# Patient Record
Sex: FEMALE | Race: WHITE | Employment: OTHER | URBAN - METROPOLITAN AREA
[De-identification: names, ages, dates, MRNs, and addresses within clinical notes are randomized per-mention and may not be internally consistent; named-entity substitution may affect disease eponyms.]

---

## 2017-02-06 PROBLEM — Z01.810 PRE-OPERATIVE CARDIOVASCULAR EXAMINATION: Status: ACTIVE | Noted: 2017-02-06

## 2017-02-20 ENCOUNTER — HOSPITAL ENCOUNTER (OUTPATIENT)
Dept: PHYSICAL THERAPY | Age: 72
Discharge: HOME | End: 2017-02-20
Payer: MEDICARE

## 2017-02-20 ENCOUNTER — HOSPITAL ENCOUNTER (OUTPATIENT)
Dept: SURGERY | Age: 72
Discharge: HOME | End: 2017-02-20
Payer: MEDICARE

## 2017-02-20 VITALS
DIASTOLIC BLOOD PRESSURE: 59 MMHG | BODY MASS INDEX: 35.34 KG/M2 | OXYGEN SATURATION: 96 % | SYSTOLIC BLOOD PRESSURE: 152 MMHG | HEIGHT: 65 IN | RESPIRATION RATE: 16 BRPM | HEART RATE: 56 BPM | WEIGHT: 212.13 LBS | TEMPERATURE: 96.8 F

## 2017-02-20 PROBLEM — R82.90 ABNORMAL URINALYSIS: Status: ACTIVE | Noted: 2017-02-20

## 2017-02-20 LAB
ANION GAP BLD CALC-SCNC: 11 MMOL/L (ref 7–16)
APPEARANCE UR: ABNORMAL
APTT PPP: 27 SEC (ref 25.3–32.9)
BACTERIA SPEC CULT: ABNORMAL
BACTERIA URNS QL MICRO: ABNORMAL /HPF
BASOPHILS # BLD AUTO: 0 K/UL (ref 0–0.2)
BASOPHILS # BLD: 0 % (ref 0–2)
BILIRUB UR QL: NEGATIVE
BUN SERPL-MCNC: 40 MG/DL (ref 8–23)
CALCIUM SERPL-MCNC: 9.3 MG/DL (ref 8.3–10.4)
CASTS URNS QL MICRO: ABNORMAL /LPF
CHLORIDE SERPL-SCNC: 98 MMOL/L (ref 98–107)
CO2 SERPL-SCNC: 25 MMOL/L (ref 21–32)
COLOR UR: YELLOW
CREAT SERPL-MCNC: 1.73 MG/DL (ref 0.6–1)
DIFFERENTIAL METHOD BLD: ABNORMAL
EOSINOPHIL # BLD: 0.2 K/UL (ref 0–0.8)
EOSINOPHIL NFR BLD: 2 % (ref 0.5–7.8)
EPI CELLS #/AREA URNS HPF: ABNORMAL /HPF
ERYTHROCYTE [DISTWIDTH] IN BLOOD BY AUTOMATED COUNT: 13.2 % (ref 11.9–14.6)
GLUCOSE SERPL-MCNC: 367 MG/DL (ref 65–100)
GLUCOSE UR STRIP.AUTO-MCNC: >1000 MG/DL
HCT VFR BLD AUTO: 37.8 % (ref 35.8–46.3)
HGB BLD-MCNC: 12.8 G/DL (ref 11.7–15.4)
HGB UR QL STRIP: NEGATIVE
IMM GRANULOCYTES # BLD: 0 K/UL (ref 0–0.5)
IMM GRANULOCYTES NFR BLD AUTO: 0.2 % (ref 0–5)
INR PPP: 1 (ref 0.9–1.2)
KETONES UR QL STRIP.AUTO: NEGATIVE MG/DL
LEUKOCYTE ESTERASE UR QL STRIP.AUTO: ABNORMAL
LYMPHOCYTES # BLD AUTO: 39 % (ref 13–44)
LYMPHOCYTES # BLD: 2.6 K/UL (ref 0.5–4.6)
MCH RBC QN AUTO: 28.6 PG (ref 26.1–32.9)
MCHC RBC AUTO-ENTMCNC: 33.9 G/DL (ref 31.4–35)
MCV RBC AUTO: 84.6 FL (ref 79.6–97.8)
MONOCYTES # BLD: 0.5 K/UL (ref 0.1–1.3)
MONOCYTES NFR BLD AUTO: 7 % (ref 4–12)
NEUTS SEG # BLD: 3.4 K/UL (ref 1.7–8.2)
NEUTS SEG NFR BLD AUTO: 52 % (ref 43–78)
NITRITE UR QL STRIP.AUTO: NEGATIVE
PH UR STRIP: 6 [PH] (ref 5–9)
PLATELET # BLD AUTO: 238 K/UL (ref 150–450)
PMV BLD AUTO: 10.1 FL (ref 10.8–14.1)
POTASSIUM SERPL-SCNC: 5 MMOL/L (ref 3.5–5.1)
PROT UR STRIP-MCNC: NEGATIVE MG/DL
PROTHROMBIN TIME: 10.3 SEC (ref 9.6–12)
RBC # BLD AUTO: 4.47 M/UL (ref 4.05–5.25)
RBC #/AREA URNS HPF: ABNORMAL /HPF
SERVICE CMNT-IMP: ABNORMAL
SODIUM SERPL-SCNC: 134 MMOL/L (ref 136–145)
SP GR UR REFRACTOMETRY: 1.02 (ref 1–1.02)
UROBILINOGEN UR QL STRIP.AUTO: 0.2 EU/DL (ref 0.2–1)
WBC # BLD AUTO: 6.7 K/UL (ref 4.3–11.1)
WBC URNS QL MICRO: ABNORMAL /HPF

## 2017-02-20 PROCEDURE — G8978 MOBILITY CURRENT STATUS: HCPCS

## 2017-02-20 PROCEDURE — 97161 PT EVAL LOW COMPLEX 20 MIN: CPT

## 2017-02-20 PROCEDURE — 85730 THROMBOPLASTIN TIME PARTIAL: CPT | Performed by: ORTHOPAEDIC SURGERY

## 2017-02-20 PROCEDURE — G8980 MOBILITY D/C STATUS: HCPCS

## 2017-02-20 PROCEDURE — 87641 MR-STAPH DNA AMP PROBE: CPT | Performed by: ORTHOPAEDIC SURGERY

## 2017-02-20 PROCEDURE — 80048 BASIC METABOLIC PNL TOTAL CA: CPT | Performed by: ORTHOPAEDIC SURGERY

## 2017-02-20 PROCEDURE — 85610 PROTHROMBIN TIME: CPT | Performed by: ORTHOPAEDIC SURGERY

## 2017-02-20 PROCEDURE — 81001 URINALYSIS AUTO W/SCOPE: CPT | Performed by: ORTHOPAEDIC SURGERY

## 2017-02-20 PROCEDURE — 85025 COMPLETE CBC W/AUTO DIFF WBC: CPT | Performed by: ORTHOPAEDIC SURGERY

## 2017-02-20 PROCEDURE — 36415 COLL VENOUS BLD VENIPUNCTURE: CPT | Performed by: ORTHOPAEDIC SURGERY

## 2017-02-20 PROCEDURE — G8979 MOBILITY GOAL STATUS: HCPCS

## 2017-02-20 PROCEDURE — 87086 URINE CULTURE/COLONY COUNT: CPT | Performed by: ORTHOPAEDIC SURGERY

## 2017-02-20 RX ORDER — LORATADINE 10 MG/1
10 TABLET ORAL
COMMUNITY
End: 2017-09-19

## 2017-02-20 NOTE — PERIOP NOTES
Dr Leta Donato:    Patient: Santy Beasley,  1945, DOS 3/14/17    During a recent visit to the surgical preadmission testing center, the above mentioned patient was found to have a non-fasting blood glucose level of 367 mg/dL. This may indicate inadequate diabetic management and raises concerns that the patient is not medically optimized for surgery. It is our standard practice to postpone elective surgery for patients who present fasting blood glucose level >300 mg/dL on the day of their procedure. The patient has been advised of this policy and counseled on the importance of glucose control. We feel that this patient is at increased risk of cancellation; however, their blood glucose may be in acceptable range when they are NPO. Therefore, we will leave the decision to you whether to delay the surgery and refer the patient to their primary care provider or keep them as currently scheduled. Our goal is to prevent as many delays and cancellations as possible while ensuring patient safety.     Sincerely,    Jadyn [facility]

## 2017-02-20 NOTE — PROGRESS NOTES
02/20/17 1030   Oxygen Therapy   O2 Sat (%) 97 %   Pulse via Oximetry 67 beats per minute   O2 Device Room air   Pre-Treatment   Breath Sounds Bilateral Clear   Pre FEV1 (liters) 1.7 liters   % Predicted 75   Incentive Spirometry Treatment   Actual Volume (ml) 1500 ml     Initial respiratory Assessment completed with pt. Pt was interviewed and evaluated in Joint camp prior to surgery. Patient ID:  Lety Escobedo  [id number]  80 y.o.  1945  Surgeon: Dr. Henny Tobin  Date of Surgery: The linked surgery was not found. Please check manually. Procedure: Total Left Knee Arthroplasty  Primary Care Physician: Beck Simmons [phone]  Specialists:                                  Pt instructed in the use of Incentive Spirometry. Pt instructed to bring Incentive Spirometer back on date of surgery & to start using Is upon return to pt room.     Pt taught proper cough technique      History of smoking:   NONE      Quit date:    Secondhand smoke:      Past procedures with Oxygen desaturation:NONE    Past Medical History   Diagnosis Date    Acute pancreatitis 2/2008    Aneurysm ([address]      splenic- \"just over 1 cm\"-(CT done July '13- followed by Dr Dontae Quiroz))    Anxiety     Bilateral sacroiliitis ([address]     Breast cyst      aspiration 6/14    CAD (coronary artery disease)      MITRAL VALVE LEAK AND ANEURYSM ON SPLENIC ARTERY; has REJI-1 flight of steps    Cellulitis 6/16/15     right leg    Chest pain     Chronic pain      lumbar/spine/thoracic, left knee    CKD (chronic kidney disease)      stage 3    Degenerative disc disease      lumbosacral    Depression 12/23/2015    Diabetes mellitus type II      fasting -170, oral meds, Hypoglycemia sx @ 90, last hgba1c- 9.2 (6/2016)    Dyslipidemia     Dyspnea     Edema 12/23/2015    Fatty liver     Fibromyalgia     Food allergy     GERD (gastroesophageal reflux disease)      controlled with medication    Heart failure ([address]      per  Denilson Ang- due to valve leakage (pt denies)    Hypertension      controlled with medication    Hypertriglyceridemia     Hypothyroidism      2/08 partial thyroidectomy    Lumbar herniated disc Aug 2013    Lumbar radiculopathy     Microcytic anemia     Mild pulmonary hypertension ([address]      per ECHO (2012)    Mitral valve regurgitation 12/23/2015    Nausea & vomiting      post-op nausea    Neuropathy      bilateral legs    Obesity (BMI 30.0-34.9) 10/2/14     BMI- 30.7    Osteoarthritis     Panic attacks     Sacroiliac dysfunction     Seasonal allergic reaction     Sleep apnea      NO CPAP    Syncope and collapse 12/23/2015                                    ENT:SINUS AND OCCASIONAL SWALLOWING ISSUE                                                                                                                      Respiratory history:SOB- STATES SHE IS HAVING A STRESS TEST Thursday                                                                  HX OF JACE? YES  - NON-COMPLIANT         Respiratory meds:                                                     PAST SLEEP STUDY:       YES      - 5 YEARS AGO WORE CPAP FOR 6 MONTHS                                        FAMILY PRESENT:                                        JACE assessment:                                               SLEEPS ON SIDE  & BACK                                                    PHYSICAL EXAM   Body mass index is 35.3 kg/(m^2).    Visit Vitals    /59 (BP 1 Location: Left arm, BP Patient Position: At rest)    Pulse (!) 56    Temp 96.8 °F (36 °C)    Resp 16    Ht 5' 5\" (1.651 m)    Wt 96.2 kg (212 lb 2 oz)    SpO2 96%    BMI 35.3 kg/m2     Neck circumference: 38.5     cm    Loud snoring:SNORES    Witnessed apnea or wakening gasping or choking:DENIES    Awakens with headaches:DENIES    Morning or daytime tiredness/ sleepiness:  TIRED    Dry mouth or sore throat in morning:YES     Freidman stage:4    SACS score:9    STOP/BAN                              CPAP:NON-COMPLIANT  DANGERS OF UNTREATED JACE EXPLAINED TO PT        CONT SAT HS        Referrals:    Pt. Phone Number:

## 2017-02-20 NOTE — ADVANCED PRACTICE NURSE
Total Joint Surgery Preoperative Chart Review      Patient ID:  Eulalio Severin  [id number]  79 y.o.  1945  Surgeon: Dr. Saira Odonnell  Date of Surgery: 3/14/2017  Procedure: Total Left Knee Arthroplasty  Primary Care Physician: Jose Doan [phone]  Specialty Physician(s):  Laura Mckeon MD Cardiology    Subjective:   Eulalio Severin is a 80 y.o. WHITE OR  female who presents for preoperative evaluation for Total Left Knee arthroplasty. This is a preoperative chart review note based on data collected by the nurse at the surgical Pre-Assessment visit.     Past Medical History   Diagnosis Date    Acute pancreatitis 2/2008    Aneurysm [facility])      splenic- \"just over 1 cm\"-(CT done July '13- followed by Dr Fernando Hardy))   [place] Bilateral sacroiliitis ([address]     Breast cyst      aspiration 6/14    CAD (coronary artery disease)      MITRAL VALVE LEAK AND ANEURYSM ON SPLENIC ARTERY; has REJI-1 flight of steps    Cellulitis 6/16/15     right leg    Chest pain     Chronic pain      lumbar/spine/thoracic, left knee    CKD (chronic kidney disease)      stage 3    Degenerative disc disease      lumbosacral    Depression 12/23/2015    Diabetes mellitus type II      fasting -170, oral meds, Hypoglycemia sx @ 90, last hgba1c- 9.2 (6/2016)    Dyslipidemia     Dyspnea     Edema 12/23/2015    Fatty liver     Fibromyalgia     Food allergy     GERD (gastroesophageal reflux disease)      controlled with medication    Heart failure ([address]      per Dr Amelie Gonzalez- due to valve leakage (pt denies)    Hypertension      controlled with medication    Hypertriglyceridemia     Hypothyroidism      2/08 partial thyroidectomy    Lumbar herniated disc Aug 2013    Lumbar radiculopathy     Microcytic anemia     Mild pulmonary hypertension ([address]      per ECHO (2012)    Mitral valve regurgitation 12/23/2015    Nausea & vomiting      post-op nausea    Neuropathy      bilateral legs    Obesity (BMI 30.0-34.9) 10/2/14     BMI- 30.7    Osteoarthritis     Panic attacks     Sacroiliac dysfunction     Seasonal allergic reaction     Sleep apnea      NO CPAP    Syncope and collapse 12/23/2015      Past Surgical History   Procedure Laterality Date    Hx other surgical  12/22/09     TENS implant [facility]    Hx partial thyroidectomy  2008     partial thyroidectomy and parathyroidectomy    Hx other surgical       nodule removed left foot and hand    Hx other surgical  10/28/13     left hand-pointer and pinky finger cyst removed    Hx breast lumpectomy Right     Hx hysterectomy  1995    Hx knee arthroscopy Left 2004     left knee    Hx back surgery  08/30/2013     Spinal Modulation implant    Hx orthopaedic Left 2010     elbow scope    Hx orthopaedic Right 2009     hand nodule removed    Hx breast biopsy Bilateral R/1988  L/2012    Hx knee arthroscopy  10/03/2014     left knee torn menincus    Hx premalig/benign skin lesion excision  09/29/2014     lower lip    Hx heart catheterization  2005     Family History   Problem Relation Age of Onset    Diabetes Father     Heart Disease Father      CHF/ CAD    Lung Disease Father      \"holes in lungs- exposed to toxins in textiles\"    Cancer Father      leukemia    Other Father      polio    Hypertension Father     Asthma Father     Diabetes Maternal Grandmother     Heart Disease Maternal Grandmother     Stroke Maternal Grandmother     Diabetes Maternal Grandfather     Heart Disease Maternal Grandfather     Diabetes Paternal Grandmother     Heart Failure Paternal Grandmother      CHF    Diabetes Paternal Grandfather     Heart Disease Paternal Grandfather     Breast Cancer Mother     Diabetes Mother     Hypertension Mother     Stroke Mother      TIAs- several    Breast Cancer Maternal Aunt      3 Maternal Aunts    Breast Cancer Paternal Aunt       Social History   Substance Use Topics    Smoking status: Never Smoker    Smokeless tobacco: Never Used    Alcohol use No       Prior to Admission medications    Medication Sig Start Date End Date Taking? Authorizing Provider   loratadine (CLARITIN) 10 mg tablet Take 10 mg by mouth nightly. Yes Historical Provider   pioglitazone (ACTOS) 30 mg tablet Take 1 Tab by mouth daily. 2/13/17  Yes Charline Mednez MD   levothyroxine (SYNTHROID) 112 mcg tablet Take 1 Tab by mouth Daily (before breakfast). Indications: hypothyroidism 2/13/17  Yes Charline Mendez MD   irbesartan (AVAPRO) 300 mg tablet Take 1 Tab by mouth nightly. 2/13/17  Yes Charline Mendez MD   ergocalciferol (VITAMIN D2) 50,000 unit capsule Take 1 Cap by mouth every Sunday. 2/19/17  Yes Charline Mendez MD   temazepam (RESTORIL) 30 mg capsule Take 1 Cap by mouth nightly as needed for Sleep. Max Daily Amount: 30 mg. 2/13/17  Yes Charline Mendez MD   magnesium oxide (MAG-OX) 400 mg tablet Take 1 Tab by mouth two (2) times a day. 2/6/17  Yes Ramos Pryor DO   glipiZIDE SR (GLUCOTROL XL) 10 mg CR tablet Take 1 Tab by mouth daily. 1/13/17  Yes Charline Mendez MD   montelukast (SINGULAIR) 10 mg tablet Take 1 Tab by mouth every evening. 1/13/17  Yes Charline Mendez MD   escitalopram oxalate (LEXAPRO) 20 mg tablet Take 1 Tab by mouth daily. Patient taking differently: Take 20 mg by mouth nightly. 1/13/17  Yes Charline Mendez MD   butalbital-acetaminophen-caffeine (FIORICET, ESGIC) -40 mg per tablet Take 1 Tab by mouth every four (4) hours as needed for Pain. Max Daily Amount: 6 Tabs. 12/8/16  Yes Charline Mendez MD   metoprolol succinate (TOPROL XL) 100 mg tablet Take 2 Tabs by mouth daily. Patient taking differently: Take 200 mg by mouth nightly. 11/23/16  Yes hCarline Mendez MD   ezetimibe-simvastatin (VYTORIN 10/40) 10-40 mg per tablet Take 1 Tab by mouth nightly. 11/23/16  Yes Charline Mendez MD   gabapentin (NEURONTIN) 100 mg capsule Take 1 Cap by mouth three (3) times daily.  Take by mouth 1 - 3 tablets three times daily as needed. 11/23/16  Yes Lexi Bowens MD   metolazone (ZAROXOLYN) 5 mg tablet 1 tab every 3 days. Take 30 minutes prior to lasix 10/20/16  Yes Essence Truong DO   repaglinide (PRANDIN) 1 mg tablet Take 1 Tab by mouth Before breakfast, lunch, and dinner. 8/25/16  Yes Lexi Bowens MD   pantoprazole (PROTONIX) 40 mg tablet Take 1 Tab by mouth two (2) times a day. Indications: GASTROESOPHAGEAL REFLUX 8/25/16  Yes Lexi Bowens MD   LORazepam (ATIVAN) 1 mg tablet Take 1 Tab by mouth three (3) times daily as needed for Anxiety. Max Daily Amount: 3 mg. Indications: ANXIETY 8/25/16  Yes Lexi Bowens MD   diclofenac (VOLTAREN) 1 % gel Apply 2 g to affected area four (4) times daily. 8/25/16  Yes Lexi Bowens MD   fluticasone (FLONASE) 50 mcg/actuation nasal spray 2 Sprays by Both Nostrils route daily. 5/23/16  Yes Elisa Curtis MD   SUMAtriptan (IMITREX) 50 mg tablet Take 1 Tab by mouth as needed for Migraine. Indications: MIGRAINE 5/23/16  Yes Elisa Curtis MD   ferrous sulfate 325 mg (65 mg iron) tablet Take  by mouth two (2) times a day. Yes Historical Provider   oxyCODONE IR (OXY-IR) 15 mg immediate release tablet Take 1 Tab by mouth every four (4) hours as needed for Pain. Max Daily Amount: 90 mg. Indications: PAIN 2/13/15  Yes Elisa Curtis MD   cyclosporine (RESTASIS) 0.05 % ophthalmic emulsion Administer 1 drop to both eyes nightly. Indications: DRY EYE   Yes Historical Provider   cetirizine (ALLERGY RELIEF, CETIRIZINE,) 10 mg tablet Take 10 mg by mouth nightly.  Indications: ALLERGIC RHINITIS   Yes Historical Provider   OTHER jobst compression stockings 20 mmHg. R60.0 - bilateral lower extremity swelling 11/13/15   Elisa Curtis MD     Allergies   Allergen Reactions    Catapres [Clonidine] Rash, Swelling and Other (comments)     Catapres patch,  headache    Celebrex [Celecoxib] Swelling     Red rash, itching    Cymbalta [Duloxetine] Rash  Morphine Rash and Swelling     migraines    Oxycontin [Oxycodone] Other (comments)     Migraines, elevated BP    Shellfish Containing Products Anaphylaxis    Sulfa (Sulfonamide Antibiotics) Unknown (comments)          Objective:     Physical Exam:   Patient Vitals for the past 24 hrs:   Temp Pulse Resp BP SpO2   02/20/17 1208 96.8 °F (36 °C) (!) 56 16 152/59 96 %       ECG:    EKG Results     None          Data Review:   Labs:   Recent Results (from the past 24 hour(s))   CBC WITH AUTOMATED DIFF    Collection Time: 02/20/17 10:28 AM   Result Value Ref Range    WBC 6.7 4.3 - 11.1 K/uL    RBC 4.47 4.05 - 5.25 M/uL    HGB 12.8 11.7 - 15.4 g/dL    HCT 37.8 35.8 - 46.3 %    MCV 84.6 79.6 - 97.8 FL    MCH 28.6 26.1 - 32.9 PG    MCHC 33.9 31.4 - 35.0 g/dL    RDW 13.2 11.9 - 14.6 %    PLATELET 360 121 - 229 K/uL    MPV 10.1 (L) 10.8 - 14.1 FL    DF AUTOMATED      NEUTROPHILS 52 43 - 78 %    LYMPHOCYTES 39 13 - 44 %    MONOCYTES 7 4.0 - 12.0 %    EOSINOPHILS 2 0.5 - 7.8 %    BASOPHILS 0 0.0 - 2.0 %    IMMATURE GRANULOCYTES 0.2 0.0 - 5.0 %    ABS. NEUTROPHILS 3.4 1.7 - 8.2 K/UL    ABS. LYMPHOCYTES 2.6 0.5 - 4.6 K/UL    ABS. MONOCYTES 0.5 0.1 - 1.3 K/UL    ABS. EOSINOPHILS 0.2 0.0 - 0.8 K/UL    ABS. BASOPHILS 0.0 0.0 - 0.2 K/UL    ABS. IMM.  GRANS. 0.0 0.0 - 0.5 K/UL   METABOLIC PANEL, BASIC    Collection Time: 02/20/17 10:28 AM   Result Value Ref Range    Sodium 134 (L) 136 - 145 mmol/L    Potassium 5.0 3.5 - 5.1 mmol/L    Chloride 98 98 - 107 mmol/L    CO2 25 21 - 32 mmol/L    Anion gap 11 7 - 16 mmol/L    Glucose 367 (H) 65 - 100 mg/dL    BUN 40 (H) 8 - 23 MG/DL    Creatinine 1.73 (H) 0.6 - 1.0 MG/DL    GFR est AA 37 (L) >60 ml/min/1.73m2    GFR est non-AA 31 (L) >60 ml/min/1.73m2    Calcium 9.3 8.3 - 10.4 MG/DL   PROTHROMBIN TIME + INR    Collection Time: 02/20/17 10:28 AM   Result Value Ref Range    Prothrombin time 10.3 9.6 - 12.0 sec    INR 1.0 0.9 - 1.2     PTT    Collection Time: 02/20/17 10:28 AM   Result Value Ref Range    aPTT 27.0 25.3 - 32.9 SEC   URINALYSIS W/ RFLX MICROSCOPIC    Collection Time: 02/20/17 10:28 AM   Result Value Ref Range    Color YELLOW      Appearance CLOUDY      Specific gravity 1.018 1.001 - 1.023      pH (UA) 6.0 5.0 - 9.0      Protein NEGATIVE  NEG mg/dL    Glucose >1000 mg/dL    Ketone NEGATIVE  NEG mg/dL    Bilirubin NEGATIVE  NEG      Blood NEGATIVE  NEG      Urobilinogen 0.2 0.2 - 1.0 EU/dL    Nitrites NEGATIVE  NEG      Leukocyte Esterase MODERATE (A) NEG      WBC 10-20 0 /hpf    RBC 5-10 0 /hpf    Epithelial cells 5-10 0 /hpf    Bacteria TRACE 0 /hpf    Casts 3-5 0 /lpf         Problem List:  )  Patient Active Problem List   Diagnosis Code    Lateral epicondylitis  of elbow M77.10    Essential hypertension, benign I10    Hypothyroidism E03.9    GERD (gastroesophageal reflux disease) K21.9    Panic attacks F41.0    Generalized anxiety disorder F41.1    Mixed hyperlipidemia E78.2    Degenerative disc disease, lumbar M51.36    Osteoarthritis M19.90    Fibromyalgia M79.7    CAD (coronary artery disease) I25.10    Sleep apnea G47.30    Vitamin D deficiency E55.9    Type 2 diabetes mellitus, controlled (HCC) E11.9    Tear of medial cartilage or meniscus of knee, current [id number]    Tear of lateral cartilage or meniscus of knee, current S83.289A    Primary localized osteoarthrosis, lower leg M17.10    Edema R60.9    Syncope and collapse R55    Mitral valve regurgitation I34.0    Depression F32.9    Pre-operative cardiovascular examination Z01.810    Abnormal urinalysis R82.90       Total Joint Surgery Pre-Assessment Recommendations:     Renal referral prior to surgery, diabetic teaching and management prior to surgery. Will need to gain control of DM management for optimal outcomes. The patient is not compliant with wearing CPAP. Recommend oxygen saturation monitoring during hospitalization. Abnormal urinalysis. Culture pending. Will call in antibiotic if indicated. Signed By: Clayton Campos NP-C    February 20, 2017

## 2017-02-20 NOTE — PERIOP NOTES
Lab results within anesthesia guidelines except for elevated creatinine result- will have anesthesia review per protocol; MRSA swab result pending. All results faxed to pt's PCP, Terence Menon MD, [phone], per surgeon's order. Pt's PCP, Terence Menon MD, reviewed pt's elevated BG result from today (via email) and agrees that he would like to see pt prior to surgery in order to obtain \"better glycemic control. \" Pt instructed to call PCP's office to schedule appt. Harper Antunez, joint [place] NP, reviewed labs including creatinine result and would like pt to have nephrology clearance prior to surgery as pt states she has known CKD stage 3 but has not had nephrology consult at this time. Left message with Dr Lux Alonso's nurse, re above clearance needs prior to surgery. Recent Results (from the past 12 hour(s))   CBC WITH AUTOMATED DIFF    Collection Time: 02/20/17 10:28 AM   Result Value Ref Range    WBC 6.7 4.3 - 11.1 K/uL    RBC 4.47 4.05 - 5.25 M/uL    HGB 12.8 11.7 - 15.4 g/dL    HCT 37.8 35.8 - 46.3 %    MCV 84.6 79.6 - 97.8 FL    MCH 28.6 26.1 - 32.9 PG    MCHC 33.9 31.4 - 35.0 g/dL    RDW 13.2 11.9 - 14.6 %    PLATELET 814 090 - 955 K/uL    MPV 10.1 (L) 10.8 - 14.1 FL    DF AUTOMATED      NEUTROPHILS 52 43 - 78 %    LYMPHOCYTES 39 13 - 44 %    MONOCYTES 7 4.0 - 12.0 %    EOSINOPHILS 2 0.5 - 7.8 %    BASOPHILS 0 0.0 - 2.0 %    IMMATURE GRANULOCYTES 0.2 0.0 - 5.0 %    ABS. NEUTROPHILS 3.4 1.7 - 8.2 K/UL    ABS. LYMPHOCYTES 2.6 0.5 - 4.6 K/UL    ABS. MONOCYTES 0.5 0.1 - 1.3 K/UL    ABS. EOSINOPHILS 0.2 0.0 - 0.8 K/UL    ABS. BASOPHILS 0.0 0.0 - 0.2 K/UL    ABS. IMM.  GRANS. 0.0 0.0 - 0.5 K/UL   METABOLIC PANEL, BASIC    Collection Time: 02/20/17 10:28 AM   Result Value Ref Range    Sodium 134 (L) 136 - 145 mmol/L    Potassium 5.0 3.5 - 5.1 mmol/L    Chloride 98 98 - 107 mmol/L    CO2 25 21 - 32 mmol/L    Anion gap 11 7 - 16 mmol/L    Glucose 367 (H) 65 - 100 mg/dL    BUN 40 (H) 8 - 23 MG/DL Creatinine 1.73 (H) 0.6 - 1.0 MG/DL    GFR est AA 37 (L) >60 ml/min/1.73m2    GFR est non-AA 31 (L) >60 ml/min/1.73m2    Calcium 9.3 8.3 - 10.4 MG/DL   PROTHROMBIN TIME + INR    Collection Time: 02/20/17 10:28 AM   Result Value Ref Range    Prothrombin time 10.3 9.6 - 12.0 sec    INR 1.0 0.9 - 1.2     PTT    Collection Time: 02/20/17 10:28 AM   Result Value Ref Range    aPTT 27.0 25.3 - 32.9 SEC   URINALYSIS W/ RFLX MICROSCOPIC    Collection Time: 02/20/17 10:28 AM   Result Value Ref Range    Color YELLOW      Appearance CLOUDY      Specific gravity 1.018 1.001 - 1.023      pH (UA) 6.0 5.0 - 9.0      Protein NEGATIVE  NEG mg/dL    Glucose >1000 mg/dL    Ketone NEGATIVE  NEG mg/dL    Bilirubin NEGATIVE  NEG      Blood NEGATIVE  NEG      Urobilinogen 0.2 0.2 - 1.0 EU/dL    Nitrites NEGATIVE  NEG      Leukocyte Esterase MODERATE (A) NEG      WBC 10-20 0 /hpf    RBC 5-10 0 /hpf    Epithelial cells 5-10 0 /hpf    Bacteria TRACE 0 /hpf    Casts 3-5 0 /lpf

## 2017-02-20 NOTE — PERIOP NOTES
Dr Betty De La Cruz:      Patient: Brinda Mina,  1945, DOS 3/14/17    During a recent visit to the surgical preadmission testing center, the above mentioned patient was found to have a non-fasting blood glucose level of 367 mg/dL. This may indicate inadequate diabetic management and raises concerns that the patient is not medically optimized for surgery. It is our standard practice to postpone elective surgery for patients who present fasting blood glucose level >300 mg/dL on the day of their procedure. The patient has been advised of this policy and counseled on the importance of glucose control. We feel that this patient is at increased risk of cancellation; however, their blood glucose may be in acceptable range when they are NPO. Therefore, we will leave the decision to you whether to delay the surgery and refer the patient to their primary care provider or keep them as currently scheduled. Our goal is to prevent as many delays and cancellations as possible while ensuring patient safety.     Sincerely,    Jadyn [facility]

## 2017-02-20 NOTE — PROGRESS NOTES
Padmaja Henry  : [id number](89 y.o.) Joint [facility] at [place] [address], [address] [facility]  [phone]       Physical Therapy Prehab Plan of Treatment and Evaluation Summary:2017    ICD-10: Treatment Diagnosis:   · Pain in Left Knee (M25.562)  · Stiffness of Left Knee, Not elsewhere classified (R02.912)  Precautions/Allergies:   Catapres [clonidine]; Celebrex [celecoxib]; Cymbalta [duloxetine]; Morphine; Oxycontin [oxycodone]; Shellfish containing products; and Sulfa (sulfonamide antibiotics)  MEDICAL/REFERRING DIAGNOSIS:  Unilateral primary osteoarthritis, left knee [M17.12]  REFERRING PHYSICIAN: Monica Vizcarra MD  DATE OF SURGERY: 3/14/17   Assessment:   Comments:  Scheduled for L TKA. Patient has surgically implanted neurotransmitter for chronic LBP management and therefore will need to have general anesthesia. PROBLEM LIST (Impacting functional limitations):  Ms. Galo Dai presents with the following left lower extremity(s) problems:  1. Gait  2. Range of Motion  3. Home Exercise Program  4. Pain   INTERVENTIONS PLANNED:  1. Home Exercise Program  2. Educational Discussion     TREATMENT PLAN: Effective Dates: 2017 TO 2017. Frequency/Duration: Patient to continue to perform home exercise program at least twice per day up until her surgery. GOALS: (Goals have been discussed and agreed upon with patient.)  Discharge Goals: Time Frame: 1 Day  1. Patient will demonstrate independence with a home exercise program designed to increase functional technique and pain control to minimize functional deficits and optimize patient for total joint replacement. Rehabilitation Potential For Stated Goals: Good  Regarding Belen Daley's therapy, I certify that the treatment plan above will be carried out by a therapist or under their direction.   Thank you for this referral,  Tristan Teixeira, PT               HISTORY:   Present Symptoms:  Pain Intensity 1: 7  Pain Location 1: Knee  Pain Orientation 1: Left   History of Present Injury/Illness (Reason for Referral):  Medical/Referring Diagnosis: Unilateral primary osteoarthritis, left knee [M17.12]   Past Medical History/Comorbidities:   Ms. Ellis Pearson  has a past medical history of Acute pancreatitis (2/2008); Aneurysm ([facility] [address] Anxiety; Bilateral sacroiliitis ([facility] [address] Breast cyst; CAD (coronary artery disease); Cellulitis (6/16/15); Chest pain; Chronic pain; CKD (chronic kidney disease); Degenerative disc disease; Depression (12/23/2015); Diabetes mellitus type II; Dyslipidemia; Dyspnea; Edema (12/23/2015); Fatty liver; Fibromyalgia; Food allergy; GERD (gastroesophageal reflux disease); Heart failure ([address] Hypertension; Hypertriglyceridemia; Hypothyroidism; Lumbar herniated disc (Aug 2013); Lumbar radiculopathy; Microcytic anemia; Mild pulmonary hypertension ([address] Mitral valve regurgitation (12/23/2015); Nausea & vomiting; Neuropathy; Obesity (BMI 30.0-34.9) (10/2/14); Osteoarthritis; Panic attacks; Sacroiliac dysfunction; Seasonal allergic reaction; Sleep apnea; and Syncope and collapse (12/23/2015). Ms. Ellis Pearson  has a past surgical history that includes other surgical (12/22/09); partial thyroidectomy (2008); other surgical; other surgical (10/28/13); breast lumpectomy (Right); hysterectomy (1995); knee arthroscopy (Left, 2004); back surgery (08/30/2013); orthopaedic (Left, 2010); orthopaedic (Right, 2009); breast biopsy (Bilateral, R/1988  L/2012); knee arthroscopy (10/03/2014); premalig/benign skin lesion excision (09/29/2014); and heart catheterization (2005).   Social History/Living Environment:   Home Environment: Private residence  # Steps to Enter: 2  Rails to Enter: No  Wheelchair Ramp: Yes  One/Two Story Residence: One story  Living Alone: No  Support Systems: Child(sarah), Spouse/Significant Other/Partner  Patient Expects to be Discharged to[de-identified] Private residence  Current DME Used/Available at Home: Ian Patel, Trilby Goodell, straight, Grab bars  Tub or Shower Type: Other (comment) (custom walk in tub)  Work/Activity:  retired  Dominant Side:  LEFT  Current Medications:  See Pre-assessment nursing note   Number of Personal Factors/Comorbidities that affect the Plan of Care: 1-2: MODERATE COMPLEXITY   EXAMINATION:   ADLs (Current Functional Status):   Ambulation:  [x] Independent  [] Walk Indoors Only  [] Walk Outdoors  [x] Use Assistive Device-occasionally uses cane  [] Use Wheelchair Only Dressing:  [] [address] from Someone for:  [x] Sock/Shoes-occasionally  [x] Pants-occasionally  [] Everything   Bathing/Showering:   [x] Independent  [] Requires Assistance from Someone  [] [address]:  [] Routine house and yard work  [x] Light Housework Only  [] None   Observation/Orthostatic Postural Assessment: Forward head, Rounded shoulders, Genu valgus left, Genu valgus right  ROM/Flexibility:   AROM: Within functional limits (for R LE)                LLE AROM  L Knee Flexion: 110  L Knee Extension: -10          Strength:   Strength: Within functional limits [facility] but weaker on L)                  Functional Mobility:         Stand to Sit: Independent, Additional time  Sit to Stand: Modified independent, Additional time  Distance (ft): 450 Feet (ft)  Ambulation - Level of Assistance: Independent  Speed/Lindsay: Slow  Stance: Left decreased  Gait Abnormalities: Antalgic          Balance:    Sitting: Intact  Standing: Intact   Body Structures Involved:  1. Bones  2. Joints  3. Muscles  4. Ligaments Body Functions Affected:  1. Sensory/Pain  2. Neuromusculoskeletal  3. Movement Related Activities and Participation Affected:  1. General Tasks and Demands  2. Mobility  3.  Self Care   Number of elements that affect the Plan of Care: 3: MODERATE COMPLEXITY   CLINICAL PRESENTATION:   Presentation: Stable and uncomplicated: LOW COMPLEXITY   CLINICAL DECISION MAKING:   Outcome Measure: Tool Used: Lower Extremity Functional Scale (LEFS)  Score:  Initial: 15/80 Most Recent: X/80 (Date: -- )   Interpretation of Score: 20 questions each scored on a 5 point scale with 0 representing \"extreme difficulty or unable to perform\" and 4 representing \"no difficulty\". The lower the score, the greater the functional disability. 80/80 represents no disability. Minimal detectable change is 9 points. Score 80 79-65 64-49 48-33 32-17 16-1 0   Modifier CH CI CJ CK CL CM CN     ? Mobility - Walking and Moving Around:     - CURRENT STATUS: CM - 80%-99% impaired, limited or restricted    - GOAL STATUS: CM - 80%-99% impaired, limited or restricted    - D/C STATUS:  CM - 80%-99% impaired, limited or restricted  Medical Necessity:   · Ms. Manuela Merino is expected to optimize her lower extremity strength and ROM in preparation for joint replacement surgery. Reason for Services/Other Comments:  · Achieve baseline assesment of musculoskeletal system, functional mobility and home environment. , educate in PT HEP in preparation for surgery, educate in hospital plan of care. Use of outcome tool(s) and clinical judgement create a POC that gives a: Clear prediction of patient's progress: LOW COMPLEXITY   TREATMENT:   Treatment/Session Assessment:  Patient was instructed in PT- HEP to increase strength and ROM in LEs. Answered all questions. · Post session pain:  7  · Compliance with Program/Exercises: anticipate that patient will be compliant.   Total Treatment Duration:  PT Patient Time In/Time Out  Time In: 1015  Time Out: 5555 Chu Jones, PT

## 2017-02-20 NOTE — PERIOP NOTES
Patient verified name, , and surgery as listed in [facility]. Type 3 surgery, joint PAT assessment complete. Labs per surgeon: CBC, BMP, PT/PTT, UA and MRSA swab collected  Labs per anesthesia protocol: no additional labs needed  EKG: done (2017) at [facility] clearance appt- result SB and within anesthesia guidelines    Pt has cardiac stress test scheduled 17 at [facility]. Most recent cardiology office note in EMR for reference but pt will return to [facility] 3/1/17 after cardiac testing to receive clearance. ECHO () in EMR for anesthesia reference. Pt's non-fasting BG= 367 today in PAT. Pt educated that BG will need to be <300 DOS or her surgery is at risk for being canceled. Pt informed BG result will be forwarded to surgeon's office and PCP's office and would recommend following up with PCP prior to surgery to help discuss BG management. Dr Vicente Mars, anesthesiologist, called per protocol of BG >300 and informed of above POC and that pt verbalizes understanding- Dr Vicente Mars in agreement with pt following up with PCP prior to surgery. Pt with spinal cord stimulator in back and pt states Dr Romario More has informed pt she will need general anesthesia (her pain management doctor has also recommended GA)- pt has no questions for anesthesia at this time. Hibiclens and instructions given per hospital policy. Nasal Swab collected per MD order and instructions for Mupirocin nasal ointment if required. Patient provided with handouts including Guide to Surgery, Pain Management, Hand Hygiene, Blood Transfusion Education, and [place] [facility] Brochure. Patient answered medical/surgical history questions at their best of ability. All prior to admission medications documented in [facility]. Original medication prescription bottles visualized during patient appointment.      Patient instructed to hold all vitamins 7 days prior to surgery and NSAIDS 5 days prior to surgery. Medications to be held prior to surgery- diclofenac gel. Patient instructed to continue previous medications as prescribed prior to surgery and to take the following medications the day of surgery according to anesthesia guidelines with a small sip of water: flonase, gabapentin, synthroid, ativan, protonix, oxycodone PRN, imitrex PRN. Patient taught back and verbalized understanding.

## 2017-02-21 NOTE — PERIOP NOTES
Prescription for Mupirocin ointment 22g tube, apply intranasally to both nostrils BID x5 days pre-operatively or for a total of 10 doses; called to OANH Wynn at [phone]. Patient notified of positive MSSA nasal swab, verbalizes understanding of usage starting on 3/9/17.      2/20/2017  4:54 PM - Boris, Lab [facility] [employer] [employer]   Component Results   Component Value Flag Ref Range Units Status   Special Requests: NO SPECIAL REQUESTS     Final   Culture result:  (A)    Final   MRSA target DNA not detected, SA target DNA detected.   A MRSA negative, SA positive test result does not preclude MRSA nasal colonization.

## 2017-02-21 NOTE — PERIOP NOTES
Return call from [facility] @ Dr. Saul Hardwick office. Received message that , anesthesia, requests pcp visit for better glycemic control and Natalie Moreno NP requests nephrology consult (see previous RN notes). [facility] will address with Dr. Arpit Sommers tomorrow (2/22/17).

## 2017-02-23 LAB
BACTERIA SPEC CULT: NORMAL
SERVICE CMNT-IMP: NORMAL

## 2017-02-24 NOTE — PERIOP NOTES
Call from Vito @ [address] [place] [address] office. [facility] pt has appt with [facility] next week  Requesting labs be faxed to [facility] for that appt to [facility] attention @ fax # [phone]. Labs sent.

## 2017-03-02 NOTE — PERIOP NOTES
Chart follow up:    1. Pt had cardiology work up, stress test performed 2/23/17- results: normal stress EKG, normal perfusion, normal LVSF. Cardiology office visit 3/1/17 with Dr. Britt Epps ok, no [facility] needed now. STRONG which seems more from 20# weight gain than anything else. Mild MR and normal EF on 2013 echo, follow. Return in 6 months. 2. Pt was also evaluated by PCP for hyperglycemia. Per Dr. Jacque Merino note dated 2/27/17: \"Diabetes-random po=027. Pt needs to be on injectable insulin. She reports that she is very resistant to this. Refer to endocrinology for assistance in transitioning to injectable insulin. We will increase her Actos to 45mg ,continue prandin and glipizide XL 10mg a day. Recheck random glucose in a month. Polypharmacy. continuing to attmpt to assist the patient with weaning off of some of her medicines\". * Per note in EMR from Dr. Izabella Skinner dated 2/20/17:\"elective surgery should be postponed unitl better glycemic control is obtained\". [id number] [facility] to verify office visit/appointment scheduled. Left message with medical records to return call to PAT. Chart posted for anesthesia to reeval after all of above completed in chart.

## 2017-03-13 ENCOUNTER — ANESTHESIA EVENT (OUTPATIENT)
Dept: SURGERY | Age: 72
DRG: 470 | End: 2017-03-13
Payer: MEDICARE

## 2017-03-13 NOTE — PERIOP NOTES
Received call from Juvencio Feliciano at Dr. Polina Rodrigues office returning call to [facility]. Informed Juvencio Feliciano that [place] not available. Per Juvencio Feliciano, she discussed pt with Dr. Mary Carmen Randle and Dr. Mary Carmen Randle is not comfortable providing patient with medical clearance prior to endocrinology work up for uncontrolled diabetes. Juvencio Feliciano asked if anesthesia aware of pt's consults with cardiology and nephrology also. Informed Juvencio Feliciano that cardiology and nephrology notes have been reviewed by anesthesia and that (Dylon Shimon) anesthesia is ok with proceeding based on those reports.

## 2017-03-13 NOTE — PERIOP NOTES
Nephrology clearance note on chart for review if needed. States avoid diuretic and ARB on DOS, keep patient well hydrated.

## 2017-03-13 NOTE — H&P
H&P    Patient ID:  Gael Hernandez  [id number]  89 y.o.  1945  Surgeon:  Zane Willson MD  Date of Surgery: * No surgery date entered *  Procedure: Left Knee Total Arthroplasty  Primary Care Physician: Eddie Camarillo MD        Subjective:  Gael Hernandez is a 80 y.o. WHITE [place]  female who presents with Left Knee pain. She has history of Left Knee pain for several months ago. Symptoms worse with walking and relieved with rest. Conservative treatment consisting of  therapeutic injections into the knee has not helped. The patient  lives with their spouse. The patients goal after surgery is improved pain and function.         Past Medical History:   Diagnosis Date    Acute pancreatitis 2/2008    Aneurysm [facility])     splenic- \"just over 1 cm\"-(CT done July '13- followed by Dr Stephie Ferrer))   Ian Mott Bilateral sacroiliitis ([address]     Breast cyst     aspiration 6/14    CAD (coronary artery disease)     MITRAL VALVE LEAK AND ANEURYSM ON SPLENIC ARTERY; has REJI-1 flight of steps    Cellulitis 6/16/15    right leg    Chest pain     Chronic pain     lumbar/spine/thoracic, left knee    CKD (chronic kidney disease)     stage 3    Degenerative disc disease     lumbosacral    Depression 12/23/2015    Diabetes mellitus type II     fasting -170, oral meds, Hypoglycemia sx @ 89, last hgba1c- 9.2 (6/2016)    Dyslipidemia     Dyspnea     Edema 12/23/2015    Fatty liver     Fibromyalgia     Food allergy     GERD (gastroesophageal reflux disease)     controlled with medication    Heart failure ([address]     per Dr Althea David- due to valve leakage (pt denies)    Hypertension     controlled with medication    Hypertriglyceridemia     Hypothyroidism     2/08 partial thyroidectomy    Lumbar herniated disc Aug 2013    Lumbar radiculopathy     Microcytic anemia     Mild pulmonary hypertension ([address]     per [facility] (2012)    Mitral valve regurgitation 12/23/2015    Nausea & vomiting post-op nausea    Neuropathy     bilateral legs    Obesity (BMI 30.0-34.9) 10/2/14    BMI- 30.7    Osteoarthritis     Panic attacks     Sacroiliac dysfunction     Seasonal allergic reaction     Sleep apnea     NO CPAP    Syncope and collapse 12/23/2015      Past Surgical History:   Procedure Laterality Date    HX BACK SURGERY  08/30/2013    Spinal Modulation implant    HX BREAST BIOPSY Bilateral R/1988  L/2012    HX BREAST LUMPECTOMY Right     HX HEART CATHETERIZATION  2005    HX HYSTERECTOMY  1995    HX KNEE ARTHROSCOPY Left 2004    left knee    HX KNEE ARTHROSCOPY  10/03/2014    left knee torn menincus    HX ORTHOPAEDIC Left 2010    elbow scope    HX ORTHOPAEDIC Right 2009    hand nodule removed    HX OTHER SURGICAL  12/22/09    TENS implant [facility] [facility]    HX OTHER SURGICAL      nodule removed left foot and hand    HX OTHER SURGICAL  10/28/13    left hand-pointer and pinky finger cyst removed    HX PARTIAL THYROIDECTOMY  2008    partial thyroidectomy and parathyroidectomy    HX PREMALIG/BENIGN SKIN LESION EXCISION  09/29/2014    lower lip     Family History   Problem Relation Age of Onset    Diabetes Father     Heart Disease Father      CHF/ CAD    Lung Disease Father      \"holes in lungs- exposed to toxins in textiles\"    Cancer Father      leukemia    Other Father      polio    Hypertension Father     Asthma Father     Diabetes Maternal Grandmother     Heart Disease Maternal Grandmother     Stroke Maternal Grandmother     Diabetes Maternal Grandfather     Heart Disease Maternal Grandfather     Diabetes Paternal Grandmother     Heart Failure Paternal Grandmother      CHF    Diabetes Paternal Grandfather     Heart Disease Paternal Grandfather     Breast Cancer Mother     Diabetes Mother     Hypertension Mother     Stroke Mother      TIAs- several    Breast Cancer Maternal Aunt      3 Maternal Aunts    Breast Cancer Paternal Aunt       Social History   Substance Use Topics  Smoking status: Never Smoker    Smokeless tobacco: Never Used    Alcohol use No       Prior to Admission medications    Medication Sig Start Date End Date Taking? Authorizing Provider   pantoprazole (PROTONIX) 40 mg tablet Take 1 Tab by mouth two (2) times a day. Indications: GASTROESOPHAGEAL REFLUX 2/27/17   Fatou Carrillo MD   gabapentin (NEURONTIN) 100 mg capsule Take 1 Cap by mouth three (3) times daily. Take by mouth 1 - 3 tablets three times daily as needed. 2/27/17   Fatou Carrillo MD   pioglitazone (ACTOS) 45 mg tablet Take 1 Tab by mouth daily. 2/27/17   Fatou Carrillo MD   LORazepam (ATIVAN) 1 mg tablet Take 1 Tab by mouth every eight (8) hours as needed for Anxiety. Max Daily Amount: 3 mg. Indications: ANXIETY 2/27/17   Fatou Carrillo MD   mupirocin calcium (BACTROBAN) 2 % nasal ointment by Both Nostrils route two (2) times a day. Historical Provider   loratadine (CLARITIN) 10 mg tablet Take 10 mg by mouth nightly. Historical Provider   levothyroxine (SYNTHROID) 112 mcg tablet Take 1 Tab by mouth Daily (before breakfast). Indications: hypothyroidism 2/13/17   Fatou Carrillo MD   irbesartan (AVAPRO) 300 mg tablet Take 1 Tab by mouth nightly. 2/13/17   Fatou Carrillo MD   ergocalciferol (VITAMIN D2) 50,000 unit capsule Take 1 Cap by mouth every Sunday. 2/19/17   Fatou Carrillo MD   temazepam (RESTORIL) 30 mg capsule Take 1 Cap by mouth nightly as needed for Sleep. Max Daily Amount: 30 mg. 2/13/17   Fatou Carrillo MD   magnesium oxide (MAG-OX) 400 mg tablet Take 1 Tab by mouth two (2) times a day. 2/6/17   Gris Cos, DO   glipiZIDE SR (GLUCOTROL XL) 10 mg CR tablet Take 1 Tab by mouth daily. 1/13/17   Fatou Carrillo MD   montelukast (SINGULAIR) 10 mg tablet Take 1 Tab by mouth every evening. 1/13/17   Fatou Carrillo MD   escitalopram oxalate (LEXAPRO) 20 mg tablet Take 1 Tab by mouth daily. Patient taking differently: Take 20 mg by mouth nightly.  1/13/17   Ani Tripathi Mary Carmen Randle MD   butalbital-acetaminophen-caffeine (FIORICET, ESGIC) -40 mg per tablet Take 1 Tab by mouth every four (4) hours as needed for Pain. Max Daily Amount: 6 Tabs. 12/8/16   Dandy Hernandez MD   metoprolol succinate (TOPROL XL) 100 mg tablet Take 2 Tabs by mouth daily. Patient taking differently: Take 200 mg by mouth nightly. 11/23/16   Dandy Hernandez MD   ezetimibe-simvastatin (VYTORIN 10/40) 10-40 mg per tablet Take 1 Tab by mouth nightly. 11/23/16   Dandy Hernandez MD   metolazone (ZAROXOLYN) 5 mg tablet 1 tab every 3 days. Take 30 minutes prior to lasix 10/20/16   Lodema Sera, DO   repaglinide (PRANDIN) 1 mg tablet Take 1 Tab by mouth Before breakfast, lunch, and dinner. 8/25/16   Dandy Hernandez MD   diclofenac (VOLTAREN) 1 % gel Apply 2 g to affected area four (4) times daily. 8/25/16   Dandy Hernandez MD   fluticasone (FLONASE) 50 mcg/actuation nasal spray 2 Sprays by Both Nostrils route daily. 5/23/16   Bharath Glover MD   SUMAtriptan (IMITREX) 50 mg tablet Take 1 Tab by mouth as needed for Migraine. Indications: MIGRAINE 5/23/16   Bharath Glover MD   ferrous sulfate 325 mg (65 mg iron) tablet Take  by mouth two (2) times a day. Historical Provider   OTHER jobst compression stockings 20 mmHg. R60.0 - bilateral lower extremity swelling 11/13/15   Bharath Glover MD   oxyCODONE IR (OXY-IR) 15 mg immediate release tablet Take 1 Tab by mouth every four (4) hours as needed for Pain. Max Daily Amount: 90 mg. Indications: PAIN 2/13/15   Bharath Glover MD   cyclosporine (RESTASIS) 0.05 % ophthalmic emulsion Administer 1 drop to both eyes nightly. Indications: DRY EYE    Historical Provider   cetirizine (ALLERGY RELIEF, CETIRIZINE,) 10 mg tablet Take 10 mg by mouth nightly.  Indications: ALLERGIC RHINITIS    Historical Provider     Allergies   Allergen Reactions    Catapres [Clonidine] Rash, Swelling and Other (comments)     Catapres patch,  headache    Celebrex [Celecoxib] Swelling     Red rash, itching    Cymbalta [Duloxetine] Rash    Morphine Rash and Swelling     migraines    Oxycontin [Oxycodone] Other (comments)     Migraines, elevated BP    Shellfish Containing Products Anaphylaxis    Sulfa (Sulfonamide Antibiotics) Unknown (comments)        REVIEW OF SYSTEMS:  CONSTITUTIONAL: Denies fever, decreased appetite, weight loss/gain, night sweats or fatigue. HEENT: Denies vision or hearing changes. has glasses. denies hearing aids. CARDIAC: Denies CP, palpitations, rheumatic fever, murmur, peripheral edema, carotid artery disease or syncopal episodes. RESPIRATORY: Denies dyspnea on exertion, asthma, COPD or orthopnea. GI: Denies GERD, history of GI bleed or melena, PUD, hepatitis or cirrhosis. : Denies dysuria, hematuria. denies BPH symptoms. HEMATOLOGIC: Denies anemia or blood disorders. ENDOCRINE: Denies thyroid disease. MUSCULOSKELETAL: See HPI. NEUROLOGIC: Denies seizure, peripheral neuropathy or memory loss. PSYCH: Denies depression, anxiety or insomnia. SKIN: Denies rash or open sores. Objective:    PHYSICAL EXAM  GENERAL: Patient Vitals for the past 8 hrs:   Height Weight   03/13/17 1226 5' 5\" (1.651 m) 96.2 kg (212 lb 2 oz)    EYES: PERRL. EOM intact. MOUTH:Teeth and Gums normal. NECK: Full ROM. Trachea midline. No thyromegaly or JVD. CARDIOVASCULAR: Regular rate and rhythm. No murmur or gallops. No carotid bruits. Peripheral pulses: radial 2 +, PT 2+, DP 2+ bilaterally. LUNGS: CTA bilaterally. No wheezes, rhonchi or rales. GI: positive BS. Abdomen nontender. NEUROLOGIC: Alert and oriented x 3. Bilateral equal strong had grasp and bilateral equal strong plantar flexion and dorsiflexion. GAIT: abnormal  MUSCULOSKELETAL: ROM: full range with pain. Tenderness: Medial joint line, Lateral joint line and Patella. Crepitus: present. SKIN: No rash, bruising, swelling, redness or warmth.      Labs:  No results found for this or any previous visit (from the past 24 hour(s)). Xray Left Knee: Joint space narrowing    Assessment:  Advanced Left Knee Osteoarthritis. Total Left Knee Arthroplasty Indicated. Patient Active Problem List   Diagnosis Code    Lateral epicondylitis  of elbow M77.10    Essential hypertension, benign I10    Hypothyroidism E03.9    GERD (gastroesophageal reflux disease) K21.9    Panic attacks F41.0    Generalized anxiety disorder F41.1    Mixed hyperlipidemia E78.2    Degenerative disc disease, lumbar M51.36    Osteoarthritis M19.90    Fibromyalgia M79.7    CAD (coronary artery disease) I25.10    Sleep apnea G47.30    Vitamin D deficiency E55.9    Type 2 diabetes mellitus, controlled ([facility] E11.9    Tear of medial cartilage or meniscus of knee, current [id number]    Tear of lateral cartilage or meniscus of knee, current S83.289A    Primary localized osteoarthrosis, lower leg M17.10    Edema R60.9    Syncope and collapse R55    Mitral valve regurgitation I34.0    Depression F32.9    Pre-operative cardiovascular examination Z01.810    Abnormal urinalysis R82.90       Plan:  I have advised the patient of the risks and consequences, including possible complications of performing total joint replacement, as well as not doing this operation. The patient had the opportunity to ask questions and have them answered to their satisfaction.      Signed:  Skipper Paget, PA 3/13/2017

## 2017-03-13 NOTE — PERIOP NOTES
Spoke with Dr Violetta Martinez, he is aware of elevated blood glucose and referral from PCP to Endocrinology with an appt in June. Patient has received clearance from cardio and nephro and diabetic medications were adjusted by PCP. Dr Violetta Martinez requests fasting glucose on the am of surgery.

## 2017-03-14 ENCOUNTER — HOME HEALTH ADMISSION (OUTPATIENT)
Dept: HOME HEALTH SERVICES | Facility: HOME HEALTH | Age: 72
End: 2017-03-14
Payer: MEDICARE

## 2017-03-14 ENCOUNTER — SURGERY (OUTPATIENT)
Age: 72
End: 2017-03-14

## 2017-03-14 ENCOUNTER — ANESTHESIA (OUTPATIENT)
Dept: SURGERY | Age: 72
DRG: 470 | End: 2017-03-14
Payer: MEDICARE

## 2017-03-14 ENCOUNTER — HOSPITAL ENCOUNTER (INPATIENT)
Age: 72
LOS: 2 days | Discharge: HOME HEALTH | DRG: 470 | End: 2017-03-16
Attending: ORTHOPAEDIC SURGERY | Admitting: ORTHOPAEDIC SURGERY
Payer: MEDICARE

## 2017-03-14 LAB
ABO + RH BLD: NORMAL
APPEARANCE UR: CLEAR
BILIRUB UR QL: NEGATIVE
BLOOD GROUP ANTIBODIES SERPL: NORMAL
COLOR UR: YELLOW
GLUCOSE BLD STRIP.AUTO-MCNC: 184 MG/DL (ref 65–100)
GLUCOSE BLD STRIP.AUTO-MCNC: 303 MG/DL (ref 65–100)
GLUCOSE BLD STRIP.AUTO-MCNC: 365 MG/DL (ref 65–100)
GLUCOSE UR STRIP.AUTO-MCNC: NEGATIVE MG/DL
HGB BLD-MCNC: 10.2 G/DL (ref 11.7–15.4)
HGB UR QL STRIP: NEGATIVE
KETONES UR QL STRIP.AUTO: NEGATIVE MG/DL
LEUKOCYTE ESTERASE UR QL STRIP.AUTO: NEGATIVE
NITRITE UR QL STRIP.AUTO: NEGATIVE
PH UR STRIP: 6 [PH] (ref 5–9)
PROT UR STRIP-MCNC: NEGATIVE MG/DL
SP GR UR REFRACTOMETRY: 1.01 (ref 1–1.02)
SPECIMEN EXP DATE BLD: NORMAL
UROBILINOGEN UR QL STRIP.AUTO: 0.2 EU/DL (ref 0.2–1)

## 2017-03-14 PROCEDURE — 74011250636 HC RX REV CODE- 250/636: Performed by: ORTHOPAEDIC SURGERY

## 2017-03-14 PROCEDURE — C1776 JOINT DEVICE (IMPLANTABLE): HCPCS | Performed by: ORTHOPAEDIC SURGERY

## 2017-03-14 PROCEDURE — 77030033067 HC SUT PDO STRATFX SPIR J&J -B: Performed by: ORTHOPAEDIC SURGERY

## 2017-03-14 PROCEDURE — 87086 URINE CULTURE/COLONY COUNT: CPT | Performed by: ORTHOPAEDIC SURGERY

## 2017-03-14 PROCEDURE — 74011000250 HC RX REV CODE- 250

## 2017-03-14 PROCEDURE — 94760 N-INVAS EAR/PLS OXIMETRY 1: CPT

## 2017-03-14 PROCEDURE — 74011250637 HC RX REV CODE- 250/637: Performed by: ORTHOPAEDIC SURGERY

## 2017-03-14 PROCEDURE — 76060000035 HC ANESTHESIA 2 TO 2.5 HR: Performed by: ORTHOPAEDIC SURGERY

## 2017-03-14 PROCEDURE — 77030012935 HC DRSG AQUACEL BMS -B: Performed by: ORTHOPAEDIC SURGERY

## 2017-03-14 PROCEDURE — 77030006804 HC BLD SAW RECIP CNMD -B: Performed by: ORTHOPAEDIC SURGERY

## 2017-03-14 PROCEDURE — 74011000258 HC RX REV CODE- 258: Performed by: ORTHOPAEDIC SURGERY

## 2017-03-14 PROCEDURE — 97161 PT EVAL LOW COMPLEX 20 MIN: CPT

## 2017-03-14 PROCEDURE — 77030012890

## 2017-03-14 PROCEDURE — 77030032490 HC SLV COMPR SCD KNE COVD -B

## 2017-03-14 PROCEDURE — 77030011640 HC PAD GRND REM COVD -A: Performed by: ORTHOPAEDIC SURGERY

## 2017-03-14 PROCEDURE — 74011000250 HC RX REV CODE- 250: Performed by: ANESTHESIOLOGY

## 2017-03-14 PROCEDURE — 86580 TB INTRADERMAL TEST: CPT | Performed by: ORTHOPAEDIC SURGERY

## 2017-03-14 PROCEDURE — 94762 N-INVAS EAR/PLS OXIMTRY CONT: CPT

## 2017-03-14 PROCEDURE — 77030013819 HC MX SYS CEM ZIMM -B: Performed by: ORTHOPAEDIC SURGERY

## 2017-03-14 PROCEDURE — 82962 GLUCOSE BLOOD TEST: CPT

## 2017-03-14 PROCEDURE — 77030006777 HC BLD SAW OSC CNMD -B: Performed by: ORTHOPAEDIC SURGERY

## 2017-03-14 PROCEDURE — 65270000029 HC RM PRIVATE

## 2017-03-14 PROCEDURE — 81003 URINALYSIS AUTO W/O SCOPE: CPT | Performed by: ORTHOPAEDIC SURGERY

## 2017-03-14 PROCEDURE — 86900 BLOOD TYPING SEROLOGIC ABO: CPT | Performed by: ORTHOPAEDIC SURGERY

## 2017-03-14 PROCEDURE — 74011250636 HC RX REV CODE- 250/636

## 2017-03-14 PROCEDURE — 76942 ECHO GUIDE FOR BIOPSY: CPT | Performed by: ORTHOPAEDIC SURGERY

## 2017-03-14 PROCEDURE — 77030037622 HC TIB TRIAL PK ATTUNE INTTN J&J -C: Performed by: ORTHOPAEDIC SURGERY

## 2017-03-14 PROCEDURE — 77030003602 HC NDL NRV BLK BBMI -B: Performed by: ANESTHESIOLOGY

## 2017-03-14 PROCEDURE — C1713 ANCHOR/SCREW BN/BN,TIS/BN: HCPCS | Performed by: ORTHOPAEDIC SURGERY

## 2017-03-14 PROCEDURE — 76010000162 HC OR TIME 1.5 TO 2 HR INTENSV-TIER 1: Performed by: ORTHOPAEDIC SURGERY

## 2017-03-14 PROCEDURE — 77030020782 HC GWN BAIR PAWS FLX 3M -B: Performed by: ANESTHESIOLOGY

## 2017-03-14 PROCEDURE — 74011250636 HC RX REV CODE- 250/636: Performed by: ANESTHESIOLOGY

## 2017-03-14 PROCEDURE — 85018 HEMOGLOBIN: CPT | Performed by: ORTHOPAEDIC SURGERY

## 2017-03-14 PROCEDURE — 76210000017 HC OR PH I REC 1.5 TO 2 HR: Performed by: ORTHOPAEDIC SURGERY

## 2017-03-14 PROCEDURE — 77010033678 HC OXYGEN DAILY

## 2017-03-14 PROCEDURE — 77030013727 HC IRR FAN PULSVC ZIMM -B: Performed by: ORTHOPAEDIC SURGERY

## 2017-03-14 PROCEDURE — 77030006720 HC BLD PAT RMR ZIMM -B: Performed by: ORTHOPAEDIC SURGERY

## 2017-03-14 PROCEDURE — 74011250637 HC RX REV CODE- 250/637: Performed by: INTERNAL MEDICINE

## 2017-03-14 PROCEDURE — 77030020143 HC AIRWY LARYN INTUB CGAS -A: Performed by: ANESTHESIOLOGY

## 2017-03-14 PROCEDURE — 77030018836 HC SOL IRR NACL ICUM -A: Performed by: ORTHOPAEDIC SURGERY

## 2017-03-14 PROCEDURE — 74011250636 HC RX REV CODE- 250/636: Performed by: INTERNAL MEDICINE

## 2017-03-14 PROCEDURE — 77030034849: Performed by: ORTHOPAEDIC SURGERY

## 2017-03-14 PROCEDURE — 77030011208: Performed by: ORTHOPAEDIC SURGERY

## 2017-03-14 PROCEDURE — 77030008467 HC STPLR SKN COVD -B: Performed by: ORTHOPAEDIC SURGERY

## 2017-03-14 PROCEDURE — 36415 COLL VENOUS BLD VENIPUNCTURE: CPT | Performed by: ORTHOPAEDIC SURGERY

## 2017-03-14 PROCEDURE — 76010010054 HC POST OP PAIN BLOCK: Performed by: ORTHOPAEDIC SURGERY

## 2017-03-14 PROCEDURE — 74011000250 HC RX REV CODE- 250: Performed by: ORTHOPAEDIC SURGERY

## 2017-03-14 PROCEDURE — 77030019940 HC BLNKT HYPOTHRM STRY -B: Performed by: ANESTHESIOLOGY

## 2017-03-14 PROCEDURE — 0SRD0J9 REPLACEMENT OF LEFT KNEE JOINT WITH SYNTHETIC SUBSTITUTE, CEMENTED, OPEN APPROACH: ICD-10-PCS | Performed by: ORTHOPAEDIC SURGERY

## 2017-03-14 PROCEDURE — 74011000302 HC RX REV CODE- 302: Performed by: ORTHOPAEDIC SURGERY

## 2017-03-14 PROCEDURE — 77030018673: Performed by: ORTHOPAEDIC SURGERY

## 2017-03-14 PROCEDURE — 77030019557 HC ELECTRD VES SEAL MEDT -F: Performed by: ORTHOPAEDIC SURGERY

## 2017-03-14 PROCEDURE — 77030031139 HC SUT VCRL2 J&J -A: Performed by: ORTHOPAEDIC SURGERY

## 2017-03-14 PROCEDURE — 97165 OT EVAL LOW COMPLEX 30 MIN: CPT

## 2017-03-14 PROCEDURE — 77030037623 HC FEM TRIAL PK ATTUNE INTTN J&J -D: Performed by: ORTHOPAEDIC SURGERY

## 2017-03-14 DEVICE — COMPONENT FEM SZ 5 L KNEE NAR POST STBL CEM ATTUNE: Type: IMPLANTABLE DEVICE | Site: KNEE | Status: FUNCTIONAL

## 2017-03-14 DEVICE — (D)CEMENT BNE HV R 40GM -- DUPE USE ITEM 353850: Type: IMPLANTABLE DEVICE | Site: KNEE | Status: FUNCTIONAL

## 2017-03-14 DEVICE — COMPONENT PAT DIA38MM POLYETH DOME CEM MEDIALIZED ATTUNE: Type: IMPLANTABLE DEVICE | Site: KNEE | Status: FUNCTIONAL

## 2017-03-14 DEVICE — BASEPLATE TIB SZ 4 KNEE CEM FIX BEAR ATTUNE: Type: IMPLANTABLE DEVICE | Site: KNEE | Status: FUNCTIONAL

## 2017-03-14 DEVICE — IMPLANTABLE DEVICE: Type: IMPLANTABLE DEVICE | Site: KNEE | Status: FUNCTIONAL

## 2017-03-14 RX ORDER — LEVOTHYROXINE SODIUM 112 UG/1
112 TABLET ORAL
Status: DISCONTINUED | OUTPATIENT
Start: 2017-03-15 | End: 2017-03-16 | Stop reason: HOSPADM

## 2017-03-14 RX ORDER — ASPIRIN 81 MG/1
81 TABLET ORAL EVERY 12 HOURS
Status: DISCONTINUED | OUTPATIENT
Start: 2017-03-14 | End: 2017-03-16 | Stop reason: HOSPADM

## 2017-03-14 RX ORDER — LIDOCAINE HYDROCHLORIDE 10 MG/ML
0.1 INJECTION INFILTRATION; PERINEURAL AS NEEDED
Status: DISCONTINUED | OUTPATIENT
Start: 2017-03-14 | End: 2017-03-14 | Stop reason: HOSPADM

## 2017-03-14 RX ORDER — LORATADINE 10 MG/1
10 TABLET ORAL
Status: DISCONTINUED | OUTPATIENT
Start: 2017-03-14 | End: 2017-03-15

## 2017-03-14 RX ORDER — DEXAMETHASONE SODIUM PHOSPHATE 4 MG/ML
INJECTION, SOLUTION INTRA-ARTICULAR; INTRALESIONAL; INTRAMUSCULAR; INTRAVENOUS; SOFT TISSUE AS NEEDED
Status: DISCONTINUED | OUTPATIENT
Start: 2017-03-14 | End: 2017-03-14 | Stop reason: HOSPADM

## 2017-03-14 RX ORDER — CEFAZOLIN SODIUM IN 0.9 % NACL 2 G/50 ML
2 INTRAVENOUS SOLUTION, PIGGYBACK (ML) INTRAVENOUS EVERY 8 HOURS
Status: COMPLETED | OUTPATIENT
Start: 2017-03-14 | End: 2017-03-15

## 2017-03-14 RX ORDER — SODIUM CHLORIDE 9 MG/ML
25 INJECTION, SOLUTION INTRAVENOUS CONTINUOUS
Status: DISCONTINUED | OUTPATIENT
Start: 2017-03-14 | End: 2017-03-14 | Stop reason: HOSPADM

## 2017-03-14 RX ORDER — LIDOCAINE HYDROCHLORIDE 20 MG/ML
INJECTION, SOLUTION EPIDURAL; INFILTRATION; INTRACAUDAL; PERINEURAL AS NEEDED
Status: DISCONTINUED | OUTPATIENT
Start: 2017-03-14 | End: 2017-03-14 | Stop reason: HOSPADM

## 2017-03-14 RX ORDER — NALOXONE HYDROCHLORIDE 0.4 MG/ML
.2-.4 INJECTION, SOLUTION INTRAMUSCULAR; INTRAVENOUS; SUBCUTANEOUS
Status: DISCONTINUED | OUTPATIENT
Start: 2017-03-14 | End: 2017-03-16 | Stop reason: HOSPADM

## 2017-03-14 RX ORDER — CEFAZOLIN SODIUM IN 0.9 % NACL 2 G/50 ML
2 INTRAVENOUS SOLUTION, PIGGYBACK (ML) INTRAVENOUS ONCE
Status: DISCONTINUED | OUTPATIENT
Start: 2017-03-14 | End: 2017-03-14 | Stop reason: HOSPADM

## 2017-03-14 RX ORDER — METOPROLOL SUCCINATE 100 MG/1
200 TABLET, EXTENDED RELEASE ORAL
Status: DISCONTINUED | OUTPATIENT
Start: 2017-03-14 | End: 2017-03-16 | Stop reason: HOSPADM

## 2017-03-14 RX ORDER — AMOXICILLIN 250 MG
2 CAPSULE ORAL DAILY
Status: DISCONTINUED | OUTPATIENT
Start: 2017-03-15 | End: 2017-03-16 | Stop reason: HOSPADM

## 2017-03-14 RX ORDER — SUMATRIPTAN 50 MG/1
50 TABLET, FILM COATED ORAL AS NEEDED
Status: DISCONTINUED | OUTPATIENT
Start: 2017-03-14 | End: 2017-03-16 | Stop reason: HOSPADM

## 2017-03-14 RX ORDER — ROPIVACAINE HYDROCHLORIDE 2 MG/ML
INJECTION, SOLUTION EPIDURAL; INFILTRATION; PERINEURAL AS NEEDED
Status: DISCONTINUED | OUTPATIENT
Start: 2017-03-14 | End: 2017-03-14 | Stop reason: HOSPADM

## 2017-03-14 RX ORDER — FLUTICASONE PROPIONATE 50 MCG
2 SPRAY, SUSPENSION (ML) NASAL DAILY
Status: DISCONTINUED | OUTPATIENT
Start: 2017-03-15 | End: 2017-03-16 | Stop reason: HOSPADM

## 2017-03-14 RX ORDER — PROPOFOL 10 MG/ML
INJECTION, EMULSION INTRAVENOUS AS NEEDED
Status: DISCONTINUED | OUTPATIENT
Start: 2017-03-14 | End: 2017-03-14 | Stop reason: HOSPADM

## 2017-03-14 RX ORDER — HYDROCODONE BITARTRATE AND ACETAMINOPHEN 7.5; 325 MG/1; MG/1
1 TABLET ORAL AS NEEDED
Status: DISCONTINUED | OUTPATIENT
Start: 2017-03-14 | End: 2017-03-14 | Stop reason: HOSPADM

## 2017-03-14 RX ORDER — SODIUM CHLORIDE 9 MG/ML
100 INJECTION, SOLUTION INTRAVENOUS CONTINUOUS
Status: DISPENSED | OUTPATIENT
Start: 2017-03-14 | End: 2017-03-16

## 2017-03-14 RX ORDER — ESCITALOPRAM OXALATE 10 MG/1
20 TABLET ORAL
Status: DISCONTINUED | OUTPATIENT
Start: 2017-03-14 | End: 2017-03-16 | Stop reason: HOSPADM

## 2017-03-14 RX ORDER — FAMOTIDINE 20 MG/1
20 TABLET, FILM COATED ORAL ONCE
Status: DISCONTINUED | OUTPATIENT
Start: 2017-03-14 | End: 2017-03-14 | Stop reason: HOSPADM

## 2017-03-14 RX ORDER — NALOXONE HYDROCHLORIDE 0.4 MG/ML
0.1 INJECTION, SOLUTION INTRAMUSCULAR; INTRAVENOUS; SUBCUTANEOUS AS NEEDED
Status: DISCONTINUED | OUTPATIENT
Start: 2017-03-14 | End: 2017-03-14 | Stop reason: HOSPADM

## 2017-03-14 RX ORDER — ONDANSETRON 2 MG/ML
INJECTION INTRAMUSCULAR; INTRAVENOUS AS NEEDED
Status: DISCONTINUED | OUTPATIENT
Start: 2017-03-14 | End: 2017-03-14 | Stop reason: HOSPADM

## 2017-03-14 RX ORDER — HYDRALAZINE HYDROCHLORIDE 20 MG/ML
10 INJECTION INTRAMUSCULAR; INTRAVENOUS
Status: DISCONTINUED | OUTPATIENT
Start: 2017-03-14 | End: 2017-03-16 | Stop reason: HOSPADM

## 2017-03-14 RX ORDER — HYDROMORPHONE HYDROCHLORIDE 2 MG/ML
0.5 INJECTION, SOLUTION INTRAMUSCULAR; INTRAVENOUS; SUBCUTANEOUS
Status: DISCONTINUED | OUTPATIENT
Start: 2017-03-14 | End: 2017-03-14 | Stop reason: HOSPADM

## 2017-03-14 RX ORDER — SODIUM CHLORIDE 0.9 % (FLUSH) 0.9 %
5-10 SYRINGE (ML) INJECTION EVERY 8 HOURS
Status: DISCONTINUED | OUTPATIENT
Start: 2017-03-14 | End: 2017-03-16 | Stop reason: HOSPADM

## 2017-03-14 RX ORDER — IRBESARTAN 300 MG/1
300 TABLET ORAL
Status: DISCONTINUED | OUTPATIENT
Start: 2017-03-14 | End: 2017-03-16 | Stop reason: HOSPADM

## 2017-03-14 RX ORDER — HYDROMORPHONE HYDROCHLORIDE 2 MG/1
2 TABLET ORAL
Status: DISCONTINUED | OUTPATIENT
Start: 2017-03-14 | End: 2017-03-16 | Stop reason: HOSPADM

## 2017-03-14 RX ORDER — DEXAMETHASONE SODIUM PHOSPHATE 100 MG/10ML
10 INJECTION INTRAMUSCULAR; INTRAVENOUS ONCE
Status: ACTIVE | OUTPATIENT
Start: 2017-03-15 | End: 2017-03-16

## 2017-03-14 RX ORDER — MONTELUKAST SODIUM 10 MG/1
10 TABLET ORAL EVERY EVENING
Status: DISCONTINUED | OUTPATIENT
Start: 2017-03-14 | End: 2017-03-16 | Stop reason: HOSPADM

## 2017-03-14 RX ORDER — PROMETHAZINE HYDROCHLORIDE 25 MG/ML
25 INJECTION, SOLUTION INTRAMUSCULAR; INTRAVENOUS
Status: DISCONTINUED | OUTPATIENT
Start: 2017-03-14 | End: 2017-03-16 | Stop reason: HOSPADM

## 2017-03-14 RX ORDER — SODIUM CHLORIDE 0.9 % (FLUSH) 0.9 %
5-10 SYRINGE (ML) INJECTION AS NEEDED
Status: DISCONTINUED | OUTPATIENT
Start: 2017-03-14 | End: 2017-03-16 | Stop reason: HOSPADM

## 2017-03-14 RX ORDER — DIPHENHYDRAMINE HCL 25 MG
25 CAPSULE ORAL
Status: DISCONTINUED | OUTPATIENT
Start: 2017-03-14 | End: 2017-03-16 | Stop reason: HOSPADM

## 2017-03-14 RX ORDER — PANTOPRAZOLE SODIUM 40 MG/1
40 TABLET, DELAYED RELEASE ORAL 2 TIMES DAILY
Status: DISCONTINUED | OUTPATIENT
Start: 2017-03-14 | End: 2017-03-16 | Stop reason: HOSPADM

## 2017-03-14 RX ORDER — GABAPENTIN 100 MG/1
100 CAPSULE ORAL
Status: DISCONTINUED | OUTPATIENT
Start: 2017-03-14 | End: 2017-03-16 | Stop reason: HOSPADM

## 2017-03-14 RX ORDER — ACETAMINOPHEN 500 MG
1000 TABLET ORAL EVERY 6 HOURS
Status: DISCONTINUED | OUTPATIENT
Start: 2017-03-15 | End: 2017-03-16 | Stop reason: HOSPADM

## 2017-03-14 RX ORDER — ONDANSETRON 8 MG/1
8 TABLET, ORALLY DISINTEGRATING ORAL
Status: DISCONTINUED | OUTPATIENT
Start: 2017-03-14 | End: 2017-03-16 | Stop reason: HOSPADM

## 2017-03-14 RX ORDER — GLIPIZIDE 10 MG/1
10 TABLET, FILM COATED, EXTENDED RELEASE ORAL DAILY
Status: DISCONTINUED | OUTPATIENT
Start: 2017-03-15 | End: 2017-03-16 | Stop reason: HOSPADM

## 2017-03-14 RX ORDER — LORAZEPAM 1 MG/1
1 TABLET ORAL
Status: DISCONTINUED | OUTPATIENT
Start: 2017-03-14 | End: 2017-03-16 | Stop reason: HOSPADM

## 2017-03-14 RX ORDER — CYCLOSPORINE 0.5 MG/ML
1 EMULSION OPHTHALMIC
Status: DISCONTINUED | OUTPATIENT
Start: 2017-03-14 | End: 2017-03-16 | Stop reason: HOSPADM

## 2017-03-14 RX ORDER — BUTALBITAL, ACETAMINOPHEN AND CAFFEINE 50; 325; 40 MG/1; MG/1; MG/1
1 TABLET ORAL
Status: DISCONTINUED | OUTPATIENT
Start: 2017-03-14 | End: 2017-03-16 | Stop reason: HOSPADM

## 2017-03-14 RX ORDER — FENTANYL CITRATE 50 UG/ML
100 INJECTION, SOLUTION INTRAMUSCULAR; INTRAVENOUS ONCE
Status: COMPLETED | OUTPATIENT
Start: 2017-03-14 | End: 2017-03-14

## 2017-03-14 RX ORDER — SODIUM CHLORIDE, SODIUM LACTATE, POTASSIUM CHLORIDE, CALCIUM CHLORIDE 600; 310; 30; 20 MG/100ML; MG/100ML; MG/100ML; MG/100ML
INJECTION, SOLUTION INTRAVENOUS
Status: DISCONTINUED | OUTPATIENT
Start: 2017-03-14 | End: 2017-03-14 | Stop reason: HOSPADM

## 2017-03-14 RX ORDER — ACETAMINOPHEN 10 MG/ML
1000 INJECTION, SOLUTION INTRAVENOUS ONCE
Status: COMPLETED | OUTPATIENT
Start: 2017-03-14 | End: 2017-03-14

## 2017-03-14 RX ORDER — SODIUM CHLORIDE, SODIUM LACTATE, POTASSIUM CHLORIDE, CALCIUM CHLORIDE 600; 310; 30; 20 MG/100ML; MG/100ML; MG/100ML; MG/100ML
100 INJECTION, SOLUTION INTRAVENOUS CONTINUOUS
Status: DISCONTINUED | OUTPATIENT
Start: 2017-03-14 | End: 2017-03-14 | Stop reason: HOSPADM

## 2017-03-14 RX ORDER — HYDROMORPHONE HYDROCHLORIDE 2 MG/ML
INJECTION, SOLUTION INTRAMUSCULAR; INTRAVENOUS; SUBCUTANEOUS AS NEEDED
Status: DISCONTINUED | OUTPATIENT
Start: 2017-03-14 | End: 2017-03-14 | Stop reason: HOSPADM

## 2017-03-14 RX ORDER — ZOLPIDEM TARTRATE 5 MG/1
5 TABLET ORAL
Status: DISCONTINUED | OUTPATIENT
Start: 2017-03-14 | End: 2017-03-16 | Stop reason: HOSPADM

## 2017-03-14 RX ORDER — SODIUM CHLORIDE 0.9 % (FLUSH) 0.9 %
5-10 SYRINGE (ML) INJECTION AS NEEDED
Status: DISCONTINUED | OUTPATIENT
Start: 2017-03-14 | End: 2017-03-14 | Stop reason: HOSPADM

## 2017-03-14 RX ORDER — HYDROMORPHONE HYDROCHLORIDE 1 MG/ML
1 INJECTION, SOLUTION INTRAMUSCULAR; INTRAVENOUS; SUBCUTANEOUS
Status: DISCONTINUED | OUTPATIENT
Start: 2017-03-14 | End: 2017-03-16 | Stop reason: HOSPADM

## 2017-03-14 RX ORDER — LORATADINE 10 MG/1
10 TABLET ORAL
Status: DISCONTINUED | OUTPATIENT
Start: 2017-03-14 | End: 2017-03-16 | Stop reason: HOSPADM

## 2017-03-14 RX ORDER — MIDAZOLAM HYDROCHLORIDE 1 MG/ML
2 INJECTION, SOLUTION INTRAMUSCULAR; INTRAVENOUS
Status: DISCONTINUED | OUTPATIENT
Start: 2017-03-14 | End: 2017-03-14 | Stop reason: HOSPADM

## 2017-03-14 RX ORDER — LANOLIN ALCOHOL/MO/W.PET/CERES
1 CREAM (GRAM) TOPICAL 2 TIMES DAILY WITH MEALS
Status: DISCONTINUED | OUTPATIENT
Start: 2017-03-14 | End: 2017-03-16 | Stop reason: HOSPADM

## 2017-03-14 RX ORDER — SODIUM CHLORIDE 0.9 % (FLUSH) 0.9 %
5-10 SYRINGE (ML) INJECTION EVERY 8 HOURS
Status: DISCONTINUED | OUTPATIENT
Start: 2017-03-14 | End: 2017-03-14 | Stop reason: HOSPADM

## 2017-03-14 RX ORDER — LANOLIN ALCOHOL/MO/W.PET/CERES
400 CREAM (GRAM) TOPICAL 2 TIMES DAILY
Status: DISCONTINUED | OUTPATIENT
Start: 2017-03-14 | End: 2017-03-16 | Stop reason: HOSPADM

## 2017-03-14 RX ORDER — HYDROMORPHONE HYDROCHLORIDE 4 MG/1
4 TABLET ORAL
Status: DISCONTINUED | OUTPATIENT
Start: 2017-03-14 | End: 2017-03-16 | Stop reason: HOSPADM

## 2017-03-14 RX ORDER — TEMAZEPAM 15 MG/1
30 CAPSULE ORAL
Status: DISCONTINUED | OUTPATIENT
Start: 2017-03-14 | End: 2017-03-16 | Stop reason: HOSPADM

## 2017-03-14 RX ORDER — INSULIN LISPRO 100 [IU]/ML
INJECTION, SOLUTION INTRAVENOUS; SUBCUTANEOUS
Status: DISCONTINUED | OUTPATIENT
Start: 2017-03-14 | End: 2017-03-16 | Stop reason: HOSPADM

## 2017-03-14 RX ORDER — ROPIVACAINE HYDROCHLORIDE 5 MG/ML
INJECTION, SOLUTION EPIDURAL; INFILTRATION; PERINEURAL AS NEEDED
Status: DISCONTINUED | OUTPATIENT
Start: 2017-03-14 | End: 2017-03-14 | Stop reason: HOSPADM

## 2017-03-14 RX ORDER — FENTANYL CITRATE 50 UG/ML
INJECTION, SOLUTION INTRAMUSCULAR; INTRAVENOUS AS NEEDED
Status: DISCONTINUED | OUTPATIENT
Start: 2017-03-14 | End: 2017-03-14 | Stop reason: HOSPADM

## 2017-03-14 RX ADMIN — LIDOCAINE HYDROCHLORIDE 50 MG: 20 INJECTION, SOLUTION EPIDURAL; INFILTRATION; INTRACAUDAL; PERINEURAL at 10:24

## 2017-03-14 RX ADMIN — BUTALBITAL, ACETAMINOPHEN AND CAFFEINE 1 TABLET: 50; 325; 40 TABLET ORAL at 20:05

## 2017-03-14 RX ADMIN — INSULIN LISPRO 8 UNITS: 100 INJECTION, SOLUTION INTRAVENOUS; SUBCUTANEOUS at 16:30

## 2017-03-14 RX ADMIN — MONTELUKAST SODIUM 10 MG: 10 TABLET, FILM COATED ORAL at 17:12

## 2017-03-14 RX ADMIN — FENTANYL CITRATE 100 MCG: 50 INJECTION, SOLUTION INTRAMUSCULAR; INTRAVENOUS at 09:44

## 2017-03-14 RX ADMIN — MIDAZOLAM HYDROCHLORIDE 2 MG: 1 INJECTION, SOLUTION INTRAMUSCULAR; INTRAVENOUS at 09:44

## 2017-03-14 RX ADMIN — DEXAMETHASONE SODIUM PHOSPHATE 10 MG: 4 INJECTION, SOLUTION INTRA-ARTICULAR; INTRALESIONAL; INTRAMUSCULAR; INTRAVENOUS; SOFT TISSUE at 10:44

## 2017-03-14 RX ADMIN — FERROUS SULFATE TAB 325 MG (65 MG ELEMENTAL FE) 325 MG: 325 (65 FE) TAB at 15:35

## 2017-03-14 RX ADMIN — LIDOCAINE HYDROCHLORIDE 0.1 ML: 10 INJECTION, SOLUTION INFILTRATION; PERINEURAL at 08:42

## 2017-03-14 RX ADMIN — ESCITALOPRAM OXALATE 20 MG: 10 TABLET ORAL at 20:05

## 2017-03-14 RX ADMIN — LORATADINE 10 MG: 10 TABLET ORAL at 20:06

## 2017-03-14 RX ADMIN — SODIUM CHLORIDE, SODIUM LACTATE, POTASSIUM CHLORIDE, AND CALCIUM CHLORIDE 100 ML/HR: 600; 310; 30; 20 INJECTION, SOLUTION INTRAVENOUS at 08:43

## 2017-03-14 RX ADMIN — SODIUM CHLORIDE 1 G: 900 INJECTION, SOLUTION INTRAVENOUS at 11:06

## 2017-03-14 RX ADMIN — HYDROMORPHONE HYDROCHLORIDE 2 MG: 2 TABLET ORAL at 23:08

## 2017-03-14 RX ADMIN — TEMAZEPAM 30 MG: 15 CAPSULE ORAL at 23:15

## 2017-03-14 RX ADMIN — Medication 400 MG: at 17:12

## 2017-03-14 RX ADMIN — INSULIN LISPRO 14 UNITS: 100 INJECTION, SOLUTION INTRAVENOUS; SUBCUTANEOUS at 23:08

## 2017-03-14 RX ADMIN — ONDANSETRON 4 MG: 2 INJECTION INTRAMUSCULAR; INTRAVENOUS at 10:44

## 2017-03-14 RX ADMIN — PROPOFOL 200 MG: 10 INJECTION, EMULSION INTRAVENOUS at 10:24

## 2017-03-14 RX ADMIN — ROPIVACAINE HYDROCHLORIDE 60 ML: 2 INJECTION, SOLUTION EPIDURAL; INFILTRATION at 11:05

## 2017-03-14 RX ADMIN — SODIUM CHLORIDE 100 ML/HR: 900 INJECTION, SOLUTION INTRAVENOUS at 20:10

## 2017-03-14 RX ADMIN — HYDROMORPHONE HYDROCHLORIDE 0.5 MG: 2 INJECTION, SOLUTION INTRAMUSCULAR; INTRAVENOUS; SUBCUTANEOUS at 12:33

## 2017-03-14 RX ADMIN — ROPIVACAINE HYDROCHLORIDE 20 ML: 5 INJECTION, SOLUTION EPIDURAL; INFILTRATION; PERINEURAL at 09:48

## 2017-03-14 RX ADMIN — EZETIMIBE: 10 TABLET ORAL at 20:06

## 2017-03-14 RX ADMIN — SODIUM CHLORIDE, SODIUM LACTATE, POTASSIUM CHLORIDE, CALCIUM CHLORIDE: 600; 310; 30; 20 INJECTION, SOLUTION INTRAVENOUS at 10:14

## 2017-03-14 RX ADMIN — HYDROMORPHONE HYDROCHLORIDE 2 MG: 2 TABLET ORAL at 20:05

## 2017-03-14 RX ADMIN — ASPIRIN 81 MG: 81 TABLET, COATED ORAL at 20:06

## 2017-03-14 RX ADMIN — HYDROMORPHONE HYDROCHLORIDE 2 MG: 2 TABLET ORAL at 15:35

## 2017-03-14 RX ADMIN — TUBERCULIN PURIFIED PROTEIN DERIVATIVE 5 UNITS: 5 INJECTION, SOLUTION INTRADERMAL at 08:43

## 2017-03-14 RX ADMIN — CEFAZOLIN 2 G: 1 INJECTION, POWDER, FOR SOLUTION INTRAMUSCULAR; INTRAVENOUS; PARENTERAL at 20:05

## 2017-03-14 RX ADMIN — FENTANYL CITRATE 50 MCG: 50 INJECTION, SOLUTION INTRAMUSCULAR; INTRAVENOUS at 10:24

## 2017-03-14 RX ADMIN — PANTOPRAZOLE SODIUM 40 MG: 40 TABLET, DELAYED RELEASE ORAL at 17:12

## 2017-03-14 RX ADMIN — HYDROMORPHONE HYDROCHLORIDE 2 MG: 2 INJECTION INTRAMUSCULAR; INTRAVENOUS; SUBCUTANEOUS at 11:05

## 2017-03-14 RX ADMIN — FENTANYL CITRATE 50 MCG: 50 INJECTION, SOLUTION INTRAMUSCULAR; INTRAVENOUS at 12:16

## 2017-03-14 RX ADMIN — ACETAMINOPHEN 1000 MG: 10 INJECTION, SOLUTION INTRAVENOUS at 17:12

## 2017-03-14 RX ADMIN — SODIUM CHLORIDE, SODIUM LACTATE, POTASSIUM CHLORIDE, CALCIUM CHLORIDE: 600; 310; 30; 20 INJECTION, SOLUTION INTRAVENOUS at 11:43

## 2017-03-14 NOTE — IP AVS SNAPSHOT
Millie Bueno 
 
 
 [address] 
[phone] Patient: David Roth MRN: [id number] :1945 You are allergic to the following Allergen Reactions Catapres (Clonidine) Rash Swelling Other (comments) Catapres patch,  headache Celebrex (Celecoxib) Swelling Red rash, itching Cymbalta (Duloxetine) Rash Morphine Rash Swelling  
 migraines Oxycontin (Oxycodone) Other (comments) Migraines, elevated BP Shellfish Containing Products Anaphylaxis Sulfa (Sulfonamide Antibiotics) Unknown (comments) Immunizations Administered for This Admission Name Date  
 TB Skin Test (PPD) Intradermal 3/14/2017 Recent Documentation Height Weight BMI OB Status Smoking Status 1.651 m 96.2 kg 35.3 kg/m2 Postmenopausal Never Smoker Emergency Contacts Name Discharge Info Relation Home Work Mobile Jaren Daley DISCHARGE CAREGIVER [3] Spouse [3] [phone] [id number] [facility] CAREGIVER [3] Daughter [21] [phone] About your hospitalization You were admitted on:  2017 [facility] You were discharged on:  2017 Unit phone number:  [phone] Why you were hospitalized Your primary diagnosis was:  S/P Total Knee Arthroplasty Your diagnoses also included:  Osteoarthritis Providers Seen During Your Hospitalizations Provider Role Specialty Primary office phone Beatrice Chandler MD Attending Provider Orthopedic Surgery [phone] Your Primary Care Physician (PCP) Primary Care Physician Office Phone Office Fax Rosalina Boone [phone] Follow-up Information Follow up With Details Comments Contact Info Zach Rich MD  As needed [address] [place] [address] [phone] Lj Swanson MD  Keep scheduled appointment [facility] [employer] [facility] [address] 
[phone] [address]  Will contact you within 48 hrs 2700 [facility] [address] [facility] [address] 
[phone] Your Appointments Tuesday April 04, 2017 11:10 AM EDT Follow Up with Destiny Sanchez MD  
[facility] [employer] [address] ([address] [place]  
[address] [facility] [address]  
[phone] Tuesday April 18, 2017  9:40 AM EDT  
LAB with PST LAB [facility] [employer] [address] ([address] 
[address] [facility] [address]  
[phone] Current Discharge Medication List  
  
START taking these medications Dose & Instructions Dispensing Information Comments Morning Noon Evening Bedtime  
 aspirin delayed-release 81 mg tablet Your next dose is: Today Dose:  81 mg Take 1 Tab by mouth every twelve (12) hours every twelve (12) hours for 30 days. Quantity:  60 Tab Refills:  0 HYDROmorphone 2 mg tablet Commonly known as:  DILAUDID Your next dose is: Today Dose:  2-4 mg Take 1-2 Tabs by mouth every four (4) hours as needed. Max Daily Amount: 24 mg. Quantity:  60 Tab Refills:  0  
     
   
   
  
   
  
 methocarbamol 750 mg tablet Commonly known as:  ROBAXIN Your next dose is: Today Dose:  750 mg Take 1 Tab by mouth four (4) times daily. Quantity:  40 Tab Refills:  0 CONTINUE these medications which have CHANGED Dose & Instructions Dispensing Information Comments Morning Noon Evening Bedtime  
 escitalopram oxalate 20 mg tablet Commonly known as:  Seth Horvath What changed:  when to take this Your next dose is:  Tomorrow Dose:  20 mg Take 1 Tab by mouth daily. Quantity:  30 Tab Refills:  4 metoprolol succinate 100 mg tablet Commonly known as:  TOPROL XL What changed:  when to take this Your next dose is:  Tomorrow Dose:  200 mg Take 2 Tabs by mouth daily. Quantity:  60 Tab Refills:  5 CONTINUE these medications which have NOT CHANGED Dose & Instructions Dispensing Information Comments Morning Noon Evening Bedtime ALLERGY RELIEF (CETIRIZINE) 10 mg tablet Generic drug:  cetirizine Your next dose is: Today Dose:  10 mg Take 10 mg by mouth nightly. Indications: ALLERGIC RHINITIS Refills:  0  
     
   
   
   
  
  
 ergocalciferol 50,000 unit capsule Commonly known as:  VITAMIN D2 Dose:  61157 Units Take 1 Cap by mouth every Sunday. Quantity:  4 Cap Refills:  5  
     
   
   
   
  
 ezetimibe-simvastatin 10-40 mg per tablet Commonly known as:  Vytorin 10/40 Your next dose is: Today Dose:  1 Tab Take 1 Tab by mouth nightly. Quantity:  30 Tab Refills:  5  
     
   
   
   
  
  
 ferrous sulfate 325 mg (65 mg iron) tablet Your next dose is: Today Take  by mouth two (2) times a day. Refills:  0  
     
   
   
  
   
  
 fluticasone 50 mcg/actuation nasal spray Commonly known as:  Michaelyn Drought Your next dose is:  Tomorrow Dose:  2 Spray 2 Sprays by Both Nostrils route daily. Quantity:  1 Bottle Refills:  5  
     
  
   
   
   
  
 gabapentin 100 mg capsule Commonly known as:  NEURONTIN Your next dose is: Today Dose:  100 mg Take 1 Cap by mouth three (3) times daily. Take by mouth 1 - 3 tablets three times daily as needed. Quantity:  270 Cap Refills:  1  
     
   
   
  
   
  
 glipiZIDE SR 10 mg CR tablet Commonly known as:  GLUCOTROL XL Your next dose is:  Tomorrow Dose:  10 mg Take 1 Tab by mouth daily. Quantity:  30 Tab Refills:  5  
     
  
   
   
   
  
 irbesartan 300 mg tablet Commonly known as:  AVAPRO Your next dose is: Today Dose:  300 mg Take 1 Tab by mouth nightly. Quantity:  30 Tab Refills:  5  
     
   
   
   
  
  
 levothyroxine 112 mcg tablet Commonly known as:  SYNTHROID Your next dose is:  Tomorrow Dose:  112 mcg Take 1 Tab by mouth Daily (before breakfast). Indications: hypothyroidism Quantity:  30 Tab Refills:  5  
     
  
   
   
   
  
 loratadine 10 mg tablet Commonly known as:  Walker Sor Your next dose is: Today Dose:  10 mg Take 10 mg by mouth nightly. Refills:  0 LORazepam 1 mg tablet Commonly known as:  ATIVAN Your next dose is: Today Dose:  1 mg Take 1 Tab by mouth every eight (8) hours as needed for Anxiety. Max Daily Amount: 3 mg. Indications: ANXIETY Quantity:  60 Tab Refills:  0  
     
   
   
  
   
  
 magnesium oxide 400 mg tablet Commonly known as:  MAG-OX Your next dose is: Today Dose:  400 mg Take 1 Tab by mouth two (2) times a day. Quantity:  60 Tab Refills:  11  
     
   
   
  
   
  
 metOLazone 5 mg tablet Commonly known as:  ZAROXOLYN  
   
 1 tab every 3 days. Take 30 minutes prior to lasix Quantity:  30 Tab Refills:  6  
     
   
   
   
  
 montelukast 10 mg tablet Commonly known as:  SINGULAIR Your next dose is: Today Dose:  10 mg Take 1 Tab by mouth every evening. Quantity:  30 Tab Refills:  5 OTHER  
   
 jobst compression stockings 20 mmHg. R60.0 - bilateral lower extremity swelling Quantity:  2 Each Refills:  1  
     
   
   
   
  
 pantoprazole 40 mg tablet Commonly known as:  PROTONIX Your next dose is: Today Dose:  40 mg Take 1 Tab by mouth two (2) times a day. Indications: GASTROESOPHAGEAL REFLUX Quantity:  60 Tab Refills:  5  
     
   
   
  
   
  
 pioglitazone 45 mg tablet Commonly known as:  ACTOS Your next dose is: Today Dose:  45 mg Take 1 Tab by mouth daily. Quantity:  30 Tab Refills:  5 RESTASIS 0.05 % ophthalmic emulsion Generic drug:  cycloSPORINE Your next dose is: Today Dose:  1 Drop Administer 1 drop to both eyes nightly. Indications: DRY EYE Refills:  0 SUMAtriptan 50 mg tablet Commonly known as:  IMITREX Dose:  50 mg Take 1 Tab by mouth as needed for Migraine. Indications: MIGRAINE Quantity:  30 Tab Refills:  1  
     
   
   
   
  
 temazepam 30 mg capsule Commonly known as:  RESTORIL Your next dose is: Today Dose:  30 mg Take 1 Cap by mouth nightly as needed for Sleep. Max Daily Amount: 30 mg.  
 Quantity:  30 Cap Refills:  5 STOP taking these medications   
 butalbital-acetaminophen-caffeine -40 mg per tablet Commonly known as:  FIORICET, ESGIC  
   
  
 diclofenac 1 % Gel Commonly known as:  VOLTAREN  
   
  
 mupirocin calcium 2 % nasal ointment Commonly known as:  BACTROBAN  
   
  
 oxyCODONE IR 15 mg immediate release tablet Commonly known as:  OXY-IR Where to Get Your Medications Information on where to get these meds will be given to you by the nurse or doctor. ! Ask your nurse or doctor about these medications  
  aspirin delayed-release 81 mg tablet HYDROmorphone 2 mg tablet  
 methocarbamol 750 mg tablet Discharge Instructions [employer] Patient Discharge Instructions Eulalio Severin / [id number] : 1945 Admitted 3/14/2017 Discharged: 3/16/2017 IF YOU HAVE ANY PROBLEMS ONCE YOU ARE AT HOME CALL THE FOLLOWING NUMBERS:  
Main office number: [phone] Take Home Medications · It is important that you take the medication exactly as they are prescribed.  
· Keep your medication in the bottles provided by the pharmacist and keep a list of the medication names, dosages, and times to be taken in your wallet. · Do not take other medications without consulting your doctor. What to do at [facility] Resume your prehospital diet. If you have excessive nausea or vomitting call your doctor's office Home Physical Therapy is arranged. Use rolling walker when walking. Use Alfonso Hose stockings until we see you in the office for your follow up appointment with Dr. Romario More. Patients who have had a joint replacement should not drive until you are seen for your follow up appointment by Dr. Romario More. When to Call - Call if you have a temperature greater then 101 
- Unable to keep food down - Loose control of your bladder or bowel function - Are unable to bear any weight  
- Need a pain medication refill DISCHARGE SUMMARY from Nurse The following personal items collected during your admission are returned to you:  
Dental Appliance: Dental Appliances: None Vision: Visual Aid: Glasses Hearing Aid:   na 
Jewelry: Jewelry: None Clothing: Clothing: At bedside, Footwear, Pants, Shirt, Socks, Undergarments Other Valuables: Other Valuables: None Valuables sent to safe:   na 
 
PATIENT INSTRUCTIONS: 
 
After general anesthesia or intravenous sedation, for 24 hours or while taking prescription Narcotics: · Limit your activities · Do not drive and operate hazardous machinery · Do not make important personal or business decisions · Do  not drink alcoholic beverages · If you have not urinated within 8 hours after discharge, please contact your surgeon on call. Report the following to your surgeon: 
· Excessive pain, swelling, redness or odor of or around the surgical area · Temperature over 101 · Nausea and vomiting lasting longer than 4 hours or if unable to take medications · Any signs of decreased circulation or nerve impairment to extremity: change in color, persistent  numbness, tingling, coldness or increase pain · Any questions, call office @ [phone] Keep scheduled follow up appointment. If need to change, call office @ [phone]. *  Please give a list of your current medications to your Primary Care Provider. *  Please update this list whenever your medications are discontinued, doses are 
    changed, or new medications (including over-the-counter products) are added. *  Please carry medication information at all times in case of emergency situations. Total Knee Replacement: What to Expect at Home Your Recovery When you leave the hospital, you should be able to move around with a walker or crutches. But you will need someone to help you at home for the next few weeks or until you have more energy and can move around better. If there is no one to help you at home, you may go to a rehabilitation center. You will go home with a bandage and stitches or staples. Change the bandage as your doctor tells you to. Your doctor will remove your stitches or staples 10 to 21 days after your surgery. You may still have some mild pain, and the area may be swollen for 3 to 6 months after surgery. Your knee will continue to improve for 6 to 12 months. You will probably use a walker for 1 to 3 weeks and then use crutches. When you are ready, you can use a cane. You will probably be able to walk on your own in 4 to 8 weeks. You will need to do months of physical rehabilitation (rehab) after a knee replacement. Rehab will help you strengthen the muscles of the knee and help you regain movement. After you recover, your artificial knee will allow you to do normal daily activities with less pain or no pain at all. You may be able to hike, dance, ride a bike, and play golf. Talk to your doctor about whether you can do more strenuous activities. Always tell your caregivers that you have an artificial knee.  
How long it will take to walk on your own, return to normal activities, and go back to work depends on your health and how well your rehabilitation (rehab) program goes. The better you do with your rehab exercises, the quicker you will get your strength and movement back. This care sheet gives you a general idea about how long it will take for you to recover. But each person recovers at a different pace. Follow the steps below to get better as quickly as possible. How can you care for yourself at home? Activity · Rest when you feel tired. You may take a nap, but do not stay in bed all day. When you sit, use a chair with arms. You can use the arms to help you stand up. · Work with your physical therapist to find the best way to exercise. You may be able to take frequent, short walks using crutches or a walker. What you can do as your knee heals will depend on whether your new knee is cemented or uncemented. You may not be able to do certain things for a while if your new knee is uncemented. · After your knee has healed enough, you can do more strenuous activities with caution. ¨ You can golf, but use a golf cart, and do not wear shoes with spikes. ¨ You can bike on a flat road or on a stationary bike. Avoid biking up hills. ¨ Your doctor may suggest that you stay away from activities that put stress on your knee. These include tennis or badminton, squash or racquetball, contact sports like football, jumping (such as in basketball), jogging, or running. ¨ Avoid activities where you might fall. These include horseback riding, skiing, and mountain biking. · Do not sit for more than 1 hour at a time. Get up and walk around for a while before you sit again. If you must sit for a long time, prop up your leg with a chair or footstool. This will help you avoid swelling. · Ask your doctor when you can shower. You may need to take sponge baths until your stitches or staples have been removed. · Ask your doctor when you can drive again.  It may take up to 8 weeks after knee replacement surgery before it is safe for you to drive. · When you get into a car, sit on the edge of the seat. Then pull in your legs, and turn to face the front. · You should be able to do many everyday activities 3 to 6 weeks after your surgery. You will probably need to take 4 to 16 weeks off from work. When you can go back to work depends on the type of work you do and how you feel. · Ask your doctor when it is okay for you to have sex. · Do not lift anything heavier than 10 pounds and do not lift weights for 12 weeks. Diet · By the time you leave the hospital, you should be eating your normal diet. If your stomach is upset, try bland, low-fat foods like plain rice, broiled chicken, toast, and yogurt. Your doctor may suggest that you take iron and vitamin supplements. · Drink plenty of fluids (unless your doctor tells you not to). · Eat healthy foods, and watch your portion sizes. Try to stay at your ideal weight. Too much weight puts more stress on your new knee. · You may notice that your bowel movements are not regular right after your surgery. This is common. Try to avoid constipation and straining with bowel movements. You may want to take a fiber supplement every day. If you have not had a bowel movement after a couple of days, ask your doctor about taking a mild laxative. Medicines · Your doctor will tell you if and when you can restart your medicines. He or she will also give you instructions about taking any new medicines. · If you take blood thinners, such as warfarin (Coumadin), clopidogrel (Plavix), or aspirin, be sure to talk to your doctor. He or she will tell you if and when to start taking those medicines again. Make sure that you understand exactly what your doctor wants you to do. · Your doctor may give you a blood-thinning medicine to prevent blood clots.  If you take a blood thinner, be sure you get instructions about how to take your medicine safely. Blood thinners can cause serious bleeding problems. This medicine could be in pill form or as a shot (injection). If a shot is necessary, your doctor will tell you how to do this. · Be safe with medicines. Take pain medicines exactly as directed. ¨ If the doctor gave you a prescription medicine for pain, take it as prescribed. ¨ If you are not taking a prescription pain medicine, ask your doctor if you can take an over-the-counter medicine. ¨ Plan to take your pain medicine 30 minutes before exercises. It is easier to prevent pain before it starts than to stop it once it has started. · If you think your pain medicine is making you sick to your stomach: 
¨ Take your medicine after meals (unless your doctor has told you not to). ¨ Ask your doctor for a different pain medicine. · If your doctor prescribed antibiotics, take them as directed. Do not stop taking them just because you feel better. You need to take the full course of antibiotics. Incision care · You will have a bandage over the cut (incision) and staples or stitches. Take the bandage off when your doctor says it is okay. · Your doctor will remove the staples or stitches 10 days to 3 weeks after the surgery and replace them with strips of tape. Leave the tape on for a week or until it falls off. Exercise · Your rehab program will give you a number of exercises to do to help you get back your knee's range of motion and strength. Always do them as your therapist tells you. Ice and elevation · For pain and swelling, put ice or a cold pack on the area for 10 to 20 minutes at a time. Put a thin cloth between the ice and your skin. Other instructions · Continue to wear your support stockings as your doctor says. These help to prevent blood clots. The length of time that you will have to wear them depends on your activity level and the amount of swelling. · Wear medical alert jewelry that says you may need antibiotics before any procedure, including dental work. You can buy this at most drugstores. · You have metal pieces in your knee. These may set off some airport metal detectors. Carry a medical alert card that says you have an artificial joint, just in case. Follow-up care is a key part of your treatment and safety. Be sure to make and go to all appointments, and call your doctor if you are having problems. It's also a good idea to know your test results and keep a list of the medicines you take. When should you call for help? Call 911 anytime you think you may need emergency care. For example, call if: 
· You passed out (lost consciousness). · You have severe trouble breathing. · You have sudden chest pain and shortness of breath, or you cough up blood. Call your doctor now or seek immediate medical care if: 
· You have signs of infection, such as: 
¨ Increased pain, swelling, warmth, or redness. ¨ Red streaks leading from the incision. ¨ Pus draining from the incision. ¨ A fever. · You have signs of a blood clot, such as: 
¨ Pain in your calf, back of the knee, thigh, or groin. ¨ Redness and swelling in your leg or groin. · Your incision comes open and begins to bleed, or the bleeding increases. · You have pain that does not get better after you take pain medicine. Watch closely for changes in your health, and be sure to contact your doctor if: 
· You do not have a bowel movement after taking a laxative. Where can you learn more? Go to http://hernan-gwen.info/. Enter U736 in the search box to learn more about \"Total Knee Replacement: What to Expect at Home. \" Current as of: August 4, 2016 Content Version: 11.1 © 8483-9474 [employer].  Care instructions adapted under license by [employer] (which disclaims liability or warranty for this information). If you have questions about a medical condition or this instruction, always ask your healthcare professional. [address] any warranty or liability for your use of this information. These are general instructions for a healthy lifestyle: No smoking/ No tobacco products/ Avoid exposure to second hand smoke Surgeon General's Warning:  Quitting smoking now greatly reduces serious risk to your health. Obesity, smoking, and sedentary lifestyle greatly increases your risk for illness A healthy diet, regular physical exercise & weight monitoring are important for maintaining a healthy lifestyle You may be retaining fluid if you have a history of heart failure or if you experience any of the following symptoms:  Weight gain of 3 pounds or more overnight or 5 pounds in a week, increased swelling in our hands or feet or shortness of breath while lying flat in bed. Please call your doctor as soon as you notice any of these symptoms; do not wait until your next office visit. Recognize signs and symptoms of STROKE: 
 
F-face looks uneven A-arms unable to move or move even S-speech slurred or non-existent T-time-call 911 as soon as signs and symptoms begin-DO NOT go Back to bed or wait to see if you get better-TIME IS BRAIN. The discharge information has been reviewed with the patient. The patient verbalized understanding. Information obtained by : 
I understand that if any problems occur once I am at home I am to contact my physician. I understand and acknowledge receipt of the instructions indicated above. Physician's or R.N.'s Signature                                                                  Date/Time Patient or Representative Signature                                                          Date/Time Discharge Orders None ACO Transitions of Care Introducing [employer] [address] Maira Rodriguez offers a voluntary care coordination program to provide high quality service and care to [facility] fee-for-service beneficiaries. Jessica Merrill was designed to help you enhance your health and well-being through the following services: ? Transitions of Care  support for individuals who are transitioning from one care setting to another (example: Hospital to home). ? Chronic and Complex Care Coordination  support for individuals and caregivers of those with serious or chronic illnesses or with more than one chronic (ongoing) condition and those who take a number of different medications. If you meet specific medical criteria, a [address] [place] [address] Rd may call you directly to coordinate your care with your primary care physician and your other care providers. For questions about the [facility] programs, please, contact your physicians office. For general questions or additional information about Accountable Care Organizations: 
Please visit www.medicare.gov/acos. html or call 1-800-MEDICARE (8-497.204.1456) TTY users should call 3-810.188.9916. [employer] Announcement We are excited to announce that we are making your provider's discharge notes available to you in [employer]. You will see these notes when they are completed and signed by the physician that discharged you from your recent hospital stay.   If you have any questions or concerns about any information you see in [employer], please call the Health Information Department where you were seen or reach out to your Primary Care Provider for more information about your plan of care. Introducing [facility]! Robert Reed introduces [employer] patient portal. Now you can access parts of your medical record, email your doctor's office, and request medication refills online. 1. In your internet browser, go to [email or web address] 2. Click on the First Time User? Click Here link in the Sign In box. You will see the New Member Sign Up page. 3. Enter your [employer] Access Code exactly as it appears below. You will not need to use this code after youve completed the sign-up process. If you do not sign up before the expiration date, you must request a new code. · [employer] Access Code: [email or web address] Expires: 4/5/2017  3:46 PM 
 
4. Enter the last four digits of your Social Security Number (xxxx) and Date of Birth (mm/dd/yyyy) as indicated and click Submit. You will be taken to the next sign-up page. 5. Create a [employer] ID. This will be your [employer] login ID and cannot be changed, so think of one that is secure and easy to remember. 6. Create a [employer] password. You can change your password at any time. 7. Enter your Password Reset Question and Answer. This can be used at a later time if you forget your password. 8. Enter your e-mail address. You will receive e-mail notification when new information is available in [address] 9. Click Sign Up. You can now view and download portions of your medical record. 10. Click the Download Summary menu link to download a portable copy of your medical information. If you have questions, please visit the Frequently Asked Questions section of the [employer] website. Remember, [employer] is NOT to be used for urgent needs. For medical emergencies, dial 911. Now available from your iPhone and Android! General Information Please provide this summary of care documentation to your next provider.  
  
  
    
    
 Patient Signature: ____________________________________________________________ Date:  ____________________________________________________________  
  
Josph Perfect Provider Signature:  ____________________________________________________________ Date:  ____________________________________________________________

## 2017-03-14 NOTE — PROGRESS NOTES
TRANSFER - IN REPORT:    Verbal report received from [facility]) on John Scrape  being received from PACU(unit) for routine post - op      Report consisted of patients Situation, Background, Assessment and   Recommendations(SBAR). Information from the following report(s) SBAR, Kardex, OR Summary, Procedure Summary, Intake/Output and MAR was reviewed with the receiving nurse. Opportunity for questions and clarification was provided. Assessment completed upon patients arrival to unit and care assumed.

## 2017-03-14 NOTE — PROGRESS NOTES
Problem: Self Care Deficits Care Plan (Adult)  Goal: *Acute Goals and Plan of Care (Insert Text)  GOALS:   DISCHARGE GOALS (in preparation for going home/rehab): 3 days  1. Ms. Елена Herrera will perform one lower body dressing activity with minimal assistance required to demonstrate improved functional mobility and safety. 2. Ms. Елена Herrera will perform one lower body bathing activity with minimal assistance required to demonstrate improved functional mobility and safety. 3. Ms. Елена Herrera will perform toileting/toilet transfer with contact guard assistance to demonstrate improved functional mobility and safety. 4. Ms. Елена Herrera will perform shower transfer with contact guard assistance to demonstrate improved functional mobility and safety. JOINT CAMP OCCUPATIONAL THERAPY TKA: Initial Assessment 3/14/2017  INPATIENT: Hospital Day: 1  [place] / [place] / Product Type: Medicare /      NAME/AGE/GENDER: Sandra Nettles is a 80 y.o. female       PRIMARY DIAGNOSIS:  Primary osteoarthritis of left knee [M17.12]              Procedure(s) and Anesthesia Type:     * LEFT KNEE ARTHROPLASTY TOTAL/DEPUY/ GENERAL ANES BECAUSE PT HAS SPINAL CORD STIMULATOR - General (Left)  ICD-10: Treatment Diagnosis:        · Pain in Left Knee (M25.562)  · Stiffness of Left Knee, Not elsewhere classified (I79.982)       ASSESSMENT:      Ms. Елена Herrera is s/p left TKA and presents with decreased weight bearing on left LE and decreased independence with functional mobility and activities of daily living. Patient would benefit from skilled Occupational Therapy to maximize independence and safety with self-care task and functional mobility. Pt would also benefit from education on adaptive equipment and safety precautions in preparation for going home or for recommendations for post-hospital rehab program. Patient plans for further rehab at home with home health services and good family support.  OT reviewed therapy schedule and plan of care with patient. Patient was able to transfer and preform self care skills as charted below. Patient instructed to call for assistance when needing to get up from the bed and all needs in reach. Patient verbalized understanding of call light. This section established at most recent assessment   PROBLEM LIST (Impairments causing functional limitations):  1. Decreased Strength  2. Decreased ADL/Functional Activities  3. Decreased Transfer Abilities  4. Increased Pain  5. Increased Fatigue  6. Decreased Flexibility/Joint Mobility  7. Decreased Knowledge of Precautions    INTERVENTIONS PLANNED: (Benefits and precautions of occupational therapy have been discussed with the patient.)  1. Activities of daily living training  2. Adaptive equipment training  3. Balance training  4. Clothing management  5. Donning&doffing training  6. Theraputic activity      TREATMENT PLAN: Frequency/Duration: Follow patient 1-2 times to address above goals. Rehabilitation Potential For Stated Goals: GOOD      RECOMMENDED REHABILITATION/EQUIPMENT: (at time of discharge pending progress): Continue Skilled Therapy and Home Health: Physical Therapy. OCCUPATIONAL PROFILE AND HISTORY:   History of Present Injury/Illness (Reason for Referral): Pt presents this date s/p (left) TKA. Past Medical History/Comorbidities:   Ms. Unknown Boeck  has a past medical history of Acute pancreatitis (2/2008); Aneurysm ([address] Anxiety; Bilateral sacroiliitis ([address] Breast cyst; CAD (coronary artery disease); Cellulitis (6/16/15); Chest pain; Chronic pain; CKD (chronic kidney disease); Degenerative disc disease; Depression (12/23/2015); Diabetes mellitus type II; Dyslipidemia; Dyspnea; Edema (12/23/2015); Fatty liver; Fibromyalgia; Food allergy; GERD (gastroesophageal reflux disease); Heart failure ([address] Hypertension; Hypertriglyceridemia; Hypothyroidism; Lumbar herniated disc (Aug 2013);  Lumbar radiculopathy; Microcytic anemia; Mild pulmonary hypertension ([facility] Mitral valve regurgitation (12/23/2015); Nausea & vomiting; Neuropathy; Obesity (BMI 30.0-34.9) (10/2/14); Osteoarthritis; Panic attacks; Sacroiliac dysfunction; Seasonal allergic reaction; Sleep apnea; and Syncope and collapse (12/23/2015). Ms. Maximo Cuellar  has a past surgical history that includes other surgical (12/22/09); partial thyroidectomy (2008); other surgical; other surgical (10/28/13); breast lumpectomy (Right); hysterectomy (1995); knee arthroscopy (Left, 2004); back surgery (08/30/2013); orthopaedic (Left, 2010); orthopaedic (Right, 2009); breast biopsy (Bilateral, R/1988  L/2012); knee arthroscopy (10/03/2014); premalig/benign skin lesion excision (09/29/2014); and heart catheterization (2005). Social History/Living Environment:   Home Environment: Private residence  # Steps to Enter: 2  Rails to Enter: No  One/Two Story Residence: One story  Living Alone: No  Support Systems: Spouse/Significant Other/Partner, Child(sarah)  Patient Expects to be Discharged to[de-identified] Private residence  Current DME Used/Available at Home: corinna Sherman Girtha Jenny, straight, Grab bars, Walker  Tub or Shower Type:  (custom walk in tub)  Prior Level of Function/Work/Activity:  Independent       Number of Personal Factors/Comorbidities that affect the Plan of Care: Brief history (0):  LOW COMPLEXITY   ASSESSMENT OF OCCUPATIONAL PERFORMANCE[de-identified]   Most Recent Physical Functioning:   Balance  Sitting: Intact  Standing: Pull to stand; With support        Patient Vitals for the past 6 hrs:       BP BP Patient Position SpO2 O2 Flow Rate (L/min) Pulse   03/14/17 0953 120/57 At rest 100 % 4 l/min (!) 52   03/14/17 0958 148/68 At rest 99 % 4 l/min (!) 51   03/14/17 1003 131/60 At rest 100 % 4 l/min (!) 51   03/14/17 1218 135/68 Supine (!) 89 % 3 l/min 78   03/14/17 1223 147/83 - 94 % - 78   03/14/17 1228 155/90 - 99 % - 75   03/14/17 1233 177/83 - 97 % 3 l/min 75   03/14/17 1245 136/73 - 93 % 3 l/min 72 03/14/17 1303 137/77 - 100 % 3 l/min 71   03/14/17 1312 141/62 - 100 % 3 l/min 70   03/14/17 1329 125/58 - 100 % 2 l/min 75   03/14/17 1358 - - 100 % 2 l/min -   03/14/17 1405 127/54 - 100 % - 72        Gross Assessment  AROM: Within functional limits (R LE)  Strength: Generally decreased, functional (R LE)  Sensation: Intact (R LE)            LLE AROM  L Knee Flexion: 60  L Knee Extension: 20  LLE Strength  L Hip Flexion: 2  L Knee Extension: 2  L Ankle Dorsiflexion: 2  LLE Sensation  Light Touch: Partial deficit Coordination  Fine Motor Skills-Upper: Left Intact; Right Intact  Gross Motor Skills-Upper: Left Intact; Right Intact           Mental Status  Neurologic State: Drowsy  Orientation Level: Oriented X4  Cognition: Appropriate decision making  Perception: Appears intact  Perseveration: No perseveration noted  Safety/Judgement: Awareness of environment                    Basic ADLs (From Assessment) Complex ADLs (From Assessment)   Basic ADL  Feeding: Independent  Oral Facial Hygiene/Grooming: Supervision  Bathing: Moderate assistance  Upper Body Dressing: Supervision  Lower Body Dressing: Moderate assistance  Toileting: Moderate assistance     Grooming/Bathing/Dressing Activities of Daily Living     Cognitive Retraining  Safety/Judgement: Awareness of environment                 Functional Transfers  Toilet Transfer : Moderate assistance  Shower Transfer: Moderate assistance     Bed/Mat Mobility  Supine to Sit: Minimum assistance  Sit to Supine: Minimum assistance  Sit to Stand: Minimum assistance;Assist x2           Physical Skills Involved:  1. Range of Motion  2. Balance  3. Mobility Cognitive Skills Affected (resulting in the inability to perform in a timely and safe manner): 1. none Psychosocial Skills Affected:  1.  Environmental Adaptations   Number of elements that affect the Plan of Care: 3-5:  MODERATE COMPLEXITY   CLINICAL DECISION MAKING:   April Hernandez Inpatient Short Form  How much help from another person does the patient currently need. .. Total A Lot A Little None   1. Putting on and taking off regular lower body clothing?   [ ] 1   [X] 2   [ ] 3   [ ] 4   2. Bathing (including washing, rinsing, drying)? [ ] 1   [X] 2   [ ] 3   [ ] 4   3. Toileting, which includes using toilet, bedpan or urinal?   [ ] 1   [ ] 2   [X] 3   [ ] 4   4. Putting on and taking off regular upper body clothing?   [ ] 1   [ ] 2   [ ] 3   [X] 4   5. Taking care of personal grooming such as brushing teeth? [ ] 1   [ ] 2   [ ] 3   [X] 4   6. Eating meals? [ ] 1   [ ] 2   [ ] 3   [X] 4   © 2007, Trustees of [address] [place] [address] [address] [address], under license to [employer]. All rights reserved   Score:  Initial: 19 Most Recent: X (Date: -- )     Interpretation of Tool:  Represents activities that are increasingly more difficult (i.e. Bed mobility, Transfers, Gait). Score 24 23 22-20 19-15 14-10 9-7 6       Modifier CH CI CJ CK CL CM CN         · Self Care:               - CURRENT STATUS:    CK - 40%-59% impaired, limited or restricted               - GOAL STATUS:           CJ - 20%-39% impaired, limited or restricted               - D/C STATUS:                       ---------------To be determined---------------  [place] / Product Type: Medicare /       Medical Necessity:     · Patient is expected to demonstrate progress in range of motion, balance and functional technique to increase independence with self care. Reason for Services/Other Comments:  · Patient would benefit from skilled Occupational Therapy to maximize independence and safety with self-care task and functional mobility. .   Use of outcome tool(s) and clinical judgement create a POC that gives a: LOW COMPLEXITY                 TREATMENT:   (In addition to Assessment/Re-Assessment sessions the following treatments were rendered)      Pre-treatment Symptoms/Complaints: Pt with minimal complaint of pain  Pain: Initial:   Pain Intensity 1: 2 2 Post Session:  2      Assessment/Reassessment only, no treatment provided today     Treatment/Session Assessment:         Response to Treatment:  Pt motivated and moves well. Education:  [ ] Home Exercises  [X] Fall Precautions  [ ] Hip Precautions [ ] Going Home Video  [X] Knee/Hip Prosthesis Review  [X] Walker Management/Safety [X] Adaptive Equipment as Needed         Interdisciplinary Collaboration:   · Physical Therapist  · Occupational Therapist  · Registered Nurse     After treatment position/precautions:   · Supine in bed  · Bed/Chair-wheels locked  · Call light within reach  · RN notified  · Family at bedside     Compliance with Program/Exercises: Will assess as treatment progresses. Recommendations/Intent for next treatment session:  Treatment next visit will focus on increasing Ms. Daley's independence with bed mobility, transfers, gait training, strength/ROM exercises, modalities for pain, and patient education.        Total Treatment Duration:  OT Patient Time In/Time Out  Time In: 1430  Time Out: [address] [facility]

## 2017-03-14 NOTE — PERIOP NOTES
Betadine lavage:  17.5cc of betadine lot #[id number], exp. 09/18,  in 500cc of . 9NS Lot #[id number], exp.  [id number] : LEFT KNEE

## 2017-03-14 NOTE — INTERVAL H&P NOTE
H&P Update:  Alberto Jose was seen and examined. History and physical has been reviewed. The patient has been examined.  There have been no significant clinical changes since the completion of the originally dated History and Physical.    Signed By: STEFAN Nevarez     March 14, 2017 8:49 AM

## 2017-03-14 NOTE — PERIOP NOTES
TRANSFER - IN REPORT:    Verbal report received from Taurus Rodriguez, [facility] (name) on Cameron Rosen  being received from [facility] (unit) for routine progression of care      Report consisted of patients Situation, Background, Assessment and   Recommendations(SBAR). Information from the following report(s) SBAR, Kardex, MAR, Recent Results and Med Rec Status was reviewed with the receiving nurse. Opportunity for questions and clarification was provided.

## 2017-03-14 NOTE — PERIOP NOTES
TRANSFER - OUT REPORT:    Verbal report given to Mahad Elizalde RN on Silvia Bennett  being transferred to room 315 for routine progression of care       Report consisted of patients Situation, Background, Assessment and   Recommendations(SBAR). Information from the following report(s) SBAR, OR Summary, Procedure Summary, Intake/Output and MAR was reviewed with the receiving nurse. Opportunity for questions and clarification was provided.       Patient transported with:   O2 @ 2 liters

## 2017-03-14 NOTE — PROGRESS NOTES
03/14/17 1358   Oxygen Therapy   O2 Sat (%) 100 %   Pulse via Oximetry 71 beats per minute   O2 Device Nasal cannula   O2 Flow Rate (L/min) 2 l/min   FIO2 (%) 28 %   Pt instructed in the use of Incentive Spirometry. Joint camp notes reviewed; patient has known sleep apnea that she denies but family doesn't from about 5 years ago. RN stated that PACU reported that patient exhibits signs of sleep apnea. Harper alerted. C/S monitor #8 placed at bedside. Attempted to wean to room a ir. Patient O2 sats 67% ; placed patient back on 2L O2. .. O2 sats 94%, HR 65.

## 2017-03-14 NOTE — PROGRESS NOTES
Problem: Mobility Impaired (Adult and Pediatric)  Goal: *Acute Goals and Plan of Care (Insert Text)  GOALS (1-4 days):  (1.)Ms. Daniel Bedolla will move from supine to sit and sit to supine in bed with CONTACT GUARD ASSIST.  (2.)Ms. Daniel Bedolla will transfer from bed to chair and chair to bed with CONTACT GUARD ASSIST using the least restrictive device. (3.)Ms. Daniel Bedolla will ambulate with CONTACT GUARD ASSIST for 150 feet with the least restrictive device. (4.)Ms. Daniel Bedolla will ambulate up/down 4 steps with bilateral railing with MINIMAL ASSIST with no device. (5.)Ms. Daniel Bedolla will increase left knee ROM to 5°-80°.  ________________________________________________________________________________________________      PHYSICAL THERAPY JOINT CAMP TKA: INITIAL ASSESSMENT, PM 3/14/2017  INPATIENT: Hospital Day: 1  [place] / [place] / Product Type: Medicare /      NAME/AGE/GENDER: Nely Dawson is a 80 y.o. female       PRIMARY DIAGNOSIS:  Primary osteoarthritis of left knee [M17.12]              Procedure(s) and Anesthesia Type:     * LEFT KNEE ARTHROPLASTY TOTAL/DEPUY/ GENERAL ANES BECAUSE PT HAS SPINAL CORD STIMULATOR - General (Left)  ICD-10: Treatment Diagnosis:        · Pain in Left Knee (M25.562)  · Stiffness of Left Knee, Not elsewhere classified (M25.662)  · Difficulty in walking, Not elsewhere classified (R26.2)  · Other abnormalities of gait and mobility (R26.89)       ASSESSMENT:      Ms. Daniel Bedolla presents S/P L TKA and presents with decreased L LE strength and ROM, standing balance, functional mobility and TKA awareness. She would benefit from further PT while here to address these deficits. She plans on going home at OR with family support. This section established at most recent assessment   PROBLEM LIST (Impairments causing functional limitations):  1. Decreased Strength  2. Decreased Transfer Abilities  3. Decreased Ambulation Ability/Technique  4. Decreased Balance  5.  Increased Pain  6. Decreased Activity Tolerance  7. Increased Fatigue  8. Decreased Flexibility/Joint Mobility  9. Decreased Knowledge of Precautions  10. Decreased [place] with Home Exercise Program    INTERVENTIONS PLANNED: (Benefits and precautions of physical therapy have been discussed with the patient.)  1. Balance Exercise  2. Bed Mobility  3. Cold  4. Gait Training  5. Home Exercise Program (HEP)  6. Therapeutic Exercise/Strengthening  7. Transfer Training  8. TKa education  9. Range of Motion: active/assisted/passive  10. Therapeutic Activities  11. Group Therapy      TREATMENT PLAN: Frequency/Duration: Follow patient BID   to address above goals. Rehabilitation Potential For Stated Goals: GOOD      RECOMMENDED REHABILITATION/EQUIPMENT: (at time of discharge pending progress): Continue Skilled Therapy and Home Health: Physical Therapy. HISTORY:   History of Present Injury/Illness (Reason for Referral):  S/P L TKA  Past Medical History/Comorbidities:   Ms. Mariusz Chahal  has a past medical history of Acute pancreatitis (2/2008); Aneurysm ([address] Anxiety; Bilateral sacroiliitis ([address] Breast cyst; CAD (coronary artery disease); Cellulitis (6/16/15); Chest pain; Chronic pain; CKD (chronic kidney disease); Degenerative disc disease; Depression (12/23/2015); Diabetes mellitus type II; Dyslipidemia; Dyspnea; Edema (12/23/2015); Fatty liver; Fibromyalgia; Food allergy; GERD (gastroesophageal reflux disease); Heart failure ([address] Hypertension; Hypertriglyceridemia; Hypothyroidism; Lumbar herniated disc (Aug 2013); Lumbar radiculopathy; Microcytic anemia; Mild pulmonary hypertension ([address] Mitral valve regurgitation (12/23/2015); Nausea & vomiting; Neuropathy; Obesity (BMI 30.0-34.9) (10/2/14); Osteoarthritis; Panic attacks; Sacroiliac dysfunction; Seasonal allergic reaction; Sleep apnea; and Syncope and collapse (12/23/2015).   Ms. Mariusz Chahal  has a past surgical history that includes other surgical (12/22/09); partial thyroidectomy (2008); other surgical; other surgical (10/28/13); breast lumpectomy (Right); hysterectomy (1995); knee arthroscopy (Left, 2004); back surgery (08/30/2013); orthopaedic (Left, 2010); orthopaedic (Right, 2009); breast biopsy (Bilateral, R/1988  L/2012); knee arthroscopy (10/03/2014); premalig/benign skin lesion excision (09/29/2014); and heart catheterization (2005). Social History/Living Environment:   Home Environment: Private residence  # Steps to Enter: 2  Rails to Enter: No  One/Two Story Residence: One story  Living Alone: No  Support Systems: Spouse/Significant Other/Partner, Child(sarah)  Patient Expects to be Discharged to[de-identified] Private residence  Current DME Used/Available at Home: Walker, rolling, Carollee Cashing, straight, Grab bars, Walker  Tub or Shower Type:  (custom walk in tub)  Prior Level of Function/Work/Activity:  Required assist with lower body dressing and ambulated with a cane   Number of Personal Factors/Comorbidities that affect the Plan of Care: 1-2: MODERATE COMPLEXITY   EXAMINATION:   Most Recent Physical Functioning:      Gross Assessment  AROM: Within functional limits (R LE)  Strength: Generally decreased, functional (R LE)  Sensation: Intact (R LE)           LLE AROM  L Knee Flexion: 60  L Knee Extension: 20      LLE Strength  L Hip Flexion: 2  L Knee Extension: 2  L Ankle Dorsiflexion: 2     Bed Mobility  Supine to Sit: Minimum assistance  Sit to Supine: Minimum assistance     Transfers  Sit to Stand: Minimum assistance;Assist x2  Stand to Sit: Minimum assistance;Assist x2     Balance  Sitting: Intact  Standing: Pull to stand; With support                Weight Bearing Status  Left Side Weight Bearing: As tolerated  Distance (ft): 4 Feet (ft)  Ambulation - Level of Assistance: Minimal assistance;Assist x2  Assistive Device: Walker, rolling  Speed/Lindsay: Pace decreased (<100 feet/min)  Step Length: Right shortened  Stance: Left decreased  Gait Abnormalities: Antalgic; Step to gait  Interventions: Safety awareness training; Tactile cues; Verbal cues      Braces/Orthotics:      Left Knee Cold  Type: Cryocuff       Body Structures Involved:  1. Joints  2. Muscles Body Functions Affected:  1. Neuromusculoskeletal  2. Movement Related Activities and Participation Affected:  1. Mobility  2. Self Care   Number of elements that affect the Plan of Care: 4+: HIGH COMPLEXITY   CLINICAL PRESENTATION:   Presentation: Stable and uncomplicated: LOW COMPLEXITY   CLINICAL DECISION MAKIN[address] [place] [address] AM-PAC 6 Clicks   Basic Mobility Inpatient Short Form  How much difficulty does the patient currently have. .. Unable A Lot A Little None   1. Turning over in bed (including adjusting bedclothes, sheets and blankets)? [ ] 1   [ ] 2   [X] 3   [ ] 4   2. Sitting down on and standing up from a chair with arms ( e.g., wheelchair, bedside commode, etc.)   [ ] 1   [ ] 2   [X] 3   [ ] 4   3. Moving from lying on back to sitting on the side of the bed? [ ] 1   [ ] 2   [X] 3   [ ] 4   How much help from another person does the patient currently need. .. Total A Lot A Little None   4. Moving to and from a bed to a chair (including a wheelchair)? [ ] 1   [ ] 2   [X] 3   [ ] 4   5. Need to walk in hospital room? [ ] 1   [X] 2   [ ] 3   [ ] 4   6. Climbing 3-5 steps with a railing? [ ] 1   [X] 2   [ ] 3   [ ] 4   © , Trustees of [address] [place] [address], under license to [employer]. All rights reserved       Score:  Initial:16 Most Recent: X (Date: -- )     Interpretation of Tool:  Represents activities that are increasingly more difficult (i.e. Bed mobility, Transfers, Gait).        Score 24 23 22-20 19-15 14-10 9-7 6       Modifier CH CI CJ CK CL CM CN         · Mobility - Walking and Moving Around:               - CURRENT STATUS:    CK - 40%-59% impaired, limited or restricted               - GOAL STATUS:           CJ - 20%-39% impaired, limited or restricted               - D/C STATUS:                       ---------------To be determined---------------  [place] / Product Type: Medicare /       Medical Necessity:     · Patient demonstrates good rehab potential due to higher previous functional level. Reason for Services/Other Comments:  · Patient continues to require present interventions due to patient's inability to perform functional mobility independently. Use of outcome tool(s) and clinical judgement create a POC that gives a: Clear prediction of patient's progress: LOW COMPLEXITY                 TREATMENT:   (In addition to Assessment/Re-Assessment sessions the following treatments were rendered)      Pre-treatment Symptoms/Complaints:  drowsy  Pain: Initial:   Pain Intensity 1: 2  Pain Location 1: Knee  Pain Orientation 1: Left  Pain Intervention(s) 1: Ambulation/Increased Activity, Cold pack, Elevation, Repositioned  Post Session:  2      Assessment/Reassessment only, no treatment provided today        Date:    Date:    Date:      ACTIVITY/EXERCISE AM PM AM PM AM PM   GROUP THERAPY  [ ]  [ ]  [ ]  [ ]  [ ]  [ ]   Ankle Pumps               Quad Sets               Gluteal Sets               Hip ABd/ADduction               Straight Leg Raises               Knee Slides               Short Arc Quads               Long Arc Quads               Chair Slides                               B = bilateral; AA = active assistive; A = active; P = passive       Treatment/Session Assessment:         Response to Treatment:  Tolerated well. Drowsy during session. Returned to bed and set up lunch tray with family in rrom.      Education:  [ ] Home Exercises  [X] Fall Precautions  [ ] Hip Precautions [ ] Going Home Video  [ ] Knee/Hip Prosthesis Review  [X] Walker Management/Safety [ ] Adaptive Equipment as Needed         Interdisciplinary Collaboration:   · Physical Therapist  · Occupational Therapist  · Registered Nurse  · Respiratory Therapist     After treatment position/precautions:   · Supine in bed  · Bed/Chair-wheels locked  · Bed in low position  · Call light within reach  · RN notified  · Family at bedside  · Side rails x 3     Compliance with Program/Exercises: Will assess as treatment progresses. Recommendations/Intent for next treatment session:  Treatment next visit will focus on increasing Ms. Daley's independence with bed mobility, transfers, gait training, strength/ROM exercises, modalities for pain, and patient education.        Total Treatment Duration:  PT Patient Time In/Time Out  Time In: 1420  Time Out: Abena Lopez [address] [place] [address]

## 2017-03-14 NOTE — ANESTHESIA PREPROCEDURE EVALUATION
Anesthetic History     PONV          Review of Systems / Medical History  Patient summary reviewed    Pulmonary        Sleep apnea           Neuro/Psych              Cardiovascular    Hypertension: well controlled  Valvular problems/murmurs (mild): mitral insufficiency        CAD    Exercise tolerance: >4 METS  Comments: NL perfusion scan 2/17   GI/Hepatic/Renal     GERD: well controlled           Endo/Other    Diabetes: type 2  Hypothyroidism: well controlled  Obesity     Other Findings   Comments: Spinal Cord Stimulator           Physical Exam    Airway  Mallampati: III    Neck ROM: decreased range of motion   Mouth opening: Normal     Cardiovascular  Regular rate and rhythm,  S1 and S2 normal,  no murmur, click, rub, or gallop             Dental         Pulmonary  Breath sounds clear to auscultation               Abdominal         Other Findings            Anesthetic Plan    ASA: 3  Anesthesia type: general      Post-op pain plan if not by surgeon: peripheral nerve block single      Anesthetic plan and risks discussed with: Patient

## 2017-03-14 NOTE — PERIOP NOTES
TRANSFER - OUT REPORT:    Verbal report given to Jock Councilman., RN on Edmundo Cosme  being transferred to block holding area for routine progression of care       Report consisted of patients Situation, Background, Assessment and   Recommendations(SBAR). Information from the following report(s) Kardex, MAR, Recent Results and Med Rec Status was reviewed with the receiving nurse. Lines:   Peripheral IV 04/10/12 Left Antecubital (Active)       Peripheral IV 03/14/17 Right Hand (Active)   Site Assessment Clean, dry, & intact 3/14/2017  9:00 AM   Phlebitis Assessment 0 3/14/2017  9:00 AM   Infiltration Assessment 0 3/14/2017  9:00 AM   Dressing Status Clean, dry, & intact 3/14/2017  9:00 AM   Dressing Type Transparent;Tape 3/14/2017  9:00 AM   Hub Color/Line Status Infusing 3/14/2017  9:00 AM   Action Taken Blood drawn 3/14/2017  9:00 AM        Opportunity for questions and clarification was provided.       Patient transported with:   [facility]

## 2017-03-14 NOTE — ANESTHESIA POSTPROCEDURE EVALUATION
Post-Anesthesia Evaluation and Assessment    Patient: Yahaira Herbert MRN: [id number]  SSN: [id number]    YOB: 1945  Age: 80 y.o. Sex: female       Cardiovascular Function/Vital Signs  Visit Vitals    /77    Pulse 71    Temp 36.9 °C (98.5 °F)    Resp 16    Ht 5' 5\" (1.651 m)    Wt 96.2 kg (212 lb 2 oz)    SpO2 100%    BMI 35.3 kg/m2       Patient is status post general, regional anesthesia for Procedure(s):  LEFT KNEE ARTHROPLASTY TOTAL/DEPUY/ GENERAL ANES BECAUSE PT HAS SPINAL CORD STIMULATOR. Nausea/Vomiting: None    Postoperative hydration reviewed and adequate. Pain:  Pain Scale 1: Visual (03/14/17 1303)  Pain Intensity 1: 0 (03/14/17 1303)   Managed    Neurological Status:   Neuro (WDL): Within Defined Limits (03/14/17 1303)  Neuro  Neurologic State: Drowsy (03/14/17 1303)  Orientation Level: Oriented to person (03/14/17 1303)   At baseline    Mental Status and Level of Consciousness: Arousable    Pulmonary Status:   O2 Device: Nasal cannula (03/14/17 1233)   Adequate oxygenation and airway patent    Complications related to anesthesia: None    Post-anesthesia assessment completed.  No concerns    Signed By: Michael Feldman MD     March 14, 2017

## 2017-03-14 NOTE — CONSULTS
HOSPITALIST H&P/CONSULT  NAME:  Humphrey Yao   Age:  80 y.o.  :   1945   MRN:   [id number]  PCP: Timoteo Williamson MD  Consulting MD:  Treatment Team: Attending Provider: Jim Galvan MD; Consulting Provider: John Clayton MD; Consulting Provider: Asif Estevez MD  HPI:   Patient is a 80 yof with a hx of htn, dm, cad who is s/p left tka. Pt tolerated procedure well without complication. Pt denies any cp, sob, nausea, vomiting. We are asked to assist with dm management    Complete ROS done and is as stated in HPI or otherwise negative  Past Medical History:   Diagnosis Date    Acute pancreatitis 2008    Aneurysm ([address]     splenic- \"just over 1 cm\"-(CT done - followed by Dr Zabrina Evans))   Ian Mott Bilateral sacroiliitis ([address]     Breast cyst     aspiration     CAD (coronary artery disease)     MITRAL VALVE LEAK AND ANEURYSM ON SPLENIC ARTERY; has REJI-1 flight of steps    Cellulitis 6/16/15    right leg    Chest pain     Chronic pain     lumbar/spine/thoracic, left knee    CKD (chronic kidney disease)     stage 3    Degenerative disc disease     lumbosacral    Depression 2015    Diabetes mellitus type II     fasting -170, oral meds, Hypoglycemia sx @ 89, last hgba1c- 9.2 (2016)    Dyslipidemia     Dyspnea     Edema 2015    Fatty liver     Fibromyalgia     Food allergy     GERD (gastroesophageal reflux disease)     controlled with medication    Heart failure ([address]     per Dr Uri Davis- due to valve leakage (pt denies)    Hypertension     controlled with medication    Hypertriglyceridemia     Hypothyroidism      partial thyroidectomy    Lumbar herniated disc Aug 2013    Lumbar radiculopathy     Microcytic anemia     Mild pulmonary hypertension ([address]     per [facility] ()    Mitral valve regurgitation 2015    Nausea & vomiting     post-op nausea    Neuropathy     bilateral legs    Obesity (BMI 30.0-34. 9) 10/2/14    BMI- 30.7    Osteoarthritis     Panic attacks     Sacroiliac dysfunction     Seasonal allergic reaction     Sleep apnea     NO CPAP    Syncope and collapse 12/23/2015      Past Surgical History:   Procedure Laterality Date    HX BACK SURGERY  08/30/2013    Spinal Modulation implant    HX BREAST BIOPSY Bilateral R/1988  L/2012    HX BREAST LUMPECTOMY Right     HX HEART CATHETERIZATION  2005    HX HYSTERECTOMY  1995    HX KNEE ARTHROSCOPY Left 2004    left knee    HX KNEE ARTHROSCOPY  10/03/2014    left knee torn menincus    HX ORTHOPAEDIC Left 2010    elbow scope    HX ORTHOPAEDIC Right 2009    hand nodule removed    HX OTHER SURGICAL  12/22/09    TENS implant [facility] [facility]    HX OTHER SURGICAL      nodule removed left foot and hand    HX OTHER SURGICAL  10/28/13    left hand-pointer and pinky finger cyst removed    HX PARTIAL THYROIDECTOMY  2008    partial thyroidectomy and parathyroidectomy    HX PREMALIG/BENIGN SKIN LESION EXCISION  09/29/2014    lower lip      Prior to Admission Medications   Prescriptions Last Dose Informant Patient Reported? Taking? LORazepam (ATIVAN) 1 mg tablet 3/14/2017 at Unknown time  No Yes   Sig: Take 1 Tab by mouth every eight (8) hours as needed for Anxiety. Max Daily Amount: 3 mg. Indications: ANXIETY   OTHER 3/13/2017 at Unknown time  No Yes   Sig: jobst compression stockings 20 mmHg. R60.0 - bilateral lower extremity swelling   SUMAtriptan (IMITREX) 50 mg tablet 2/14/2017 at Unknown time  No Yes   Sig: Take 1 Tab by mouth as needed for Migraine. Indications: MIGRAINE   butalbital-acetaminophen-caffeine (FIORICET, ESGIC) -40 mg per tablet 3/13/2017 at Unknown time  No Yes   Sig: Take 1 Tab by mouth every four (4) hours as needed for Pain. Max Daily Amount: 6 Tabs. cetirizine (ALLERGY RELIEF, CETIRIZINE,) 10 mg tablet 3/14/2017 at Unknown time  Yes Yes   Sig: Take 10 mg by mouth nightly.  Indications: ALLERGIC RHINITIS   cyclosporine (RESTASIS) 0.05 % ophthalmic emulsion 3/13/2017 at Unknown time  Yes Yes   Sig: Administer 1 drop to both eyes nightly. Indications: DRY EYE   diclofenac (VOLTAREN) 1 % gel 3/7/2017 at Unknown time  No Yes   Sig: Apply 2 g to affected area four (4) times daily. ergocalciferol (VITAMIN D2) 50,000 unit capsule 3/7/2017 at Unknown time  No Yes   Sig: Take 1 Cap by mouth every . escitalopram oxalate (LEXAPRO) 20 mg tablet 3/13/2017 at Unknown time  No Yes   Sig: Take 1 Tab by mouth daily. Patient taking differently: Take 20 mg by mouth nightly.   ezetimibe-simvastatin (VYTORIN 10/40) 10-40 mg per tablet 3/13/2017 at Unknown time  No Yes   Sig: Take 1 Tab by mouth nightly. ferrous sulfate 325 mg (65 mg iron) tablet 3/7/2017 at Unknown time  Yes Yes   Sig: Take  by mouth two (2) times a day. fluticasone (FLONASE) 50 mcg/actuation nasal spray 3/13/2017 at Unknown time  No Yes   Si Sprays by Both Nostrils route daily. gabapentin (NEURONTIN) 100 mg capsule 3/14/2017 at Unknown time  No Yes   Sig: Take 1 Cap by mouth three (3) times daily. Take by mouth 1 - 3 tablets three times daily as needed. glipiZIDE SR (GLUCOTROL XL) 10 mg CR tablet 3/13/2017 at Unknown time  No Yes   Sig: Take 1 Tab by mouth daily. irbesartan (AVAPRO) 300 mg tablet 3/13/2017 at Unknown time  No Yes   Sig: Take 1 Tab by mouth nightly. levothyroxine (SYNTHROID) 112 mcg tablet 3/14/2017 at Unknown time  No Yes   Sig: Take 1 Tab by mouth Daily (before breakfast). Indications: hypothyroidism   loratadine (CLARITIN) 10 mg tablet 3/13/2017 at Unknown time  Yes Yes   Sig: Take 10 mg by mouth nightly.   magnesium oxide (MAG-OX) 400 mg tablet 3/13/2017 at Unknown time  No Yes   Sig: Take 1 Tab by mouth two (2) times a day. metolazone (ZAROXOLYN) 5 mg tablet 3/13/2017 at Unknown time  No Yes   Si tab every 3 days.  Take 30 minutes prior to lasix   metoprolol succinate (TOPROL XL) 100 mg tablet 3/13/2017 at 2200  No Yes   Sig: Take 2 Tabs by mouth daily. Patient taking differently: Take 200 mg by mouth nightly. montelukast (SINGULAIR) 10 mg tablet 3/13/2017 at Unknown time  No Yes   Sig: Take 1 Tab by mouth every evening. mupirocin calcium (BACTROBAN) 2 % nasal ointment 3/13/2017 at Unknown time  Yes Yes   Sig: by Both Nostrils route two (2) times a day. Used 5 days prior to surgery   oxyCODONE IR (OXY-IR) 15 mg immediate release tablet 3/14/2017 at Unknown time  No Yes   Sig: Take 1 Tab by mouth every four (4) hours as needed for Pain. Max Daily Amount: 90 mg. Indications: PAIN   pantoprazole (PROTONIX) 40 mg tablet 3/14/2017 at Unknown time  No Yes   Sig: Take 1 Tab by mouth two (2) times a day. Indications: GASTROESOPHAGEAL REFLUX   pioglitazone (ACTOS) 45 mg tablet 3/13/2017 at Unknown time  No Yes   Sig: Take 1 Tab by mouth daily. temazepam (RESTORIL) 30 mg capsule 3/13/2017 at Unknown time  No Yes   Sig: Take 1 Cap by mouth nightly as needed for Sleep. Max Daily Amount: 30 mg.       Facility-Administered Medications: None     Allergies   Allergen Reactions    Catapres [Clonidine] Rash, Swelling and Other (comments)     Catapres patch,  headache    Celebrex [Celecoxib] Swelling     Red rash, itching    Cymbalta [Duloxetine] Rash    Morphine Rash and Swelling     migraines    Oxycontin [Oxycodone] Other (comments)     Migraines, elevated BP    Shellfish Containing Products Anaphylaxis    Sulfa (Sulfonamide Antibiotics) Unknown (comments)      Social History   Substance Use Topics    Smoking status: Never Smoker    Smokeless tobacco: Never Used    Alcohol use No      Family History   Problem Relation Age of Onset    Diabetes Father     Heart Disease Father      CHF/ CAD    Lung Disease Father      \"holes in lungs- exposed to toxins in textiles\"    Cancer Father      leukemia    Other Father      polio    Hypertension Father     Asthma Father     Diabetes Maternal Grandmother     Heart Disease Maternal Grandmother     Stroke Maternal Grandmother     Diabetes Maternal Grandfather     Heart Disease Maternal Grandfather     Diabetes Paternal Grandmother     Heart Failure Paternal Grandmother      CHF    Diabetes Paternal Grandfather     Heart Disease Paternal Grandfather     Breast Cancer Mother     Diabetes Mother     Hypertension Mother     Stroke Mother      TIAs- several    Breast Cancer Maternal Aunt      3 Maternal Aunts    Breast Cancer Paternal Aunt       Objective:     Visit Vitals    /54    Pulse 72    Temp 97 °F (36.1 °C)    Resp 16    Ht 5' 5\" (1.651 m)    Wt 96.2 kg (212 lb 2 oz)    SpO2 100%    BMI 35.3 kg/m2      Temp (24hrs), Av.5 °F (36.4 °C), Min:96.9 °F (36.1 °C), Max:98.5 °F (36.9 °C)    Oxygen Therapy  O2 Sat (%): 100 % (17 1405)  O2 Device: Nasal cannula (17 1233)  O2 Flow Rate (L/min): 2 l/min (17 1329)  Physical Exam:  General:    Alert, cooperative, no distress, appears stated age. Head:   Normocephalic, without obvious abnormality, atraumatic. Nose:  Nares normal. No drainage or sinus tenderness. Lungs:   Clear to auscultation bilaterally. No Wheezing or Rhonchi. No rales. Heart:   Regular rate and rhythm,  no murmur, rub or gallop. Abdomen:   Soft, non-tender. Not distended. Bowel sounds normal.   Extremities: No cyanosis. No edema. No clubbing  Skin:     Texture, turgor normal. No rashes or lesions.   Not Jaundiced  Neurologic: Alert and oriented x 3, no focal deficits   Data Review:   Recent Results (from the past 24 hour(s))   TYPE & SCREEN    Collection Time: 17  8:50 AM   Result Value Ref Range    Crossmatch Expiration 2017     ABO/Rh(D) AB POSITIVE     Antibody screen NEG    GLUCOSE, POC    Collection Time: 17  9:16 AM   Result Value Ref Range    Glucose (POC) 184 (H) 65 - 100 mg/dL   URINALYSIS W/ RFLX MICROSCOPIC    Collection Time: 17 11:10 AM   Result Value Ref Range    Color YELLOW      Appearance CLEAR      Specific gravity 1.007 1.001 - 1.023      pH (UA) 6.0 5.0 - 9.0      Protein NEGATIVE  NEG mg/dL    Glucose NEGATIVE  mg/dL    Ketone NEGATIVE  NEG mg/dL    Bilirubin NEGATIVE  NEG      Blood NEGATIVE  NEG      Urobilinogen 0.2 0.2 - 1.0 EU/dL    Nitrites NEGATIVE  NEG      Leukocyte Esterase NEGATIVE  NEG       Imaging /Procedures /Studies     Assessment and Plan: Active Hospital Problems    Diagnosis Date Noted    Osteoarthritis        PLAN  ·  continue home meds  · Monitor glucose. ssi added  · Monitor bp. Hydralazine prn  · PT/OT  · Thank you for the opportunity to participate in this patient's care.          Signed By: Emerald Aggarwal MD     March 14, 2017

## 2017-03-14 NOTE — ANESTHESIA PROCEDURE NOTES
Peripheral Block    Start time: 3/14/2017 9:44 AM  End time: 3/14/2017 9:48 AM  Performed by: Matt Christian  Authorized by: Matt Christian       Pre-procedure: Indications: post-op pain management    Preanesthetic Checklist: patient identified, risks and benefits discussed, site marked, timeout performed, anesthesia consent given and patient being monitored    Timeout Time: 09:44          Block Type:   Block Type:   Adductor canal  Laterality:  Left  Monitoring:  Continuous pulse ox, frequent vital sign checks, heart rate, oxygen and responsive to questions  Injection Technique:  Single shot  Procedures: ultrasound guided and nerve stimulator    Patient Position: supine  Prep: DuraPrep    Location:  Mid thigh  Needle Type:  Stimuplex  Needle Gauge:  20 G  Needle Localization:  Nerve stimulator and ultrasound guidance  Motor Response: minimal motor response >0.4 mA    Medication Injected:  0.5%  ropivacaine  Adds:  Epi 1:400K  Volume (mL):  20    Assessment:  Number of attempts:  1  Injection Assessment:  Incremental injection every 5 mL, local visualized surrounding nerve on ultrasound, negative aspiration for blood, no paresthesia, no intravascular symptoms and ultrasound image on chart  Patient tolerance:  Patient tolerated the procedure well with no immediate complications

## 2017-03-14 NOTE — OP NOTES
[employer]  Cemented Total Knee Arthroplasty  Patient:Elva Boudreaux   : 1945  Medical Record [id number]  Pre-operative Diagnosis:  Primary osteoarthritis of left knee [M17.12]  Post-operative Diagnosis: Primary osteoarthritis of left knee [M17.12]    Surgeon: Evonne Guzman MD  Assistant: Yasmine Quinones PA-C    Anesthesia: General    Procedure: Total Knee Arthroplasty with use of Bone Cement  The complexity of the total joint surgery requires the use of a first assistant for positioning, retraction and assistance in closure. The patient's Body mass index is 35.3 kg/(m^2). , BMI's greater then 40 make surgical exposure and retraction extremely difficult and increase operative time. Tourniquet Time: none  EBL: 150cc  Additional Findings: Severe DJD  Releases none    Cameron Rosen was brought to the operating room and positioned on the operating table. She was anethestized  A echeverria catheter was placed preoperatively and IV antibiotics was administered. Prior to the incision being made a timeout was called identifying the patient, procedure ,operative side and surgeon. . The left leg was prepped and draped in the usual sterile manner  An anterior longitudinal incision was accomplished just medial to the tibial tubercle and extending approximal 6 centimeters proximal to the superior pole of the patella. A medial parapatellar capsular incision was performed. The medial capsular flap was elevated around to the insertion of the semimembranous tendon. The patella was everted and the knee flexed and externally rotated. The medial and external menisci were excised. The lateral half of the fat pad excised and the patella femoral ligament was released. The anterior cruciate ligament was resected and the posterior cruciate ligament was substituted. Using extramedullary instrumentation, the tibial cut was accomplished with appropriate posterior slope.   Approxiamately 2 mm of bone was removed from the low side of the tibia. The distal femur was next addressed. A drill hole was made above the intracondylar notch. Using appropriate intramedullary instrumentation,a 6 degree valgus distal cut was accomplished. A femur was sized. The anterior and posterior cuts were then made about the distal femur. The osteophytes were removed from the tibial and femoral surfaces. The flexion and extension gaps were assessed with the appropriate spacer blocks. Additional surgical procedures included none. The flexion and extension gaps were deemed appropriately balanced. The appropriate cutting blocks were then utilized to perform the anterior chamfer, posterior chamfer and notch cuts, with appropriate lateral tranlation accomplished for the patellofemoral groove. The tibia was sized. The tibial base plate was pinned into place with the appropriate external rotation and stem site prepared. A preliminary range of motion was accomplished with the above size trial components. A polyethylene insert allowed the patient to obtain full extension as well as appropriate flexion. The patient's ligaments were stable in flexion and extension to medial and lateral stressing and the alignment was through the appropriate mechanical axis. The patella was then everted. The bone was resected appropriately for a pegged patella button. A trial reduction revealed appropriate tracking through the patellofemoral groove. All trial components were removed and the cut surfaces prepared for cementing with irrigation and debridement of the bone interstices. There were no femoral deficiencies. There were no tibial deficiencies. No augmentation was utilized. The implants were cemented into position and pressurized in the usual fashion. Bone and cement debris were meticulously removed. The betadine lavage protocol was used.     Jose L Barrier knee was placed through range of motion and noted to be stable as mentioned above with the trail components. The wound was dry, therefore no drain was used. The operative knee was injected with 60cc of Naropin, 10 cc's of morphine and 1 cc of 30mg of Toradol. The capsular layer was closed using a #1 vicryl suture, while subcutaneous layers were closed using 2-0 Vicryl interrupted sutures. Finally the skin was closed using 3-0 Vicryl and skin staples, which were applied in occlusive fashion and sterile bandage applied. An Iceman cryo pad was applied on the operative leg. Sponge count and needle counts were correct. Artcarlos Frederick left the operating room     Implants:   Implant Name Type Inv.  Item Serial No.  Lot No. LRB No. Used   CEMENT BNE HV R 40GM -- PALACOS - [id number]  CEMENT BNE HV R 40GM -- PALACOS [id number] [employer] [id number] Left 2   BASE TIB FB SZ 4 CARRIE -- ATTUNE - [id number]  BASE TIB FB SZ 4 CARRIE -- ATTUNE [id number] [facility] [id number] Left 1   FEM PS BENEDICTO CARRIE LT SZ 5 -- ATTUNE - [id number]  FEM PS BENEDICTO CARRIE LT SZ 5 -- ATTUNE [id number] [facility] [id number] Left 1   PAT CARRIE DOME MEDIAL 38MM -- ATTUNE - [id number]  PAT CARRIE DOME MEDIAL 38MM -- ATTUNE [id number] [facility] [id number] Left 1   INSERT TIB FB PS SZ 5 6MM -- ATTUNE - [id number]   INSERT TIB FB PS SZ 5 6MM -- ATTUNE [id number] [facility] [id number] Left 1     Signed By: Alexia Terry MD

## 2017-03-15 PROBLEM — Z96.659 S/P TOTAL KNEE ARTHROPLASTY: Status: ACTIVE | Noted: 2017-03-15

## 2017-03-15 LAB
ANION GAP BLD CALC-SCNC: 8 MMOL/L (ref 7–16)
BUN SERPL-MCNC: 32 MG/DL (ref 8–23)
CALCIUM SERPL-MCNC: 8.3 MG/DL (ref 8.3–10.4)
CHLORIDE SERPL-SCNC: 101 MMOL/L (ref 98–107)
CO2 SERPL-SCNC: 28 MMOL/L (ref 21–32)
CREAT SERPL-MCNC: 1.24 MG/DL (ref 0.6–1)
EST. AVERAGE GLUCOSE BLD GHB EST-MCNC: 209 MG/DL
GLUCOSE BLD STRIP.AUTO-MCNC: 197 MG/DL (ref 65–100)
GLUCOSE BLD STRIP.AUTO-MCNC: 214 MG/DL (ref 65–100)
GLUCOSE BLD STRIP.AUTO-MCNC: 247 MG/DL (ref 65–100)
GLUCOSE SERPL-MCNC: 196 MG/DL (ref 65–100)
HBA1C MFR BLD: 8.9 % (ref 4.8–6)
HGB BLD-MCNC: 9.5 G/DL (ref 11.7–15.4)
POTASSIUM SERPL-SCNC: 5.3 MMOL/L (ref 3.5–5.1)
SODIUM SERPL-SCNC: 137 MMOL/L (ref 136–145)

## 2017-03-15 PROCEDURE — 36415 COLL VENOUS BLD VENIPUNCTURE: CPT | Performed by: ORTHOPAEDIC SURGERY

## 2017-03-15 PROCEDURE — 94760 N-INVAS EAR/PLS OXIMETRY 1: CPT

## 2017-03-15 PROCEDURE — 74011250636 HC RX REV CODE- 250/636: Performed by: INTERNAL MEDICINE

## 2017-03-15 PROCEDURE — 80048 BASIC METABOLIC PNL TOTAL CA: CPT | Performed by: ORTHOPAEDIC SURGERY

## 2017-03-15 PROCEDURE — 97110 THERAPEUTIC EXERCISES: CPT

## 2017-03-15 PROCEDURE — 82962 GLUCOSE BLOOD TEST: CPT

## 2017-03-15 PROCEDURE — 74011250637 HC RX REV CODE- 250/637: Performed by: ORTHOPAEDIC SURGERY

## 2017-03-15 PROCEDURE — 97116 GAIT TRAINING THERAPY: CPT

## 2017-03-15 PROCEDURE — 65270000029 HC RM PRIVATE

## 2017-03-15 PROCEDURE — 74011250636 HC RX REV CODE- 250/636: Performed by: ORTHOPAEDIC SURGERY

## 2017-03-15 PROCEDURE — 83036 HEMOGLOBIN GLYCOSYLATED A1C: CPT | Performed by: ORTHOPAEDIC SURGERY

## 2017-03-15 PROCEDURE — 85018 HEMOGLOBIN: CPT | Performed by: ORTHOPAEDIC SURGERY

## 2017-03-15 RX ORDER — HYDROMORPHONE HYDROCHLORIDE 2 MG/1
2-4 TABLET ORAL
Qty: 60 TAB | Refills: 0 | Status: SHIPPED | OUTPATIENT
Start: 2017-03-15 | End: 2017-05-08

## 2017-03-15 RX ORDER — ASPIRIN 81 MG/1
81 TABLET ORAL EVERY 12 HOURS
Qty: 60 TAB | Refills: 0 | Status: SHIPPED | OUTPATIENT
Start: 2017-03-15 | End: 2017-04-14

## 2017-03-15 RX ORDER — METHOCARBAMOL 500 MG/1
500 TABLET, FILM COATED ORAL 4 TIMES DAILY
Status: DISCONTINUED | OUTPATIENT
Start: 2017-03-15 | End: 2017-03-16

## 2017-03-15 RX ADMIN — INSULIN LISPRO 4 UNITS: 100 INJECTION, SOLUTION INTRAVENOUS; SUBCUTANEOUS at 11:59

## 2017-03-15 RX ADMIN — METOPROLOL SUCCINATE 200 MG: 100 TABLET, EXTENDED RELEASE ORAL at 20:22

## 2017-03-15 RX ADMIN — FERROUS SULFATE TAB 325 MG (65 MG ELEMENTAL FE) 325 MG: 325 (65 FE) TAB at 15:57

## 2017-03-15 RX ADMIN — PANTOPRAZOLE SODIUM 40 MG: 40 TABLET, DELAYED RELEASE ORAL at 15:57

## 2017-03-15 RX ADMIN — PANTOPRAZOLE SODIUM 40 MG: 40 TABLET, DELAYED RELEASE ORAL at 07:58

## 2017-03-15 RX ADMIN — GLIPIZIDE 10 MG: 10 TABLET, FILM COATED, EXTENDED RELEASE ORAL at 07:57

## 2017-03-15 RX ADMIN — METHOCARBAMOL 500 MG: 500 TABLET ORAL at 15:00

## 2017-03-15 RX ADMIN — PIOGLITAZONE HYDROCHLORIDE 45 MG: 15 TABLET ORAL at 07:57

## 2017-03-15 RX ADMIN — ACETAMINOPHEN 1000 MG: 500 TABLET, FILM COATED ORAL at 11:59

## 2017-03-15 RX ADMIN — METHOCARBAMOL 500 MG: 500 TABLET ORAL at 20:21

## 2017-03-15 RX ADMIN — LORAZEPAM 1 MG: 1 TABLET ORAL at 15:56

## 2017-03-15 RX ADMIN — HYDROMORPHONE HYDROCHLORIDE 4 MG: 4 TABLET ORAL at 07:57

## 2017-03-15 RX ADMIN — ESCITALOPRAM OXALATE 20 MG: 10 TABLET ORAL at 20:21

## 2017-03-15 RX ADMIN — HYDROMORPHONE HYDROCHLORIDE 4 MG: 4 TABLET ORAL at 04:19

## 2017-03-15 RX ADMIN — SENNOSIDES AND DOCUSATE SODIUM 2 TABLET: 8.6; 5 TABLET ORAL at 07:58

## 2017-03-15 RX ADMIN — HYDROMORPHONE HYDROCHLORIDE 4 MG: 4 TABLET ORAL at 15:57

## 2017-03-15 RX ADMIN — MONTELUKAST SODIUM 10 MG: 10 TABLET, FILM COATED ORAL at 20:21

## 2017-03-15 RX ADMIN — HYDROMORPHONE HYDROCHLORIDE 4 MG: 4 TABLET ORAL at 11:59

## 2017-03-15 RX ADMIN — FERROUS SULFATE TAB 325 MG (65 MG ELEMENTAL FE) 325 MG: 325 (65 FE) TAB at 07:57

## 2017-03-15 RX ADMIN — TEMAZEPAM 30 MG: 15 CAPSULE ORAL at 20:34

## 2017-03-15 RX ADMIN — EZETIMIBE: 10 TABLET ORAL at 20:21

## 2017-03-15 RX ADMIN — LORATADINE 10 MG: 10 TABLET ORAL at 20:21

## 2017-03-15 RX ADMIN — CEFAZOLIN 2 G: 1 INJECTION, POWDER, FOR SOLUTION INTRAMUSCULAR; INTRAVENOUS; PARENTERAL at 04:19

## 2017-03-15 RX ADMIN — Medication 400 MG: at 20:21

## 2017-03-15 RX ADMIN — INSULIN LISPRO 2 UNITS: 100 INJECTION, SOLUTION INTRAVENOUS; SUBCUTANEOUS at 16:30

## 2017-03-15 RX ADMIN — LEVOTHYROXINE SODIUM 112 MCG: 112 TABLET ORAL at 06:32

## 2017-03-15 RX ADMIN — HYDROMORPHONE HYDROCHLORIDE 4 MG: 4 TABLET ORAL at 20:21

## 2017-03-15 RX ADMIN — Medication 400 MG: at 07:57

## 2017-03-15 RX ADMIN — ASPIRIN 81 MG: 81 TABLET, COATED ORAL at 20:22

## 2017-03-15 RX ADMIN — ACETAMINOPHEN 1000 MG: 500 TABLET, FILM COATED ORAL at 06:32

## 2017-03-15 RX ADMIN — ASPIRIN 81 MG: 81 TABLET, COATED ORAL at 07:58

## 2017-03-15 NOTE — PROGRESS NOTES
Care Management Interventions  Mode of Transport at Discharge: Self  Transition of Care Consult (CM Consult): [address]: Yes  Discharge Durable Medical Equipment: Yes  Physical Therapy Consult: Yes  Occupational Therapy Consult: Yes  Current Support Network: Lives with Spouse  Confirm Follow Up Transport: Family  Plan discussed with Pt/Family/Caregiver: Yes  Freedom of Choice Offered: Yes  Discharge Location  Discharge Placement: Home with home health    Patient is a 80y.o. year old female admitted for Left TKA . Patient lives with Her spouse and plans to return home on discharge. Order received to arrange home health. Patient without preference towards agency. Referral sent to [facility]. Patient  has a walker. Patient requesting we arrange a bedside commode. Referral sent to [employer] who will deliver to the hospital room prior to discharge. Will follow until discharge.   Perez Denise

## 2017-03-15 NOTE — PROGRESS NOTES
Problem: Mobility Impaired (Adult and Pediatric)  Goal: *Acute Goals and Plan of Care (Insert Text)  GOALS (1-4 days):  (1.)Ms. Adelina Najera will move from supine to sit and sit to supine in bed with CONTACT GUARD ASSIST.  (2.)Ms. Adelina Najera will transfer from bed to chair and chair to bed with CONTACT GUARD ASSIST using the least restrictive device. (3.)Ms. Adelina Najera will ambulate with CONTACT GUARD ASSIST for 150 feet with the least restrictive device. (4.)Ms. Adelina Najera will ambulate up/down 4 steps with bilateral railing with MINIMAL ASSIST with no device. (5.)Ms. Adelina Najera will increase left knee ROM to 5°-80°.  ________________________________________________________________________________________________      PHYSICAL THERAPY JOINT CAMP TKA: Daily Note and AM 3/15/2017  INPATIENT: Hospital Day: 2  [place] / Product Type: Medicare /      NAME/AGE/GENDER: Scooter Penny is a 80 y.o. female       PRIMARY DIAGNOSIS:  Primary osteoarthritis of left knee [M17.12]              Procedure(s) and Anesthesia Type:     * LEFT KNEE ARTHROPLASTY TOTAL/DEPUY/ GENERAL ANES BECAUSE PT HAS SPINAL CORD STIMULATOR - General (Left)  ICD-10: Treatment Diagnosis:        · Pain in Left Knee (M25.562)  · Stiffness of Left Knee, Not elsewhere classified (M25.662)  · Difficulty in walking, Not elsewhere classified (R26.2)  · Other abnormalities of gait and mobility (R26.89)       ASSESSMENT:      Ms. Adelina Najera presents S/P L TKA and presents with decreased L LE strength and ROM, standing balance, functional mobility and TKA awareness. She would benefit from further PT while here to address these deficits. She plans on going home at [place] with family support. She is doing well today. She did better with ambulation by increasing her distance. She is tolerating exercises well. This section established at most recent assessment   PROBLEM LIST (Impairments causing functional limitations):  1.  Decreased Strength  2. Decreased Transfer Abilities  3. Decreased Ambulation Ability/Technique  4. Decreased Balance  5. Increased Pain  6. Decreased Activity Tolerance  7. Increased Fatigue  8. Decreased Flexibility/Joint Mobility  9. Decreased Knowledge of Precautions  10. Decreased [place] with Home Exercise Program    INTERVENTIONS PLANNED: (Benefits and precautions of physical therapy have been discussed with the patient.)  1. Balance Exercise  2. Bed Mobility  3. Cold  4. Gait Training  5. Home Exercise Program (HEP)  6. Therapeutic Exercise/Strengthening  7. Transfer Training  8. TKa education  9. Range of Motion: active/assisted/passive  10. Therapeutic Activities  11. Group Therapy      TREATMENT PLAN: Frequency/Duration: Follow patient BID   to address above goals. Rehabilitation Potential For Stated Goals: GOOD      RECOMMENDED REHABILITATION/EQUIPMENT: (at time of discharge pending progress): Continue Skilled Therapy and Home Health: Physical Therapy. HISTORY:   History of Present Injury/Illness (Reason for Referral):  S/P L TKA  Past Medical History/Comorbidities:   Ms. Spencer Taylor  has a past medical history of Acute pancreatitis (2/2008); Aneurysm ([facility] [address] Anxiety; Bilateral sacroiliitis ([address] Breast cyst; CAD (coronary artery disease); Cellulitis (6/16/15); Chest pain; Chronic pain; CKD (chronic kidney disease); Degenerative disc disease; Depression (12/23/2015); Diabetes mellitus type II; Dyslipidemia; Dyspnea; Edema (12/23/2015); Fatty liver; Fibromyalgia; Food allergy; GERD (gastroesophageal reflux disease); Heart failure ([address] Hypertension; Hypertriglyceridemia; Hypothyroidism; Lumbar herniated disc (Aug 2013); Lumbar radiculopathy; Microcytic anemia; Mild pulmonary hypertension ([address] Mitral valve regurgitation (12/23/2015); Nausea & vomiting; Neuropathy; Obesity (BMI 30.0-34.9) (10/2/14); Osteoarthritis; Panic attacks;  Sacroiliac dysfunction; Seasonal allergic reaction; Sleep apnea; and Syncope and collapse (12/23/2015). Ms. Alla Low  has a past surgical history that includes other surgical (12/22/09); partial thyroidectomy (2008); other surgical; other surgical (10/28/13); breast lumpectomy (Right); hysterectomy (1995); knee arthroscopy (Left, 2004); back surgery (08/30/2013); orthopaedic (Left, 2010); orthopaedic (Right, 2009); breast biopsy (Bilateral, R/1988  L/2012); knee arthroscopy (10/03/2014); premalig/benign skin lesion excision (09/29/2014); and heart catheterization (2005). Social History/Living Environment:   Home Environment: Private residence  # Steps to Enter: 2  Rails to Enter: No  One/Two Story Residence: One story  Living Alone: No  Support Systems: Spouse/Significant Other/Partner, Child(sarah), Family member(s), Friends \ neighbors  Patient Expects to be Discharged to[de-identified] Private residence  Current DME Used/Available at Home: Sharran Rump, straight, Grab bars, Theone Mario, rolling  Tub or Shower Type:  (custom walk in tub)  Prior Level of Function/Work/Activity:  Required assist with lower body dressing and ambulated with a cane   Number of Personal Factors/Comorbidities that affect the Plan of Care: 1-2: MODERATE COMPLEXITY   EXAMINATION:   Most Recent Physical Functioning:                               Bed Mobility  Supine to Sit: Minimum assistance     Transfers  Sit to Stand: Minimum assistance  Stand to Sit: Minimum assistance     Balance  Sitting: Intact  Standing: Pull to stand; With support                Weight Bearing Status  Left Side Weight Bearing: As tolerated  Distance (ft): 25 Feet (ft)  Ambulation - Level of Assistance: Minimal assistance  Assistive Device: Walker, rolling  Speed/Lindsay: Pace decreased (<100 feet/min)  Step Length: Right shortened  Stance: Left decreased  Gait Abnormalities: Antalgic  Stand Pivot Transfers: Minimal assistance (with RW)  Interventions: Safety awareness training;Verbal cues      Braces/Orthotics:      Left Knee Cold  Type: Cryocuff Body Structures Involved:  1. Joints  2. Muscles Body Functions Affected:  1. Neuromusculoskeletal  2. Movement Related Activities and Participation Affected:  1. Mobility  2. Self Care   Number of elements that affect the Plan of Care: 4+: HIGH COMPLEXITY   CLINICAL PRESENTATION:   Presentation: Stable and uncomplicated: LOW COMPLEXITY   CLINICAL DECISION MAKIN[address] [place] [address] AM-PAC 6 Clicks   Basic Mobility Inpatient Short Form  How much difficulty does the patient currently have. .. Unable A Lot A Little None   1. Turning over in bed (including adjusting bedclothes, sheets and blankets)? [ ] 1   [ ] 2   [X] 3   [ ] 4   2. Sitting down on and standing up from a chair with arms ( e.g., wheelchair, bedside commode, etc.)   [ ] 1   [ ] 2   [X] 3   [ ] 4   3. Moving from lying on back to sitting on the side of the bed? [ ] 1   [ ] 2   [X] 3   [ ] 4   How much help from another person does the patient currently need. .. Total A Lot A Little None   4. Moving to and from a bed to a chair (including a wheelchair)? [ ] 1   [ ] 2   [X] 3   [ ] 4   5. Need to walk in hospital room? [ ] 1   [X] 2   [ ] 3   [ ] 4   6. Climbing 3-5 steps with a railing? [ ] 1   [X] 2   [ ] 3   [ ] 4   © , Trustees of [address] [place] [address], under license to [employer]. All rights reserved       Score:  Initial:16 Most Recent: X (Date: -- )     Interpretation of Tool:  Represents activities that are increasingly more difficult (i.e. Bed mobility, Transfers, Gait).        Score 24 23 22-20 19-15 14-10 9-7 6       Modifier CH CI CJ CK CL CM CN         · Mobility - Walking and Moving Around:               - CURRENT STATUS:    CK - 40%-59% impaired, limited or restricted               - GOAL STATUS:           CJ - 20%-39% impaired, limited or restricted               - D/C STATUS:                       ---------------To be determined---------------  [place] / Product Type: Medicare /       Medical Necessity:     · Patient demonstrates good rehab potential due to higher previous functional level. Reason for Services/Other Comments:  · Patient continues to require present interventions due to patient's inability to perform functional mobility independently. Use of outcome tool(s) and clinical judgement create a POC that gives a: Clear prediction of patient's progress: LOW COMPLEXITY                 TREATMENT:   (In addition to Assessment/Re-Assessment sessions the following treatments were rendered)      Pre-treatment Symptoms/Complaints:  Some pain with knee exercises  Pain: Initial:   Pain Intensity 1: 2  Pain Location 1: Knee  Pain Orientation 1: Left  Pain Intervention(s) 1: Ambulation/Increased Activity, Cold pack, Elevation, Exercise, Repositioned  Post Session:  2      Gait Training (15 Minutes):  Gait training to improve and/or restore physical functioning as related to mobility, strength and balance. Ambulated 25 Feet (ft) with Minimal assistance using a Walker, rolling and minimal Safety awareness training;Verbal cues related to their stance phase, stride length and knee position and motion to promote proper body alignment, promote proper body posture and promote proper body breathing techniques. Therapeutic Exercise: (15 Minutes):  Exercises per grid below to improve mobility, strength, coordination and endurance. Required minimal visual, verbal and tactile cues to promote proper body alignment and promote proper body breathing techniques. Progressed range, repetitions and complexity of movement as indicated.             Date:  3/15/17  Date:    Date:      ACTIVITY/EXERCISE AM PM AM PM AM PM   GROUP THERAPY  [ ]  [ ]  [ ]  [ ]  [ ]  [ ]   Ankle Pumps 10              Quad Sets  10             Gluteal Sets  10             Hip ABd/ADduction  10AA             Straight Leg Raises  10AA             Knee Slides  10AA             Short Arc Quads  10AA Long Arc Quads               Chair Slides                               B = bilateral; AA = active assistive; A = active; P = passive       Treatment/Session Assessment:         Response to Treatment:  Tolerated well. Drowsy during session. Returned to bed and set up lunch tray with family in rrom. Education:  [x ] Home Exercises  [X] Fall Precautions  [ ] Hip Precautions [ ] Going Home Video  [ ] Knee/Hip Prosthesis Review  [X] Walker Management/Safety [ ] Adaptive Equipment as Needed         Interdisciplinary Collaboration:   · Physical Therapist, Registered Nurse and Respiratory Therapist     After treatment position/precautions:   · Up in chair, Bed/Chair-wheels locked, Call light within reach, RN notified and Family at bedside     Compliance with Program/Exercises: Will assess as treatment progresses. Recommendations/Intent for next treatment session:  Treatment next visit will focus on increasing Ms. Daley's independence with bed mobility, transfers, gait training, strength/ROM exercises, modalities for pain, and patient education.        Total Treatment Duration:  PT Patient Time In/Time Out  Time In: 0800  Time Out: [facility], PT

## 2017-03-15 NOTE — PROGRESS NOTES
Patient is drowsy, Ox4. Able to verbalize needs. Resting quietly with no distress noted. Dressing to surgical site is dry and intact. Neurovascular and peripheral vascular checks WNL. June draining clear yellow urine to bag. Denies needs. Bed low and locked. Call light within reach. Instructed to call for assistance. Patient verbalizes understanding. Will monitor.

## 2017-03-15 NOTE — PROGRESS NOTES
Problem: Mobility Impaired (Adult and Pediatric)  Goal: *Acute Goals and Plan of Care (Insert Text)  GOALS (1-4 days):  (1.)Ms. Alla Low will move from supine to sit and sit to supine in bed with CONTACT GUARD ASSIST.  (2.)Ms. Alla Low will transfer from bed to chair and chair to bed with CONTACT GUARD ASSIST using the least restrictive device. (3.)Ms. Alla Low will ambulate with CONTACT GUARD ASSIST for 150 feet with the least restrictive device. (4.)Ms. Alla Low will ambulate up/down 4 steps with bilateral railing with MINIMAL ASSIST with no device. (5.)Ms. Alla Low will increase left knee ROM to 5°-80°.  ________________________________________________________________________________________________      PHYSICAL THERAPY JOINT CAMP TKA: Daily Note and PM 3/15/2017  INPATIENT: Hospital Day: 2  [place] / Product Type: Medicare /      NAME/AGE/GENDER: Lily Finn is a 80 y.o. female       PRIMARY DIAGNOSIS:  Primary osteoarthritis of left knee [M17.12]              Procedure(s) and Anesthesia Type:     * LEFT KNEE ARTHROPLASTY TOTAL/DEPUY/ GENERAL ANES BECAUSE PT HAS SPINAL CORD STIMULATOR - General (Left)  ICD-10: Treatment Diagnosis:        · Pain in Left Knee (M25.562)  · Stiffness of Left Knee, Not elsewhere classified (M25.662)  · Difficulty in walking, Not elsewhere classified (R26.2)  · Other abnormalities of gait and mobility (R26.89)       ASSESSMENT:      Ms. Alla Low presents S/P L TKA and presents with decreased L LE strength and ROM, standing balance, functional mobility and TKA awareness. She would benefit from further PT while here to address these deficits. She plans on going home at [place] with family support. In a bit of pain this pm from treatment. She did increase her ambulation distance. This section established at most recent assessment   PROBLEM LIST (Impairments causing functional limitations):  1. Decreased Strength  2.  Decreased Transfer Abilities  3. Decreased Ambulation Ability/Technique  4. Decreased Balance  5. Increased Pain  6. Decreased Activity Tolerance  7. Increased Fatigue  8. Decreased Flexibility/Joint Mobility  9. Decreased Knowledge of Precautions  10. Decreased [place] with Home Exercise Program    INTERVENTIONS PLANNED: (Benefits and precautions of physical therapy have been discussed with the patient.)  1. Balance Exercise  2. Bed Mobility  3. Cold  4. Gait Training  5. Home Exercise Program (HEP)  6. Therapeutic Exercise/Strengthening  7. Transfer Training  8. TKa education  9. Range of Motion: active/assisted/passive  10. Therapeutic Activities  11. Group Therapy      TREATMENT PLAN: Frequency/Duration: Follow patient BID   to address above goals. Rehabilitation Potential For Stated Goals: GOOD      RECOMMENDED REHABILITATION/EQUIPMENT: (at time of discharge pending progress): Continue Skilled Therapy and Home Health: Physical Therapy. HISTORY:   History of Present Injury/Illness (Reason for Referral):  S/P L TKA  Past Medical History/Comorbidities:   Ms. Galo Dai  has a past medical history of Acute pancreatitis (2/2008); Aneurysm ([facility] [address] Anxiety; Bilateral sacroiliitis ([address] Breast cyst; CAD (coronary artery disease); Cellulitis (6/16/15); Chest pain; Chronic pain; CKD (chronic kidney disease); Degenerative disc disease; Depression (12/23/2015); Diabetes mellitus type II; Dyslipidemia; Dyspnea; Edema (12/23/2015); Fatty liver; Fibromyalgia; Food allergy; GERD (gastroesophageal reflux disease); Heart failure ([address] Hypertension; Hypertriglyceridemia; Hypothyroidism; Lumbar herniated disc (Aug 2013); Lumbar radiculopathy; Microcytic anemia; Mild pulmonary hypertension ([address] Mitral valve regurgitation (12/23/2015); Nausea & vomiting; Neuropathy; Obesity (BMI 30.0-34.9) (10/2/14); Osteoarthritis; Panic attacks;  Sacroiliac dysfunction; Seasonal allergic reaction; Sleep apnea; and Syncope and collapse (12/23/2015). Ms. Meenu Rodrigues  has a past surgical history that includes other surgical (12/22/09); partial thyroidectomy (2008); other surgical; other surgical (10/28/13); breast lumpectomy (Right); hysterectomy (1995); knee arthroscopy (Left, 2004); back surgery (08/30/2013); orthopaedic (Left, 2010); orthopaedic (Right, 2009); breast biopsy (Bilateral, R/1988  L/2012); knee arthroscopy (10/03/2014); premalig/benign skin lesion excision (09/29/2014); and heart catheterization (2005). Social History/Living Environment:   Home Environment: Private residence  # Steps to Enter: 2  Rails to Enter: No  One/Two Story Residence: One story  Living Alone: No  Support Systems: Spouse/Significant Other/Partner, Child(sarah), Family member(s), Friends \ neighbors  Patient Expects to be Discharged to[de-identified] Private residence  Current DME Used/Available at Home: Judene Desanctis, straight, Grab bars, Marisa Mount, rolling  Tub or Shower Type:  (custom walk in tub)  Prior Level of Function/Work/Activity:  Required assist with lower body dressing and ambulated with a cane   Number of Personal Factors/Comorbidities that affect the Plan of Care: 1-2: MODERATE COMPLEXITY   EXAMINATION:   Most Recent Physical Functioning:                               Bed Mobility  Supine to Sit: Minimum assistance  Sit to Supine: Minimum assistance     Transfers  Sit to Stand: Minimum assistance  Stand to Sit: Minimum assistance     Balance  Sitting: Intact  Standing: Pull to stand; With support                Weight Bearing Status  Left Side Weight Bearing: As tolerated  Distance (ft): 50 Feet (ft)  Ambulation - Level of Assistance:  (close CGA)  Assistive Device: Walker, rolling  Speed/Lindsay: Pace decreased (<100 feet/min)  Step Length: Right shortened  Stance: Left decreased  Gait Abnormalities: Antalgic  Stand Pivot Transfers:  (close CGA with RW)  Interventions: Safety awareness training;Verbal cues      Braces/Orthotics:      Left Knee Cold  Type: Cryocuff Body Structures Involved:  1. Joints  2. Muscles Body Functions Affected:  1. Neuromusculoskeletal  2. Movement Related Activities and Participation Affected:  1. Mobility  2. Self Care   Number of elements that affect the Plan of Care: 4+: HIGH COMPLEXITY   CLINICAL PRESENTATION:   Presentation: Stable and uncomplicated: LOW COMPLEXITY   CLINICAL DECISION MAKIN[address] [place] [address] AM-PAC 6 Clicks   Basic Mobility Inpatient Short Form  How much difficulty does the patient currently have. .. Unable A Lot A Little None   1. Turning over in bed (including adjusting bedclothes, sheets and blankets)? [ ] 1   [ ] 2   [X] 3   [ ] 4   2. Sitting down on and standing up from a chair with arms ( e.g., wheelchair, bedside commode, etc.)   [ ] 1   [ ] 2   [X] 3   [ ] 4   3. Moving from lying on back to sitting on the side of the bed? [ ] 1   [ ] 2   [X] 3   [ ] 4   How much help from another person does the patient currently need. .. Total A Lot A Little None   4. Moving to and from a bed to a chair (including a wheelchair)? [ ] 1   [ ] 2   [X] 3   [ ] 4   5. Need to walk in hospital room? [ ] 1   [X] 2   [ ] 3   [ ] 4   6. Climbing 3-5 steps with a railing? [ ] 1   [X] 2   [ ] 3   [ ] 4   © , Trustees of [address] [place] [address], under license to [employer]. All rights reserved       Score:  Initial:16 Most Recent: X (Date: -- )     Interpretation of Tool:  Represents activities that are increasingly more difficult (i.e. Bed mobility, Transfers, Gait).        Score 24 23 22-20 19-15 14-10 9-7 6       Modifier CH CI CJ CK CL CM CN         · Mobility - Walking and Moving Around:               - CURRENT STATUS:    CK - 40%-59% impaired, limited or restricted               - GOAL STATUS:           CJ - 20%-39% impaired, limited or restricted               - D/C STATUS:                       ---------------To be determined---------------  [place] / Product Type: Medicare /       Medical Necessity:     · Patient demonstrates good rehab potential due to higher previous functional level. Reason for Services/Other Comments:  · Patient continues to require present interventions due to patient's inability to perform functional mobility independently. Use of outcome tool(s) and clinical judgement create a POC that gives a: Clear prediction of patient's progress: LOW COMPLEXITY                 TREATMENT:   (In addition to Assessment/Re-Assessment sessions the following treatments were rendered)      Pre-treatment Symptoms/Complaints:  Increased pain. Ready to go back to bed  Pain: Initial:   Pain Intensity 1: 2 (before treatment)  Pain Location 1: Knee  Pain Orientation 1: Left  Pain Intervention(s) 1: Ambulation/Increased Activity, Cold pack, Elevation, Exercise, Repositioned  Post Session:  4      Gait Training (15 Minutes):  Gait training to improve and/or restore physical functioning as related to mobility, strength and balance. Ambulated 50 Feet (ft) with  (close CGA) using a Walker, rolling and minimal Safety awareness training;Verbal cues related to their stance phase, stride length and knee position and motion to promote proper body alignment, promote proper body posture and promote proper body breathing techniques. Therapeutic Exercise: (15 Minutes):  Exercises per grid below to improve mobility, strength, coordination and endurance. Required minimal visual, verbal and tactile cues to promote proper body alignment and promote proper body breathing techniques. Progressed range, repetitions and complexity of movement as indicated.             Date:  3/15/17  Date:    Date:      ACTIVITY/EXERCISE AM PM AM PM AM PM   GROUP THERAPY  [ ]  [ ]  [ ]  [ ]  [ ]  [ ]   Ankle Pumps 10  15            Quad Sets  10  15           Gluteal Sets  10  15           Hip ABd/ADduction  10AA  15AA           Straight Leg Raises  10AA  15AA           Knee Slides  10AA  15AA Short Arc Quads  10AA  15AA           Long Arc Quads               Chair Slides                               B = bilateral; AA = active assistive; A = active; P = passive       Treatment/Session Assessment:         Response to Treatment:  Tolerated well. Placed in bed with family present     Education:  [x ] Home Exercises  [X] Fall Precautions  [ ] Hip Precautions [ ] Going Home Video  [ ] Knee/Hip Prosthesis Review  [X] Walker Management/Safety [ ] Adaptive Equipment as Needed         Interdisciplinary Collaboration:   · Physical Therapist     After treatment position/precautions:   · Up in chair, Bed/Chair-wheels locked, Call light within reach, RN notified and Family at bedside     Compliance with Program/Exercises: Will assess as treatment progresses. Recommendations/Intent for next treatment session:  Treatment next visit will focus on increasing Ms. Daley's independence with bed mobility, transfers, gait training, strength/ROM exercises, modalities for pain, and patient education.        Total Treatment Duration:  PT Patient Time In/Time Out  Time In: 1300  Time Out: [address]

## 2017-03-15 NOTE — PROGRESS NOTES
March 15, 2017         Post Op day: 1 Day Post-Op     Admit Date: 3/14/2017  Admit Diagnosis: Primary osteoarthritis of left knee [M17.12]        Subjective: Doing well, No complaints, No SOB, No Chest Pain, No Nausea or Vomiting     Objective:   Vital Signs are Stable, No Acute Distress, Alert and Oriented, Dressing is Dry,  Neurovascular exam is normal.     Assessment / Plan :  Patient Active Problem List   Diagnosis Code    Lateral epicondylitis  of elbow M77.10    Essential hypertension, benign I10    Hypothyroidism E03.9    GERD (gastroesophageal reflux disease) K21.9    Panic attacks F41.0    Generalized anxiety disorder F41.1    Mixed hyperlipidemia E78.2    Degenerative disc disease, lumbar M51.36    Osteoarthritis M19.90    Fibromyalgia M79.7    CAD (coronary artery disease) I25.10    Sleep apnea G47.30    Vitamin D deficiency E55.9    Type 2 diabetes mellitus, controlled ([facility] [address] E11.9    Tear of medial cartilage or meniscus of knee, current [id number]    Tear of lateral cartilage or meniscus of knee, current S83.289A    Primary localized osteoarthrosis, lower leg M17.10    Edema R60.9    Syncope and collapse R55    Mitral valve regurgitation I34.0    Depression F32.9    Pre-operative cardiovascular examination Z01.810    Abnormal urinalysis R82.90    S/P total knee arthroplasty Z96.659    Patient Vitals for the past 8 hrs:   BP Temp Pulse Resp SpO2   03/15/17 0419 138/72 96 °F (35.6 °C) 69 18 100 %    Temp (24hrs), Av.9 °F (36.1 °C), Min:96 °F (35.6 °C), Max:98.5 °F (36.9 °C)    Body mass index is 35.3 kg/(m^2).     Lab Results   Component Value Date/Time    HGB 9.5 03/15/2017 04:00 AM      Pt seen by and discussed with Supervising Physician   Continue PT, current pain meds  Plan for home tomorrow       Signed By: STEFAN Pablo

## 2017-03-15 NOTE — DISCHARGE SUMMARY
[address] [place] [address]  Total Joint Discharge Summary      Patient ID:  Sandra Nettles  [id number]  35 y.o.  1945    Admit date: 3/14/2017  Discharge date and time: 3-16-17  Admitting Physician: Chely May MD  Surgeon: Same  Admission Diagnoses: Primary osteoarthritis of left knee [M17.12]  Discharge Diagnoses: Principal Problem:    S/P total knee arthroplasty (3/15/2017)    Active Problems:    Osteoarthritis ()                                Perioperative Antibiotics: Ancef 1 to 2 mg was given depending on patient's weight. If allergic to Ancef or due to other indications, patient was given Vancomycin. Hospital Medications given:   [unfilled]  [unfilled]  [unfilled]    Discharge Medications given:  Current Discharge Medication List      START taking these medications    Details   aspirin delayed-release 81 mg tablet Take 1 Tab by mouth every twelve (12) hours every twelve (12) hours for 30 days. Qty: 60 Tab, Refills: 0      HYDROmorphone (DILAUDID) 2 mg tablet Take 1-2 Tabs by mouth every four (4) hours as needed. Max Daily Amount: 24 mg. Qty: 60 Tab, Refills: 0         CONTINUE these medications which have NOT CHANGED    Details   pantoprazole (PROTONIX) 40 mg tablet Take 1 Tab by mouth two (2) times a day. Indications: GASTROESOPHAGEAL REFLUX  Qty: 60 Tab, Refills: 5      gabapentin (NEURONTIN) 100 mg capsule Take 1 Cap by mouth three (3) times daily. Take by mouth 1 - 3 tablets three times daily as needed. Qty: 270 Cap, Refills: 1      pioglitazone (ACTOS) 45 mg tablet Take 1 Tab by mouth daily. Qty: 30 Tab, Refills: 5      LORazepam (ATIVAN) 1 mg tablet Take 1 Tab by mouth every eight (8) hours as needed for Anxiety. Max Daily Amount: 3 mg. Indications: ANXIETY  Qty: 60 Tab, Refills: 0      loratadine (CLARITIN) 10 mg tablet Take 10 mg by mouth nightly. levothyroxine (SYNTHROID) 112 mcg tablet Take 1 Tab by mouth Daily (before breakfast).  Indications: hypothyroidism  Qty: 30 Tab, Refills: 5      irbesartan (AVAPRO) 300 mg tablet Take 1 Tab by mouth nightly. Qty: 30 Tab, Refills: 5      ergocalciferol (VITAMIN D2) 50,000 unit capsule Take 1 Cap by mouth every Sunday. Qty: 4 Cap, Refills: 5    Associated Diagnoses: Vitamin D deficiency      temazepam (RESTORIL) 30 mg capsule Take 1 Cap by mouth nightly as needed for Sleep. Max Daily Amount: 30 mg.  Qty: 30 Cap, Refills: 5      magnesium oxide (MAG-OX) 400 mg tablet Take 1 Tab by mouth two (2) times a day. Qty: 60 Tab, Refills: 11      glipiZIDE SR (GLUCOTROL XL) 10 mg CR tablet Take 1 Tab by mouth daily. Qty: 30 Tab, Refills: 5      montelukast (SINGULAIR) 10 mg tablet Take 1 Tab by mouth every evening. Qty: 30 Tab, Refills: 5      escitalopram oxalate (LEXAPRO) 20 mg tablet Take 1 Tab by mouth daily. Qty: 30 Tab, Refills: 4    Associated Diagnoses: Fibromyalgia      metoprolol succinate (TOPROL XL) 100 mg tablet Take 2 Tabs by mouth daily. Qty: 60 Tab, Refills: 5      ezetimibe-simvastatin (VYTORIN 10/40) 10-40 mg per tablet Take 1 Tab by mouth nightly. Qty: 30 Tab, Refills: 5      metolazone (ZAROXOLYN) 5 mg tablet 1 tab every 3 days. Take 30 minutes prior to lasix  Qty: 30 Tab, Refills: 6      fluticasone (FLONASE) 50 mcg/actuation nasal spray 2 Sprays by Both Nostrils route daily. Qty: 1 Bottle, Refills: 5      SUMAtriptan (IMITREX) 50 mg tablet Take 1 Tab by mouth as needed for Migraine. Indications: MIGRAINE  Qty: 30 Tab, Refills: 1      ferrous sulfate 325 mg (65 mg iron) tablet Take  by mouth two (2) times a day. OTHER jobst compression stockings 20 mmHg. R60.0 - bilateral lower extremity swelling  Qty: 2 Each, Refills: 1    Associated Diagnoses: Bilateral edema of lower extremity      cyclosporine (RESTASIS) 0.05 % ophthalmic emulsion Administer 1 drop to both eyes nightly. Indications: DRY EYE      cetirizine (ALLERGY RELIEF, CETIRIZINE,) 10 mg tablet Take 10 mg by mouth nightly. Indications: ALLERGIC RHINITIS         STOP taking these medications       mupirocin calcium (BACTROBAN) 2 % nasal ointment Comments:   Reason for Stopping:         butalbital-acetaminophen-caffeine (FIORICET, ESGIC) -40 mg per tablet Comments:   Reason for Stopping:         diclofenac (VOLTAREN) 1 % gel Comments:   Reason for Stopping:         oxyCODONE IR (OXY-IR) 15 mg immediate release tablet Comments:   Reason for Stopping:                Additional DVT Prophylaxis:  ZARA Hose,Plexi-Pulse    Postoperative transfusions:   none  Post Op complications: none    Hemoglobin at discharge:   Lab Results   Component Value Date/Time    HGB 9.5 03/15/2017 04:00 AM       Wound appears to be healing without any evidence of infection. Physical Therapy started on the day following surgery and progressed to independent ambulation with the aid of a walker. At the time of discharge, able to go up and down stairs and had understanding of precautions needed following surgery.       PT/OT:            Assistive Device: Walker (comment)        LLE AROM  L Knee Flexion: 60  L Knee Extension: 20       Discharged to: home    Discharge instructions:  -Rx pain medication given  - Anticoagulate with: Ecotrin 81 mg PO BID x 4 weeks  -Resume pre hospital diet             -Resume home medications per medical continuation form     -Ambulate with walker, appropriate total joint protocol  -Follow up in office as scheduled       Signed:  STEFAN Head  3/15/2017  7:32 AM

## 2017-03-15 NOTE — PROGRESS NOTES
[address]  Face to Face Encounter    Patients Name: Lily Finn    YOB: 1945    Ordering Physician: Aj Miller    Primary Diagnosis: Primary osteoarthritis of left knee [M17.12]  S/p left TKA    Date of Face to Face:   3-14-17                               Face to Face Encounter findings are related to primary reason for home care:   yes. 1. I certify that the patient needs intermittent care as follows: physical therapy: gait/stair training    2. I certify that this patient is homebound, that is: 1) patient requires the use of a walker device, special transportation, or assistance of another to leave the home; or 2) patient's condition makes leaving the home medically contraindicated; and 3) patient has a normal inability to leave the home and leaving the home requires considerable and taxing effort. Patient may leave the home for infrequent and short duration for medical reasons, and occasional absences for non-medical reasons. Homebound status is due to the following functional limitations: Patient's ambulation limited secondary to severe pain and requires the use of an assistive device and the assistance of a caregiver for safe completion. Patient with strength and ROM deficits limiting ambulation endurance requiring the use of an assistive device and the assistance of a caregiver. Patient deemed temporarily homebound secondary to increased risk for infection when leaving home and going out into the community. 3. I certify that this patient is under my care and that I, or a nurse practitioner or [facility], or clinical nurse specialist, or certified nurse midwife, working with me, had a Face-to-Face Encounter that meets the physician Face-to-Face Encounter requirements.   The following are the clinical findings from the [address] [place] [address] encounter that support the need for skilled services and is a summary of the encounter: see hospital chart        Susansimin Ashvin Jaren, [address]  3/15/2017      THE FOLLOWING TO BE COMPLETED BY THE COMMUNITY PHYSICIAN:    I concur with the findings described above from the F2F encounter that this patient is homebound and in need of a skilled service.     Certifying Physician: _____________________________________      Printed Certifying Physician Name: _____________________________________    Date: _________________

## 2017-03-15 NOTE — PROGRESS NOTES
03/15/17 0822   Oxygen Therapy   O2 Sat (%) 100 %   Pulse via Oximetry 69 beats per minute   O2 Device Room air   O2 Flow Rate (L/min) 0 l/min   FIO2 (%) 21 %   Spoke with patient concerning DWAYNE; wants a referral for a sleep study.

## 2017-03-15 NOTE — ADVANCED PRACTICE NURSE
Active desaturations with need for oxygen with sleep. The patients overnight sat study reviewed with significant desaturations. Spoke with patient and she is willing to participate in sleep study at this time. Will order sleep study with [facility] in near future.

## 2017-03-15 NOTE — PROGRESS NOTES
Patient placed on continuous sat monitor for overnight oximetry study on room air. Monitor cleared of trends. RN notified. Notification sign placed on door. Alarm limits set to 100/80. Herald Slipper with Monitoring Room called and notified.

## 2017-03-15 NOTE — PROGRESS NOTES
Dr Cuauhtemoc Guaman notified of Brian. Telephone order read back and verified to give a total of 14 units humalog subQ.

## 2017-03-16 VITALS
HEART RATE: 73 BPM | BODY MASS INDEX: 35.34 KG/M2 | OXYGEN SATURATION: 98 % | HEIGHT: 65 IN | WEIGHT: 212.12 LBS | RESPIRATION RATE: 18 BRPM | TEMPERATURE: 99.6 F | SYSTOLIC BLOOD PRESSURE: 167 MMHG | DIASTOLIC BLOOD PRESSURE: 86 MMHG

## 2017-03-16 LAB
APPEARANCE UR: CLEAR
BACTERIA SPEC CULT: NORMAL
BILIRUB UR QL: NEGATIVE
COLOR UR: YELLOW
GLUCOSE BLD STRIP.AUTO-MCNC: 208 MG/DL (ref 65–100)
GLUCOSE BLD STRIP.AUTO-MCNC: 281 MG/DL (ref 65–100)
GLUCOSE UR STRIP.AUTO-MCNC: 500 MG/DL
HGB BLD-MCNC: 10.4 G/DL (ref 11.7–15.4)
HGB UR QL STRIP: NEGATIVE
KETONES UR QL STRIP.AUTO: NEGATIVE MG/DL
LEUKOCYTE ESTERASE UR QL STRIP.AUTO: NEGATIVE
NITRITE UR QL STRIP.AUTO: NEGATIVE
PH UR STRIP: 6.5 [PH] (ref 5–9)
PROT UR STRIP-MCNC: NEGATIVE MG/DL
SERVICE CMNT-IMP: NORMAL
SP GR UR REFRACTOMETRY: 1.01 (ref 1–1.02)
UROBILINOGEN UR QL STRIP.AUTO: 0.2 EU/DL (ref 0.2–1)

## 2017-03-16 PROCEDURE — 85018 HEMOGLOBIN: CPT | Performed by: ORTHOPAEDIC SURGERY

## 2017-03-16 PROCEDURE — 74011250637 HC RX REV CODE- 250/637: Performed by: ORTHOPAEDIC SURGERY

## 2017-03-16 PROCEDURE — 94760 N-INVAS EAR/PLS OXIMETRY 1: CPT

## 2017-03-16 PROCEDURE — 81003 URINALYSIS AUTO W/O SCOPE: CPT | Performed by: ORTHOPAEDIC SURGERY

## 2017-03-16 PROCEDURE — 36415 COLL VENOUS BLD VENIPUNCTURE: CPT | Performed by: ORTHOPAEDIC SURGERY

## 2017-03-16 PROCEDURE — 97150 GROUP THERAPEUTIC PROCEDURES: CPT

## 2017-03-16 PROCEDURE — 82962 GLUCOSE BLOOD TEST: CPT

## 2017-03-16 PROCEDURE — 97116 GAIT TRAINING THERAPY: CPT

## 2017-03-16 PROCEDURE — 77030012935 HC DRSG AQUACEL BMS -B

## 2017-03-16 PROCEDURE — 74011250636 HC RX REV CODE- 250/636: Performed by: INTERNAL MEDICINE

## 2017-03-16 PROCEDURE — 77030012890

## 2017-03-16 PROCEDURE — 97535 SELF CARE MNGMENT TRAINING: CPT

## 2017-03-16 RX ORDER — METHOCARBAMOL 750 MG/1
750 TABLET, FILM COATED ORAL 4 TIMES DAILY
Status: DISCONTINUED | OUTPATIENT
Start: 2017-03-16 | End: 2017-03-16 | Stop reason: HOSPADM

## 2017-03-16 RX ORDER — METHOCARBAMOL 750 MG/1
750 TABLET, FILM COATED ORAL 4 TIMES DAILY
Qty: 40 TAB | Refills: 0 | Status: SHIPPED | OUTPATIENT
Start: 2017-03-16 | End: 2017-05-08

## 2017-03-16 RX ADMIN — HYDROMORPHONE HYDROCHLORIDE 4 MG: 4 TABLET ORAL at 09:00

## 2017-03-16 RX ADMIN — METHOCARBAMOL 750 MG: 750 TABLET ORAL at 08:24

## 2017-03-16 RX ADMIN — SENNOSIDES AND DOCUSATE SODIUM 2 TABLET: 8.6; 5 TABLET ORAL at 08:24

## 2017-03-16 RX ADMIN — ACETAMINOPHEN 1000 MG: 500 TABLET, FILM COATED ORAL at 12:50

## 2017-03-16 RX ADMIN — PANTOPRAZOLE SODIUM 40 MG: 40 TABLET, DELAYED RELEASE ORAL at 08:24

## 2017-03-16 RX ADMIN — GLIPIZIDE 10 MG: 10 TABLET, FILM COATED, EXTENDED RELEASE ORAL at 08:24

## 2017-03-16 RX ADMIN — METHOCARBAMOL 750 MG: 750 TABLET ORAL at 12:50

## 2017-03-16 RX ADMIN — INSULIN LISPRO 4 UNITS: 100 INJECTION, SOLUTION INTRAVENOUS; SUBCUTANEOUS at 00:24

## 2017-03-16 RX ADMIN — LORAZEPAM 1 MG: 1 TABLET ORAL at 00:24

## 2017-03-16 RX ADMIN — Medication 400 MG: at 08:24

## 2017-03-16 RX ADMIN — PIOGLITAZONE HYDROCHLORIDE 45 MG: 15 TABLET ORAL at 08:24

## 2017-03-16 RX ADMIN — HYDROMORPHONE HYDROCHLORIDE 4 MG: 4 TABLET ORAL at 12:50

## 2017-03-16 RX ADMIN — FERROUS SULFATE TAB 325 MG (65 MG ELEMENTAL FE) 325 MG: 325 (65 FE) TAB at 08:24

## 2017-03-16 RX ADMIN — ACETAMINOPHEN 1000 MG: 500 TABLET, FILM COATED ORAL at 00:24

## 2017-03-16 RX ADMIN — HYDROMORPHONE HYDROCHLORIDE 4 MG: 4 TABLET ORAL at 04:22

## 2017-03-16 RX ADMIN — LORAZEPAM 1 MG: 1 TABLET ORAL at 08:24

## 2017-03-16 RX ADMIN — ASPIRIN 81 MG: 81 TABLET, COATED ORAL at 08:24

## 2017-03-16 RX ADMIN — LEVOTHYROXINE SODIUM 112 MCG: 112 TABLET ORAL at 04:22

## 2017-03-16 RX ADMIN — HYDROMORPHONE HYDROCHLORIDE 4 MG: 4 TABLET ORAL at 00:24

## 2017-03-16 NOTE — DISCHARGE INSTRUCTIONS
[facility]   Patient Discharge Instructions    Padmaja Henry / [id number] : 1945    Admitted 3/14/2017 Discharged: 3/16/2017     IF YOU HAVE ANY PROBLEMS ONCE YOU ARE AT HOME CALL THE FOLLOWING NUMBERS:   Main office number: [phone]    Take Home Medications     · It is important that you take the medication exactly as they are prescribed. · Keep your medication in the bottles provided by the pharmacist and keep a list of the medication names, dosages, and times to be taken in your wallet. · Do not take other medications without consulting your doctor. What to do at [address] your prehospital diet. If you have excessive nausea or vomitting call your doctor's office     Home Physical Therapy is arranged. Use rolling walker when walking. Use Alfonso Hose stockings until we see you in the office for your follow up appointment with Dr. Dori Sadler. Patients who have had a joint replacement should not drive until you are seen for your follow up appointment by Dr. Dori Sadler. When to Call    - Call if you have a temperature greater then 101  - Unable to keep food down  - Loose control of your bladder or bowel function  - Are unable to bear any weight   - Need a pain medication refill       DISCHARGE SUMMARY from Nurse    The following personal items collected during your admission are returned to you:   Dental Appliance: Dental Appliances: None  Vision: Visual Aid: Glasses  Hearing Aid:   na  Jewelry: Jewelry: None  Clothing: Clothing: At bedside, Footwear, Pants, Shirt, Socks, Undergarments  Other Valuables:  Other Valuables: None  Valuables sent to safe:   na    PATIENT INSTRUCTIONS:    After general anesthesia or intravenous sedation, for 24 hours or while taking prescription Narcotics:  · Limit your activities  · Do not drive and operate hazardous machinery  · Do not make important personal or business decisions  · Do  not drink alcoholic beverages  · If you have not urinated within 8 hours after discharge, please contact your surgeon on call. Report the following to your surgeon:  · Excessive pain, swelling, redness or odor of or around the surgical area  · Temperature over 101  · Nausea and vomiting lasting longer than 4 hours or if unable to take medications  · Any signs of decreased circulation or nerve impairment to extremity: change in color, persistent  numbness, tingling, coldness or increase pain  · Any questions, call office @ [phone]      Keep scheduled follow up appointment. If need to change, call office @ [phone]. *  Please give a list of your current medications to your Primary Care Provider. *  Please update this list whenever your medications are discontinued, doses are      changed, or new medications (including over-the-counter products) are added. *  Please carry medication information at all times in case of emergency situations. Total Knee Replacement: What to Expect at [address] [place] [address]    When you leave the hospital, you should be able to move around with a walker or crutches. But you will need someone to help you at home for the next few weeks or until you have more energy and can move around better. If there is no one to help you at home, you may go to a rehabilitation center. You will go home with a bandage and stitches or staples. Change the bandage as your doctor tells you to. Your doctor will remove your stitches or staples 10 to 21 days after your surgery. You may still have some mild pain, and the area may be swollen for 3 to 6 months after surgery. Your knee will continue to improve for 6 to 12 months. You will probably use a walker for 1 to 3 weeks and then use crutches. When you are ready, you can use a cane. You will probably be able to walk on your own in 4 to 8 weeks. You will need to do months of physical rehabilitation (rehab) after a knee replacement.  Rehab will help you strengthen the muscles of the knee and help you regain movement. After you recover, your artificial knee will allow you to do normal daily activities with less pain or no pain at all. You may be able to hike, dance, ride a bike, and play golf. Talk to your doctor about whether you can do more strenuous activities. Always tell your caregivers that you have an artificial knee. How long it will take to walk on your own, return to normal activities, and go back to work depends on your health and how well your rehabilitation (rehab) program goes. The better you do with your rehab exercises, the quicker you will get your strength and movement back. This care sheet gives you a general idea about how long it will take for you to recover. But each person recovers at a different pace. Follow the steps below to get better as quickly as possible. How can you care for yourself at home? Activity  · Rest when you feel tired. You may take a nap, but do not stay in bed all day. When you sit, use a chair with arms. You can use the arms to help you stand up. · Work with your physical therapist to find the best way to exercise. You may be able to take frequent, short walks using crutches or a walker. What you can do as your knee heals will depend on whether your new knee is cemented or uncemented. You may not be able to do certain things for a while if your new knee is uncemented. · After your knee has healed enough, you can do more strenuous activities with caution. ¨ You can golf, but use a golf cart, and do not wear shoes with spikes. ¨ You can bike on a flat road or on a stationary bike. Avoid biking up hills. ¨ Your doctor may suggest that you stay away from activities that put stress on your knee. These include tennis or badminton, squash or racquetball, contact sports like football, jumping (such as in basketball), jogging, or running. ¨ Avoid activities where you might fall. These include horseback riding, skiing, and mountain biking.   · Do not sit for more than 1 hour at a time. Get up and walk around for a while before you sit again. If you must sit for a long time, prop up your leg with a chair or footstool. This will help you avoid swelling. · Ask your doctor when you can shower. You may need to take sponge baths until your stitches or staples have been removed. · Ask your doctor when you can drive again. It may take up to 8 weeks after knee replacement surgery before it is safe for you to drive. · When you get into a car, sit on the edge of the seat. Then pull in your legs, and turn to face the front. · You should be able to do many everyday activities 3 to 6 weeks after your surgery. You will probably need to take 4 to 16 weeks off from work. When you can go back to work depends on the type of work you do and how you feel. · Ask your doctor when it is okay for you to have sex. · Do not lift anything heavier than 10 pounds and do not lift weights for 12 weeks. Diet  · By the time you leave the hospital, you should be eating your normal diet. If your stomach is upset, try bland, low-fat foods like plain rice, broiled chicken, toast, and yogurt. Your doctor may suggest that you take iron and vitamin supplements. · Drink plenty of fluids (unless your doctor tells you not to). · Eat healthy foods, and watch your portion sizes. Try to stay at your ideal weight. Too much weight puts more stress on your new knee. · You may notice that your bowel movements are not regular right after your surgery. This is common. Try to avoid constipation and straining with bowel movements. You may want to take a fiber supplement every day. If you have not had a bowel movement after a couple of days, ask your doctor about taking a mild laxative. Medicines  · Your doctor will tell you if and when you can restart your medicines. He or she will also give you instructions about taking any new medicines.   · If you take blood thinners, such as warfarin (Coumadin), clopidogrel (Plavix), or aspirin, be sure to talk to your doctor. He or she will tell you if and when to start taking those medicines again. Make sure that you understand exactly what your doctor wants you to do. · Your doctor may give you a blood-thinning medicine to prevent blood clots. If you take a blood thinner, be sure you get instructions about how to take your medicine safely. Blood thinners can cause serious bleeding problems. This medicine could be in pill form or as a shot (injection). If a shot is necessary, your doctor will tell you how to do this. · Be safe with medicines. Take pain medicines exactly as directed. ¨ If the doctor gave you a prescription medicine for pain, take it as prescribed. ¨ If you are not taking a prescription pain medicine, ask your doctor if you can take an over-the-counter medicine. ¨ Plan to take your pain medicine 30 minutes before exercises. It is easier to prevent pain before it starts than to stop it once it has started. · If you think your pain medicine is making you sick to your stomach:  ¨ Take your medicine after meals (unless your doctor has told you not to). ¨ Ask your doctor for a different pain medicine. · If your doctor prescribed antibiotics, take them as directed. Do not stop taking them just because you feel better. You need to take the full course of antibiotics. Incision care  · You will have a bandage over the cut (incision) and staples or stitches. Take the bandage off when your doctor says it is okay. · Your doctor will remove the staples or stitches 10 days to 3 weeks after the surgery and replace them with strips of tape. Leave the tape on for a week or until it falls off. Exercise  · Your rehab program will give you a number of exercises to do to help you get back your knee's range of motion and strength. Always do them as your therapist tells you.   Ice and elevation  · For pain and swelling, put ice or a cold pack on the area for 10 to 20 minutes at a time. Put a thin cloth between the ice and your skin. Other instructions  · Continue to wear your support stockings as your doctor says. These help to prevent blood clots. The length of time that you will have to wear them depends on your activity level and the amount of swelling. · Wear medical alert jewelry that says you may need antibiotics before any procedure, including dental work. You can buy this at most drugstores. · You have metal pieces in your knee. These may set off some airport metal detectors. Carry a medical alert card that says you have an artificial joint, just in case. Follow-up care is a key part of your treatment and safety. Be sure to make and go to all appointments, and call your doctor if you are having problems. It's also a good idea to know your test results and keep a list of the medicines you take. When should you call for help? Call 911 anytime you think you may need emergency care. For example, call if:  · You passed out (lost consciousness). · You have severe trouble breathing. · You have sudden chest pain and shortness of breath, or you cough up blood. Call your doctor now or seek immediate medical care if:  · You have signs of infection, such as:  ¨ Increased pain, swelling, warmth, or redness. ¨ Red streaks leading from the incision. ¨ Pus draining from the incision. ¨ A fever. · You have signs of a blood clot, such as:  ¨ Pain in your calf, back of the knee, thigh, or groin. ¨ Redness and swelling in your leg or groin. · Your incision comes open and begins to bleed, or the bleeding increases. · You have pain that does not get better after you take pain medicine. Watch closely for changes in your health, and be sure to contact your doctor if:  · You do not have a bowel movement after taking a laxative. Where can you learn more? Go to http://hernan-gwen.info/.   Enter L261 in the search box to learn more about \"Total Knee Replacement: What to Expect at Home. \"  Current as of: August 4, 2016  Content Version: 11.1  © 7758-7177 [employer]. Care instructions adapted under license by [employer] (which disclaims liability or warranty for this information). If you have questions about a medical condition or this instruction, always ask your healthcare professional. [place] [address] any warranty or liability for your use of this information. These are general instructions for a healthy lifestyle:    No smoking/ No tobacco products/ Avoid exposure to second hand smoke    Surgeon General's Warning:  Quitting smoking now greatly reduces serious risk to your health. Obesity, smoking, and sedentary lifestyle greatly increases your risk for illness    A healthy diet, regular physical exercise & weight monitoring are important for maintaining a healthy lifestyle    You may be retaining fluid if you have a history of heart failure or if you experience any of the following symptoms:  Weight gain of 3 pounds or more overnight or 5 pounds in a week, increased swelling in our hands or feet or shortness of breath while lying flat in bed. Please call your doctor as soon as you notice any of these symptoms; do not wait until your next office visit. Recognize signs and symptoms of STROKE:    F-face looks uneven    A-arms unable to move or move even    S-speech slurred or non-existent    T-time-call 911 as soon as signs and symptoms begin-DO NOT go       Back to bed or wait to see if you get better-TIME IS BRAIN. The discharge information has been reviewed with the patient. The patient verbalized understanding. Information obtained by :  I understand that if any problems occur once I am at home I am to contact my physician. I understand and acknowledge receipt of the instructions indicated above. Physician's or R.N.'s Signature                                                                  Date/Time                                                                                                                                              Patient or Representative Signature                                                          Date/Time

## 2017-03-16 NOTE — PROGRESS NOTES
Problem: Mobility Impaired (Adult and Pediatric)  Goal: *Acute Goals and Plan of Care (Insert Text)  GOALS (1-4 days):  (1.)Ms. Ellis Pearson will move from supine to sit and sit to supine in bed with CONTACT GUARD ASSIST.  (2.)Ms. Ellis Pearson will transfer from bed to chair and chair to bed with CONTACT GUARD ASSIST using the least restrictive device. (3.)Ms. Ellis Pearson will ambulate with CONTACT GUARD ASSIST for 150 feet with the least restrictive device. (4.)Ms. Ellis Pearson will ambulate up/down 4 steps with bilateral railing with MINIMAL ASSIST with no device. (5.)Ms. Ellis Pearson will increase left knee ROM to 5°-80°.  ________________________________________________________________________________________________      PHYSICAL THERAPY JOINT CAMP TKA: Daily Note and AM 3/16/2017  INPATIENT: Hospital Day: 3  [place] / Product Type: Medicare /      NAME/AGE/GENDER: Marilu Goodell is a 80 y.o. female       PRIMARY DIAGNOSIS:  Primary osteoarthritis of left knee [M17.12]              Procedure(s) and Anesthesia Type:     * LEFT KNEE ARTHROPLASTY TOTAL/DEPUY/ GENERAL ANES BECAUSE PT HAS SPINAL CORD STIMULATOR - General (Left)  ICD-10: Treatment Diagnosis:        · Pain in Left Knee (M25.562)  · Stiffness of Left Knee, Not elsewhere classified (M25.662)  · Difficulty in walking, Not elsewhere classified (R26.2)  · Other abnormalities of gait and mobility (R26.89)       ASSESSMENT:      Ms. Ellis Pearson presents S/P L TKA and presents with decreased L LE strength and ROM, standing balance, functional mobility and TKA awareness. She would benefit from further PT while here to address these deficits. She [place] going home at [place] with family support. Pt. Complaining 10/10 pain in the knee and back. She was standing on contact coming out of the bathroom with family. She ambulated in the velazco with walker CGA, gait limited by pain. She performed tka exercises with cues and assistance.   Very limited rom and strength of left LE due to pain. Poor quad set. Pain with all exercises and needs help with all exercises. Pt. Reports ramp at home so refused stairs. This section established at most recent assessment   PROBLEM LIST (Impairments causing functional limitations):  1. Decreased Strength  2. Decreased Transfer Abilities  3. Decreased Ambulation Ability/Technique  4. Decreased Balance  5. Increased Pain  6. Decreased Activity Tolerance  7. Increased Fatigue  8. Decreased Flexibility/Joint Mobility  9. Decreased Knowledge of Precautions  10. Decreased [place] with Home Exercise Program    INTERVENTIONS PLANNED: (Benefits and precautions of physical therapy have been discussed with the patient.)  1. Balance Exercise  2. Bed Mobility  3. Cold  4. Gait Training  5. Home Exercise Program (HEP)  6. Therapeutic Exercise/Strengthening  7. Transfer Training  8. TKa education  9. Range of Motion: active/assisted/passive  10. Therapeutic Activities  11. Group Therapy      TREATMENT PLAN: Frequency/Duration: Follow patient BID   to address above goals. Rehabilitation Potential For Stated Goals: GOOD      RECOMMENDED REHABILITATION/EQUIPMENT: (at time of discharge pending progress): Continue Skilled Therapy and Home Health: Physical Therapy. HISTORY:   History of Present Injury/Illness (Reason for Referral):  S/P L TKA  Past Medical History/Comorbidities:   Ms. Andrez Montesinos  has a past medical history of Acute pancreatitis (2/2008); Aneurysm ([address] Anxiety; Bilateral sacroiliitis ([address] Breast cyst; CAD (coronary artery disease); Cellulitis (6/16/15); Chest pain; Chronic pain; CKD (chronic kidney disease); Degenerative disc disease; Depression (12/23/2015); Diabetes mellitus type II; Dyslipidemia; Dyspnea; Edema (12/23/2015); Fatty liver; Fibromyalgia; Food allergy; GERD (gastroesophageal reflux disease); Heart failure ([address] Hypertension; Hypertriglyceridemia;  Hypothyroidism; Lumbar herniated disc (Aug 2013); Lumbar radiculopathy; Microcytic anemia; Mild pulmonary hypertension ([facility] Mitral valve regurgitation (12/23/2015); Nausea & vomiting; Neuropathy; Obesity (BMI 30.0-34.9) (10/2/14); Osteoarthritis; Panic attacks; Sacroiliac dysfunction; Seasonal allergic reaction; Sleep apnea; and Syncope and collapse (12/23/2015). Ms. Jurline Shone  has a past surgical history that includes other surgical (12/22/09); partial thyroidectomy (2008); other surgical; other surgical (10/28/13); breast lumpectomy (Right); hysterectomy (1995); knee arthroscopy (Left, 2004); back surgery (08/30/2013); orthopaedic (Left, 2010); orthopaedic (Right, 2009); breast biopsy (Bilateral, R/1988  L/2012); knee arthroscopy (10/03/2014); premalig/benign skin lesion excision (09/29/2014); and heart catheterization (2005).   Social History/Living Environment:   Home Environment: Private residence  # Steps to Enter: 2  Rails to Enter: No  One/Two Story Residence: One story  Living Alone: No  Support Systems: Spouse/Significant Other/Partner, Child(sarah), Family member(s), Friends \ neighbors  Patient Expects to be Discharged to[de-identified] Private residence  Current DME Used/Available at Home: [employer], straight, Grab bars, Walker, Walker, rolling  Tub or Shower Type:  (custom walk in tub)  Prior Level of Function/Work/Activity:  Required assist with lower body dressing and ambulated with a cane   Number of Personal Factors/Comorbidities that affect the Plan of Care: 1-2: MODERATE COMPLEXITY   EXAMINATION:   Most Recent Physical Functioning:                  LLE AROM  L Knee Flexion: 25  L Knee Extension: 11            Bed Mobility  Supine to Sit: Minimum assistance     Transfers  Sit to Stand: Supervision  Bed to Chair: Contact guard assistance     Balance  Sitting: Intact  Standing: With support                Weight Bearing Status  Left Side Weight Bearing: As tolerated  Distance (ft): 80 Feet (ft)  Ambulation - Level of Assistance: Contact guard assistance  Assistive Device: Walker, rolling  Speed/Lindsay: Delayed  Step Length: Left shortened;Right shortened  Stance: Left decreased  Gait Abnormalities: Antalgic;Decreased step clearance;Shuffling gait  Interventions: Safety awareness training;Verbal cues      Braces/Orthotics:      Left Knee Cold  Type: Cryocuff       Body Structures Involved:  1. Joints  2. Muscles Body Functions Affected:  1. Neuromusculoskeletal  2. Movement Related Activities and Participation Affected:  1. Mobility  2. Self Care   Number of elements that affect the Plan of Care: 4+: HIGH COMPLEXITY   CLINICAL PRESENTATION:   Presentation: Stable and uncomplicated: LOW COMPLEXITY   CLINICAL DECISION MAKING:   [employer] AM-PAC 6 Clicks   Basic Mobility Inpatient Short Form  How much difficulty does the patient currently have. .. Unable A Lot A Little None   1. Turning over in bed (including adjusting bedclothes, sheets and blankets)? [ ] 1   [ ] 2   [X] 3   [ ] 4   2. Sitting down on and standing up from a chair with arms ( e.g., wheelchair, bedside commode, etc.)   [ ] 1   [ ] 2   [X] 3   [ ] 4   3. Moving from lying on back to sitting on the side of the bed? [ ] 1   [ ] 2   [X] 3   [ ] 4   How much help from another person does the patient currently need. .. Total A Lot A Little None   4. Moving to and from a bed to a chair (including a wheelchair)? [ ] 1   [ ] 2   [X] 3   [ ] 4   5. Need to walk in hospital room? [ ] 1   [X] 2   [ ] 3   [ ] 4   6. Climbing 3-5 steps with a railing? [ ] 1   [X] 2   [ ] 3   [ ] 4   © 2007, Trustees of [facility] [employer] [employer], under license to [employer]. All rights reserved       Score:  Initial:16 Most Recent: X (Date: -- )     Interpretation of Tool:  Represents activities that are increasingly more difficult (i.e. Bed mobility, Transfers, Gait).        Score 24 23 22-20 19-15 14-10 9-7 6       Modifier CH CI CJ CK CL CM CN         · Mobility - Walking and Moving Around:  - CURRENT STATUS:    CK - 40%-59% impaired, limited or restricted               - GOAL STATUS:           CJ - 20%-39% impaired, limited or restricted               - D/C STATUS:                       ---------------To be determined---------------  [place] / Product Type: Medicare /       Medical Necessity:     · Patient demonstrates good rehab potential due to higher previous functional level. Reason for Services/Other Comments:  · Patient continues to require present interventions due to patient's inability to perform functional mobility independently. Use of outcome tool(s) and clinical judgement create a POC that gives a: Clear prediction of patient's progress: LOW COMPLEXITY                 TREATMENT:   (In addition to Assessment/Re-Assessment sessions the following treatments were rendered)      Pre-treatment Symptoms/Complaints:  Increased pain limiting mobility  Pain: Initial: 10     Post Session:  10      Gait Training (15 Minutes):  Gait training to improve and/or restore physical functioning as related to mobility, strength and balance. Ambulated 80 Feet (ft) with Contact guard assistance using a Walker, rolling and minimal Safety awareness training;Verbal cues related to their stance phase, stride length and knee position and motion to promote proper body alignment, promote proper body posture and promote proper body breathing techniques. Therapeutic Exercise: (45 Minutes):  Exercises per grid below to improve mobility, strength, coordination and endurance. Required minimal visual, verbal and tactile cues to promote proper body alignment and promote proper body breathing techniques. Progressed range, repetitions and complexity of movement as indicated.             Date:  3/15/17  Date:  3/16  Date:      ACTIVITY/EXERCISE AM PM AM PM AM PM   GROUP THERAPY  [ ]  [ ]  [ x]  [ ]  [ ]  [ ]   Ankle Pumps 10  15  20 a         Quad Sets  10  15 20aa         Gluteal Sets  10  15  20a         Hip ABd/ADduction  10AA  15AA  20aa         Straight Leg Raises  10AA  15AA  20aa         Knee Slides  10AA  15AA  20aa         Short Arc Quads  10AA  15AA  20aa         Long Arc Quads               Chair Slides      20aa                         B = bilateral; AA = active assistive; A = active; P = passive       Treatment/Session Assessment:         Response to Treatment:  Pt. Doing ok, pain in left LE     Education:  [x ] Home Exercises  [X] Fall Precautions  [ ] Hip Precautions [ ] Going Home Video  [x ] Knee/Hip Prosthesis Review  [X] Walker Management/Safety [ ] Adaptive Equipment as Needed         Interdisciplinary Collaboration:   · Rehabilitation Attendant     After treatment position/precautions:   · Up in chair, Bed/Chair-wheels locked, Call light within reach, RN notified and Family at bedside     Compliance with Program/Exercises: no questions. Recommendations/Intent for next treatment session:  Treatment next visit will focus on increasing Ms. Daley's independence with bed mobility, transfers, gait training, strength/ROM exercises, modalities for pain, and patient education.        Total Treatment Duration:  PT Patient Time In/Time Out  Time In: 1030  Time Out: [address], PT

## 2017-03-16 NOTE — PROGRESS NOTES
Problem: Self Care Deficits Care Plan (Adult)  Goal: *Acute Goals and Plan of Care (Insert Text)  GOALS:   DISCHARGE GOALS (in preparation for going home/rehab): 3 days  1. Ms. Daniel Bedolla will perform one lower body dressing activity with minimal assistance required to demonstrate improved functional mobility and safety. GOAL MET 3/16/2017  2. Ms. Daniel Bedolla will perform one lower body bathing activity with minimal assistance required to demonstrate improved functional mobility and safety. GOAL MET 3/16/2017  3. Ms. Daniel Bedolla will perform toileting/toilet transfer with contact guard assistance to demonstrate improved functional mobility and safety. GOAL MET 3/16/2017  4. Ms. Daniel Bedolla will perform shower transfer with contact guard assistance to demonstrate improved functional mobility and safety. JOINT CAMP OCCUPATIONAL THERAPY TKA: Daily Note, Treatment Day: 1st and AM 3/16/2017  INPATIENT: Hospital Day: 3  [place]: [place] / Product Type: Medicare /      NAME/AGE/GENDER: Nely Dawson is a 80 y.o. female       PRIMARY DIAGNOSIS:  Primary osteoarthritis of left knee [M17.12]              Procedure(s) and Anesthesia Type:     * LEFT KNEE ARTHROPLASTY TOTAL/DEPUY/ GENERAL ANES BECAUSE PT HAS SPINAL CORD STIMULATOR - General (Left)  ICD-10: Treatment Diagnosis:        · Pain in Left Knee (M25.562)  · Stiffness of Left Knee, Not elsewhere classified (Z49.202)       ASSESSMENT:      Ms. Daniel Bedolla is s/p left TKA and presents with decreased weight bearing on left LE and decreased independence with functional mobility and activities of daily living. Pt refused to get in shower due to being in pain. Pt completed sponge bath, toileting and dressing with the assistance listed below. Patient would benefit from skilled Occupational Therapy to maximize independence and safety with self-care task and functional mobility.        This section established at most recent assessment   PROBLEM LIST (Impairments causing functional limitations):  1. Decreased Strength  2. Decreased ADL/Functional Activities  3. Decreased Transfer Abilities  4. Increased Pain  5. Increased Fatigue  6. Decreased Flexibility/Joint Mobility  7. Decreased Knowledge of Precautions    INTERVENTIONS PLANNED: (Benefits and precautions of occupational therapy have been discussed with the patient.)  1. Activities of daily living training  2. Adaptive equipment training  3. Balance training  4. Clothing management  5. Donning&doffing training  6. Theraputic activity      TREATMENT PLAN: Frequency/Duration: Follow patient 1-2 times to address above goals. Rehabilitation Potential For Stated Goals: GOOD      RECOMMENDED REHABILITATION/EQUIPMENT: (at time of discharge pending progress): Continue Skilled Therapy and Home Health: Physical Therapy. OCCUPATIONAL PROFILE AND HISTORY:   History of Present Injury/Illness (Reason for Referral): Pt presents this date s/p (left) TKA. Past Medical History/Comorbidities:   Ms. Елена Herrera  has a past medical history of Acute pancreatitis (2/2008); Aneurysm ([address] Anxiety; Bilateral sacroiliitis ([address] Breast cyst; CAD (coronary artery disease); Cellulitis (6/16/15); Chest pain; Chronic pain; CKD (chronic kidney disease); Degenerative disc disease; Depression (12/23/2015); Diabetes mellitus type II; Dyslipidemia; Dyspnea; Edema (12/23/2015); Fatty liver; Fibromyalgia; Food allergy; GERD (gastroesophageal reflux disease); Heart failure ([address] Hypertension; Hypertriglyceridemia; Hypothyroidism; Lumbar herniated disc (Aug 2013); Lumbar radiculopathy; Microcytic anemia; Mild pulmonary hypertension ([address] Mitral valve regurgitation (12/23/2015); Nausea & vomiting; Neuropathy; Obesity (BMI 30.0-34.9) (10/2/14); Osteoarthritis; Panic attacks; Sacroiliac dysfunction; Seasonal allergic reaction; Sleep apnea; and Syncope and collapse (12/23/2015).   Ms. Елена Herrera  has a past surgical history that includes other surgical (12/22/09); partial thyroidectomy (2008); other surgical; other surgical (10/28/13); breast lumpectomy (Right); hysterectomy (1995); knee arthroscopy (Left, 2004); back surgery (08/30/2013); orthopaedic (Left, 2010); orthopaedic (Right, 2009); breast biopsy (Bilateral, R/1988  L/2012); knee arthroscopy (10/03/2014); premalig/benign skin lesion excision (09/29/2014); and heart catheterization (2005). Social History/Living Environment:   Home Environment: Private residence  # Steps to Enter: 2  Rails to Enter: No  One/Two Story Residence: One story  Living Alone: No  Support Systems: Spouse/Significant Other/Partner, Child(sarah), Family member(s), Friends \ neighbors  Patient Expects to be Discharged to[de-identified] Private residence  Current DME Used/Available at Home: [address], [address], straight, Grab bars, Slava [place]ras, rolling  Tub or Shower Type:  (custom walk in tub)  Prior Level of Function/Work/Activity:  Independent       Number of Personal Factors/Comorbidities that affect the Plan of Care: Brief history (0):  LOW COMPLEXITY   ASSESSMENT OF OCCUPATIONAL PERFORMANCE[de-identified]   Most Recent Physical Functioning:   Balance  Sitting: Intact  Standing: With support        Patient Vitals for the past 6 hrs:   BP BP Patient Position SpO2 O2 Flow Rate (L/min) Pulse   03/16/17 0423 171/89 - 98 % - 74   03/16/17 0754 167/86 Sitting 98 % 0 l/min 73                                   Mental Status  Neurologic State: Alert  Orientation Level: Oriented X4  Cognition: Appropriate decision making; Appropriate for age attention/concentration  Perception: Appears intact  Perseveration: No perseveration noted  Safety/Judgement: Fall prevention                    Basic ADLs (From Assessment) Complex ADLs (From Assessment)   Basic ADL  Feeding: Independent  Oral Facial Hygiene/Grooming: Supervision  Bathing: Moderate assistance  Upper Body Dressing: Supervision  Lower Body Dressing: Moderate assistance  Toileting:  Moderate assistance Grooming/Bathing/Dressing Activities of Daily Living     Cognitive Retraining  Safety/Judgement: Fall prevention   Upper Body Bathing  Bathing Assistance: Modified independent  Position Performed: Seated in chair  Adaptive Equipment: Shower chair     [address]: Minimum assistance  Perineal  : Independent  Lower Body : Minimum assistance  Position Performed: Seated in chair  Adaptive Equipment: Shower chair Toileting  Toileting Assistance: Modified independent  Bladder Hygiene: [address]: Elevated seat   Upper Body Dressing Assistance  Dressing Assistance: Independent  Bra: [address] [facility] [address]: Independent Functional Transfers  Toilet Transfer : Contact guard assistance  Shower Transfer: Contact guard assistance   [address]: Minimum assistance  Underpants: Minimum assistance  Pants With Elastic Waist: Minimum assistance  Socks: Minimum assistance  Antiembolitic Stockings: Compensatory technique training  Slip on Shoes with Back: Minimum assistance  Cues: Doff;Don;Physical assistance Bed/Mat Mobility  Supine to Sit: Minimum assistance  Sit to Stand: Supervision  Bed to Chair: Contact guard assistance           Physical Skills Involved:  1. Range of Motion  2. Balance  3. Mobility Cognitive Skills Affected (resulting in the inability to perform in a timely and safe manner): 1. none Psychosocial Skills Affected:  1. Environmental Adaptations   Number of elements that affect the Plan of Care: 3-5:  MODERATE COMPLEXITY   CLINICAL DECISION MAKIN [facility] [address] [address] AM-PAC 6 Clicks   Basic Mobility Inpatient Short Form  How much help from another person does the patient currently need. .. Total A Lot A Little None   1. Putting on and taking off regular lower body clothing?   [ ] 1   [X] 2   [ ] 3   [ ] 4   2. Bathing (including washing, rinsing, drying)? [ ] 1   [X] 2   [ ] 3   [ ] 4   3.   Toileting, which includes using toilet, bedpan or urinal?   [ ] 1   [ ] 2   [X] 3   [ ] 4   4. Putting on and taking off regular upper body clothing?   [ ] 1   [ ] 2   [ ] 3   [X] 4   5. Taking care of personal grooming such as brushing teeth? [ ] 1   [ ] 2   [ ] 3   [X] 4   6. Eating meals? [ ] 1   [ ] 2   [ ] 3   [X] 4   © 2007, Trustees of [address] [place] [address] [address] [id number], under license to [employer]. All rights reserved   Score:  Initial: 19 Most Recent: X (Date: -- )     Interpretation of Tool:  Represents activities that are increasingly more difficult (i.e. Bed mobility, Transfers, Gait). Score 24 23 22-20 19-15 14-10 9-7 6       Modifier CH CI CJ CK CL CM CN         · Self Care:               - CURRENT STATUS:    CK - 40%-59% impaired, limited or restricted               - GOAL STATUS:           CJ - 20%-39% impaired, limited or restricted               - D/C STATUS:                       ---------------To be determined---------------  [place] / Product Type: Medicare /       Medical Necessity:     · Patient is expected to demonstrate progress in range of motion, balance and functional technique to increase independence with self care. Reason for Services/Other Comments:  · Patient would benefit from skilled Occupational Therapy to maximize independence and safety with self-care task and functional mobility. .   Use of outcome tool(s) and clinical judgement create a POC that gives a: LOW COMPLEXITY                 TREATMENT:   (In addition to Assessment/Re-Assessment sessions the following treatments were rendered)      Pre-treatment Symptoms/Complaints:  Pt with minimal complaint of pain  Pain: Initial:   Pain Intensity 1: 10  Pain Intervention(s) 1: Repositioned 2 Post Session:  2      Self Care: (38): Procedure(s) (per grid) utilized to improve and/or restore self-care/home management as related to dressing, bathing and toileting.  Required min verbal and manual cueing to facilitate activities of daily living skills. Treatment/Session Assessment:         Response to Treatment:  Pt motivated and moves well. Education:  [ ] Home Exercises  [X] Fall Precautions  [ ] Hip Precautions [ ] Going Home Video  [X] Knee/Hip Prosthesis Review  [X] Walker Management/Safety [X] Adaptive Equipment as Needed         Interdisciplinary Collaboration:   · Physical Therapist, Certified Occupational Therapy Assistant and Registered Nurse     After treatment position/precautions:   · Up in chair, Bed/Chair-wheels locked, Call light within reach, RN notified and Family at bedside     Compliance with Program/Exercises: Will assess as treatment progresses. Recommendations/Intent for next treatment session:  Treatment next visit will focus on increasing Ms. Daley's independence with bed mobility, transfers, gait training, strength/ROM exercises, modalities for pain, and patient education.        Total Treatment Duration:  OT Patient Time In/Time Out  Time In: 0800  Time Out: EDWIN Calix

## 2017-03-16 NOTE — PROGRESS NOTES
2017         Post Op day: 2 Days Post-Op     Admit Date: 3/14/2017  Admit Diagnosis: Primary osteoarthritis of left knee [M17.12]        Subjective: Doing ok, having some issues with pain, No SOB, No Chest Pain, No Nausea or Vomiting     Objective:   Vital Signs are Stable, No Acute Distress, Alert and Oriented, Dressing is Dry,  Neurovascular exam is normal.     Assessment / Plan :  Patient Active Problem List   Diagnosis Code    Lateral epicondylitis  of elbow M77.10    Essential hypertension, benign I10    Hypothyroidism E03.9    GERD (gastroesophageal reflux disease) K21.9    Panic attacks F41.0    Generalized anxiety disorder F41.1    Mixed hyperlipidemia E78.2    Degenerative disc disease, lumbar M51.36    Osteoarthritis M19.90    Fibromyalgia M79.7    CAD (coronary artery disease) I25.10    Sleep apnea G47.30    Vitamin D deficiency E55.9    Type 2 diabetes mellitus, controlled ([facility] E11.9    Tear of medial cartilage or meniscus of knee, current [id number]    Tear of lateral cartilage or meniscus of knee, current S83.289A    Primary localized osteoarthrosis, lower leg M17.10    Edema R60.9    Syncope and collapse R55    Mitral valve regurgitation I34.0    Depression F32.9    Pre-operative cardiovascular examination Z01.810    Abnormal urinalysis R82.90    S/P total knee arthroplasty Z96.659    Patient Vitals for the past 8 hrs:   BP Temp Pulse Resp SpO2   17 0423 171/89 97.7 °F (36.5 °C) 74 18 98 %   17 0028 168/85 98.7 °F (37.1 °C) 77 18 99 %    Temp (24hrs), Av.4 °F (36.3 °C), Min:95.9 °F (35.5 °C), Max:98.7 °F (37.1 °C)    Body mass index is 35.3 kg/(m^2).     Lab Results   Component Value Date/Time    HGB 10.4 2017 04:20 AM      Pt seen by and discussed with Supervising Physician   Continue PT, current pain meds  Plan for home today if therapy goes well, pain controlled       Signed By: STEFAN Alvares

## 2017-03-16 NOTE — PROGRESS NOTES
Patient is A&Ox4. Able to verbalize needs. Resting quietly with no distress noted. Dressing to surgical site is dry and intact. Neurovascular and peripheral vascular checks WNL. Voiding clear yellow urine. Ambulates with walker. Denies needs. Bed low and locked. Call light within reach. Instructed to call for assistance. Patient verbalizes understanding. Will monitor.

## 2017-03-17 ENCOUNTER — HOME CARE VISIT (OUTPATIENT)
Dept: SCHEDULING | Facility: HOME HEALTH | Age: 72
End: 2017-03-17
Payer: MEDICARE

## 2017-03-17 ENCOUNTER — PATIENT OUTREACH (OUTPATIENT)
Dept: CASE MANAGEMENT | Age: 72
End: 2017-03-17

## 2017-03-17 VITALS
HEART RATE: 76 BPM | RESPIRATION RATE: 16 BRPM | DIASTOLIC BLOOD PRESSURE: 78 MMHG | SYSTOLIC BLOOD PRESSURE: 147 MMHG | OXYGEN SATURATION: 99 % | TEMPERATURE: 97.1 F

## 2017-03-17 PROCEDURE — G0151 HHCP-SERV OF PT,EA 15 MIN: HCPCS

## 2017-03-17 PROCEDURE — 3331090001 HH PPS REVENUE CREDIT

## 2017-03-17 PROCEDURE — 400013 HH SOC

## 2017-03-17 PROCEDURE — 3331090002 HH PPS REVENUE DEBIT

## 2017-03-17 NOTE — PROGRESS NOTES
Transition of Care Discharge Follow-up Questionnaire   Date/Time of Call:   March 17, 2017 1:43PM   What was the patient hospitalized for? Patient hospitalized for Status Post left knee arthroplasty. Does the patient understand his/her diagnosis and/or treatment and what happened during the hospitalization? Patient states understanding of diagnosis and treatment during hospitalization. Did the patient receive discharge instructions? Patient states discharge instructions explained and received before discharge to home. Review any discharge instructions (see notes in [facility]). Ask patient if they understand these. Do they have any questions? Discharge instructions reviewed with patient per [facility], opportunity for questions and clarification provided. Patient states no questions at this time. Were home services ordered (nursing, PT, OT, ST, etc.)? Patient states home health services ordered (PT). If so, has the first visit occurred? If not, why? (Assist with coordination of services if necessary.) Patient states first visit occurred today 03/17/2017. Was any DME ordered? Bedside Commode ordered. If so, has it been received? If not, why?  (Assist with coordination of arranging DME orders if necessary.) Bedside Commode delivered to hospital room prior to patient being discharged to home. Complete a review of all medications (new, continued and discontinued meds per the D/C instructions and medication tab in [facility]). Two new prescriptions given Aspirin 81 mg delayed release tabs po and Dilaudid 2 mg tabs po. Given before patient discharged to home. Were all new prescriptions filled? If not, why?  (Assist with obtainment of medications if necessary.) Patient states new prescriptions filled and currently being taken per doctors order. Does the patient understand the purpose and dosing instructions for all medications?   (If patient has questions, provide explanation and education.) Patient states understanding of medication purposes and dosing instructions. Does the patient have any problems in performing ADLs? (If patient is unable to perform ADLs - what is the limiting factor(s)? Do they have a support system that can assist? If no support system is present, discuss possible assistance that they may be able to obtain.) Patient states she in independent with ADL's and ambulates with the aid of walker. Does the patient have all follow-up appointments scheduled? Has transportation been arranged? [facility] Pulmonary follow-up should be within 7 days of discharge; all others should have PCP follow-up within 7 days of discharge; follow-ups with other specialists as appropriate or ordered.) Patient states follow up appointment scheduled with Dr. Saira Odonnell (Orthopedic Surgeon) March 29, 2017. Patient states no transportation needs at this time. Any other questions or concerns expressed by the patient? Patient expresses no questions or concerns. Schedule next appointment with Elva Izaguirre Coordinator or refer to RN Case Manager/  as appropriate. No further needs identified, patient instructed to call Care Coordinator if further questions or concerns arise.    VIRGINIE Call Completed By: Edmund Sinha LPN  Care Coordinator   [facility]

## 2017-03-18 PROCEDURE — 3331090001 HH PPS REVENUE CREDIT

## 2017-03-18 PROCEDURE — 3331090002 HH PPS REVENUE DEBIT

## 2017-03-19 PROCEDURE — 3331090001 HH PPS REVENUE CREDIT

## 2017-03-19 PROCEDURE — 3331090002 HH PPS REVENUE DEBIT

## 2017-03-20 ENCOUNTER — HOME CARE VISIT (OUTPATIENT)
Dept: SCHEDULING | Facility: HOME HEALTH | Age: 72
End: 2017-03-20
Payer: MEDICARE

## 2017-03-20 VITALS
HEART RATE: 76 BPM | DIASTOLIC BLOOD PRESSURE: 80 MMHG | SYSTOLIC BLOOD PRESSURE: 130 MMHG | TEMPERATURE: 97.7 F | RESPIRATION RATE: 17 BRPM

## 2017-03-20 PROCEDURE — 3331090002 HH PPS REVENUE DEBIT

## 2017-03-20 PROCEDURE — 3331090001 HH PPS REVENUE CREDIT

## 2017-03-20 PROCEDURE — G0157 HHC PT ASSISTANT EA 15: HCPCS

## 2017-03-21 PROCEDURE — 3331090002 HH PPS REVENUE DEBIT

## 2017-03-21 PROCEDURE — 3331090001 HH PPS REVENUE CREDIT

## 2017-03-22 ENCOUNTER — HOME CARE VISIT (OUTPATIENT)
Dept: SCHEDULING | Facility: HOME HEALTH | Age: 72
End: 2017-03-22
Payer: MEDICARE

## 2017-03-22 VITALS
RESPIRATION RATE: 17 BRPM | HEART RATE: 76 BPM | SYSTOLIC BLOOD PRESSURE: 130 MMHG | TEMPERATURE: 97.9 F | DIASTOLIC BLOOD PRESSURE: 82 MMHG

## 2017-03-22 PROCEDURE — 3331090002 HH PPS REVENUE DEBIT

## 2017-03-22 PROCEDURE — 3331090001 HH PPS REVENUE CREDIT

## 2017-03-22 PROCEDURE — G0157 HHC PT ASSISTANT EA 15: HCPCS

## 2017-03-23 PROCEDURE — 3331090002 HH PPS REVENUE DEBIT

## 2017-03-23 PROCEDURE — 3331090001 HH PPS REVENUE CREDIT

## 2017-03-24 ENCOUNTER — HOME CARE VISIT (OUTPATIENT)
Dept: SCHEDULING | Facility: HOME HEALTH | Age: 72
End: 2017-03-24
Payer: MEDICARE

## 2017-03-24 VITALS
DIASTOLIC BLOOD PRESSURE: 84 MMHG | HEART RATE: 80 BPM | TEMPERATURE: 97.2 F | RESPIRATION RATE: 17 BRPM | SYSTOLIC BLOOD PRESSURE: 138 MMHG

## 2017-03-24 PROCEDURE — 3331090001 HH PPS REVENUE CREDIT

## 2017-03-24 PROCEDURE — 3331090002 HH PPS REVENUE DEBIT

## 2017-03-24 PROCEDURE — G0157 HHC PT ASSISTANT EA 15: HCPCS

## 2017-03-25 PROCEDURE — 3331090001 HH PPS REVENUE CREDIT

## 2017-03-25 PROCEDURE — 3331090002 HH PPS REVENUE DEBIT

## 2017-03-26 PROCEDURE — 3331090002 HH PPS REVENUE DEBIT

## 2017-03-26 PROCEDURE — 3331090001 HH PPS REVENUE CREDIT

## 2017-03-27 ENCOUNTER — HOME CARE VISIT (OUTPATIENT)
Dept: SCHEDULING | Facility: HOME HEALTH | Age: 72
End: 2017-03-27
Payer: MEDICARE

## 2017-03-27 VITALS
HEART RATE: 88 BPM | TEMPERATURE: 98.1 F | DIASTOLIC BLOOD PRESSURE: 82 MMHG | RESPIRATION RATE: 17 BRPM | SYSTOLIC BLOOD PRESSURE: 134 MMHG

## 2017-03-27 PROCEDURE — 3331090002 HH PPS REVENUE DEBIT

## 2017-03-27 PROCEDURE — 3331090001 HH PPS REVENUE CREDIT

## 2017-03-27 PROCEDURE — G0157 HHC PT ASSISTANT EA 15: HCPCS

## 2017-03-28 ENCOUNTER — HOME CARE VISIT (OUTPATIENT)
Dept: SCHEDULING | Facility: HOME HEALTH | Age: 72
End: 2017-03-28
Payer: MEDICARE

## 2017-03-28 VITALS
TEMPERATURE: 97.1 F | SYSTOLIC BLOOD PRESSURE: 118 MMHG | HEART RATE: 64 BPM | RESPIRATION RATE: 17 BRPM | DIASTOLIC BLOOD PRESSURE: 76 MMHG

## 2017-03-28 PROCEDURE — 3331090001 HH PPS REVENUE CREDIT

## 2017-03-28 PROCEDURE — A6258 TRANSPARENT FILM >16<=48 IN: HCPCS

## 2017-03-28 PROCEDURE — G0157 HHC PT ASSISTANT EA 15: HCPCS

## 2017-03-28 PROCEDURE — A4649 SURGICAL SUPPLIES: HCPCS

## 2017-03-28 PROCEDURE — 3331090002 HH PPS REVENUE DEBIT

## 2017-03-29 PROCEDURE — 3331090002 HH PPS REVENUE DEBIT

## 2017-03-29 PROCEDURE — 3331090001 HH PPS REVENUE CREDIT

## 2017-03-30 PROCEDURE — 3331090002 HH PPS REVENUE DEBIT

## 2017-03-30 PROCEDURE — 3331090001 HH PPS REVENUE CREDIT

## 2017-03-31 ENCOUNTER — HOME CARE VISIT (OUTPATIENT)
Dept: SCHEDULING | Facility: HOME HEALTH | Age: 72
End: 2017-03-31
Payer: MEDICARE

## 2017-03-31 VITALS
SYSTOLIC BLOOD PRESSURE: 124 MMHG | RESPIRATION RATE: 18 BRPM | HEART RATE: 64 BPM | TEMPERATURE: 97.2 F | DIASTOLIC BLOOD PRESSURE: 60 MMHG

## 2017-03-31 PROCEDURE — 3331090001 HH PPS REVENUE CREDIT

## 2017-03-31 PROCEDURE — G0151 HHCP-SERV OF PT,EA 15 MIN: HCPCS

## 2017-03-31 PROCEDURE — 3331090002 HH PPS REVENUE DEBIT

## 2017-04-01 PROCEDURE — 3331090002 HH PPS REVENUE DEBIT

## 2017-04-01 PROCEDURE — 3331090001 HH PPS REVENUE CREDIT

## 2017-04-02 PROCEDURE — 3331090002 HH PPS REVENUE DEBIT

## 2017-04-02 PROCEDURE — 3331090001 HH PPS REVENUE CREDIT

## 2017-04-03 PROCEDURE — 3331090001 HH PPS REVENUE CREDIT

## 2017-04-03 PROCEDURE — 3331090002 HH PPS REVENUE DEBIT

## 2017-04-04 ENCOUNTER — HOSPITAL ENCOUNTER (OUTPATIENT)
Dept: PHYSICAL THERAPY | Age: 72
Discharge: HOME | End: 2017-04-04
Payer: MEDICARE

## 2017-04-04 PROCEDURE — G8979 MOBILITY GOAL STATUS: HCPCS

## 2017-04-04 PROCEDURE — G8978 MOBILITY CURRENT STATUS: HCPCS

## 2017-04-04 PROCEDURE — 3331090001 HH PPS REVENUE CREDIT

## 2017-04-04 PROCEDURE — 3331090002 HH PPS REVENUE DEBIT

## 2017-04-04 PROCEDURE — 97162 PT EVAL MOD COMPLEX 30 MIN: CPT

## 2017-04-04 NOTE — PROGRESS NOTES
Ambulatory/Rehab Services H2 Model Falls Risk Assessment    Risk Factor Pts. ·   Confusion/Disorientation/Impulsivity  []    4 ·   Symptomatic Depression  [x]   2 ·   Altered Elimination  [x]   1 ·   Dizziness/Vertigo  [x]   1 ·   Gender (Male)  []   1 ·   Any administered antiepileptics (anticonvulsants):  []   2 ·   Any administered benzodiazepines:  [x]   1 ·   Visual Impairment (specify):  []   1 ·   Portable Oxygen Use  []   1 ·   Orthostatic ? BP  []   1 ·   History of Recent Falls (within 3 mos.)  [x]   5     Ability to Rise from Chair (choose one) Pts. ·   Ability to rise in a single movement  []   0 ·   Pushes up, successful in one attempt  [x]   1 ·   Multiple attempts, but successful  []   3 ·   Unable to rise without assistance  []   4   Total: (5 or greater = High Risk) 11     Falls Prevention Plan:   [x]                Physical Limitations to Exercise (specify): monitor balance   [x]                Mobility Assistance Device (type): walker   []                Exercise/Equipment Adaptation (specify):    ©2010 [facility] of [place] [address] [place] [address] Patent #3,995,365.  Federal Law prohibits the replication, distribution or use without written permission from [facility]

## 2017-04-04 NOTE — PROGRESS NOTES
Raghav Fletcher  : 1945 [facility] [place]  [address], [address], [place].  [phone]   [phone]         OUTPATIENT PHYSICAL THERAPY:Initial Assessment 2017    ICD-10: Treatment Diagnosis: Pain in left knee (M25.562), Presence of left artificial knee joint (M97.886)  Precautions/Allergies: allergic to Catapres, Celebrex, Cymbalta, Morphine, Oxycontin, Sulfa and shellfish products  Fall Risk Score: 11 (? 5 = High Risk)  MD Orders: Eval and treat MEDICAL/REFERRING DIAGNOSIS:   L TKA  DATE OF ONSET: 3/14/17  REFERRING PHYSICIAN: Quan Gleason MD  RETURN PHYSICIAN APPOINTMENT: 17     INITIAL ASSESSMENT:  Ms. Amie Seaman is a 80year old female s/p L TKA on 3/14/17. She pressents with continued pain in L knee, antalgic gait with walker, decreased ROM and strength in L LE and decreased functional mobility. She could benefit from PT to address deficits and work toward goals. PROBLEM LIST (Impacting functional limitations):  1. Decreased Strength  2. Decreased ADL/Functional Activities  3. Increased Pain  4. Decreased Flexibility/Joint Mobility INTERVENTIONS PLANNED:  1. Balance Exercise  2. Home Exercise Program (HEP)  3. Manual Therapy  4. Range of Motion (ROM)  5. Therapeutic Exercise/Strengthening   TREATMENT PLAN:  Effective Dates: 17 TO 7/3/17. Frequency/Duration: 2-3 times a week for 90 days  GOALS: (Goals have been discussed and agreed upon with patient.)  Patient's stated goal is to walk without pain or assistive device  Short-Term Functional Goals: Time Frame: 6 weeks  1. Patient to be independent with HEP  2. Patient to increase AROM L knee to 5-90 degrees for improved functional movement  3. Patient to ambulate without assistive device within her house  4. Patient to report decrease in L knee pain to 3/10 at rest  Discharge Goals: Time Frame: 90 days  1.  Patient to ambulate community distances without assistive device  2. Patient to report no more than minimal L knee pain with activity  3. Patient to improve LEFS score to 50 to demo improved functional movement  Rehabilitation Potential For Stated Goals: [address] [facility], I certify that the treatment plan above will be carried out by a therapist or under their direction. Thank you for this referral,      Milo Montes PT     Referring Physician Signature: Jersey Bravo MD              Date                    The information in this section was collected on 4/4/17 (except where otherwise noted). HISTORY:   History of Present Injury/Illness (Reason for Referral):  Patient reports gradual increase in L knee pain over past 5 years. She had L TKA on 3/14/17, left hospital 3 days later and then had home health PT. She returned to MD on 3/29/17 and was referred to PT. Past Medical History/Comorbidities:  Acute pancreatitis, aneurysm, anxiety, CAD, cellulitis, chronic pain, kidney disease, DDD, depression, DM II, Dyspnea, Fatty liver, fibromyalgia, GERD, Heart failure, HTN, hypthyroidism, herniated lumbar disc, Mitral valve regurgitation, neuropathy,  Obesity, OA, panic attacks, hysterectomy, L knee score, back surgery,cardia cath. Social History/Living Environment:     lives with . Babysits grandchildren 3 days/wk  Prior Level of Function/Work/Activity:  Retired. Current Medications:  Patient to bring in list  Date Last Reviewed:  4/4/17   Number of Personal Factors/Comorbidities that affect the Plan of Care: 3+: HIGH COMPLEXITY   EXAMINATION:   Observation/Orthostatic Postural Assessment:          11 steri strips in place on incision for L TKA. Girth measurements at superior and inferior patellar poles R 54/43 cm, L 55/46 cm. Redness around the knee but patient reports she showed this to MD on 3/29/17.     Palpation:         Tender L knee in general.   ROM:          AROM R knee flex 105, extn -4        AROM L knee flex 65 (65 in supine, 72 in sitting)  extn -12  Strength:          R knee grossly 4= to 5/5. L knee not tested  Neurological Screen:        Sensation: dulled light touch L LE - but has been that way prior to L TKA   Body Structures Involved:  1. Bones  2. Joints  3. Muscles Body Functions Affected:  1. Neuromusculoskeletal  2. Movement Related Activities and Participation Affected:  1. Self Care  2. Domestic Life  3. Community, Social and [place]   Number of elements (examined above) that affect the Plan of Care: 3: MODERATE COMPLEXITY   CLINICAL PRESENTATION:   Presentation: Evolving clinical presentation with changing clinical characteristics: MODERATE COMPLEXITY   CLINICAL DECISION MAKING:   Outcome Measure: Tool Used: Lower Extremity Functional Scale (LEFS)  Score:  Initial: 14/80 Most Recent: X/80 (Date: -- )   Interpretation of Score: 20 questions each scored on a 5 point scale with 0 representing \"extreme difficulty or unable to perform\" and 4 representing \"no difficulty\". The lower the score, the greater the functional disability. 80/80 represents no disability. Minimal detectable change is 9 points. Score 80 79-63 62-48 47-32 31-16 15-1 0   Modifier CH CI CJ CK CL CM CN     ? Mobility - Walking and Moving Around:     - CURRENT STATUS: CM - 80%-99% impaired, limited or restricted    - GOAL STATUS: CJ - 20%-39% impaired, limited or restricted    - D/C STATUS:  ---------------To be determined---------------    Medical Necessity:   · Patient demonstrates fair rehab potential due to higher previous functional level. Reason for Services/Other Comments:  · Patient continues to require skilled intervention due to need to regain functional mobility.    Use of outcome tool(s) and clinical judgement create a POC that gives a: Questionable prediction of patient's progress: MODERATE COMPLEXITY            TREATMENT:   (In addition to Assessment/Re-Assessment sessions the following treatments were rendered)  Pre-treatment Symptoms/Complaints:  Patient with stiffness in knee and pain. Pain: Initial:   Pain Intensity 1: 6 (currently 6, up to 9 at times)   Post Session:  Pain not rated at end of session. Therapeutic Exercise: ( ): patient instructed to continue with home health exercises until next PT visit  Manual Therapy ( 5 minutes) - grade 2 to 4- physio mobs L knee flex and extn    Treatment/Session Assessment:    · Response to Treatment:  Patient is very stiff in L knee. · Compliance with Program/Exercises: Will assess as treatment progresses. · Recommendations/Intent for next treatment session: \"Next visit will focus on ROM\".   Total Treatment Duration:  40 minutes  PT Patient Time In/Time Out  Time In: 8710  Time Out: Dino Nieves

## 2017-04-05 PROCEDURE — 3331090002 HH PPS REVENUE DEBIT

## 2017-04-05 PROCEDURE — 3331090001 HH PPS REVENUE CREDIT

## 2017-04-10 ENCOUNTER — HOSPITAL ENCOUNTER (OUTPATIENT)
Dept: PHYSICAL THERAPY | Age: 72
Discharge: HOME | End: 2017-04-10
Payer: MEDICARE

## 2017-04-10 PROCEDURE — 97140 MANUAL THERAPY 1/> REGIONS: CPT

## 2017-04-10 NOTE — PROGRESS NOTES
Dasia Young  : 1945 [facility] Center at [facility] C[place] EDWARDS  [address], [address], [facility]. [place].  [phone]   [phone]         OUTPATIENT PHYSICAL THERAPY:Daily Note 4/10/2017    ICD-10: Treatment Diagnosis: Pain in left knee (M25.562), Presence of left artificial knee joint (X54.967)  Precautions/Allergies: allergic to Catapres, Celebrex, Cymbalta, Morphine, Oxycontin, Sulfa and shellfish products  Fall Risk Score: 11 (? 5 = High Risk)  MD Orders: Eval and treat MEDICAL/REFERRING DIAGNOSIS:   L TKA  DATE OF ONSET: 3/14/17  REFERRING PHYSICIAN: Jailyn Davis MD  RETURN PHYSICIAN APPOINTMENT: 17     INITIAL ASSESSMENT:  Ms. Manav Livingston is a 80year old female s/p L TKA on 3/14/17. She pressents with continued pain in L knee, antalgic gait with walker, decreased ROM and strength in L LE and decreased functional mobility. She could benefit from PT to address deficits and work toward goals. PROBLEM LIST (Impacting functional limitations):  1. Decreased Strength  2. Decreased ADL/Functional Activities  3. Increased Pain  4. Decreased Flexibility/Joint Mobility INTERVENTIONS PLANNED:  1. Balance Exercise  2. Home Exercise Program (HEP)  3. Manual Therapy  4. Range of Motion (ROM)  5. Therapeutic Exercise/Strengthening   TREATMENT PLAN:  Effective Dates: 17 TO 7/3/17. Frequency/Duration: 2-3 times a week for 90 days  GOALS: (Goals have been discussed and agreed upon with patient.)  Patient's stated goal is to walk without pain or assistive device  Short-Term Functional Goals: Time Frame: 6 weeks  1. Patient to be independent with HEP  2. Patient to increase AROM L knee to 5-90 degrees for improved functional movement  3. Patient to ambulate without assistive device within her house  4. Patient to report decrease in L knee pain to 3/10 at rest  Discharge Goals: Time Frame: 90 days  1.  Patient to ambulate community distances without assistive device  2. Patient to report no more than minimal L knee pain with activity  3. Patient to improve LEFS score to 50 to demo improved functional movement  Rehabilitation Potential For Stated Goals: [address], PT                  The information in this section was collected on 4/4/17 (except where otherwise noted). HISTORY:   History of Present Injury/Illness (Reason for Referral):  Patient reports gradual increase in L knee pain over past 5 years. She had L TKA on 3/14/17, left hospital 3 days later and then had home health PT. She returned to MD on 3/29/17 and was referred to PT. Past Medical History/Comorbidities:  Acute pancreatitis, aneurysm, anxiety, CAD, cellulitis, chronic pain, kidney disease, DDD, depression, DM II, Dyspnea, Fatty liver, fibromyalgia, GERD, Heart failure, HTN, hypthyroidism, herniated lumbar disc, Mitral valve regurgitation, neuropathy,  Obesity, OA, panic attacks, hysterectomy, L knee score, back surgery,cardia cath. Social History/Living Environment:     lives with . Babysits grandchildren 3 days/wk  Prior Level of Function/Work/Activity:  Retired. Current Medications:  Patient to bring in list  Date Last Reviewed:  4/4/17   EXAMINATION:   Observation/Orthostatic Postural Assessment:          No new    Palpation:         Tender L knee in general.   ROM:          AROM R knee flex 105, extn -4   (at eval)        AROM L knee flex 65 (65 in supine, 72 in sitting)  extn -12   (at eval)  Strength:          R knee grossly 4= to 5/5. L knee not tested   (at eval)  Neurological Screen:        Sensation: dulled light touch L LE - but has been that way prior to L TKA   (at eval)    Body Structures Involved:  1. Bones  2. Joints  3. Muscles Body Functions Affected:  1. Neuromusculoskeletal  2. Movement Related Activities and Participation Affected:  1. Self Care  2. Domestic Life  3.  Community, Social and Civic Life   CLINICAL DECISION MAKING:   Outcome Measure: Tool Used: Lower Extremity Functional Scale (LEFS)  Score:  Initial: 14/80 Most Recent: X/80 (Date: -- )   Interpretation of Score: 20 questions each scored on a 5 point scale with 0 representing \"extreme difficulty or unable to perform\" and 4 representing \"no difficulty\". The lower the score, the greater the functional disability. 80/80 represents no disability. Minimal detectable change is 9 points. Score 80 79-63 62-48 47-32 31-16 15-1 0   Modifier CH CI CJ CK CL CM CN     ? Mobility - Walking and Moving Around:     - CURRENT STATUS: CM - 80%-99% impaired, limited or restricted    - GOAL STATUS: CJ - 20%-39% impaired, limited or restricted    - D/C STATUS:  ---------------To be determined---------------    Medical Necessity:   · Patient demonstrates fair rehab potential due to higher previous functional level. Reason for Services/Other Comments:  · Patient continues to require skilled intervention due to need to regain functional mobility. TREATMENT:   (In addition to Assessment/Re-Assessment sessions the following treatments were rendered)  Pre-treatment Symptoms/Complaints:  Patient reports she continues to do HEP from home health. Feels like her knee is loosening up a little bit. Pain: Initial:       Post Session:  Pain noted that pain had decreased by end of session. Therapeutic Exercise: ( ): none today  Manual Therapy ( 25 minutes) - grade 2 to 4- physio mobs L knee in supine, and flexion in sitting. Patient declined cryo at end of session. Treatment/Session Assessment:    · Response to Treatment:  Patient is very stiff in L knee. · Compliance with Program/Exercises: yes per patient  · Recommendations/Intent for next treatment session: \"Next visit will focus on ROM\".   Total Treatment Duration:  25 minutes  PT Patient Time In/Time Out  Time In: 1033  Time Out: Keron Farnsworth

## 2017-04-12 ENCOUNTER — HOME HEALTH ADMISSION (OUTPATIENT)
Dept: HOME HEALTH SERVICES | Facility: HOME HEALTH | Age: 72
End: 2017-04-12

## 2017-04-12 ENCOUNTER — HOSPITAL ENCOUNTER (OUTPATIENT)
Dept: PHYSICAL THERAPY | Age: 72
Discharge: HOME | End: 2017-04-12
Payer: MEDICARE

## 2017-04-12 PROCEDURE — 97140 MANUAL THERAPY 1/> REGIONS: CPT

## 2017-04-12 NOTE — PROGRESS NOTES
Kimberly Cha  : 1945 [facility] at [facility] CECIL EDWARDS  [address], [address], [place].  [phone]   [phone]         OUTPATIENT PHYSICAL THERAPY:Daily Note 2017    ICD-10: Treatment Diagnosis: Pain in left knee (M25.562), Presence of left artificial knee joint (X87.663)  Precautions/Allergies: allergic to Catapres, Celebrex, Cymbalta, Morphine, Oxycontin, Sulfa and shellfish products  Fall Risk Score: 11 (? 5 = High Risk)  MD Orders: Eval and treat MEDICAL/REFERRING DIAGNOSIS:   L TKA  DATE OF ONSET: 3/14/17  REFERRING PHYSICIAN: Ignacio Peña MD  RETURN PHYSICIAN APPOINTMENT: 17     INITIAL ASSESSMENT:  Ms. Taylor Bautista is a 80year old female s/p L TKA on 3/14/17. She pressents with continued pain in L knee, antalgic gait with walker, decreased ROM and strength in L LE and decreased functional mobility. She could benefit from PT to address deficits and work toward goals. PROBLEM LIST (Impacting functional limitations):  1. Decreased Strength  2. Decreased ADL/Functional Activities  3. Increased Pain  4. Decreased Flexibility/Joint Mobility INTERVENTIONS PLANNED:  1. Balance Exercise  2. Home Exercise Program (HEP)  3. Manual Therapy  4. Range of Motion (ROM)  5. Therapeutic Exercise/Strengthening   TREATMENT PLAN:  Effective Dates: 17 TO 7/3/17. Frequency/Duration: 2-3 times a week for 90 days  GOALS: (Goals have been discussed and agreed upon with patient.)  Patient's stated goal is to walk without pain or assistive device  Short-Term Functional Goals: Time Frame: 6 weeks  1. Patient to be independent with HEP  2. Patient to increase AROM L knee to 5-90 degrees for improved functional movement  3. Patient to ambulate without assistive device within her house  4. Patient to report decrease in L knee pain to 3/10 at rest  Discharge Goals: Time Frame: 90 days  1.  Patient to ambulate community distances without assistive device  2. Patient to report no more than minimal L knee pain with activity  3. Patient to improve LEFS score to 50 to demo improved functional movement  Rehabilitation Potential For Stated Goals: [address], PT                  The information in this section was collected on 4/4/17 (except where otherwise noted). HISTORY:   History of Present Injury/Illness (Reason for Referral):  Patient reports gradual increase in L knee pain over past 5 years. She had L TKA on 3/14/17, left hospital 3 days later and then had home health PT. She returned to [place] on 3/29/17 and was referred to PT. Past Medical History/Comorbidities:  Acute pancreatitis, aneurysm, anxiety, CAD, cellulitis, chronic pain, kidney disease, DDD, depression, DM II, Dyspnea, Fatty liver, fibromyalgia, GERD, Heart failure, HTN, hypthyroidism, herniated lumbar disc, Mitral valve regurgitation, neuropathy,  Obesity, OA, panic attacks, hysterectomy, L knee score, back surgery,cardia cath. Social History/Living Environment:     lives with . Babysits grandchildren 3 days/wk  Prior Level of Function/Work/Activity:  Retired. Current Medications:  Patient forgot to bring in list  Date Last Reviewed:  4/12/17   EXAMINATION:   Observation/Orthostatic Postural Assessment:          No new    Palpation:         Tender L knee in general.   ROM:          AROM R knee flex 105, extn -4   (at eval)        AROM L knee flex 65 (65 in supine, 72 in sitting)  extn -12   (at eval)  Strength:          R knee grossly 4= to 5/5. L knee not tested   (at eval)  Neurological Screen:        Sensation: dulled light touch L LE - but has been that way prior to L TKA   (at eval)    Body Structures Involved:  1. Bones  2. Joints  3. Muscles Body Functions Affected:  1. Neuromusculoskeletal  2. Movement Related Activities and Participation Affected:  1. Self Care  2. Domestic Life  3.  Community, Social and Civic Life   CLINICAL DECISION MAKING:   Outcome Measure: Tool Used: Lower Extremity Functional Scale (LEFS)  Score:  Initial: 14/80 Most Recent: X/80 (Date: -- )   Interpretation of Score: 20 questions each scored on a 5 point scale with 0 representing \"extreme difficulty or unable to perform\" and 4 representing \"no difficulty\". The lower the score, the greater the functional disability. 80/80 represents no disability. Minimal detectable change is 9 points. Score 80 79-63 62-48 47-32 31-16 15-1 0   Modifier CH CI CJ CK CL CM CN     ? Mobility - Walking and Moving Around:     - CURRENT STATUS: CM - 80%-99% impaired, limited or restricted    - GOAL STATUS: CJ - 20%-39% impaired, limited or restricted    - D/C STATUS:  ---------------To be determined---------------    Medical Necessity:   · Patient demonstrates fair rehab potential due to higher previous functional level. Reason for Services/Other Comments:  · Patient continues to require skilled intervention due to need to regain functional mobility. TREATMENT:   (In addition to Assessment/Re-Assessment sessions the following treatments were rendered)  Pre-treatment Symptoms/Complaints:  Patient reports she went out to lunch and walked a lot today. All the steri strips have come off. Pain: Initial:   Pain Intensity 1: 6   Post Session:  No change in pain levels reported at end of session. .      Therapeutic Exercise: ( ): none today  Manual Therapy (30 minutes) - grade 2 to 4- physio mobs L knee extn in supine, and flexion in sitting. Grade 2 to 3- patellar glides in all directions. Patient took ice with her at end of session. Treatment/Session Assessment:    · Response to Treatment:  Patient's L knee ROM appears to be improving. Some pain at end range flexion. · Compliance with Program/Exercises: yes per patient  · Recommendations/Intent for next treatment session: \"Next visit will focus on ROM\".   Total Treatment Duration:  30 minutes  PT Patient Time In/Time Out  Time In: 2101  Time Out: 7[address]

## 2017-04-20 ENCOUNTER — HOSPITAL ENCOUNTER (OUTPATIENT)
Dept: PHYSICAL THERAPY | Age: 72
Discharge: HOME | End: 2017-04-20
Payer: MEDICARE

## 2017-04-20 PROCEDURE — 97140 MANUAL THERAPY 1/> REGIONS: CPT

## 2017-04-20 NOTE — PROGRESS NOTES
Anjelica Lockett  : 1945 [facility] CECIL EDWARDS  [address] [place] [address], [address], [address] [facility]  [phone]   [phone]         OUTPATIENT PHYSICAL THERAPY:Daily Note 2017    ICD-10: Treatment Diagnosis: Pain in left knee (M25.562), Presence of left artificial knee joint (E65.075)  Precautions/Allergies: allergic to Catapres, Celebrex, Cymbalta, Morphine, Oxycontin, Sulfa and shellfish products  Fall Risk Score: 11 (? 5 = High Risk)  MD Orders: Eval and treat MEDICAL/REFERRING DIAGNOSIS:   L TKA  DATE OF ONSET: 3/14/17  REFERRING PHYSICIAN: Charlene Barker MD  RETURN PHYSICIAN APPOINTMENT: 17     INITIAL ASSESSMENT:  Ms. Salty Cheek is a 80year old female s/p L TKA on 3/14/17. She pressents with continued pain in L knee, antalgic gait with walker, decreased ROM and strength in L LE and decreased functional mobility. She could benefit from PT to address deficits and work toward goals. PROBLEM LIST (Impacting functional limitations):  1. Decreased Strength  2. Decreased ADL/Functional Activities  3. Increased Pain  4. Decreased Flexibility/Joint Mobility INTERVENTIONS PLANNED:  1. Balance Exercise  2. Home Exercise Program (HEP)  3. Manual Therapy  4. Range of Motion (ROM)  5. Therapeutic Exercise/Strengthening   TREATMENT PLAN:  Effective Dates: 17 TO 7/3/17. Frequency/Duration: 2-3 times a week for 90 days  GOALS: (Goals have been discussed and agreed upon with patient.)  Patient's stated goal is to walk without pain or assistive device  Short-Term Functional Goals: Time Frame: 6 weeks  1. Patient to be independent with HEP  2. Patient to increase AROM L knee to 5-90 degrees for improved functional movement  3. Patient to ambulate without assistive device within her house  4. Patient to report decrease in L knee pain to 3/10 at rest  Discharge Goals: Time Frame: 90 days  1.  Patient to ambulate community distances without assistive device  2. Patient to report no more than minimal L knee pain with activity  3. Patient to improve LEFS score to 50 to demo improved functional movement  Rehabilitation Potential For Stated Goals: [address], PT                  The information in this section was collected on 4/4/17 (except where otherwise noted). HISTORY:   History of Present Injury/Illness (Reason for Referral):  Patient reports gradual increase in L knee pain over past 5 years. She had L TKA on 3/14/17, left hospital 3 days later and then had home health PT. She returned to MD on 3/29/17 and was referred to PT. Past Medical History/Comorbidities:  Acute pancreatitis, aneurysm, anxiety, CAD, cellulitis, chronic pain, kidney disease, DDD, depression, DM II, Dyspnea, Fatty liver, fibromyalgia, GERD, Heart failure, HTN, hypthyroidism, herniated lumbar disc, Mitral valve regurgitation, neuropathy,  Obesity, OA, panic attacks, hysterectomy, L knee score, back surgery,cardia cath. Social History/Living Environment:     lives with . Babysits grandchildren 3 days/wk  Prior Level of Function/Work/Activity:  Retired. Current Medications:  Patient forgot to bring in list  Date Last Reviewed:  4/12/17   EXAMINATION:   Observation/Orthostatic Postural Assessment:          No new    Palpation:         Tender L knee in general.   ROM:          AROM R knee flex 105, extn -4   (at eval)        AROM L knee flex 65 (65 in supine, 72 in sitting)  extn -12   (at eval)  Strength:          R knee grossly 4= to 5/5. L knee not tested   (at eval)  Neurological Screen:        Sensation: dulled light touch L LE - but has been that way prior to L TKA   (at eval)    Body Structures Involved:  1. Bones  2. Joints  3. Muscles Body Functions Affected:  1. Neuromusculoskeletal  2. Movement Related Activities and Participation Affected:  1. Self Care  2. Domestic Life  3.  Community, Social and Civic Life   CLINICAL DECISION MAKING:   Outcome Measure: Tool Used: Lower Extremity Functional Scale (LEFS)  Score:  Initial: 14/80 Most Recent: X/80 (Date: -- )   Interpretation of Score: 20 questions each scored on a 5 point scale with 0 representing \"extreme difficulty or unable to perform\" and 4 representing \"no difficulty\". The lower the score, the greater the functional disability. 80/80 represents no disability. Minimal detectable change is 9 points. Score 80 79-63 62-48 47-32 31-16 15-1 0   Modifier CH CI CJ CK CL CM CN     ? Mobility - Walking and Moving Around:     - CURRENT STATUS: CM - 80%-99% impaired, limited or restricted    - GOAL STATUS: CJ - 20%-39% impaired, limited or restricted    - D/C STATUS:  ---------------To be determined---------------    Medical Necessity:   · Patient demonstrates fair rehab potential due to higher previous functional level. Reason for Services/Other Comments:  · Patient continues to require skilled intervention due to need to regain functional mobility. TREATMENT:   (In addition to Assessment/Re-Assessment sessions the following treatments were rendered)  Pre-treatment Symptoms/Complaints:  Patient reports she has been working her knee very hard. She missed Tuesday due to an appointment with another doctor. Sore spots are at the proximal and distal ends of the incision, but the middle part of the incision is okay. Also gets shooting pains in the sides of the knee from time to timel     Pain: Initial:   Pain Intensity 1: 5   Post Session:  Patient reported that there was no knee pain at end of session. Therapeutic Exercise: ( ): none today  Manual Therapy (30 minutes) - grade 2 to 4- physio mobs L knee extn in supine, and flexion in sitting. Grade 2 to 3- patellar glides in all directions. Patient declined ice today. Treatment/Session Assessment:    · Response to Treatment:  Patient's L knee ROM appears to be improving. Decreased pain today and knee appeared less swollen. · Compliance with Program/Exercises: yes per patient  · Recommendations/Intent for next treatment session: \"Next visit will focus on ROM\".   Total Treatment Duration:  30 minutes  PT Patient Time In/Time Out  Time In: 0947  Time Out: Jah Cedeno

## 2017-04-25 ENCOUNTER — HOSPITAL ENCOUNTER (OUTPATIENT)
Dept: PHYSICAL THERAPY | Age: 72
Discharge: HOME | End: 2017-04-25
Payer: MEDICARE

## 2017-04-25 PROCEDURE — 97140 MANUAL THERAPY 1/> REGIONS: CPT

## 2017-04-25 NOTE — PROGRESS NOTES
Yamileth Sharmin  : 1945 [facility] CECIL EDWARDS  [address] [facility], [address], [place].  [phone]   [phone]         OUTPATIENT PHYSICAL THERAPY:Daily Note 2017    ICD-10: Treatment Diagnosis: Pain in left knee (M25.562), Presence of left artificial knee joint (M22.964)  Precautions/Allergies: allergic to Catapres, Celebrex, Cymbalta, Morphine, Oxycontin, Sulfa and shellfish products  Fall Risk Score: 11 (? 5 = High Risk)  MD Orders: Eval and treat MEDICAL/REFERRING DIAGNOSIS:   L TKA  DATE OF ONSET: 3/14/17  REFERRING PHYSICIAN: Lis Rdz MD  RETURN PHYSICIAN APPOINTMENT: 17     INITIAL ASSESSMENT:  Ms. Eliz Munoz is a 80year old female s/p L TKA on 3/14/17. She pressents with continued pain in L knee, antalgic gait with walker, decreased ROM and strength in L LE and decreased functional mobility. She could benefit from PT to address deficits and work toward goals. PROBLEM LIST (Impacting functional limitations):  1. Decreased Strength  2. Decreased ADL/Functional Activities  3. Increased Pain  4. Decreased Flexibility/Joint Mobility INTERVENTIONS PLANNED:  1. Balance Exercise  2. Home Exercise Program (HEP)  3. Manual Therapy  4. Range of Motion (ROM)  5. Therapeutic Exercise/Strengthening   TREATMENT PLAN:  Effective Dates: 17 TO 7/3/17. Frequency/Duration: 2-3 times a week for 90 days  GOALS: (Goals have been discussed and agreed upon with patient.)  Patient's stated goal is to walk without pain or assistive device  Short-Term Functional Goals: Time Frame: 6 weeks  1. Patient to be independent with HEP  2. Patient to increase AROM L knee to 5-90 degrees for improved functional movement  3. Patient to ambulate without assistive device within her house  4. Patient to report decrease in L knee pain to 3/10 at rest  Discharge Goals: Time Frame: 90 days  1.  Patient to ambulate community distances without assistive device  2. Patient to report no more than minimal L knee pain with activity  3. Patient to improve LEFS score to 50 to demo improved functional movement  Rehabilitation Potential For Stated Goals: [address], PT                  The information in this section was collected on 4/4/17 (except where otherwise noted). HISTORY:   History of Present Injury/Illness (Reason for Referral):  Patient reports gradual increase in L knee pain over past 5 years. She had L TKA on 3/14/17, left hospital 3 days later and then had home health PT. She returned to MD on 3/29/17 and was referred to PT. Past Medical History/Comorbidities:  Acute pancreatitis, aneurysm, anxiety, CAD, cellulitis, chronic pain, kidney disease, DDD, depression, DM II, Dyspnea, Fatty liver, fibromyalgia, GERD, Heart failure, HTN, hypthyroidism, herniated lumbar disc, Mitral valve regurgitation, neuropathy,  Obesity, OA, panic attacks, hysterectomy, L knee score, back surgery,cardia cath. Social History/Living Environment:     lives with . Babysits grandchildren 3 days/wk  Prior Level of Function/Work/Activity:  Retired. Current Medications:  Patient again forgot to bring in list of meds. Date Last Reviewed:  4/25/17   EXAMINATION:   Observation/Orthostatic Postural Assessment:          No new    Palpation:         Tender L knee in general.   ROM:          AROM R knee flex 105, extn -4   (at eval)        AROM L knee flex 65 (65 in supine, 72 in sitting)  extn -12   (at eval)        AROM L knee flex 95 (95 in supine, 90 in sitting)  extn -6   (4/25/17)   Strength:          R knee grossly 4= to 5/5. L knee not tested   (at eval)  Neurological Screen:        Sensation: dulled light touch L LE - but has been that way prior to L TKA   (at eval)    Body Structures Involved:  1. Bones  2. Joints  3. Muscles Body Functions Affected:  1. Neuromusculoskeletal  2. Movement Related Activities and Participation Affected:  1.  Self Care  2. Domestic Life  3. Community, Social and Civic Life   CLINICAL DECISION MAKING:   Outcome Measure: Tool Used: Lower Extremity Functional Scale (LEFS)  Score:  Initial: 14/80 Most Recent: X/80 (Date: -- )   Interpretation of Score: 20 questions each scored on a 5 point scale with 0 representing \"extreme difficulty or unable to perform\" and 4 representing \"no difficulty\". The lower the score, the greater the functional disability. 80/80 represents no disability. Minimal detectable change is 9 points. Score 80 79-63 62-48 47-32 31-16 15-1 0   Modifier CH CI CJ CK CL CM CN     ? Mobility - Walking and Moving Around:     - CURRENT STATUS: CM - 80%-99% impaired, limited or restricted    - GOAL STATUS: CJ - 20%-39% impaired, limited or restricted    - D/C STATUS:  ---------------To be determined---------------    Medical Necessity:   · Patient demonstrates fair rehab potential due to higher previous functional level. Reason for Services/Other Comments:  · Patient continues to require skilled intervention due to need to regain functional mobility. TREATMENT:   (In addition to Assessment/Re-Assessment sessions the following treatments were rendered)  Pre-treatment Symptoms/Complaints:  Patient reports she has been working her knee very hard. She returns to MD tomorrow. Pain: Initial:   Pain Intensity 1: 4   Post Session:  Patient did not report any change in pain at end of session. Therapeutic Exercise: ( ): none today  Manual Therapy (30 minutes) - grade 2 to 4- physio mobs L knee extn in supine, and flexion in sitting. Grade 2 to 3- patellar glides in all directions. Modals: None     Treatment/Session Assessment:    · Response to Treatment:  Patient's L knee flexion and extn are both improved since initial evaluation. · Compliance with Program/Exercises: yes per patient  · Recommendations/Intent for next treatment session: \"Next visit will focus on ROM\".   Total Treatment Duration:  30 minutes  PT Patient Time In/Time Out  Time In: 1117  Time Out: [address] [facility] [address] Patrice Barthel

## 2017-04-25 NOTE — PROGRESS NOTES
Gena Dewey  : 1945 [facility] CECIL EDWARDS  [address] [facility], [address] [place] [address],[address], [place] [address].  [phone]   [phone]         OUTPATIENT PHYSICAL THERAPY:MD Note 2017    ICD-10: Treatment Diagnosis: Pain in left knee (M25.562), Presence of left artificial knee joint (R61.350)  Precautions/Allergies: allergic to Catapres, Celebrex, Cymbalta, Morphine, Oxycontin, Sulfa and shellfish products  Patient has attended 5 PT session from 17 to 17 with one missed session. Fall Risk Score: 11 (? 5 = High Risk)  MD Orders: Eval and treat MEDICAL/REFERRING DIAGNOSIS:   L TKA  DATE OF ONSET: 3/14/17  REFERRING PHYSICIAN: Diego Yan MD  RETURN PHYSICIAN APPOINTMENT: 17     ASSESSMENT:  Ms. Dominick Franklin is a 80year old female s/p L TKA on 3/14/17. She pressented with continued pain in L knee, antalgic gait with walker, decreased ROM and strength in L LE and decreased functional mobility. She had increased ROM for both flexion and extn and she now ambulates with cane instead of walker.       ROM:          AROM R knee flex 105, extn -4   (at eval)        AROM L knee flex 65 (65 in supine, 72 in sitting)  extn -12   (at eval)        AROM L knee flex 95 (95 in supine, 90 in sitting)  extn -6   (17)     Thank you for this referral,      Mary Ann Lamb, PT

## 2017-04-27 ENCOUNTER — HOSPITAL ENCOUNTER (OUTPATIENT)
Dept: PHYSICAL THERAPY | Age: 72
Discharge: HOME | End: 2017-04-27
Payer: MEDICARE

## 2017-04-27 PROCEDURE — 97140 MANUAL THERAPY 1/> REGIONS: CPT

## 2017-04-27 NOTE — PROGRESS NOTES
Yamileth Sharmin  : 1945 [facility] CECIL EDWARDS  [address] [facility], [address], [facility] [place].  [phone]   [phone]         OUTPATIENT PHYSICAL THERAPY:Daily Note 2017    ICD-10: Treatment Diagnosis: Pain in left knee (M25.562), Presence of left artificial knee joint (C41.623)  Precautions/Allergies: allergic to Catapres, Celebrex, Cymbalta, Morphine, Oxycontin, Sulfa and shellfish products  Fall Risk Score: 11 (? 5 = High Risk)  MD Orders: Eval and treat MEDICAL/REFERRING DIAGNOSIS:   L TKA  DATE OF ONSET: 3/14/17  REFERRING PHYSICIAN: Lis Rdz MD  RETURN PHYSICIAN APPOINTMENT: 5/10/17     INITIAL ASSESSMENT:  Ms. Eliz Munoz is a 80year old female s/p L TKA on 3/14/17. She pressents with continued pain in L knee, antalgic gait with walker, decreased ROM and strength in L LE and decreased functional mobility. She could benefit from PT to address deficits and work toward goals. PROBLEM LIST (Impacting functional limitations):  1. Decreased Strength  2. Decreased ADL/Functional Activities  3. Increased Pain  4. Decreased Flexibility/Joint Mobility INTERVENTIONS PLANNED:  1. Balance Exercise  2. Home Exercise Program (HEP)  3. Manual Therapy  4. Range of Motion (ROM)  5. Therapeutic Exercise/Strengthening   TREATMENT PLAN:  Effective Dates: 17 TO 7/3/17. Frequency/Duration: 2-3 times a week for 90 days  GOALS: (Goals have been discussed and agreed upon with patient.)  Patient's stated goal is to walk without pain or assistive device  Short-Term Functional Goals: Time Frame: 6 weeks  1. Patient to be independent with HEP  2. Patient to increase AROM L knee to 5-90 degrees for improved functional movement  3. Patient to ambulate without assistive device within her house  4. Patient to report decrease in L knee pain to 3/10 at rest  Discharge Goals: Time Frame: 90 days  1.  Patient to ambulate community distances without assistive device  2. Patient to report no more than minimal L knee pain with activity  3. Patient to improve LEFS score to 50 to demo improved functional movement  Rehabilitation Potential For Stated Goals: [address], PT                  The information in this section was collected on 4/4/17 (except where otherwise noted). HISTORY:   History of Present Injury/Illness (Reason for Referral):  Patient reports gradual increase in L knee pain over past 5 years. She had L TKA on 3/14/17, left hospital 3 days later and then had home health PT. She returned to MD on 3/29/17 and was referred to PT. Past Medical History/Comorbidities:  Acute pancreatitis, aneurysm, anxiety, CAD, cellulitis, chronic pain, kidney disease, DDD, depression, DM II, Dyspnea, Fatty liver, fibromyalgia, GERD, Heart failure, HTN, hypthyroidism, herniated lumbar disc, Mitral valve regurgitation, neuropathy,  Obesity, OA, panic attacks, hysterectomy, L knee score, back surgery,cardia cath. Social History/Living Environment:     lives with . Babysits grandchildren 3 days/wk  Prior Level of Function/Work/Activity:  Retired. Current Medications:  Cetirizine, Fioricet, Voltaren, D2, Lexapro, Vytorin, Ferrous sulfate, Flonase, Neurontin, Glucotrol XL, Avapro, Synthroid, Claritin, Ativan, Mag-ox, Zaroxolyn, Toprol, Singulair, Bactroban, Oxycodone, Protonix, Actos, Prandin, cyclosporine, Imitrex, Restoril,   Date Last Reviewed:  4/27/17   EXAMINATION:   Observation/Orthostatic Postural Assessment:          No new    Palpation:         Tender L knee in general.   ROM:          AROM R knee flex 105, extn -4   (at eval)        AROM L knee flex 65 (65 in supine, 72 in sitting)  extn -12   (at eval)        AROM L knee flex 95 (95 in supine, 90 in sitting)  extn -6   (4/25/17)   Strength:          R knee grossly 4= to 5/5.   L knee not tested   (at eval)  Neurological Screen:        Sensation: dulled light touch L LE - but has been that way prior to L TKA   (at eval)    Body Structures Involved:  1. Bones  2. Joints  3. Muscles Body Functions Affected:  1. Neuromusculoskeletal  2. Movement Related Activities and Participation Affected:  1. Self Care  2. Domestic Life  3. Community, Social and Civic Life   CLINICAL DECISION MAKING:   Outcome Measure: Tool Used: Lower Extremity Functional Scale (LEFS)  Score:  Initial: 14/80 Most Recent: X/80 (Date: -- )   Interpretation of Score: 20 questions each scored on a 5 point scale with 0 representing \"extreme difficulty or unable to perform\" and 4 representing \"no difficulty\". The lower the score, the greater the functional disability. 80/80 represents no disability. Minimal detectable change is 9 points. Score 80 79-63 62-48 47-32 31-16 15-1 0   Modifier CH CI CJ CK CL CM CN     ? Mobility - Walking and Moving Around:     - CURRENT STATUS: CM - 80%-99% impaired, limited or restricted    - GOAL STATUS: CJ - 20%-39% impaired, limited or restricted    - D/C STATUS:  ---------------To be determined---------------    Medical Necessity:   · Patient demonstrates fair rehab potential due to higher previous functional level. Reason for Services/Other Comments:  · Patient continues to require skilled intervention due to need to regain functional mobility. TREATMENT:   (In addition to Assessment/Re-Assessment sessions the following treatments were rendered)  Pre-treatment Symptoms/Complaints:  Patient reports she has been working her knee very hard and it is sore today. She returned to surgeon yesterday and he told her she needed to increase the motion or he would have to go back in and remove scar tissue - she does not want that. Reports that he wants her to bend 95 degrees in sitting and be able to straighten her leg all the way. She got new script but forgot to bring it so she will bring it next time.  She did remember to bring her medication list.   Pain: Initial:   Pain Intensity 1: 7 Post Session:  Patient did not report any change in pain at end of session. Therapeutic Exercise: ( ): none today  Manual Therapy (30 minutes) - grade 2 to 4- physio mobs L knee extn in supine, and flexion in sitting. Grade 2 to 3- patellar glides in all directions. Modals: None     Treatment/Session Assessment:    · Response to Treatment:  Patient tolerated manual therapy well. She is bothered by the fact that MD thought her ROM was not good enough. · Compliance with Program/Exercises: yes per patient  · Recommendations/Intent for next treatment session: \"Next visit will focus on ROM\".   Total Treatment Duration:  30 minutes  PT Patient Time In/Time Out  Time In: 0923  Time Out: [address] [facility]

## 2017-05-02 ENCOUNTER — HOSPITAL ENCOUNTER (OUTPATIENT)
Dept: PHYSICAL THERAPY | Age: 72
Discharge: HOME | End: 2017-05-02
Payer: MEDICARE

## 2017-05-02 PROCEDURE — 97140 MANUAL THERAPY 1/> REGIONS: CPT

## 2017-05-02 NOTE — PROGRESS NOTES
Ezequiel Fernandes  : 1945 [facility] at [facility] CECIL EDWARDS  [facility], [address], [address] [facility]  [phone]   [phone]         OUTPATIENT PHYSICAL THERAPY:Daily Note 2017    ICD-10: Treatment Diagnosis: Pain in left knee (M25.562), Presence of left artificial knee joint (T71.791)  Precautions/Allergies: allergic to Catapres, Celebrex, Cymbalta, Morphine, Oxycontin, Sulfa and shellfish products  Fall Risk Score: 11 (? 5 = High Risk)  MD Orders: Eval and treat MEDICAL/REFERRING DIAGNOSIS:   L TKA  DATE OF ONSET: 3/14/17  REFERRING PHYSICIAN: Ghanshyam Medrano MD  RETURN PHYSICIAN APPOINTMENT: 5/10/17     INITIAL ASSESSMENT:  Ms. Florinda Zamarripa is a 80year old female s/p L TKA on 3/14/17. She pressents with continued pain in L knee, antalgic gait with walker, decreased ROM and strength in L LE and decreased functional mobility. She could benefit from PT to address deficits and work toward goals. PROBLEM LIST (Impacting functional limitations):  1. Decreased Strength  2. Decreased ADL/Functional Activities  3. Increased Pain  4. Decreased Flexibility/Joint Mobility INTERVENTIONS PLANNED:  1. Balance Exercise  2. Home Exercise Program (HEP)  3. Manual Therapy  4. Range of Motion (ROM)  5. Therapeutic Exercise/Strengthening   TREATMENT PLAN:  Effective Dates: 17 TO 7/3/17. Frequency/Duration: 2-3 times a week for 90 days  GOALS: (Goals have been discussed and agreed upon with patient.)  Patient's stated goal is to walk without pain or assistive device  Short-Term Functional Goals: Time Frame: 6 weeks  1. Patient to be independent with HEP  2. Patient to increase AROM L knee to 5-90 degrees for improved functional movement  3. Patient to ambulate without assistive device within her house  4. Patient to report decrease in L knee pain to 3/10 at rest  Discharge Goals: Time Frame: 90 days  1. Patient to ambulate community distances without assistive device  2.  Patient to report no more than minimal L knee pain with activity  3. Patient to improve LEFS score to 50 to demo improved functional movement  Rehabilitation Potential For Stated Goals: [address], PT                  The information in this section was collected on 4/4/17 (except where otherwise noted). HISTORY:   History of Present Injury/Illness (Reason for Referral):  Patient reports gradual increase in L knee pain over past 5 years. She had L TKA on 3/14/17, left hospital 3 days later and then had home health PT. She returned to MD on 3/29/17 and was referred to PT. Past Medical History/Comorbidities:  Acute pancreatitis, aneurysm, anxiety, CAD, cellulitis, chronic pain, kidney disease, DDD, depression, DM II, Dyspnea, Fatty liver, fibromyalgia, GERD, Heart failure, HTN, hypthyroidism, herniated lumbar disc, Mitral valve regurgitation, neuropathy,  Obesity, OA, panic attacks, hysterectomy, L knee score, back surgery,cardia cath. Social History/Living Environment:     lives with . Babysits grandchildren 3 days/wk  Prior Level of Function/Work/Activity:  Retired. Current Medications:  Cetirizine, Fioricet, Voltaren, D2, Lexapro, Vytorin, Ferrous sulfate, Flonase, Neurontin, Glucotrol XL, Avapro, Synthroid, Claritin, Ativan, Mag-ox, Zaroxolyn, Toprol, Singulair, Bactroban, Oxycodone, Protonix, Actos, Prandin, cyclosporine, Imitrex, Restoril,   Date Last Reviewed:  5/2/17   EXAMINATION:   Observation/Orthostatic Postural Assessment:          No new    Palpation:         Tender L knee in general.   ROM:          AROM R knee flex 105, extn -4   (at eval)        AROM L knee flex 65 (65 in supine, 72 in sitting)  extn -12   (at eval)        AROM L knee flex 95 (95 in supine, 90 in sitting)  extn -6   (4/25/17)         AROM L knee flex 95 ( 95 in sitting)      (5/2/17)     Strength:          R knee grossly 4= to 5/5.   L knee not tested   (at eval)  Neurological Screen:        Sensation: dulled light touch L LE - but has been that way prior to L TKA   (at eval)    Body Structures Involved:  1. Bones  2. Joints  3. Muscles Body Functions Affected:  1. Neuromusculoskeletal  2. Movement Related Activities and Participation Affected:  1. Self Care  2. Domestic Life  3. Community, Social and Civic Life   CLINICAL DECISION MAKING:   Outcome Measure: Tool Used: Lower Extremity Functional Scale (LEFS)  Score:  Initial: 14/80 Most Recent: X/80 (Date: -- )   Interpretation of Score: 20 questions each scored on a 5 point scale with 0 representing \"extreme difficulty or unable to perform\" and 4 representing \"no difficulty\". The lower the score, the greater the functional disability. 80/80 represents no disability. Minimal detectable change is 9 points. Score 80 79-63 62-48 47-32 31-16 15-1 0   Modifier CH CI CJ CK CL CM CN     ? Mobility - Walking and Moving Around:     - CURRENT STATUS: CM - 80%-99% impaired, limited or restricted    - GOAL STATUS: CJ - 20%-39% impaired, limited or restricted    - D/C STATUS:  ---------------To be determined---------------    Medical Necessity:   · Patient demonstrates fair rehab potential due to higher previous functional level. Reason for Services/Other Comments:  · Patient continues to require skilled intervention due to need to regain functional mobility. TREATMENT:   (In addition to Assessment/Re-Assessment sessions the following treatments were rendered)  Pre-treatment Symptoms/Complaints:  Patient reports she has been more sore. She did go shopping for about 4 hours on Saturday and the pain flared up afterward. Pain: Initial:   Pain Intensity 1: 7 (\"6 or 7\")   Post Session:  Patient reported that pain decreased to 3 or 4 by end of session. Therapeutic Exercise: ( ): none today  Manual Therapy (25 minutes) - grade 2 to 4- physio mobs L knee extn in supine, and flexion in sitting. Grade 2 to 3- patellar glides in all directions.    Modals: patient took ice with her at end of session. Treatment/Session Assessment:    · Response to Treatment:  Patient tolerated manual therapy well. She is bothered by the fact that MD thought her ROM was not good enough. · Compliance with Program/Exercises: yes per patient  · Recommendations/Intent for next treatment session: \"Next visit will focus on ROM\".   Total Treatment Duration:  25 minutes  PT Patient Time In/Time Out  Time In: 1037  Time Out: [address] [address] Cuh Issa

## 2017-05-04 ENCOUNTER — HOSPITAL ENCOUNTER (OUTPATIENT)
Dept: PHYSICAL THERAPY | Age: 72
Discharge: HOME | End: 2017-05-04
Payer: MEDICARE

## 2017-05-04 PROCEDURE — 97140 MANUAL THERAPY 1/> REGIONS: CPT

## 2017-05-04 NOTE — PROGRESS NOTES
Lata Tolentino  : 1945 [facility] at [facility] CECIL EDWARDS  [address] [facility], [address], [place].  [phone]   [phone]         OUTPATIENT PHYSICAL THERAPY:Daily Note 2017    ICD-10: Treatment Diagnosis: Pain in left knee (M25.562), Presence of left artificial knee joint (U95.933)  Precautions/Allergies: allergic to Catapres, Celebrex, Cymbalta, Morphine, Oxycontin, Sulfa and shellfish products  Fall Risk Score: 11 (? 5 = High Risk)  MD Orders: Eval and treat MEDICAL/REFERRING DIAGNOSIS:   L TKA  DATE OF ONSET: 3/14/17  REFERRING PHYSICIAN: Cecilia Lr MD  RETURN PHYSICIAN APPOINTMENT: 5/10/17     INITIAL ASSESSMENT:  Ms. Hammad Kan is a 80year old female s/p L TKA on 3/14/17. She pressents with continued pain in L knee, antalgic gait with walker, decreased ROM and strength in L LE and decreased functional mobility. She could benefit from PT to address deficits and work toward goals. PROBLEM LIST (Impacting functional limitations):  1. Decreased Strength  2. Decreased ADL/Functional Activities  3. Increased Pain  4. Decreased Flexibility/Joint Mobility INTERVENTIONS PLANNED:  1. Balance Exercise  2. Home Exercise Program (HEP)  3. Manual Therapy  4. Range of Motion (ROM)  5. Therapeutic Exercise/Strengthening   TREATMENT PLAN:  Effective Dates: 17 TO 7/3/17. Frequency/Duration: 2-3 times a week for 90 days  GOALS: (Goals have been discussed and agreed upon with patient.)  Patient's stated goal is to walk without pain or assistive device  Short-Term Functional Goals: Time Frame: 6 weeks  1. Patient to be independent with HEP  2. Patient to increase AROM L knee to 5-90 degrees for improved functional movement  3. Patient to ambulate without assistive device within her house  4. Patient to report decrease in L knee pain to 3/10 at rest  Discharge Goals: Time Frame: 90 days  1. Patient to ambulate community distances without assistive device  2.  Patient to report no more than minimal L knee pain with activity  3. Patient to improve LEFS score to 50 to demo improved functional movement  Rehabilitation Potential For Stated Goals: [address], PT                  The information in this section was collected on 4/4/17 (except where otherwise noted). HISTORY:   History of Present Injury/Illness (Reason for Referral):  Patient reports gradual increase in L knee pain over past 5 years. She had L TKA on 3/14/17, left hospital 3 days later and then had home health PT. She returned to MD on 3/29/17 and was referred to PT. Past Medical History/Comorbidities:  Acute pancreatitis, aneurysm, anxiety, CAD, cellulitis, chronic pain, kidney disease, DDD, depression, DM II, Dyspnea, Fatty liver, fibromyalgia, GERD, Heart failure, HTN, hypthyroidism, herniated lumbar disc, Mitral valve regurgitation, neuropathy,  Obesity, OA, panic attacks, hysterectomy, L knee score, back surgery,cardia cath. Social History/Living Environment:     lives with . Babysits grandchildren 3 days/wk  Prior Level of Function/Work/Activity:  Retired. Current Medications:  Cetirizine, Fioricet, Voltaren, D2, Lexapro, Vytorin, Ferrous sulfate, Flonase, Neurontin, Glucotrol XL, Avapro, Synthroid, Claritin, Ativan, Mag-ox, Zaroxolyn, Toprol, Singulair, Bactroban, Oxycodone, Protonix, Actos, Prandin, cyclosporine, Imitrex, Restoril,   Date Last Reviewed:  5/2/17   EXAMINATION:   Observation/Orthostatic Postural Assessment:          No new    Palpation:         Tender L knee in general.   ROM:          AROM R knee flex 105, extn -4   (at eval)        AROM L knee flex 65 (65 in supine, 72 in sitting)  extn -12   (at eval)        AROM L knee flex 95 (95 in supine, 90 in sitting)  extn -6   (4/25/17)         AROM L knee flex 95 ( 95 in sitting)      (5/2/17)     Strength:          R knee grossly 4= to 5/5.   L knee not tested   (at eval)  Neurological Screen:        Sensation: dulled light touch L LE - but has been that way prior to L TKA   (at eval)    Body Structures Involved:  1. Bones  2. Joints  3. Muscles Body Functions Affected:  1. Neuromusculoskeletal  2. Movement Related Activities and Participation Affected:  1. Self Care  2. Domestic Life  3. Community, Social and Civic Life   CLINICAL DECISION MAKING:   Outcome Measure: Tool Used: Lower Extremity Functional Scale (LEFS)  Score:  Initial: 14/80 Most Recent: X/80 (Date: -- )   Interpretation of Score: 20 questions each scored on a 5 point scale with 0 representing \"extreme difficulty or unable to perform\" and 4 representing \"no difficulty\". The lower the score, the greater the functional disability. 80/80 represents no disability. Minimal detectable change is 9 points. Score 80 79-63 62-48 47-32 31-16 15-1 0   Modifier CH CI CJ CK CL CM CN     ? Mobility - Walking and Moving Around:     - CURRENT STATUS: CM - 80%-99% impaired, limited or restricted    - GOAL STATUS: CJ - 20%-39% impaired, limited or restricted    - D/C STATUS:  ---------------To be determined---------------    Medical Necessity:   · Patient demonstrates fair rehab potential due to higher previous functional level. Reason for Services/Other Comments:  · Patient continues to require skilled intervention due to need to regain functional mobility. TREATMENT:   (In addition to Assessment/Re-Assessment sessions the following treatments were rendered)  Pre-treatment Symptoms/Complaints:  Patient reports she feels like her ankle is swelling a little. Pain: Initial:   Pain Intensity 1: 6 (\"5 or 6 today\")   Post Session:  Patient did not report any increase in pain at end of session. Therapeutic Exercise: ( ): none today  Manual Therapy (25 minutes) - grade 2 to 4- physio mobs L knee extn in supine, and flexion in sitting and supine. Grade 2 to 3- patellar glides in all directions.    Modals: none    Treatment/Session Assessment: · Response to Treatment:  Patient tolerated manual therapy well. She has one more PT session before returning to MD again. · Compliance with Program/Exercises: yes per patient  · Recommendations/Intent for next treatment session: \"Next visit will focus on ROM\".   Total Treatment Duration:  30 minutes  PT Patient Time In/Time Out  Time In: 1300  Time Out: [address] Margarita Epstein

## 2017-05-09 ENCOUNTER — HOSPITAL ENCOUNTER (OUTPATIENT)
Dept: PHYSICAL THERAPY | Age: 72
Discharge: HOME | End: 2017-05-09
Payer: MEDICARE

## 2017-05-09 PROCEDURE — G8978 MOBILITY CURRENT STATUS: HCPCS

## 2017-05-09 PROCEDURE — 97140 MANUAL THERAPY 1/> REGIONS: CPT

## 2017-05-09 PROCEDURE — G8979 MOBILITY GOAL STATUS: HCPCS

## 2017-05-09 NOTE — PROGRESS NOTES
Radames Gorman  [facility] 1945 [facility]  [address], [address], [place] [address].  [phone]   [phone]         OUTPATIENT PHYSICAL THERAPY:Progress Report 2017    ICD-10: Treatment Diagnosis: Pain in left knee (M25.562), Presence of left artificial knee joint (B54.075)  Precautions/Allergies: allergic to Catapres, Celebrex, Cymbalta, Morphine, Oxycontin, Sulfa and shellfish products  PAtient has attended 9 PT sessions from 17 to 17 with 1 missed appointment  Fall Risk Score: 11 (? 5 = High Risk)  MD Orders: Eval and treat MEDICAL/REFERRING DIAGNOSIS:   L TKA  DATE OF ONSET: 3/14/17  REFERRING PHYSICIAN: Zev Tran MD  RETURN PHYSICIAN APPOINTMENT: 5/10/17     INITIAL ASSESSMENT:  Ms. Betty Arenas is a 80year old female s/p L TKA on 3/14/17. Her ROM has improved significantly since initial eval and she now ambulates without assistive device. LEFS score is improved, but not yet at goal level. She continues to have pain in L knee. She could benefit from continued PT to address deficits and work toward goals. PROBLEM LIST (Impacting functional limitations):  1. Decreased Strength  2. Decreased ADL/Functional Activities  3. Increased Pain  4. Decreased Flexibility/Joint Mobility INTERVENTIONS PLANNED:  1. Balance Exercise  2. Home Exercise Program (HEP)  3. Manual Therapy  4. Range of Motion (ROM)  5. Therapeutic Exercise/Strengthening   TREATMENT PLAN:  Effective Dates: 17 TO 7/3/17. Frequency/Duration: 2-3 times a week for 90 days  GOALS: (Goals have been discussed and agreed upon with patient.)  Patient's stated goal is to walk without pain or assistive device  Short-Term Functional Goals: Time Frame: 6 weeks  1. Patient to be independent with HEP - MET  2. Patient to increase AROM L knee to 5-90 degrees for improved functional movement - MET  3. Patient to ambulate without assistive device within her house - MET  4.  Patient to report decrease in L knee pain to 3/10 at rest - Not MET  Discharge Goals: Time Frame: 90 days - all in progress  1. Patient to ambulate community distances without assistive device - MET  2. Patient to report no more than minimal L knee pain with activity  3. Patient to improve LEFS score to 50 to demo improved functional movement  Rehabilitation Potential For Stated Goals: [address], PT                  The information in this section was collected on 4/4/17 (except where otherwise noted). HISTORY:   History of Present Injury/Illness (Reason for Referral):  Patient reports gradual increase in L knee pain over past 5 years. She had L TKA on 3/14/17, left hospital 3 days later and then had home health PT. She returned to MD on 3/29/17 and was referred to PT. Past Medical History/Comorbidities:  Acute pancreatitis, aneurysm, anxiety, CAD, cellulitis, chronic pain, kidney disease, DDD, depression, DM II, Dyspnea, Fatty liver, fibromyalgia, GERD, Heart failure, HTN, hypthyroidism, herniated lumbar disc, Mitral valve regurgitation, neuropathy,  Obesity, OA, panic attacks, hysterectomy, L knee score, back surgery,cardia cath. Social History/Living Environment:     lives with . Babysits grandchildren 3 days/wk  Prior Level of Function/Work/Activity:  Retired.    Current Medications:  Cetirizine, Fioricet, Voltaren, D2, Lexapro, Vytorin, Ferrous sulfate, Flonase, Neurontin, Glucotrol XL, Avapro, Synthroid, Claritin,Buspar(replaced Ativan 5/8/17), Mag-ox, Zaroxolyn, Toprol, Singulair, Bactroban, Oxycodone, Protonix, Actos, Prandin, cyclosporine, Imitrex, Restoril, Levemir (added 5/8/17)  Date Last Reviewed:  5/9/17   EXAMINATION:   Observation/Orthostatic Postural Assessment:          No new    Palpation:         Tender L knee in general.   ROM:          AROM R knee flex 105, extn -4   (at eval)        AROM L knee flex 65 (65 in supine, 72 in sitting)  extn -12   (at eval)        AROM L knee flex 95 (95 in supine, 90 in sitting)  extn -6   (4/25/17)         AROM L knee flex 95 ( 95 in sitting)      (5/2/17)            AROM L knee flex 98  AROM extn -4   (5/9/17)        PROM L knee flex 102      (5/9/17)          Strength:          R knee grossly 4= to 5/5. L knee not tested   (at eval)  Neurological Screen:        Sensation: dulled light touch L LE - but has been that way prior to L TKA   (at eval)    Body Structures Involved:  1. Bones  2. Joints  3. Muscles Body Functions Affected:  1. Neuromusculoskeletal  2. Movement Related Activities and Participation Affected:  1. Self Care  2. Domestic Life  3. Community, Social and Civic Life   CLINICAL DECISION MAKING:   Outcome Measure: Tool Used: Lower Extremity Functional Scale (LEFS)  Score:  Initial: 14/80 Most Recent: 35/80 (Date: 5/9/17 )   Interpretation of Score: 20 questions each scored on a 5 point scale with 0 representing \"extreme difficulty or unable to perform\" and 4 representing \"no difficulty\". The lower the score, the greater the functional disability. 80/80 represents no disability. Minimal detectable change is 9 points. Score 80 79-63 62-48 47-32 31-16 15-1 0   Modifier CH CI CJ CK CL CM CN     ? Mobility - Walking and Moving Around:     - CURRENT STATUS: CK - 40%-59% impaired, limited or restricted    - GOAL STATUS: CJ - 20%-39% impaired, limited or restricted    - D/C STATUS:  ---------------To be determined---------------    Medical Necessity:   · Patient demonstrates fair rehab potential due to higher previous functional level. Reason for Services/Other Comments:  · Patient continues to require skilled intervention due to need to regain functional mobility. TREATMENT:   (In addition to Assessment/Re-Assessment sessions the following treatments were rendered)  Pre-treatment Symptoms/Complaints:  Patient reports she returns to MD tomorrow and hopes to be released.   She feels her knee is doing much better. Pain: Initial:   Pain Intensity 1: 4   Post Session:  Patient did not report any increase in pain at end of session. Therapeutic Exercise: ( ): none today  Manual Therapy (40 minutes) - grade 2 to 4- physio mobs L knee extn in supine, and flexion in sitting . Grade 2 to 3- patellar glides in all directions. Modals: none    Treatment/Session Assessment:    · Response to Treatment:  Patient tolerated manual therapy well. See full assessment above. · Compliance with Program/Exercises: yes per patient  · Recommendations/Intent for next treatment session: \"Next visit will focus on ROM\".   Total Treatment Duration:  40 minutes  PT Patient Time In/Time Out  Time In: 3766  Time Out: [address] [facility]

## 2017-05-10 NOTE — PROGRESS NOTES
Dannie Rodriguez  : 1945 [facility] at [facility] CECIL EDWARDS  [address] [facility], [address] [place] [address],[address], [place] [address].  [phone]   [phone]         OUTPATIENT PHYSICAL THERAPY:Discharge 5/10/2017    ICD-10: Treatment Diagnosis: Pain in left knee (M25.562), Presence of left artificial knee joint (K61.229)  Precautions/Allergies: allergic to Catapres, Celebrex, Cymbalta, Morphine, Oxycontin, Sulfa and shellfish products  PAtient has attended 9 PT sessions from 17 to 17 with 1 missed appointment  Fall Risk Score: 11 (? 5 = High Risk)  MD Orders: Eval and treat MEDICAL/REFERRING DIAGNOSIS:   L TKA  DATE OF ONSET: 3/14/17  REFERRING PHYSICIAN: Mery Ruiz MD  RETURN PHYSICIAN APPOINTMENT: 5/10/17     INITIAL ASSESSMENT:  Ms. Terrance Little is a 80year old female s/p L TKA on 3/14/17. Her ROM has improved significantly since initial eval and she now ambulates without assistive device. LEFS score is improved, but not yet at goal level. She continues to have pain in L knee. She returned to MD today and she reports that she has been released from PT. She will now be discharged from PT. PROBLEM LIST (Impacting functional limitations):  1. Decreased Strength  2. Decreased ADL/Functional Activities  3. Increased Pain  4. Decreased Flexibility/Joint Mobility INTERVENTIONS PLANNED:  1. Balance Exercise  2. Home Exercise Program (HEP)  3. Manual Therapy  4. Range of Motion (ROM)  5. Therapeutic Exercise/Strengthening   PLAN: Discharge PT. Thank you for this referral.            Shearon Ahumada, PT                  The information in this section was collected on 17 (except where otherwise noted). HISTORY:   History of Present Injury/Illness (Reason for Referral):  Patient reports gradual increase in L knee pain over past 5 years. She had L TKA on 3/14/17, left hospital 3 days later and then had home health PT. She returned to MD on 3/29/17 and was referred to PT.    Past Medical History/Comorbidities:  Acute pancreatitis, aneurysm, anxiety, CAD, cellulitis, chronic pain, kidney disease, DDD, depression, DM II, Dyspnea, Fatty liver, fibromyalgia, GERD, Heart failure, HTN, hypthyroidism, herniated lumbar disc, Mitral valve regurgitation, neuropathy,  Obesity, OA, panic attacks, hysterectomy, L knee score, back surgery,cardia cath. Social History/Living Environment:     lives with . Babysits grandchildren 3 days/wk  Prior Level of Function/Work/Activity:  Retired. Current Medications:  Cetirizine, Fioricet, Voltaren, D2, Lexapro, Vytorin, Ferrous sulfate, Flonase, Neurontin, Glucotrol XL, Avapro, Synthroid, Claritin,Buspar(replaced Ativan 5/8/17), Mag-ox, Zaroxolyn, Toprol, Singulair, Bactroban, Oxycodone, Protonix, Actos, Prandin, cyclosporine, Imitrex, Restoril, Levemir (added 5/8/17)  Date Last Reviewed:  5/9/17   EXAMINATION:   Observation/Orthostatic Postural Assessment:          No new    Palpation:         Tender L knee in general.   ROM:          AROM R knee flex 105, extn -4   (at eval)        AROM L knee flex 65 (65 in supine, 72 in sitting)  extn -12   (at eval)        AROM L knee flex 95 (95 in supine, 90 in sitting)  extn -6   (4/25/17)         AROM L knee flex 95 ( 95 in sitting)      (5/2/17)            AROM L knee flex 98  AROM extn -4   (5/9/17)        PROM L knee flex 102      (5/9/17)          Strength:          R knee grossly 4= to 5/5. L knee not tested   (at eval)  Neurological Screen:        Sensation: dulled light touch L LE - but has been that way prior to L TKA   (at eval)    Body Structures Involved:  1. Bones  2. Joints  3. Muscles Body Functions Affected:  1. Neuromusculoskeletal  2. Movement Related Activities and Participation Affected:  1. Self Care  2. Domestic Life  3. Community, Social and Civic Life   CLINICAL DECISION MAKING:   Outcome Measure:    Tool Used: Lower Extremity Functional Scale (LEFS)  Score:  Initial: 14/80 Most Recent: 35/80 (Date: 5/9/17 )   Interpretation of Score: 20 questions each scored on a 5 point scale with 0 representing \"extreme difficulty or unable to perform\" and 4 representing \"no difficulty\". The lower the score, the greater the functional disability. 80/80 represents no disability. Minimal detectable change is 9 points. Score 80 79-63 62-48 47-32 31-16 15-1 0   Modifier CH CI CJ CK CL CM CN     ?  Mobility - Walking and Moving Around:     - CURRENT STATUS: CK - 40%-59% impaired, limited or restricted    - GOAL STATUS: CJ - 20%-39% impaired, limited or restricted    - D/C STATUS:  CK - 40%-59% impaired, limited or restricted    ·

## 2017-05-11 ENCOUNTER — APPOINTMENT (OUTPATIENT)
Dept: PHYSICAL THERAPY | Age: 72
End: 2017-05-11
Payer: MEDICARE

## 2017-10-04 PROBLEM — E11.42 CONTROLLED TYPE 2 DIABETES MELLITUS WITH DIABETIC POLYNEUROPATHY, WITH LONG-TERM CURRENT USE OF INSULIN (HCC): Status: ACTIVE | Noted: 2017-10-04

## 2017-10-04 PROBLEM — Z79.4 CONTROLLED TYPE 2 DIABETES MELLITUS WITH DIABETIC POLYNEUROPATHY, WITH LONG-TERM CURRENT USE OF INSULIN (HCC): Status: ACTIVE | Noted: 2017-10-04

## 2018-04-02 ENCOUNTER — HOSPITAL ENCOUNTER (OUTPATIENT)
Dept: MAMMOGRAPHY | Age: 73
Discharge: HOME | End: 2018-04-02
Attending: FAMILY MEDICINE
Payer: MEDICARE

## 2018-04-02 DIAGNOSIS — E28.39 ESTROGEN DEFICIENCY: ICD-10-CM

## 2018-04-02 PROCEDURE — 77080 DXA BONE DENSITY AXIAL: CPT

## 2018-04-24 PROBLEM — E11.21 TYPE 2 DIABETES WITH NEPHROPATHY (HCC): Status: ACTIVE | Noted: 2018-04-24

## 2018-04-24 PROBLEM — Z96.659 S/P TOTAL KNEE ARTHROPLASTY: Status: RESOLVED | Noted: 2017-03-15 | Resolved: 2018-04-24

## 2018-04-24 PROBLEM — Z01.810 PRE-OPERATIVE CARDIOVASCULAR EXAMINATION: Status: RESOLVED | Noted: 2017-02-06 | Resolved: 2018-04-24

## 2018-04-24 PROBLEM — R82.90 ABNORMAL URINALYSIS: Status: RESOLVED | Noted: 2017-02-20 | Resolved: 2018-04-24

## 2019-04-02 ENCOUNTER — ANESTHESIA EVENT (OUTPATIENT)
Dept: SURGERY | Age: 74
End: 2019-04-02
Payer: MEDICARE

## 2019-04-02 RX ORDER — SODIUM CHLORIDE 0.9 % (FLUSH) 0.9 %
5-40 SYRINGE (ML) INJECTION AS NEEDED
Status: CANCELLED | OUTPATIENT
Start: 2019-04-02

## 2019-04-02 RX ORDER — SODIUM CHLORIDE 0.9 % (FLUSH) 0.9 %
5-40 SYRINGE (ML) INJECTION EVERY 8 HOURS
Status: CANCELLED | OUTPATIENT
Start: 2019-04-02

## 2019-04-02 NOTE — H&P
HPI:   For > 1 year =  numbness and tingling in the big toe   Left 2nd toe is crossing big toe      Location of pain - great toe      Type/severity pain -  tingling shooting burning numbness    Aggravating factors -  shoes; prolonged standing/walking  Alleviating factors -  rest     PMSFH:  Included on the patient history form ; reviewed and updated   MEDS: Avapro;BusPIRone HCl;Butalbital-APAP-Caffeine;Cetirizine HCl (10 MG);Ergocalciferol (08488 UNIT);Gabapentin (100 MG); Lasix; Levemir;Levothyroxine Sodium (112 MCG); Magnesium Oxide;MetFORMIN HCl;MetOLazone;Metoprolol Tartrate;Norvasc;OxyCODONE HCl (15 MG); Temazepam;Trulicity; Vytorin  ALLERGIES: Catapres;CeleBREX;Cymbalta;Morphine Sulfate;OxyCONTIN;Shellfish;Sulfa  PMH:  NIDDM, Thyroid, HTN, Pending R. TKA  5-  SOCIAL: Retired: : non smoker   ROS:  Per POA form: positive and negative system reviews were discussed. I tried to relate any positive system review to the musculoskeletal complaint. For all others, recommend the PCP or any specialists    PE:  Patient is alert and oriented. Nutrition, appearance and mood all okay. RESPIRATIONS:  Unlabored with no obvious shortness of breath. CV:  Appears to have pedal pulses, color, capillary refill, subungual color. Varicosities     NEURO:  No abnormal reflexes or clonus. Sensation appears mostly intact to light touch and sharp/dull discrimination. SLR negative. No popliteal tenderness     SKIN:  Age nails; no swelling; no keloids; MTP thick; IP area 3 mm soft-tissue mass       MS:  Standing = right bunion; Left 2-3 clawtoes. Gait = no pain   Right big toe severe neuralgic bunion and IP area     Left 2+3 = PIP rigidity, MTP contracture, volar plate pain  Okay ankle, subtalar, tarsal motion.  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 Discussed the right big toe bunion neuralgia. I offered to add bunion resection with the planned IP resection, but that means her TKA would be just 6 weeks post-op and I am afraid that bunionectomy would impair her recovery from the TKA  She agrees to do staged surgery - 1st is to address the severe most painful area in the medial big toe IP area. 2nd is right TKA. 3rd is bunionectomy. 4th is left claw toe resections     Surgery - Right      big toe mass resection, neurectomy, possible exostectomy       op - Mac + local - 30 minutes       Power rasp         The patient understands the dx, conservative and surgical treatment. Surgical risks/complications outlined including the risk of recurrence and the fact that any claw toe will never lie flat, risk of non-healing of skin and bone with or without infection,  potential loss/amputation of a toe or toes secondary to circulation loss, the risk of stiffness in the toes, and the overall risk of swelling that could be prolonged. Understands the use of internal and/or external k-wire type fixation and that it may take 6 months to a year before the patient can comfortably get back into regular/ dress shoes. Generalized surgical risks and complications were discussed.   The patient accepts

## 2019-04-03 ENCOUNTER — HOSPITAL ENCOUNTER (OUTPATIENT)
Age: 74
Setting detail: OUTPATIENT SURGERY
Discharge: HOME | End: 2019-04-03
Attending: ORTHOPAEDIC SURGERY | Admitting: ORTHOPAEDIC SURGERY
Payer: MEDICARE

## 2019-04-03 ENCOUNTER — ANESTHESIA (OUTPATIENT)
Dept: SURGERY | Age: 74
End: 2019-04-03
Payer: MEDICARE

## 2019-04-03 VITALS
WEIGHT: 182 LBS | BODY MASS INDEX: 30.29 KG/M2 | HEART RATE: 59 BPM | SYSTOLIC BLOOD PRESSURE: 168 MMHG | RESPIRATION RATE: 16 BRPM | OXYGEN SATURATION: 100 % | TEMPERATURE: 98.6 F | DIASTOLIC BLOOD PRESSURE: 78 MMHG

## 2019-04-03 LAB
GLUCOSE BLD STRIP.AUTO-MCNC: 78 MG/DL (ref 65–100)
GLUCOSE BLD STRIP.AUTO-MCNC: 82 MG/DL (ref 65–100)

## 2019-04-03 PROCEDURE — 77030002916 HC SUT ETHLN J&J -A: Performed by: ORTHOPAEDIC SURGERY

## 2019-04-03 PROCEDURE — 82962 GLUCOSE BLOOD TEST: CPT

## 2019-04-03 PROCEDURE — 76210000020 HC REC RM PH II FIRST 0.5 HR: Performed by: ORTHOPAEDIC SURGERY

## 2019-04-03 PROCEDURE — 77030002933 HC SUT MCRYL J&J -A: Performed by: ORTHOPAEDIC SURGERY

## 2019-04-03 PROCEDURE — 74011000250 HC RX REV CODE- 250: Performed by: ORTHOPAEDIC SURGERY

## 2019-04-03 PROCEDURE — 77030012411 HC DRN WND CARD -A: Performed by: ORTHOPAEDIC SURGERY

## 2019-04-03 PROCEDURE — 76210000063 HC OR PH I REC FIRST 0.5 HR: Performed by: ORTHOPAEDIC SURGERY

## 2019-04-03 PROCEDURE — 74011250637 HC RX REV CODE- 250/637: Performed by: ANESTHESIOLOGY

## 2019-04-03 PROCEDURE — 77030018836 HC SOL IRR NACL ICUM -A: Performed by: ORTHOPAEDIC SURGERY

## 2019-04-03 PROCEDURE — 74011000250 HC RX REV CODE- 250

## 2019-04-03 PROCEDURE — 74011250636 HC RX REV CODE- 250/636

## 2019-04-03 PROCEDURE — 74011250636 HC RX REV CODE- 250/636: Performed by: ANESTHESIOLOGY

## 2019-04-03 PROCEDURE — 76010000138 HC OR TIME 0.5 TO 1 HR: Performed by: ORTHOPAEDIC SURGERY

## 2019-04-03 PROCEDURE — 74011250636 HC RX REV CODE- 250/636: Performed by: PHYSICIAN ASSISTANT

## 2019-04-03 PROCEDURE — 76060000032 HC ANESTHESIA 0.5 TO 1 HR: Performed by: ORTHOPAEDIC SURGERY

## 2019-04-03 RX ORDER — ONDANSETRON 2 MG/ML
4 INJECTION INTRAMUSCULAR; INTRAVENOUS
Status: DISCONTINUED | OUTPATIENT
Start: 2019-04-03 | End: 2019-04-03 | Stop reason: HOSPADM

## 2019-04-03 RX ORDER — SODIUM CHLORIDE 0.9 % (FLUSH) 0.9 %
5-40 SYRINGE (ML) INJECTION AS NEEDED
Status: DISCONTINUED | OUTPATIENT
Start: 2019-04-03 | End: 2019-04-03 | Stop reason: HOSPADM

## 2019-04-03 RX ORDER — PROPOFOL 10 MG/ML
INJECTION, EMULSION INTRAVENOUS AS NEEDED
Status: DISCONTINUED | OUTPATIENT
Start: 2019-04-03 | End: 2019-04-03 | Stop reason: HOSPADM

## 2019-04-03 RX ORDER — MIDAZOLAM HYDROCHLORIDE 1 MG/ML
2 INJECTION, SOLUTION INTRAMUSCULAR; INTRAVENOUS
Status: COMPLETED | OUTPATIENT
Start: 2019-04-03 | End: 2019-04-03

## 2019-04-03 RX ORDER — SODIUM CHLORIDE 0.9 % (FLUSH) 0.9 %
5-40 SYRINGE (ML) INJECTION EVERY 8 HOURS
Status: CANCELLED | OUTPATIENT
Start: 2019-04-03

## 2019-04-03 RX ORDER — SODIUM CHLORIDE 0.9 % (FLUSH) 0.9 %
5-40 SYRINGE (ML) INJECTION EVERY 8 HOURS
Status: DISCONTINUED | OUTPATIENT
Start: 2019-04-03 | End: 2019-04-03 | Stop reason: HOSPADM

## 2019-04-03 RX ORDER — PROPOFOL 10 MG/ML
INJECTION, EMULSION INTRAVENOUS
Status: DISCONTINUED | OUTPATIENT
Start: 2019-04-03 | End: 2019-04-03 | Stop reason: HOSPADM

## 2019-04-03 RX ORDER — CEFAZOLIN SODIUM 1 G/3ML
INJECTION, POWDER, FOR SOLUTION INTRAMUSCULAR; INTRAVENOUS AS NEEDED
Status: DISCONTINUED | OUTPATIENT
Start: 2019-04-03 | End: 2019-04-03 | Stop reason: HOSPADM

## 2019-04-03 RX ORDER — FENTANYL CITRATE 50 UG/ML
INJECTION, SOLUTION INTRAMUSCULAR; INTRAVENOUS AS NEEDED
Status: DISCONTINUED | OUTPATIENT
Start: 2019-04-03 | End: 2019-04-03 | Stop reason: HOSPADM

## 2019-04-03 RX ORDER — IBUPROFEN 200 MG
400 TABLET ORAL
COMMUNITY
End: 2019-05-19

## 2019-04-03 RX ORDER — EPHEDRINE SULFATE/0.9% NACL/PF 50 MG/5 ML
SYRINGE (ML) INTRAVENOUS AS NEEDED
Status: DISCONTINUED | OUTPATIENT
Start: 2019-04-03 | End: 2019-04-03 | Stop reason: HOSPADM

## 2019-04-03 RX ORDER — SODIUM CHLORIDE, SODIUM LACTATE, POTASSIUM CHLORIDE, CALCIUM CHLORIDE 600; 310; 30; 20 MG/100ML; MG/100ML; MG/100ML; MG/100ML
1000 INJECTION, SOLUTION INTRAVENOUS CONTINUOUS
Status: DISCONTINUED | OUTPATIENT
Start: 2019-04-03 | End: 2019-04-03 | Stop reason: HOSPADM

## 2019-04-03 RX ORDER — LIDOCAINE HYDROCHLORIDE 10 MG/ML
0.1 INJECTION INFILTRATION; PERINEURAL AS NEEDED
Status: DISCONTINUED | OUTPATIENT
Start: 2019-04-03 | End: 2019-04-03 | Stop reason: HOSPADM

## 2019-04-03 RX ORDER — CEFAZOLIN SODIUM/WATER 2 G/20 ML
2 SYRINGE (ML) INTRAVENOUS ONCE
Status: COMPLETED | OUTPATIENT
Start: 2019-04-03 | End: 2019-04-03

## 2019-04-03 RX ORDER — ALBUTEROL SULFATE 0.83 MG/ML
2.5 SOLUTION RESPIRATORY (INHALATION) AS NEEDED
Status: DISCONTINUED | OUTPATIENT
Start: 2019-04-03 | End: 2019-04-03 | Stop reason: HOSPADM

## 2019-04-03 RX ORDER — HYDROMORPHONE HYDROCHLORIDE 2 MG/ML
0.5 INJECTION, SOLUTION INTRAMUSCULAR; INTRAVENOUS; SUBCUTANEOUS
Status: DISCONTINUED | OUTPATIENT
Start: 2019-04-03 | End: 2019-04-03 | Stop reason: HOSPADM

## 2019-04-03 RX ORDER — BUPIVACAINE HYDROCHLORIDE 5 MG/ML
INJECTION, SOLUTION EPIDURAL; INTRACAUDAL AS NEEDED
Status: DISCONTINUED | OUTPATIENT
Start: 2019-04-03 | End: 2019-04-03 | Stop reason: HOSPADM

## 2019-04-03 RX ORDER — SODIUM CHLORIDE 0.9 % (FLUSH) 0.9 %
5-40 SYRINGE (ML) INJECTION AS NEEDED
Status: CANCELLED | OUTPATIENT
Start: 2019-04-03

## 2019-04-03 RX ORDER — ONDANSETRON 2 MG/ML
INJECTION INTRAMUSCULAR; INTRAVENOUS AS NEEDED
Status: DISCONTINUED | OUTPATIENT
Start: 2019-04-03 | End: 2019-04-03 | Stop reason: HOSPADM

## 2019-04-03 RX ORDER — ACETAMINOPHEN 500 MG
1000 TABLET ORAL ONCE
Status: COMPLETED | OUTPATIENT
Start: 2019-04-03 | End: 2019-04-03

## 2019-04-03 RX ORDER — LIDOCAINE HYDROCHLORIDE 20 MG/ML
INJECTION, SOLUTION EPIDURAL; INFILTRATION; INTRACAUDAL; PERINEURAL AS NEEDED
Status: DISCONTINUED | OUTPATIENT
Start: 2019-04-03 | End: 2019-04-03 | Stop reason: HOSPADM

## 2019-04-03 RX ADMIN — SODIUM CHLORIDE, SODIUM LACTATE, POTASSIUM CHLORIDE, AND CALCIUM CHLORIDE 1000 ML: 600; 310; 30; 20 INJECTION, SOLUTION INTRAVENOUS at 11:48

## 2019-04-03 RX ADMIN — Medication 2 G: at 11:48

## 2019-04-03 RX ADMIN — ONDANSETRON 4 MG: 2 INJECTION INTRAMUSCULAR; INTRAVENOUS at 13:26

## 2019-04-03 RX ADMIN — MIDAZOLAM HYDROCHLORIDE 2 MG: 2 INJECTION, SOLUTION INTRAMUSCULAR; INTRAVENOUS at 11:50

## 2019-04-03 RX ADMIN — PROPOFOL 20 MG: 10 INJECTION, EMULSION INTRAVENOUS at 13:22

## 2019-04-03 RX ADMIN — Medication 5 MG: at 13:24

## 2019-04-03 RX ADMIN — PROPOFOL 50 MG: 10 INJECTION, EMULSION INTRAVENOUS at 13:03

## 2019-04-03 RX ADMIN — CEFAZOLIN SODIUM 2 G: 1 INJECTION, POWDER, FOR SOLUTION INTRAMUSCULAR; INTRAVENOUS at 13:11

## 2019-04-03 RX ADMIN — PROPOFOL 140 MCG/KG/MIN: 10 INJECTION, EMULSION INTRAVENOUS at 13:03

## 2019-04-03 RX ADMIN — LIDOCAINE HYDROCHLORIDE 80 MG: 20 INJECTION, SOLUTION EPIDURAL; INFILTRATION; INTRACAUDAL; PERINEURAL at 13:03

## 2019-04-03 RX ADMIN — PROPOFOL 30 MG: 10 INJECTION, EMULSION INTRAVENOUS at 13:05

## 2019-04-03 RX ADMIN — FENTANYL CITRATE 25 MCG: 50 INJECTION, SOLUTION INTRAMUSCULAR; INTRAVENOUS at 13:19

## 2019-04-03 RX ADMIN — ACETAMINOPHEN 1000 MG: 500 TABLET, FILM COATED ORAL at 11:48

## 2019-04-03 RX ADMIN — Medication 5 MG: at 13:42

## 2019-04-03 NOTE — ANESTHESIA POSTPROCEDURE EVALUATION
Procedure(s): RIGHT BIG TOE MASS RESECTION / EXOSTECTOMY/ MAC AND LOCAL. total IV anesthesia Anesthesia Post Evaluation Multimodal analgesia: multimodal analgesia used between 6 hours prior to anesthesia start to PACU discharge Patient location during evaluation: bedside Patient participation: complete - patient participated Level of consciousness: awake and responsive to light touch Pain management: adequate Airway patency: patent Anesthetic complications: no 
Cardiovascular status: acceptable, hemodynamically stable, blood pressure returned to baseline and stable Respiratory status: acceptable, unassisted, spontaneous ventilation and nonlabored ventilation Hydration status: acceptable Post anesthesia nausea and vomiting:  controlled Vitals Value Taken Time /85 4/3/2019  1:58 PM  
Temp 37 °C (98.6 °F) 4/3/2019  1:51 PM  
Pulse 61 4/3/2019  2:02 PM  
Resp 16 4/3/2019  1:53 PM  
SpO2 96 % 4/3/2019  2:02 PM  
Vitals shown include unvalidated device data.

## 2019-04-03 NOTE — ANESTHESIA PREPROCEDURE EVALUATION
Anesthetic History PONV Review of Systems / Medical History Patient summary reviewed and pertinent labs reviewed Pulmonary Sleep apnea Neuro/Psych Cardiovascular Hypertension: well controlled Valvular problems/murmurs (mild): mitral insufficiency CAD Exercise tolerance: >4 METS Comments: NL perfusion scan 2/17 GI/Hepatic/Renal 
  
GERD: well controlled Endo/Other Diabetes: type 2 Hypothyroidism: well controlled Obesity and arthritis Other Findings Comments: Spinal Cord Stimulator Physical Exam 
 
Airway Mallampati: III Neck ROM: decreased range of motion Mouth opening: Normal 
 
 Cardiovascular Regular rate and rhythm,  S1 and S2 normal,  no murmur, click, rub, or gallop Rhythm: regular Pertinent negatives: No murmur Dental 
 
 
  
Pulmonary Breath sounds clear to auscultation Abdominal 
 
 
 
 Other Findings Anesthetic Plan ASA: 3 Anesthesia type: total IV anesthesia Induction: Intravenous Anesthetic plan and risks discussed with: Patient and Spouse

## 2019-04-03 NOTE — H&P
Outpatient Surgery History and Physical: 
Moustapha Roberts CHIEF COMPLAINT:   LE 
 
PE: There were no vitals taken for this visit. Heart:  No noted problems Lungs:  No noted problems Past Medical History:   
Patient Active Problem List  
 Diagnosis  Type 2 diabetes with nephropathy ([address]  Controlled type 2 diabetes mellitus with diabetic polyneuropathy, with long-term current use of insulin ([address]  Edema  Mitral valve regurgitation Echo 7/2013: normal EF, mild MR  
  
 Depression  Vitamin D deficiency  Uncontrolled type 2 diabetes mellitus with diabetic polyneuropathy, with long-term current use of insulin ([address]  Essential hypertension  Postsurgical hypothyroidism 2/08 partial thyroidectomy  GERD (gastroesophageal reflux disease)  Generalized anxiety disorder  Mixed hyperlipidemia  Degenerative disc disease, lumbar  Osteoarthritis  Fibromyalgia  Sleep apnea Can't wear the mask Surgical History:  
Past Surgical History:  
Procedure Laterality Date  HX BACK SURGERY  08/30/2013  
 spinal stimulator  HX BREAST BIOPSY Bilateral R/1988  L/2012  HX BREAST LUMPECTOMY Right  HX CYST REMOVAL Left 02/2019 L thumb [address] CATHETERIZATION  2005  HX KNEE ARTHROSCOPY Left 2004  
 left knee  HX KNEE ARTHROSCOPY  10/03/2014  
 left knee torn menincus  HX ORTHOPAEDIC Left 2010  
 elbow scope  HX ORTHOPAEDIC Right 2009  
 hand nodule removed  HX OTHER SURGICAL  12/22/09 TENS implant [facility] [facility]  HX OTHER SURGICAL    
 nodule removed left foot and hand  HX OTHER SURGICAL  10/28/13  
 left hand-pointer and pinky finger cyst removed  HX PARTIAL THYROIDECTOMY  2008  
 partial thyroidectomy and parathyroidectomy  HX PREMALIG/BENIGN SKIN LESION EXCISION  09/29/2014  
 lower lip [address] Social History: Patient  reports that she has never smoked.  She has never used smokeless tobacco. She reports that she does not drink alcohol or use drugs. Family History:  
Family History Problem Relation Age of Onset  Diabetes Father  Heart Disease Father CHF/ CAD  Lung Disease Father \"holes in lungs- exposed to toxins in textiles\"  Cancer Father   
     leukemia  Other Father   
     polio  Hypertension Father  Asthma Father  Diabetes Maternal Grandmother  Heart Disease Maternal Grandmother  Stroke Maternal Grandmother  Diabetes Maternal Grandfather  Heart Disease Maternal Grandfather  Diabetes Paternal Grandmother  Heart Failure Paternal Grandmother CHF  Diabetes Paternal Grandfather  Heart Disease Paternal Grandfather  Breast Cancer Mother  Diabetes Mother  Hypertension Mother  Stroke Mother TIAs- several  
 Breast Cancer Maternal Aunt 3 Maternal Aunts  Breast Cancer Paternal Aunt Allergies: Reviewed per EMR Allergies Allergen Reactions  Catapres [Clonidine] Rash, Swelling and Other (comments) Catapres patch,  headache  Celebrex [Celecoxib] Swelling Red rash, itching  Cymbalta [Duloxetine] Rash  Morphine Rash and Swelling  
  migraines  Oxycontin [Oxycodone] Other (comments) Migraines, elevated BP  Shellfish Containing Products Anaphylaxis  Sulfa (Sulfonamide Antibiotics) Unknown (comments) Medications: No current facility-administered medications on file prior to encounter. Current Outpatient Medications on File Prior to Encounter Medication Sig  
 magnesium oxide (MAG-OX) 400 mg tablet Take 1 Tab by mouth two (2) times a day.  temazepam (RESTORIL) 30 mg capsule Take 1 Cap by mouth nightly as needed for Sleep. Max Daily Amount: 30 mg.  
 busPIRone (BUSPAR) 15 mg tablet Take 1 tablet twice a day.  gabapentin (NEURONTIN) 100 mg capsule TAKE 1 CAPSULE THREE TIMES A DAY.  oxyCODONE IR (OXY-IR) 15 mg immediate release tablet Take 15 mg by mouth every eight (8) hours as needed.  cetirizine (ALLERGY RELIEF, CETIRIZINE,) 10 mg tablet Take 10 mg by mouth nightly. Indications: ALLERGIC RHINITIS The surgery is planned for the Right big toe History and physical have been reviewed. There have been no significant  changes since the completion of the updated history and physical. 
 
Necessity for the procedure/care is still present and the updated history and physical office notes outline the full long term history of the problem. Please see the updated office notes for the full musculoskeletal examination and surgical plan.  
 
Signed By: William Syed MD   
 April 3, 2019 7:27 AM

## 2019-04-03 NOTE — DISCHARGE INSTRUCTIONS
INSTRUCTIONS FOLLOWING FOOT SURGERY    ACTIVITY  Elevate foot. No Ice  Protected partial weight bearing  as tolerated in post op shoe after full sensation returns. Let the office know if dressing gets saturated with water . DIET  Day of Surgery: Clear liquids until no nausea or vomiting; then light, bland diet (Baked chicken, plain rice, grits, scrambled egg, toast). Nothing Greasy, fried or spicy today  Advance to regular diet on second day, unless your doctor orders otherwise. PAIN  Take pain medications as directed by your doctor. Call your doctor if pain is NOT relieved by medication. PAIN MED SIDE EFFECTS  Constipation: Lots of fluids, try prune juice, then OTC stool softeners if necessary  Nausea: Take medication with food. DRESSING CARE Keep dry and in place until follow up appointment    CALL YOUR DOCTOR IF YOU HAVE  Excessive bleeding that does not stop after holding mild pressure over the area. Temperature of 101 degrees or above. Redness, excessive swelling or bruising, and/or green or yellow, smelly discharge from incision. Loss of sensation - cold, white or blue toes. AFTER ANESTHESIA  For the first 24 hours and while taking narcotics for pain: DO NOT Drive, Drink Alcoholic beverages, or make important Decisions. Be aware of dizziness following anesthesia and while taking pain medication. Preventing Infection at Home  We care about preventing infection and avoiding the spread of germs - not only when you are in the hospital but also when you return home. When you return home from the hospital, its important to take the following steps to help prevent infection and avoid spreading germs that could infect you and others. Ask everyone in your home to follow these guidelines, too. Clean Your Hands  · Clean your hands whenever your hands are visibly dirty, before you eat, before or after touching your mouth, nose or eyes, and before preparing food.  Clean them after contact with body fluids, using the restroom, touching animals or changing diapers. · When washing hands, wet them with warm water and work up a lather. Rub hands for at least 15 seconds, then rinse them and pat them dry with a clean towel or paper towel. · When using hand sanitizers, it should take about 15 seconds to rub your hands dry. If not, you probably didnt apply enough . Cover Your Sneeze or Cough  Germs are released into the air whenever you sneeze or cough. To prevent the spread of infection:  · Turn away from other people before coughing or sneezing. · Cover your mouth or nose with a tissue when you cough or sneeze. Put the tissue in the trash. · If you dont have a tissue, cough or sneeze into your upper sleeve, not your hands. · Always clean your hands after coughing or sneezing. Care for Wounds  Your skin is your bodys first line of defense against germs, but an open wound leaves an easy way for germs to enter your body. To prevent infection:  · Clean your hands before and after changing wound dressings, and wear gloves to change dressings if recommended by your doctor. · Take special care with IV lines or other devices inserted into the body. If you must touch them, clean your hands first.  · Follow any specific instructions from your doctor to care for your wounds. Contact your doctor if you experience any signs of infection, such as fever or increased redness at the surgical or wound site. Keep a Clean Home  · Clean or wipe commonly touched hard surfaces like door handles, sinks, tabletops, phones and TV remotes. · Use products labeled disinfectant to kill harmful bacteria and viruses. · Use a clean cloth or paper towel to clean and dry surfaces. Wiping surfaces with a dirty dishcloth, sponge or towel will only spread germs. · Never share toothbrushes, [place], drinking glasses, utensils, razor blades, face cloths or bath towels to avoid spreading germs.   · Be sure that the linens that you sleep on are clean. · Keep pets away from wounds and wash your hands after touching pets, their toys or bedding. We care about you and your health. Remember, preventing infections is a team effort between you, your family, friends and health care providers. DISCHARGE SUMMARY from Nurse    PATIENT INSTRUCTIONS:    After general anesthesia or intravenous sedation, for 24 hours or while taking prescription Narcotics:  · Limit your activities  · Do not drive and operate hazardous machinery  · Do not make important personal or business decisions  · Do  not drink alcoholic beverages  · If you have not urinated within 8 hours after discharge, please contact your surgeon on call. *  Please give a list of your current medications to your Primary Care Provider. *  Please update this list whenever your medications are discontinued, doses are      changed, or new medications (including over-the-counter products) are added. *  Please carry medication information at all times in case of emergency situations. These are general instructions for a healthy lifestyle:    No smoking/ No tobacco products/ Avoid exposure to second hand smoke    Surgeon General's Warning:  Quitting smoking now greatly reduces serious risk to your health. Obesity, smoking, and sedentary lifestyle greatly increases your risk for illness    A healthy diet, regular physical exercise & weight monitoring are important for maintaining a healthy lifestyle    You may be retaining fluid if you have a history of heart failure or if you experience any of the following symptoms:  Weight gain of 3 pounds or more overnight or 5 pounds in a week, increased swelling in our hands or feet or shortness of breath while lying flat in bed. Please call your doctor as soon as you notice any of these symptoms; do not wait until your next office visit.     Recognize signs and symptoms of STROKE:    F-face looks uneven    A-arms unable to move or move unevenly    S-speech slurred or non-existent    T-time-call 911 as soon as signs and symptoms begin-DO NOT go       Back to bed or wait to see if you get better-TIME IS BRAIN.

## 2019-04-04 NOTE — OP NOTES
[facility]  OPERATIVE REPORT    Name:  Mason Montero  MR#:  [id number]  :  1945  ACCOUNT #:  [de-identified]  DATE OF SERVICE:  2019    PREOPERATIVE DIAGNOSIS:  Right great toe painful mass/spur. POSTOPERATIVE DIAGNOSIS:  Right great toe painful mass/spur with nerve entrapment. PROCEDURE PERFORMED:  Right great toe bursal 2 mm mass resection, phalangeal exostectomy, and branch neurectomy. SURGEON:   Luis Mendieta. MD Christopher    ANESTHESIA:  IV sedation plus local.    COMPLICATIONS:  None. SPECIMENS REMOVED:  None. ESTIMATED BLOOD LOSS:  Minimal.    FLUIDS:  Crystalloid. DRAINS:  None. TOURNIQUET TIME:  Less than 30 minutes. FINDINGS:  Intraoperatively, the patient had more of a bursal subcutaneous thickened area that was over a phalangeal spur. This had created a bursal reaction and entrapped branch of her digital nerve. All this was resected without difficulty. Finger palpation revealed complete resolution of the problem. SURGERY IN DETAIL:  After successful induction of IV sedation, the right foot was scrubbed, prepped, and draped in the usual sterile fashion. Antibiotic issued. Timeout procedure, identification, and surgical markings were done by me. Right side was addressed with a medial approach. Marking block was utilized in a digital fashion. The 2 mm bursal thickened area was resected as well as the overlying skin that thickened in response. Exostectomy and partial branch neurectomy performed without difficulty. The wound was thoroughly cleaned and then closed with Ethilon. The patient was placed in sterile dressing and seemed to tolerate the procedure well.       Ayse Delarosa MD [email or web address]  D:  2019 15:46  T:  2019 21:43  JOB #:  [id number]

## 2019-04-16 ENCOUNTER — HOSPITAL ENCOUNTER (OUTPATIENT)
Dept: LAB | Age: 74
Discharge: HOME | End: 2019-04-16
Attending: INTERNAL MEDICINE
Payer: MEDICARE

## 2019-04-16 LAB
ALBUMIN SERPL-MCNC: 3.7 G/DL (ref 3.2–4.6)
ALBUMIN/GLOB SERPL: 1.1 {RATIO}
ALP SERPL-CCNC: 61 U/L (ref 50–136)
ALT SERPL-CCNC: 20 U/L (ref 12–65)
ANION GAP SERPL CALC-SCNC: 10 MMOL/L
AST SERPL-CCNC: 14 U/L (ref 15–37)
BILIRUB SERPL-MCNC: 0.4 MG/DL (ref 0.2–1.1)
BUN SERPL-MCNC: 13 MG/DL (ref 8–23)
CALCIUM SERPL-MCNC: 8.7 MG/DL (ref 8.3–10.4)
CHLORIDE SERPL-SCNC: 101 MMOL/L (ref 98–107)
CHOLEST SERPL-MCNC: 149 MG/DL
CO2 SERPL-SCNC: 24 MMOL/L (ref 21–32)
CREAT SERPL-MCNC: 1.1 MG/DL (ref 0.6–1)
CREAT UR-MCNC: 57 MG/DL
GLOBULIN SER CALC-MCNC: 3.3 G/DL
GLUCOSE SERPL-MCNC: 96 MG/DL (ref 65–100)
HDLC SERPL-MCNC: 41 MG/DL (ref 40–60)
HDLC SERPL: 3.6 {RATIO}
LDLC SERPL CALC-MCNC: 75.6 MG/DL
LIPID PROFILE,FLP: ABNORMAL
MICROALBUMIN UR-MCNC: 0.68 MG/DL
MICROALBUMIN/CREAT UR-RTO: 12 MG/G
POTASSIUM SERPL-SCNC: 4.7 MMOL/L (ref 3.5–5.1)
PROT SERPL-MCNC: 7 G/DL (ref 6.3–8.2)
SODIUM SERPL-SCNC: 135 MMOL/L (ref 136–145)
TRIGL SERPL-MCNC: 162 MG/DL (ref 35–150)
TSH W FREE THYROID I,TSHELE: 1.22 UIU/ML (ref 0.36–3.74)
VLDLC SERPL CALC-MCNC: 32.4 MG/DL (ref 6–23)

## 2019-04-16 PROCEDURE — 80053 COMPREHEN METABOLIC PANEL: CPT

## 2019-04-16 PROCEDURE — 84443 ASSAY THYROID STIM HORMONE: CPT

## 2019-04-16 PROCEDURE — 36415 COLL VENOUS BLD VENIPUNCTURE: CPT

## 2019-04-16 PROCEDURE — 80061 LIPID PANEL: CPT

## 2019-04-16 PROCEDURE — 82043 UR ALBUMIN QUANTITATIVE: CPT

## 2019-05-09 ENCOUNTER — HOSPITAL ENCOUNTER (OUTPATIENT)
Dept: SURGERY | Age: 74
Discharge: HOME | End: 2019-05-09
Attending: ORTHOPAEDIC SURGERY
Payer: MEDICARE

## 2019-05-09 VITALS
DIASTOLIC BLOOD PRESSURE: 82 MMHG | RESPIRATION RATE: 20 BRPM | HEART RATE: 64 BPM | HEIGHT: 65 IN | WEIGHT: 187.5 LBS | BODY MASS INDEX: 31.24 KG/M2 | SYSTOLIC BLOOD PRESSURE: 166 MMHG | OXYGEN SATURATION: 64 % | TEMPERATURE: 96.7 F

## 2019-05-09 PROBLEM — Z91.14 NONCOMPLIANCE WITH CPAP TREATMENT: Status: ACTIVE | Noted: 2019-05-09

## 2019-05-09 LAB
ANION GAP SERPL CALC-SCNC: 6 MMOL/L (ref 7–16)
APPEARANCE UR: CLEAR
APTT PPP: 32.5 SEC (ref 24.7–39.8)
BACTERIA SPEC CULT: ABNORMAL
BASOPHILS # BLD: 0 K/UL (ref 0–0.2)
BASOPHILS NFR BLD: 0 % (ref 0–2)
BILIRUB UR QL: NEGATIVE
BUN SERPL-MCNC: 14 MG/DL (ref 8–23)
CALCIUM SERPL-MCNC: 8.7 MG/DL (ref 8.3–10.4)
CHLORIDE SERPL-SCNC: 100 MMOL/L (ref 98–107)
CO2 SERPL-SCNC: 28 MMOL/L (ref 21–32)
COLOR UR: YELLOW
CREAT SERPL-MCNC: 1.04 MG/DL (ref 0.6–1)
DIFFERENTIAL METHOD BLD: ABNORMAL
EOSINOPHIL # BLD: 0.2 K/UL (ref 0–0.8)
EOSINOPHIL NFR BLD: 3 % (ref 0.5–7.8)
ERYTHROCYTE [DISTWIDTH] IN BLOOD BY AUTOMATED COUNT: 13.2 % (ref 11.9–14.6)
GLUCOSE SERPL-MCNC: 93 MG/DL (ref 65–100)
GLUCOSE UR STRIP.AUTO-MCNC: NEGATIVE MG/DL
HCT VFR BLD AUTO: 41.6 % (ref 35.8–46.3)
HGB BLD-MCNC: 14.2 G/DL (ref 11.7–15.4)
HGB UR QL STRIP: NEGATIVE
IMM GRANULOCYTES # BLD AUTO: 0 K/UL (ref 0–0.5)
IMM GRANULOCYTES NFR BLD AUTO: 0 % (ref 0–5)
INR PPP: 1
KETONES UR QL STRIP.AUTO: NEGATIVE MG/DL
LEUKOCYTE ESTERASE UR QL STRIP.AUTO: NEGATIVE
LYMPHOCYTES # BLD: 2 K/UL (ref 0.5–4.6)
LYMPHOCYTES NFR BLD: 29 % (ref 13–44)
MCH RBC QN AUTO: 28.6 PG (ref 26.1–32.9)
MCHC RBC AUTO-ENTMCNC: 34.1 G/DL (ref 31.4–35)
MCV RBC AUTO: 83.9 FL (ref 79.6–97.8)
MONOCYTES # BLD: 0.4 K/UL (ref 0.1–1.3)
MONOCYTES NFR BLD: 6 % (ref 4–12)
NEUTS SEG # BLD: 4.1 K/UL (ref 1.7–8.2)
NEUTS SEG NFR BLD: 61 % (ref 43–78)
NITRITE UR QL STRIP.AUTO: NEGATIVE
NRBC # BLD: 0 K/UL (ref 0–0.2)
PH UR STRIP: 6.5 [PH] (ref 5–9)
PLATELET # BLD AUTO: 232 K/UL (ref 150–450)
PMV BLD AUTO: 8.8 FL (ref 9.4–12.3)
POTASSIUM SERPL-SCNC: 4.1 MMOL/L (ref 3.5–5.1)
PROT UR STRIP-MCNC: NEGATIVE MG/DL
PROTHROMBIN TIME: 13 SEC (ref 11.7–14.5)
RBC # BLD AUTO: 4.96 M/UL (ref 4.05–5.2)
SERVICE CMNT-IMP: ABNORMAL
SODIUM SERPL-SCNC: 134 MMOL/L (ref 136–145)
SP GR UR REFRACTOMETRY: 1.01 (ref 1–1.02)
UROBILINOGEN UR QL STRIP.AUTO: 0.2 EU/DL (ref 0.2–1)
WBC # BLD AUTO: 6.7 K/UL (ref 4.3–11.1)

## 2019-05-09 PROCEDURE — 85610 PROTHROMBIN TIME: CPT

## 2019-05-09 PROCEDURE — 80048 BASIC METABOLIC PNL TOTAL CA: CPT

## 2019-05-09 PROCEDURE — 81003 URINALYSIS AUTO W/O SCOPE: CPT

## 2019-05-09 PROCEDURE — 87641 MR-STAPH DNA AMP PROBE: CPT

## 2019-05-09 PROCEDURE — 85025 COMPLETE CBC W/AUTO DIFF WBC: CPT

## 2019-05-09 PROCEDURE — 85730 THROMBOPLASTIN TIME PARTIAL: CPT

## 2019-05-09 NOTE — ADVANCED PRACTICE NURSE
Total Joint Surgery Preoperative Chart Review Patient ID: 
Magdy Lockett [id number] 
20 y.o. 
1945 Surgeon: Dr. Aj Miller Date of Surgery: 5/16/2019 Procedure: Total Right Knee Arthroplasty Primary Care Physician: Nilay Bird [phone] Specialty Physician(s):   
 
Subjective:  
Magdy Lockett is a 68 y.o. WHITE OR  female who presents for preoperative evaluation for Total Right Knee arthroplasty. This is a preoperative chart review note based on data collected by the nurse at the surgical Pre-Assessment visit. Past Medical History:  
Diagnosis Date  Acute pancreatitis 2/2008  Aneurysm ([address]   
 splenic- \"just over 1 cm\"-(CT done July '13- followed by Dr Ehsan Dai))  Anxiety  Bilateral sacroiliitis ([address]  Breast cyst   
 aspiration 6/14  CAD (coronary artery disease) MITRAL VALVE LEAK AND ANEURYSM ON SPLENIC ARTERY; has REJI-1 flight of steps  Cellulitis 6/16/15  
 right leg  Chest pain  Chronic kidney disease   
 stage 3  Chronic pain   
 lumbar/spine/thoracic, left knee  CKD (chronic kidney disease) stage 3  Degenerative disc disease   
 lumbosacral  
 Depression 12/23/2015  
 managed with medication  Diabetes mellitus type II   
 avg 100-150, hgb A1C 5.4, symptoms of hypglycemia below 70  Dyslipidemia   
 managed with medication  Dyspnea  Edema 12/23/2015  Fatty liver  Fibromyalgia   
 managed with Medication  Food allergy  GERD (gastroesophageal reflux disease)   
 controlled with medication  Heart failure ([address]   
 per Dr Juliana Alan- due to valve leakage (pt denies)  Hx of echocardiogram 08/21/2015  
 mitral valve regurgitation  Hypertension   
 controlled with medication  Hypertriglyceridemia   
 managed with medication  Hypothyroidism   
 managed with medication  Lumbar herniated disc Aug 2013  Lumbar radiculopathy  Macrocytic anemia  Microcytic anemia  Mild pulmonary hypertension ([address]   
 per ECHO (2012)  Nausea & vomiting   
 post-op nausea  Neuropathy   
 bilateral legs  Obesity (BMI 30.0-34.9) 10/2/14 BMI- 30.7  Osteoarthritis  Panic attacks  Sacroiliac dysfunction  Seasonal allergic reaction  Sleep apnea NO CPAP  Syncope and collapse 12/23/2015 Past Surgical History:  
Procedure Laterality Date  HX BACK SURGERY  08/30/2013  
 spinal stimulator  HX BREAST BIOPSY Bilateral R/1988  L/2012  HX BREAST LUMPECTOMY Right  HX CYST REMOVAL Left 02/2019 L thumb [address] CATHETERIZATION  2005  HX KNEE ARTHROSCOPY Left 2004  
 left knee  HX KNEE ARTHROSCOPY  10/03/2014  
 left knee torn menincus  HX KNEE REPLACEMENT Left 03/14/2017  HX ORTHOPAEDIC Left 2010  
 elbow scope  HX ORTHOPAEDIC Right 2009  
 hand nodule removed  HX ORTHOPAEDIC  04/03/2019  
 right big toe mass resection exostectomy  HX OTHER SURGICAL  12/22/09 TENS implant [facility]  HX OTHER SURGICAL    
 nodule removed left foot and hand  HX OTHER SURGICAL  10/28/13  
 left hand-pointer and pinky finger cyst removed  HX PARTIAL THYROIDECTOMY  2008  
 partial thyroidectomy and parathyroidectomy  HX PREMALIG/BENIGN SKIN LESION EXCISION  09/29/2014  
 lower lip [address] Family History Problem Relation Age of Onset  Diabetes Father  Heart Disease Father CHF/ CAD  Lung Disease Father \"holes in lungs- exposed to toxins in textiles\"  Cancer Father   
     leukemia  Other Father   
     polio  Hypertension Father  Asthma Father  Diabetes Maternal Grandmother  Heart Disease Maternal Grandmother  Stroke Maternal Grandmother  Diabetes Maternal Grandfather  Heart Disease Maternal Grandfather  Diabetes Paternal Grandmother  Heart Failure Paternal Grandmother CHF  Diabetes Paternal Grandfather  Heart Disease Paternal Grandfather  Breast Cancer Mother  Diabetes Mother  Hypertension Mother  Stroke Mother TIAs- several  
 Breast Cancer Maternal Aunt 3 Maternal Aunts  Breast Cancer Paternal Aunt Social History Tobacco Use  Smoking status: Never Smoker  Smokeless tobacco: Never Used Substance Use Topics  Alcohol use: No  
   
Prior to Admission medications Medication Sig Start Date End Date Taking? Authorizing Provider  
ibuprofen (ADVIL) 200 mg tablet Take 400 mg by mouth. Yes Provider, Historical  
fexofenadine (ALLEGRA) 180 mg tablet Take 180 mg by mouth nightly. Yes Provider, Historical  
butalbital-acetaminophen-caffeine (FIORICET, ESGIC) -40 mg per tablet Take 1 Tab by mouth every six (6) hours as needed for Pain. 3/29/19  Yes Ashlee Nichole MD  
ergocalciferol (ERGOCALCIFEROL) 50,000 unit capsule Take 1 Cap by mouth every seven (7) days. Every Leroy 3/29/19  Yes Ashlee Nichole MD  
escitalopram oxalate (LEXAPRO) 20 mg tablet Take 1 Tab by mouth daily. Patient taking differently: Take 20 mg by mouth nightly. 3/29/19  Yes Ashlee Nichole MD  
amLODIPine (NORVASC) 5 mg tablet Take 1 Tab by mouth daily. 3/29/19  Yes Ashlee Nichole MD  
ezetimibe-simvastatin (VYTORIN 10/40) 10-40 mg per tablet Take 1 Tab by mouth nightly. 3/29/19  Yes Ashlee Nichole MD  
metFORMIN ER (GLUCOPHAGE XR) 500 mg tablet Take 1 Tab by mouth daily. 3/29/19  Yes Ashlee Nichole MD  
LEVEMIR FLEXTOUCH U-100 INSULN 100 unit/mL (3 mL) inpn 50 Units by SubCUTAneous route nightly. 3/26/19  Yes Amie Castillo MD  
TRULICITY 1.5 EL/5.7 mL sub-q pen 0.5 mL by SubCUTAneous route every seven (7) days. Patient taking differently: 1.5 mg by SubCUTAneous route every seven (7) days.  Indications: Takes on Leroy nights 3/26/19  Yes Evi Olivia MD  
levothyroxine (SYNTHROID) 112 mcg tablet Take 1 Tab by mouth Daily (before breakfast). 3/26/19  Yes Mita Huff MD  
metoprolol succinate (TOPROL XL) 100 mg tablet Take 2 Tabs by mouth daily. Patient taking differently: Take 200 mg by mouth nightly. Two tabs at night 3/26/19  Yes Yanci Castillo MD  
irbesartan (AVAPRO) 300 mg tablet Take 1 Tab by mouth nightly. 3/26/19  Yes Mita Huff MD  
magnesium oxide (MAG-OX) 400 mg tablet Take 1 Tab by mouth two (2) times a day. 2/6/19  Yes Senait MARTINEZ DO  
temazepam (RESTORIL) 30 mg capsule Take 1 Cap by mouth nightly as needed for Sleep. Max Daily Amount: 30 mg. 2/5/19  Yes Joselin Sexton MD  
busPIRone (BUSPAR) 15 mg tablet Take 1 tablet twice a day. 2/5/19  Yes Joselin Sexton MD  
gabapentin (NEURONTIN) 100 mg capsule TAKE 1 CAPSULE THREE TIMES A DAY. 2/5/19  Yes Joselin Sexton MD  
oxyCODONE IR (OXY-IR) 15 mg immediate release tablet Take 15 mg by mouth every six (6) hours as needed. 4/27/18  Yes Provider, Historical  
cetirizine (ALLERGY RELIEF, CETIRIZINE,) 10 mg tablet Take 10 mg by mouth nightly. Indications: ALLERGIC RHINITIS   Yes Provider, Historical  
 
Allergies Allergen Reactions  Shellfish Containing Products Anaphylaxis  Celebrex [Celecoxib] Swelling Red rash, itching  Catapres [Clonidine] Rash, Swelling and Other (comments) Catapres patch,  headache  Cymbalta [Duloxetine] Rash  Morphine Rash and Swelling  
  migraines  Oxycontin [Oxycodone] Other (comments) Migraines, elevated BP  
 Sulfa (Sulfonamide Antibiotics) Unknown (comments) Objective:  
 
Physical Exam:  
Patient Vitals for the past 24 hrs: 
 Temp Pulse Resp BP SpO2  
05/09/19 1312 96.7 °F (35.9 °C) 64 20 166/82 (!) 64 % ECG:   
EKG Results None Data Review:  
Labs:  
Recent Results (from the past 24 hour(s)) CBC WITH AUTOMATED DIFF Collection Time: 05/09/19  1:21 PM  
Result Value Ref Range WBC 6.7 4.3 - 11.1 K/uL  
 RBC 4.96 4.05 - 5.2 M/uL  
 HGB 14.2 11.7 - 15.4 g/dL HCT 41.6 35.8 - 46.3 % MCV 83.9 79.6 - 97.8 FL  
 MCH 28.6 26.1 - 32.9 PG  
 MCHC 34.1 31.4 - 35.0 g/dL  
 RDW 13.2 11.9 - 14.6 % PLATELET 723 363 - 159 K/uL MPV 8.8 (L) 9.4 - 12.3 FL ABSOLUTE NRBC 0.00 0.0 - 0.2 K/uL  
 DF AUTOMATED NEUTROPHILS 61 43 - 78 % LYMPHOCYTES 29 13 - 44 % MONOCYTES 6 4.0 - 12.0 % EOSINOPHILS 3 0.5 - 7.8 % BASOPHILS 0 0.0 - 2.0 % IMMATURE GRANULOCYTES 0 0.0 - 5.0 %  
 ABS. NEUTROPHILS 4.1 1.7 - 8.2 K/UL  
 ABS. LYMPHOCYTES 2.0 0.5 - 4.6 K/UL  
 ABS. MONOCYTES 0.4 0.1 - 1.3 K/UL  
 ABS. EOSINOPHILS 0.2 0.0 - 0.8 K/UL  
 ABS. BASOPHILS 0.0 0.0 - 0.2 K/UL  
 ABS. IMM. GRANS. 0.0 0.0 - 0.5 K/UL METABOLIC PANEL, BASIC Collection Time: 05/09/19  1:21 PM  
Result Value Ref Range Sodium 134 (L) 136 - 145 mmol/L Potassium 4.1 3.5 - 5.1 mmol/L Chloride 100 98 - 107 mmol/L  
 CO2 28 21 - 32 mmol/L Anion gap 6 (L) 7 - 16 mmol/L Glucose 93 65 - 100 mg/dL BUN 14 8 - 23 MG/DL Creatinine 1.04 (H) 0.6 - 1.0 MG/DL  
 GFR est AA >60 >60 ml/min/1.73m2 GFR est non-AA 55 (L) >60 ml/min/1.73m2 Calcium 8.7 8.3 - 10.4 MG/DL PROTHROMBIN TIME + INR Collection Time: 05/09/19  1:21 PM  
Result Value Ref Range Prothrombin time 13.0 11.7 - 14.5 sec INR 1.0    
PTT Collection Time: 05/09/19  1:21 PM  
Result Value Ref Range aPTT 32.5 24.7 - 39.8 SEC URINALYSIS W/ RFLX MICROSCOPIC Collection Time: 05/09/19  1:24 PM  
Result Value Ref Range Color YELLOW Appearance CLEAR Specific gravity 1.010 1.001 - 1.023    
 pH (UA) 6.5 5.0 - 9.0 Protein NEGATIVE  NEG mg/dL Glucose NEGATIVE  mg/dL Ketone NEGATIVE  NEG mg/dL Bilirubin NEGATIVE  NEG Blood NEGATIVE  NEG Urobilinogen 0.2 0.2 - 1.0 EU/dL Nitrites NEGATIVE  NEG Leukocyte Esterase NEGATIVE  NEG Results for Raleigh Severance (MRN [id number]) as of 5/9/2019 13:39 Ref. Range 3/26/2019 16:00 Hemoglobin A1c (POC) Latest Units: % 5.4 Problem List: 
) Patient Active Problem List  
Diagnosis Code  Essential hypertension I10  
 Postsurgical hypothyroidism E89.0  GERD (gastroesophageal reflux disease) K21.9  Generalized anxiety disorder F41.1  Mixed hyperlipidemia E78.2  Degenerative disc disease, lumbar M51.36  
 Osteoarthritis M19.90  
 Fibromyalgia M79.7  Sleep apnea G47.30  Vitamin D deficiency E55.9  
 Uncontrolled type 2 diabetes mellitus with diabetic polyneuropathy, with long-term current use of insulin (HCC) E11.42, Z79.4, E11.65  Edema R60.9  Mitral valve regurgitation I34.0  Depression F32.9  Controlled type 2 diabetes mellitus with diabetic polyneuropathy, with long-term current use of insulin (HCC) E11.42, Z79.4  Type 2 diabetes with nephropathy ([facility]) E11.21  
 Noncompliance with CPAP treatment Z91.14 Total Joint Surgery Pre-Assessment Recommendations: The patient is not compliant with wearing CPAP. Recommend oxygen saturation monitoring during hospitalization and oxygen at 3 liter via nc qhs. PEP therapy BID Signed By: YUNIER Diaz May 9, 2019

## 2019-05-09 NOTE — PERIOP NOTES
Lab results within anesthesia guideline. Recent Results (from the past 12 hour(s)) CBC WITH AUTOMATED DIFF Collection Time: 05/09/19  1:21 PM  
Result Value Ref Range WBC 6.7 4.3 - 11.1 K/uL  
 RBC 4.96 4.05 - 5.2 M/uL  
 HGB 14.2 11.7 - 15.4 g/dL HCT 41.6 35.8 - 46.3 % MCV 83.9 79.6 - 97.8 FL  
 MCH 28.6 26.1 - 32.9 PG  
 MCHC 34.1 31.4 - 35.0 g/dL  
 RDW 13.2 11.9 - 14.6 % PLATELET 658 773 - 548 K/uL MPV 8.8 (L) 9.4 - 12.3 FL ABSOLUTE NRBC 0.00 0.0 - 0.2 K/uL  
 DF AUTOMATED NEUTROPHILS 61 43 - 78 % LYMPHOCYTES 29 13 - 44 % MONOCYTES 6 4.0 - 12.0 % EOSINOPHILS 3 0.5 - 7.8 % BASOPHILS 0 0.0 - 2.0 % IMMATURE GRANULOCYTES 0 0.0 - 5.0 %  
 ABS. NEUTROPHILS 4.1 1.7 - 8.2 K/UL  
 ABS. LYMPHOCYTES 2.0 0.5 - 4.6 K/UL  
 ABS. MONOCYTES 0.4 0.1 - 1.3 K/UL  
 ABS. EOSINOPHILS 0.2 0.0 - 0.8 K/UL  
 ABS. BASOPHILS 0.0 0.0 - 0.2 K/UL  
 ABS. IMM. GRANS. 0.0 0.0 - 0.5 K/UL METABOLIC PANEL, BASIC Collection Time: 05/09/19  1:21 PM  
Result Value Ref Range Sodium 134 (L) 136 - 145 mmol/L Potassium 4.1 3.5 - 5.1 mmol/L Chloride 100 98 - 107 mmol/L  
 CO2 28 21 - 32 mmol/L Anion gap 6 (L) 7 - 16 mmol/L Glucose 93 65 - 100 mg/dL BUN 14 8 - 23 MG/DL Creatinine 1.04 (H) 0.6 - 1.0 MG/DL  
 GFR est AA >60 >60 ml/min/1.73m2 GFR est non-AA 55 (L) >60 ml/min/1.73m2 Calcium 8.7 8.3 - 10.4 MG/DL PROTHROMBIN TIME + INR Collection Time: 05/09/19  1:21 PM  
Result Value Ref Range Prothrombin time 13.0 11.7 - 14.5 sec INR 1.0    
PTT Collection Time: 05/09/19  1:21 PM  
Result Value Ref Range aPTT 32.5 24.7 - 39.8 SEC URINALYSIS W/ RFLX MICROSCOPIC Collection Time: 05/09/19  1:24 PM  
Result Value Ref Range Color YELLOW Appearance CLEAR Specific gravity 1.010 1.001 - 1.023    
 pH (UA) 6.5 5.0 - 9.0 Protein NEGATIVE  NEG mg/dL Glucose NEGATIVE  mg/dL Ketone NEGATIVE  NEG mg/dL  Bilirubin NEGATIVE  NEG    
 Blood NEGATIVE  NEG Urobilinogen 0.2 0.2 - 1.0 EU/dL Nitrites NEGATIVE  NEG  Leukocyte Esterase NEGATIVE  NEG

## 2019-05-09 NOTE — PERIOP NOTES
Patient verified name and  Order for consent was found in EHR and matches case posting; patient verified. Type 3 surgery, PAT walk in  assessment complete. Labs per surgeon: CBC,BMP,PT/PTT,UA and MRSA swab; results within anesthesia guidelines Labs per anesthesia protocol: no additional labs required EK2019 SR within anesthesia guidelines Copies of last office visit with Dr. Rohith Prince. Irineo([facility]) 2019, ECHO 2015 and NM stress test 2017 placed on chart for anesthesia reference and  found in Care Everywhere and Chart Review. Hibiclens and instructions given per hospital policy. Patient provided with and instructed on educational handouts including Guide to Surgery, Pain Management, Hand Hygiene, Blood Transfusion Education, and [employer] [facility] Brochure. Patient answered medical/surgical history questions at their best of ability. All prior to admission medications documented in [facility]. Original medication prescription bottle not visualized during patient appointment. Patient instructed to hold all vitamins 7 days prior to surgery and NSAIDS 5 days prior to surgery, patient verbalized understanding. Prescription medications to hold: Aleve and Advil x 5 days Patient instructed to continue previous medications as prescribed prior to surgery and to take the following medications the day of surgery according to anesthesia guidelines with a small sip of water: Amlodipine,buspar, lexapro,gabapentin, synthroid, metoprolol, oxycodone IR. Also instructed to take Levemir 40 units night before surgery per anesthesia guideline. Patient teach back successful and patient demonstrates knowledge of instructions.

## 2019-05-13 NOTE — H&P
H&P 
 
Patient ID: 
Vandana Garcia [id number] 
14 y.o. 
1945 Surgeon:  Jessica Bell MD 
Date of Surgery: * No surgery date entered * Procedure: Right Knee Total Arthroplasty Primary Care Physician: Gena Bush MD 
 
 
 
Subjective: 
Vandana Garcia is a 68 y.o. WHITE OR  female who presents with Right Knee pain. She has history of Right Knee pain for several months. Symptoms worse with walking long distances and relieved with rest. Conservative treatment consisting of  activity modification and injections have not helped. The patient lives with their family. The patients goal after surgery is improved pain and function. Past Medical History:  
Diagnosis Date  Acute pancreatitis 2/2008  Aneurysm ([address]   
 splenic- \"just over 1 cm\"-(CT done July '13- followed by Dr Pauline Terrell))  Anxiety  Bilateral sacroiliitis ([address]  Breast cyst   
 aspiration 6/14  CAD (coronary artery disease) MITRAL VALVE LEAK AND ANEURYSM ON SPLENIC ARTERY; has REJI-1 flight of steps  Cellulitis 6/16/15  
 right leg  Chest pain  Chronic kidney disease   
 stage 3  Chronic pain   
 lumbar/spine/thoracic, left knee  CKD (chronic kidney disease) stage 3  Degenerative disc disease   
 lumbosacral  
 Depression 12/23/2015  
 managed with medication  Diabetes mellitus type II   
 avg 100-150, hgb A1C 5.4, symptoms of hypglycemia below 70  Dyslipidemia   
 managed with medication  Dyspnea  Edema 12/23/2015  Fatty liver  Fibromyalgia   
 managed with Medication  Food allergy  GERD (gastroesophageal reflux disease)   
 controlled with medication  Heart failure ([address]   
 per Dr Romario Friend- due to valve leakage (pt denies)  Hx of echocardiogram 08/21/2015  
 mitral valve regurgitation  Hypertension   
 controlled with medication  Hypertriglyceridemia   
 managed with medication  Hypothyroidism   
 managed with medication  Lumbar herniated disc Aug 2013  Lumbar radiculopathy  Macrocytic anemia  Microcytic anemia  Mild pulmonary hypertension ([address]   
 per ECHO (2012)  Nausea & vomiting   
 post-op nausea  Neuropathy   
 bilateral legs  Obesity (BMI 30.0-34.9) 10/2/14 BMI- 30.7  Osteoarthritis  Panic attacks  Sacroiliac dysfunction  Seasonal allergic reaction  Sleep apnea NO CPAP  Syncope and collapse 12/23/2015 Past Surgical History:  
Procedure Laterality Date  HX BACK SURGERY  08/30/2013  
 spinal stimulator  HX BREAST BIOPSY Bilateral R/1988  L/2012  HX BREAST LUMPECTOMY Right  HX CYST REMOVAL Left 02/2019 L thumb [address] CATHETERIZATION  2005  HX KNEE ARTHROSCOPY Left 2004  
 left knee  HX KNEE ARTHROSCOPY  10/03/2014  
 left knee torn menincus  HX KNEE REPLACEMENT Left 03/14/2017  HX ORTHOPAEDIC Left 2010  
 elbow scope  HX ORTHOPAEDIC Right 2009  
 hand nodule removed  HX ORTHOPAEDIC  04/03/2019  
 right big toe mass resection exostectomy  HX OTHER SURGICAL  12/22/09 TENS implant [facility]  HX OTHER SURGICAL    
 nodule removed left foot and hand  HX OTHER SURGICAL  10/28/13  
 left hand-pointer and pinky finger cyst removed  HX PARTIAL THYROIDECTOMY  2008  
 partial thyroidectomy and parathyroidectomy  HX PREMALIG/BENIGN SKIN LESION EXCISION  09/29/2014  
 lower lip [address] Family History Problem Relation Age of Onset  Diabetes Father  Heart Disease Father CHF/ CAD  Lung Disease Father \"holes in lungs- exposed to toxins in textiles\"  Cancer Father   
     leukemia  Other Father   
     polio  Hypertension Father  Asthma Father  Diabetes Maternal Grandmother  Heart Disease Maternal Grandmother  Stroke Maternal Grandmother  Diabetes Maternal Grandfather  Heart Disease Maternal Grandfather  Diabetes Paternal Grandmother  Heart Failure Paternal Grandmother CHF  Diabetes Paternal Grandfather  Heart Disease Paternal Grandfather  Breast Cancer Mother  Diabetes Mother  Hypertension Mother  Stroke Mother TIAs- several  
 Breast Cancer Maternal Aunt 3 Maternal Aunts  Breast Cancer Paternal Aunt Social History Tobacco Use  Smoking status: Never Smoker  Smokeless tobacco: Never Used Substance Use Topics  Alcohol use: No  
   
Prior to Admission medications Medication Sig Start Date End Date Taking? Authorizing Provider  
ibuprofen (ADVIL) 200 mg tablet Take 400 mg by mouth. Provider, Historical  
fexofenadine (ALLEGRA) 180 mg tablet Take 180 mg by mouth nightly. Provider, Historical  
butalbital-acetaminophen-caffeine (FIORICET, ESGIC) -40 mg per tablet Take 1 Tab by mouth every six (6) hours as needed for Pain. 3/29/19   Jorge Ray MD  
ergocalciferol (ERGOCALCIFEROL) 50,000 unit capsule Take 1 Cap by mouth every seven (7) days. Every Leroy 3/29/19   Jorge Ray MD  
escitalopram oxalate (LEXAPRO) 20 mg tablet Take 1 Tab by mouth daily. Patient taking differently: Take 20 mg by mouth nightly. 3/29/19   Jorge Ray MD  
amLODIPine (NORVASC) 5 mg tablet Take 1 Tab by mouth daily. 3/29/19   Jorge Ray MD  
ezetimibe-simvastatin (VYTORIN 10/40) 10-40 mg per tablet Take 1 Tab by mouth nightly. 3/29/19   Jorge Ray MD  
metFORMIN ER (GLUCOPHAGE XR) 500 mg tablet Take 1 Tab by mouth daily. 3/29/19   Jorge Ray MD  
LEVEMIR FLEXTOUCH U-100 INSULN 100 unit/mL (3 mL) inpn 50 Units by SubCUTAneous route nightly. 3/26/19   Gino Montes MD  
TRULICITY 1.5 XW/8.0 mL sub-q pen 0.5 mL by SubCUTAneous route every seven (7) days. Patient taking differently: 1.5 mg by SubCUTAneous route every seven (7) days.  Indications: Takes on Leroy nights 3/26/19   Gino Montes MD  
 levothyroxine (SYNTHROID) 112 mcg tablet Take 1 Tab by mouth Daily (before breakfast). 3/26/19   Qiana Sabillon MD  
metoprolol succinate (TOPROL XL) 100 mg tablet Take 2 Tabs by mouth daily. Patient taking differently: Take 200 mg by mouth nightly. Two tabs at night 3/26/19   Qiana Sabillon MD  
irbesartan (AVAPRO) 300 mg tablet Take 1 Tab by mouth nightly. 3/26/19   Qiana Sabillon MD  
magnesium oxide (MAG-OX) 400 mg tablet Take 1 Tab by mouth two (2) times a day. 2/6/19   Jeff Rodriguez DO  
temazepam (RESTORIL) 30 mg capsule Take 1 Cap by mouth nightly as needed for Sleep. Max Daily Amount: 30 mg. 2/5/19   Corbin Dobbs MD  
busPIRone (BUSPAR) 15 mg tablet Take 1 tablet twice a day. 2/5/19   Corbin Dobbs MD  
gabapentin (NEURONTIN) 100 mg capsule TAKE 1 CAPSULE THREE TIMES A DAY. 2/5/19   Corbin Dobbs MD  
oxyCODONE IR (OXY-IR) 15 mg immediate release tablet Take 15 mg by mouth every six (6) hours as needed. 4/27/18   Provider, Historical  
cetirizine (ALLERGY RELIEF, CETIRIZINE,) 10 mg tablet Take 10 mg by mouth nightly. Indications: ALLERGIC RHINITIS    Provider, Historical  
 
Allergies Allergen Reactions  Shellfish Containing Products Anaphylaxis  Celebrex [Celecoxib] Swelling Red rash, itching  Catapres [Clonidine] Rash, Swelling and Other (comments) Catapres patch,  headache  Cymbalta [Duloxetine] Rash  Morphine Rash and Swelling  
  migraines  Oxycontin [Oxycodone] Other (comments) Migraines, elevated BP  
 Sulfa (Sulfonamide Antibiotics) Unknown (comments) REVIEW OF SYSTEMS: 
CONSTITUTIONAL: Denies fever, decreased appetite, weight loss/gain, night sweats or fatigue. HEENT: Denies vision or hearing changes. has glasses. denies hearing aids. CARDIAC: Denies CP, palpitations, rheumatic fever, murmur, peripheral edema, carotid artery disease or syncopal episodes. RESPIRATORY: Denies dyspnea on exertion, asthma, COPD or orthopnea.  GI: Denies GERD, history of GI bleed or melena, PUD, hepatitis or cirrhosis. : Denies dysuria, hematuria. denies BPH symptoms. HEMATOLOGIC: Denies anemia or blood disorders. ENDOCRINE: Denies thyroid disease. MUSCULOSKELETAL: See HPI. NEUROLOGIC: Denies seizure, peripheral neuropathy or memory loss. PSYCH: Denies depression, anxiety or insomnia. SKIN: Denies rash or open sores. Objective: PHYSICAL EXAM 
GENERAL: No data found. EYES: PERRL. EOM intact. MOUTH:Teeth and Gums normal. NECK: Full ROM. Trachea midline. No thyromegaly or JVD. CARDIOVASCULAR: Regular rate and rhythm. No murmur or gallops. No carotid bruits. Peripheral pulses: radial 2 +, PT 2+, DP 2+ bilaterally. LUNGS: CTA bilaterally. No wheezes, rhonchi or rales. GI: positive BS. Abdomen nontender. NEUROLOGIC: Alert and oriented x 3. Bilateral equal strong had grasp and bilateral equal strong plantar flexion and dorsiflexion. GAIT: abnormal MUSCULOSKELETAL: ROM: full with pain. Tenderness: over the medial and lateral joint lines. Crepitus: present. SKIN: No rash, bruising, swelling, redness or warmth. Labs:  No results found for this or any previous visit (from the past 24 hour(s)). Xray Right Knee: joint space narrowing Assessment: 
Advanced Right Knee Osteoarthritis. Total Right Knee Arthroplasty Indicated. Patient Active Problem List  
Diagnosis Code  Essential hypertension I10  
 Postsurgical hypothyroidism E89.0  GERD (gastroesophageal reflux disease) K21.9  Generalized anxiety disorder F41.1  Mixed hyperlipidemia E78.2  Degenerative disc disease, lumbar M51.36  
 Osteoarthritis M19.90  
 Fibromyalgia M79.7  Sleep apnea G47.30  Vitamin D deficiency E55.9  
 Uncontrolled type 2 diabetes mellitus with diabetic polyneuropathy, with long-term current use of insulin (HCC) E11.42, Z79.4, E11.65  Edema R60.9  Mitral valve regurgitation I34.0  Depression F32.9  Controlled type 2 diabetes mellitus with diabetic polyneuropathy, with long-term current use of insulin (HCC) E11.42, Z79.4  Type 2 diabetes with nephropathy (HCC) E11.21  
 Noncompliance with CPAP treatment Z91.14 Plan: 
I have advised the patient of the risks and consequences, including possible complications of performing total joint replacement, as well as not doing this operation. The patient had the opportunity to ask questions and have them answered to their satisfaction. Signed: 
Skipper Paget, PA 5/13/2019

## 2019-05-15 ENCOUNTER — ANESTHESIA EVENT (OUTPATIENT)
Dept: SURGERY | Age: 74
DRG: 470 | End: 2019-05-15
Payer: MEDICARE

## 2019-05-16 ENCOUNTER — HOSPITAL ENCOUNTER (INPATIENT)
Age: 74
LOS: 3 days | Discharge: HOME HEALTH | DRG: 470 | End: 2019-05-19
Attending: ORTHOPAEDIC SURGERY | Admitting: ORTHOPAEDIC SURGERY
Payer: MEDICARE

## 2019-05-16 ENCOUNTER — ANESTHESIA (OUTPATIENT)
Dept: SURGERY | Age: 74
DRG: 470 | End: 2019-05-16
Payer: MEDICARE

## 2019-05-16 ENCOUNTER — HOME HEALTH ADMISSION (OUTPATIENT)
Dept: HOME HEALTH SERVICES | Facility: HOME HEALTH | Age: 74
End: 2019-05-16
Payer: MEDICARE

## 2019-05-16 DIAGNOSIS — Z96.651 S/P TKR (TOTAL KNEE REPLACEMENT) USING CEMENT, RIGHT: Primary | ICD-10-CM

## 2019-05-16 PROBLEM — M17.11 ARTHRITIS OF KNEE, RIGHT: Status: ACTIVE | Noted: 2019-05-16

## 2019-05-16 LAB
ABO + RH BLD: NORMAL
BLOOD GROUP ANTIBODIES SERPL: NORMAL
GLUCOSE BLD STRIP.AUTO-MCNC: 348 MG/DL (ref 65–100)
GLUCOSE BLD STRIP.AUTO-MCNC: 78 MG/DL (ref 65–100)
HGB BLD-MCNC: 11.6 G/DL (ref 11.7–15.4)
SPECIMEN EXP DATE BLD: NORMAL

## 2019-05-16 PROCEDURE — 94762 N-INVAS EAR/PLS OXIMTRY CONT: CPT

## 2019-05-16 PROCEDURE — 74011000258 HC RX REV CODE- 258: Performed by: ORTHOPAEDIC SURGERY

## 2019-05-16 PROCEDURE — 82962 GLUCOSE BLOOD TEST: CPT

## 2019-05-16 PROCEDURE — 74011250636 HC RX REV CODE- 250/636

## 2019-05-16 PROCEDURE — C1713 ANCHOR/SCREW BN/BN,TIS/BN: HCPCS | Performed by: ORTHOPAEDIC SURGERY

## 2019-05-16 PROCEDURE — 77030020256 HC SOL INJ NACL 0.9%  500ML: Performed by: ORTHOPAEDIC SURGERY

## 2019-05-16 PROCEDURE — 76010010054 HC POST OP PAIN BLOCK: Performed by: ORTHOPAEDIC SURGERY

## 2019-05-16 PROCEDURE — 77030019557 HC ELECTRD VES SEAL MEDT -F: Performed by: ORTHOPAEDIC SURGERY

## 2019-05-16 PROCEDURE — 77030008477 HC STYL SATN SLP COVD -A: Performed by: ANESTHESIOLOGY

## 2019-05-16 PROCEDURE — 97530 THERAPEUTIC ACTIVITIES: CPT

## 2019-05-16 PROCEDURE — 74011000250 HC RX REV CODE- 250: Performed by: ORTHOPAEDIC SURGERY

## 2019-05-16 PROCEDURE — 86900 BLOOD TYPING SEROLOGIC ABO: CPT

## 2019-05-16 PROCEDURE — 77030012935 HC DRSG AQUACEL BMS -B: Performed by: ORTHOPAEDIC SURGERY

## 2019-05-16 PROCEDURE — 77030006835 HC BLD SAW SAG STRY -B: Performed by: ORTHOPAEDIC SURGERY

## 2019-05-16 PROCEDURE — 36415 COLL VENOUS BLD VENIPUNCTURE: CPT

## 2019-05-16 PROCEDURE — C1776 JOINT DEVICE (IMPLANTABLE): HCPCS | Performed by: ORTHOPAEDIC SURGERY

## 2019-05-16 PROCEDURE — 85018 HEMOGLOBIN: CPT

## 2019-05-16 PROCEDURE — 74011250636 HC RX REV CODE- 250/636: Performed by: ANESTHESIOLOGY

## 2019-05-16 PROCEDURE — 94760 N-INVAS EAR/PLS OXIMETRY 1: CPT

## 2019-05-16 PROCEDURE — 77030006720 HC BLD PAT RMR ZIMM -B: Performed by: ORTHOPAEDIC SURGERY

## 2019-05-16 PROCEDURE — 77030013819 HC MX SYS CEM ZIMM -B: Performed by: ORTHOPAEDIC SURGERY

## 2019-05-16 PROCEDURE — 77030013708 HC HNDPC SUC IRR PULS STRY –B: Performed by: ORTHOPAEDIC SURGERY

## 2019-05-16 PROCEDURE — 0SRC0J9 REPLACEMENT OF RIGHT KNEE JOINT WITH SYNTHETIC SUBSTITUTE, CEMENTED, OPEN APPROACH: ICD-10-PCS | Performed by: ORTHOPAEDIC SURGERY

## 2019-05-16 PROCEDURE — 76010000162 HC OR TIME 1.5 TO 2 HR INTENSV-TIER 1: Performed by: ORTHOPAEDIC SURGERY

## 2019-05-16 PROCEDURE — 86580 TB INTRADERMAL TEST: CPT | Performed by: ORTHOPAEDIC SURGERY

## 2019-05-16 PROCEDURE — 97165 OT EVAL LOW COMPLEX 30 MIN: CPT

## 2019-05-16 PROCEDURE — 74011000302 HC RX REV CODE- 302: Performed by: ORTHOPAEDIC SURGERY

## 2019-05-16 PROCEDURE — 77030011208: Performed by: ORTHOPAEDIC SURGERY

## 2019-05-16 PROCEDURE — 77030018836 HC SOL IRR NACL ICUM -A: Performed by: ORTHOPAEDIC SURGERY

## 2019-05-16 PROCEDURE — 77010033678 HC OXYGEN DAILY

## 2019-05-16 PROCEDURE — 76942 ECHO GUIDE FOR BIOPSY: CPT | Performed by: ORTHOPAEDIC SURGERY

## 2019-05-16 PROCEDURE — 77030008467 HC STPLR SKN COVD -B: Performed by: ORTHOPAEDIC SURGERY

## 2019-05-16 PROCEDURE — 77030033067 HC SUT PDO STRATFX SPIR J&J -B: Performed by: ORTHOPAEDIC SURGERY

## 2019-05-16 PROCEDURE — 74011000250 HC RX REV CODE- 250

## 2019-05-16 PROCEDURE — 77030003602 HC NDL NRV BLK BBMI -B: Performed by: ANESTHESIOLOGY

## 2019-05-16 PROCEDURE — 77030019908 HC STETH ESOPH SIMS -A: Performed by: ANESTHESIOLOGY

## 2019-05-16 PROCEDURE — 77030020782 HC GWN BAIR PAWS FLX 3M -B: Performed by: ANESTHESIOLOGY

## 2019-05-16 PROCEDURE — 77030018673: Performed by: ORTHOPAEDIC SURGERY

## 2019-05-16 PROCEDURE — 76210000017 HC OR PH I REC 1.5 TO 2 HR: Performed by: ORTHOPAEDIC SURGERY

## 2019-05-16 PROCEDURE — 74011636637 HC RX REV CODE- 636/637: Performed by: INTERNAL MEDICINE

## 2019-05-16 PROCEDURE — 74011250636 HC RX REV CODE- 250/636: Performed by: ORTHOPAEDIC SURGERY

## 2019-05-16 PROCEDURE — 77030034849: Performed by: ORTHOPAEDIC SURGERY

## 2019-05-16 PROCEDURE — 77030016544 HC BLD SAW RECIP1 STRY -B: Performed by: ORTHOPAEDIC SURGERY

## 2019-05-16 PROCEDURE — 77030032490 HC SLV COMPR SCD KNE COVD -B

## 2019-05-16 PROCEDURE — 97161 PT EVAL LOW COMPLEX 20 MIN: CPT

## 2019-05-16 PROCEDURE — 74011250637 HC RX REV CODE- 250/637: Performed by: ORTHOPAEDIC SURGERY

## 2019-05-16 PROCEDURE — 77030039425 HC BLD LARYNG TRULITE DISP TELE -A: Performed by: ANESTHESIOLOGY

## 2019-05-16 PROCEDURE — 65270000029 HC RM PRIVATE

## 2019-05-16 PROCEDURE — 76060000034 HC ANESTHESIA 1.5 TO 2 HR: Performed by: ORTHOPAEDIC SURGERY

## 2019-05-16 PROCEDURE — 77030031139 HC SUT VCRL2 J&J -A: Performed by: ORTHOPAEDIC SURGERY

## 2019-05-16 PROCEDURE — 97110 THERAPEUTIC EXERCISES: CPT

## 2019-05-16 PROCEDURE — 77030008703 HC TU ET UNCUF COVD -A: Performed by: ANESTHESIOLOGY

## 2019-05-16 DEVICE — BASEPLATE TIB SZ 4 FIX BEAR CEM S+ TECHNOLOGY ATTUNE: Type: IMPLANTABLE DEVICE | Site: KNEE | Status: FUNCTIONAL

## 2019-05-16 DEVICE — (D)CEMENT BNE HV R 40GM -- DUPE USE ITEM 353850: Type: IMPLANTABLE DEVICE | Site: KNEE | Status: FUNCTIONAL

## 2019-05-16 DEVICE — COMPONENT FEM SZ 5 R KNEE POST STBL CEM ATTUNE: Type: IMPLANTABLE DEVICE | Site: KNEE | Status: FUNCTIONAL

## 2019-05-16 DEVICE — INSERT TIB SZ 5 THK7MM KNEE POST STBL FIX BEAR ATTUNE: Type: IMPLANTABLE DEVICE | Site: KNEE | Status: FUNCTIONAL

## 2019-05-16 DEVICE — COMPONENT PAT DIA38MM POLYETH DOME CEM MEDIALIZED ATTUNE: Type: IMPLANTABLE DEVICE | Site: KNEE | Status: FUNCTIONAL

## 2019-05-16 RX ORDER — LEVOTHYROXINE SODIUM 112 UG/1
112 TABLET ORAL
Status: DISCONTINUED | OUTPATIENT
Start: 2019-05-17 | End: 2019-05-19 | Stop reason: HOSPADM

## 2019-05-16 RX ORDER — ESCITALOPRAM OXALATE 10 MG/1
20 TABLET ORAL
Status: DISCONTINUED | OUTPATIENT
Start: 2019-05-16 | End: 2019-05-19 | Stop reason: HOSPADM

## 2019-05-16 RX ORDER — PROPOFOL 10 MG/ML
INJECTION, EMULSION INTRAVENOUS AS NEEDED
Status: DISCONTINUED | OUTPATIENT
Start: 2019-05-16 | End: 2019-05-16 | Stop reason: HOSPADM

## 2019-05-16 RX ORDER — METOPROLOL SUCCINATE 100 MG/1
200 TABLET, EXTENDED RELEASE ORAL
Status: DISCONTINUED | OUTPATIENT
Start: 2019-05-16 | End: 2019-05-19 | Stop reason: HOSPADM

## 2019-05-16 RX ORDER — SODIUM CHLORIDE 0.9 % (FLUSH) 0.9 %
5-40 SYRINGE (ML) INJECTION EVERY 8 HOURS
Status: DISCONTINUED | OUTPATIENT
Start: 2019-05-16 | End: 2019-05-19 | Stop reason: HOSPADM

## 2019-05-16 RX ORDER — SODIUM CHLORIDE 0.9 % (FLUSH) 0.9 %
5-40 SYRINGE (ML) INJECTION AS NEEDED
Status: DISCONTINUED | OUTPATIENT
Start: 2019-05-16 | End: 2019-05-19 | Stop reason: HOSPADM

## 2019-05-16 RX ORDER — GABAPENTIN 100 MG/1
100 CAPSULE ORAL 3 TIMES DAILY
Status: DISCONTINUED | OUTPATIENT
Start: 2019-05-16 | End: 2019-05-19 | Stop reason: HOSPADM

## 2019-05-16 RX ORDER — INSULIN GLARGINE 100 [IU]/ML
15 INJECTION, SOLUTION SUBCUTANEOUS
Status: DISCONTINUED | OUTPATIENT
Start: 2019-05-16 | End: 2019-05-16 | Stop reason: SDUPTHER

## 2019-05-16 RX ORDER — NALOXONE HYDROCHLORIDE 0.4 MG/ML
.2-.4 INJECTION, SOLUTION INTRAMUSCULAR; INTRAVENOUS; SUBCUTANEOUS
Status: DISCONTINUED | OUTPATIENT
Start: 2019-05-16 | End: 2019-05-19 | Stop reason: HOSPADM

## 2019-05-16 RX ORDER — ACETAMINOPHEN 10 MG/ML
1000 INJECTION, SOLUTION INTRAVENOUS ONCE
Status: COMPLETED | OUTPATIENT
Start: 2019-05-16 | End: 2019-05-16

## 2019-05-16 RX ORDER — TEMAZEPAM 15 MG/1
30 CAPSULE ORAL
Status: DISCONTINUED | OUTPATIENT
Start: 2019-05-16 | End: 2019-05-19 | Stop reason: HOSPADM

## 2019-05-16 RX ORDER — ZOLPIDEM TARTRATE 5 MG/1
5 TABLET ORAL
Status: DISCONTINUED | OUTPATIENT
Start: 2019-05-16 | End: 2019-05-16 | Stop reason: ALTCHOICE

## 2019-05-16 RX ORDER — ASPIRIN 81 MG/1
81 TABLET ORAL EVERY 12 HOURS
Status: DISCONTINUED | OUTPATIENT
Start: 2019-05-16 | End: 2019-05-19 | Stop reason: HOSPADM

## 2019-05-16 RX ORDER — DEXAMETHASONE SODIUM PHOSPHATE 4 MG/ML
INJECTION, SOLUTION INTRA-ARTICULAR; INTRALESIONAL; INTRAMUSCULAR; INTRAVENOUS; SOFT TISSUE AS NEEDED
Status: DISCONTINUED | OUTPATIENT
Start: 2019-05-16 | End: 2019-05-16 | Stop reason: HOSPADM

## 2019-05-16 RX ORDER — DEXAMETHASONE SODIUM PHOSPHATE 100 MG/10ML
10 INJECTION INTRAMUSCULAR; INTRAVENOUS ONCE
Status: ACTIVE | OUTPATIENT
Start: 2019-05-17 | End: 2019-05-18

## 2019-05-16 RX ORDER — AMLODIPINE BESYLATE 5 MG/1
5 TABLET ORAL DAILY
Status: DISCONTINUED | OUTPATIENT
Start: 2019-05-17 | End: 2019-05-18

## 2019-05-16 RX ORDER — HYDROMORPHONE HYDROCHLORIDE 2 MG/ML
0.5 INJECTION, SOLUTION INTRAMUSCULAR; INTRAVENOUS; SUBCUTANEOUS
Status: COMPLETED | OUTPATIENT
Start: 2019-05-16 | End: 2019-05-16

## 2019-05-16 RX ORDER — ONDANSETRON 8 MG/1
8 TABLET, ORALLY DISINTEGRATING ORAL
Status: DISCONTINUED | OUTPATIENT
Start: 2019-05-16 | End: 2019-05-19 | Stop reason: HOSPADM

## 2019-05-16 RX ORDER — ACETAMINOPHEN 500 MG
1000 TABLET ORAL ONCE
Status: DISCONTINUED | OUTPATIENT
Start: 2019-05-16 | End: 2019-05-16 | Stop reason: HOSPADM

## 2019-05-16 RX ORDER — PROMETHAZINE HYDROCHLORIDE 25 MG/1
25 TABLET ORAL
Status: DISCONTINUED | OUTPATIENT
Start: 2019-05-16 | End: 2019-05-19 | Stop reason: HOSPADM

## 2019-05-16 RX ORDER — ACETAMINOPHEN 500 MG
1000 TABLET ORAL EVERY 6 HOURS
Status: DISCONTINUED | OUTPATIENT
Start: 2019-05-17 | End: 2019-05-17

## 2019-05-16 RX ORDER — LANOLIN ALCOHOL/MO/W.PET/CERES
400 CREAM (GRAM) TOPICAL 2 TIMES DAILY
Status: DISCONTINUED | OUTPATIENT
Start: 2019-05-16 | End: 2019-05-19 | Stop reason: HOSPADM

## 2019-05-16 RX ORDER — GLYCOPYRROLATE 0.2 MG/ML
INJECTION INTRAMUSCULAR; INTRAVENOUS AS NEEDED
Status: DISCONTINUED | OUTPATIENT
Start: 2019-05-16 | End: 2019-05-16 | Stop reason: HOSPADM

## 2019-05-16 RX ORDER — SODIUM CHLORIDE, SODIUM LACTATE, POTASSIUM CHLORIDE, CALCIUM CHLORIDE 600; 310; 30; 20 MG/100ML; MG/100ML; MG/100ML; MG/100ML
100 INJECTION, SOLUTION INTRAVENOUS CONTINUOUS
Status: DISCONTINUED | OUTPATIENT
Start: 2019-05-16 | End: 2019-05-16 | Stop reason: HOSPADM

## 2019-05-16 RX ORDER — EPHEDRINE SULFATE 50 MG/ML
INJECTION, SOLUTION INTRAVENOUS AS NEEDED
Status: DISCONTINUED | OUTPATIENT
Start: 2019-05-16 | End: 2019-05-16 | Stop reason: HOSPADM

## 2019-05-16 RX ORDER — EZETIMIBE 10 MG/1
10 TABLET ORAL
Status: DISCONTINUED | OUTPATIENT
Start: 2019-05-16 | End: 2019-05-19 | Stop reason: HOSPADM

## 2019-05-16 RX ORDER — DEXAMETHASONE SODIUM PHOSPHATE 4 MG/ML
INJECTION, SOLUTION INTRA-ARTICULAR; INTRALESIONAL; INTRAMUSCULAR; INTRAVENOUS; SOFT TISSUE
Status: COMPLETED | OUTPATIENT
Start: 2019-05-16 | End: 2019-05-16

## 2019-05-16 RX ORDER — FENTANYL CITRATE 50 UG/ML
100 INJECTION, SOLUTION INTRAMUSCULAR; INTRAVENOUS ONCE
Status: COMPLETED | OUTPATIENT
Start: 2019-05-16 | End: 2019-05-16

## 2019-05-16 RX ORDER — DEXTROSE 50 % IN WATER (D50W) INTRAVENOUS SYRINGE
25-50 AS NEEDED
Status: DISCONTINUED | OUTPATIENT
Start: 2019-05-16 | End: 2019-05-19 | Stop reason: HOSPADM

## 2019-05-16 RX ORDER — INSULIN LISPRO 100 [IU]/ML
INJECTION, SOLUTION INTRAVENOUS; SUBCUTANEOUS
Status: DISCONTINUED | OUTPATIENT
Start: 2019-05-16 | End: 2019-05-19 | Stop reason: HOSPADM

## 2019-05-16 RX ORDER — VALSARTAN 320 MG/1
320 TABLET ORAL
Status: DISCONTINUED | OUTPATIENT
Start: 2019-05-16 | End: 2019-05-19 | Stop reason: HOSPADM

## 2019-05-16 RX ORDER — LABETALOL HYDROCHLORIDE 5 MG/ML
INJECTION, SOLUTION INTRAVENOUS AS NEEDED
Status: DISCONTINUED | OUTPATIENT
Start: 2019-05-16 | End: 2019-05-16 | Stop reason: HOSPADM

## 2019-05-16 RX ORDER — SODIUM CHLORIDE 9 MG/ML
100 INJECTION, SOLUTION INTRAVENOUS CONTINUOUS
Status: DISPENSED | OUTPATIENT
Start: 2019-05-16 | End: 2019-05-18

## 2019-05-16 RX ORDER — MIDAZOLAM HYDROCHLORIDE 1 MG/ML
2 INJECTION, SOLUTION INTRAMUSCULAR; INTRAVENOUS
Status: COMPLETED | OUTPATIENT
Start: 2019-05-16 | End: 2019-05-16

## 2019-05-16 RX ORDER — DEXTROSE 40 %
15 GEL (GRAM) ORAL AS NEEDED
Status: DISCONTINUED | OUTPATIENT
Start: 2019-05-16 | End: 2019-05-19 | Stop reason: HOSPADM

## 2019-05-16 RX ORDER — LIDOCAINE HYDROCHLORIDE 20 MG/ML
INJECTION, SOLUTION EPIDURAL; INFILTRATION; INTRACAUDAL; PERINEURAL AS NEEDED
Status: DISCONTINUED | OUTPATIENT
Start: 2019-05-16 | End: 2019-05-16 | Stop reason: HOSPADM

## 2019-05-16 RX ORDER — SIMVASTATIN 20 MG/1
20 TABLET, FILM COATED ORAL
Status: DISCONTINUED | OUTPATIENT
Start: 2019-05-16 | End: 2019-05-19 | Stop reason: HOSPADM

## 2019-05-16 RX ORDER — ONDANSETRON 2 MG/ML
INJECTION INTRAMUSCULAR; INTRAVENOUS AS NEEDED
Status: DISCONTINUED | OUTPATIENT
Start: 2019-05-16 | End: 2019-05-16 | Stop reason: HOSPADM

## 2019-05-16 RX ORDER — DIPHENHYDRAMINE HCL 25 MG
25 CAPSULE ORAL
Status: DISCONTINUED | OUTPATIENT
Start: 2019-05-16 | End: 2019-05-19 | Stop reason: HOSPADM

## 2019-05-16 RX ORDER — ROCURONIUM BROMIDE 10 MG/ML
INJECTION, SOLUTION INTRAVENOUS AS NEEDED
Status: DISCONTINUED | OUTPATIENT
Start: 2019-05-16 | End: 2019-05-16 | Stop reason: HOSPADM

## 2019-05-16 RX ORDER — HYDROMORPHONE HYDROCHLORIDE 2 MG/ML
1 INJECTION, SOLUTION INTRAMUSCULAR; INTRAVENOUS; SUBCUTANEOUS
Status: DISCONTINUED | OUTPATIENT
Start: 2019-05-16 | End: 2019-05-19 | Stop reason: HOSPADM

## 2019-05-16 RX ORDER — FENTANYL CITRATE 50 UG/ML
INJECTION, SOLUTION INTRAMUSCULAR; INTRAVENOUS AS NEEDED
Status: DISCONTINUED | OUTPATIENT
Start: 2019-05-16 | End: 2019-05-16 | Stop reason: HOSPADM

## 2019-05-16 RX ORDER — BUSPIRONE HYDROCHLORIDE 5 MG/1
15 TABLET ORAL 2 TIMES DAILY
Status: DISCONTINUED | OUTPATIENT
Start: 2019-05-16 | End: 2019-05-19 | Stop reason: HOSPADM

## 2019-05-16 RX ORDER — NEOSTIGMINE METHYLSULFATE 1 MG/ML
INJECTION INTRAVENOUS AS NEEDED
Status: DISCONTINUED | OUTPATIENT
Start: 2019-05-16 | End: 2019-05-16 | Stop reason: HOSPADM

## 2019-05-16 RX ORDER — METFORMIN HYDROCHLORIDE 500 MG/1
500 TABLET ORAL
Status: DISCONTINUED | OUTPATIENT
Start: 2019-05-17 | End: 2019-05-19 | Stop reason: HOSPADM

## 2019-05-16 RX ORDER — HYDROMORPHONE HYDROCHLORIDE 2 MG/1
2-4 TABLET ORAL
Status: DISCONTINUED | OUTPATIENT
Start: 2019-05-16 | End: 2019-05-19 | Stop reason: HOSPADM

## 2019-05-16 RX ORDER — CEFAZOLIN SODIUM/WATER 2 G/20 ML
2 SYRINGE (ML) INTRAVENOUS EVERY 8 HOURS
Status: COMPLETED | OUTPATIENT
Start: 2019-05-16 | End: 2019-05-17

## 2019-05-16 RX ORDER — CEFAZOLIN SODIUM/WATER 2 G/20 ML
2 SYRINGE (ML) INTRAVENOUS ONCE
Status: COMPLETED | OUTPATIENT
Start: 2019-05-16 | End: 2019-05-16

## 2019-05-16 RX ORDER — LORATADINE 10 MG/1
10 TABLET ORAL DAILY
Status: DISCONTINUED | OUTPATIENT
Start: 2019-05-17 | End: 2019-05-19 | Stop reason: HOSPADM

## 2019-05-16 RX ORDER — TRANEXAMIC ACID 100 MG/ML
INJECTION, SOLUTION INTRAVENOUS AS NEEDED
Status: DISCONTINUED | OUTPATIENT
Start: 2019-05-16 | End: 2019-05-16 | Stop reason: HOSPADM

## 2019-05-16 RX ORDER — AMOXICILLIN 250 MG
2 CAPSULE ORAL DAILY
Status: DISCONTINUED | OUTPATIENT
Start: 2019-05-17 | End: 2019-05-19 | Stop reason: HOSPADM

## 2019-05-16 RX ORDER — KETAMINE HYDROCHLORIDE 100 MG/ML
INJECTION, SOLUTION INTRAMUSCULAR; INTRAVENOUS AS NEEDED
Status: DISCONTINUED | OUTPATIENT
Start: 2019-05-16 | End: 2019-05-16 | Stop reason: HOSPADM

## 2019-05-16 RX ORDER — ROPIVACAINE HYDROCHLORIDE 2 MG/ML
INJECTION, SOLUTION EPIDURAL; INFILTRATION; PERINEURAL
Status: COMPLETED | OUTPATIENT
Start: 2019-05-16 | End: 2019-05-16

## 2019-05-16 RX ORDER — LIDOCAINE HYDROCHLORIDE 10 MG/ML
0.3 INJECTION INFILTRATION; PERINEURAL ONCE
Status: COMPLETED | OUTPATIENT
Start: 2019-05-16 | End: 2019-05-16

## 2019-05-16 RX ORDER — ROPIVACAINE HYDROCHLORIDE 2 MG/ML
INJECTION, SOLUTION EPIDURAL; INFILTRATION; PERINEURAL AS NEEDED
Status: DISCONTINUED | OUTPATIENT
Start: 2019-05-16 | End: 2019-05-16 | Stop reason: HOSPADM

## 2019-05-16 RX ADMIN — ASPIRIN 81 MG: 81 TABLET ORAL at 21:52

## 2019-05-16 RX ADMIN — ROCURONIUM BROMIDE 40 MG: 10 INJECTION, SOLUTION INTRAVENOUS at 10:35

## 2019-05-16 RX ADMIN — TRANEXAMIC ACID 1000 MG: 100 INJECTION, SOLUTION INTRAVENOUS at 10:45

## 2019-05-16 RX ADMIN — EZETIMIBE 10 MG: 10 TABLET ORAL at 21:52

## 2019-05-16 RX ADMIN — ONDANSETRON 4 MG: 2 INJECTION INTRAMUSCULAR; INTRAVENOUS at 10:45

## 2019-05-16 RX ADMIN — FENTANYL CITRATE 50 MCG: 50 INJECTION INTRAMUSCULAR; INTRAVENOUS at 09:39

## 2019-05-16 RX ADMIN — SODIUM CHLORIDE, SODIUM LACTATE, POTASSIUM CHLORIDE, AND CALCIUM CHLORIDE 100 ML/HR: 600; 310; 30; 20 INJECTION, SOLUTION INTRAVENOUS at 08:23

## 2019-05-16 RX ADMIN — METOPROLOL SUCCINATE 200 MG: 100 TABLET, EXTENDED RELEASE ORAL at 21:53

## 2019-05-16 RX ADMIN — Medication 1 AMPULE: at 21:52

## 2019-05-16 RX ADMIN — SODIUM CHLORIDE, SODIUM LACTATE, POTASSIUM CHLORIDE, AND CALCIUM CHLORIDE: 600; 310; 30; 20 INJECTION, SOLUTION INTRAVENOUS at 11:00

## 2019-05-16 RX ADMIN — ROCURONIUM BROMIDE 10 MG: 10 INJECTION, SOLUTION INTRAVENOUS at 11:08

## 2019-05-16 RX ADMIN — MIDAZOLAM 2 MG: 1 INJECTION INTRAMUSCULAR; INTRAVENOUS at 09:39

## 2019-05-16 RX ADMIN — INSULIN LISPRO 8 UNITS: 100 INJECTION, SOLUTION INTRAVENOUS; SUBCUTANEOUS at 21:56

## 2019-05-16 RX ADMIN — EPHEDRINE SULFATE 10 MG: 50 INJECTION, SOLUTION INTRAVENOUS at 10:43

## 2019-05-16 RX ADMIN — GLYCOPYRROLATE 0.4 MG: 0.2 INJECTION INTRAMUSCULAR; INTRAVENOUS at 11:59

## 2019-05-16 RX ADMIN — ACETAMINOPHEN 1000 MG: 10 INJECTION, SOLUTION INTRAVENOUS at 13:23

## 2019-05-16 RX ADMIN — HYDROMORPHONE HYDROCHLORIDE 0.5 MG: 2 INJECTION INTRAMUSCULAR; INTRAVENOUS; SUBCUTANEOUS at 12:37

## 2019-05-16 RX ADMIN — Medication 400 MG: at 18:02

## 2019-05-16 RX ADMIN — SODIUM CHLORIDE 100 ML/HR: 900 INJECTION, SOLUTION INTRAVENOUS at 14:10

## 2019-05-16 RX ADMIN — GABAPENTIN 100 MG: 100 CAPSULE ORAL at 18:00

## 2019-05-16 RX ADMIN — TUBERCULIN PURIFIED PROTEIN DERIVATIVE 5 UNITS: 5 INJECTION, SOLUTION INTRADERMAL at 08:23

## 2019-05-16 RX ADMIN — HYDROMORPHONE HYDROCHLORIDE 0.5 MG: 2 INJECTION INTRAMUSCULAR; INTRAVENOUS; SUBCUTANEOUS at 13:23

## 2019-05-16 RX ADMIN — FENTANYL CITRATE 50 MCG: 50 INJECTION, SOLUTION INTRAMUSCULAR; INTRAVENOUS at 10:35

## 2019-05-16 RX ADMIN — KETAMINE HYDROCHLORIDE 50 MG: 100 INJECTION, SOLUTION INTRAMUSCULAR; INTRAVENOUS at 10:35

## 2019-05-16 RX ADMIN — GABAPENTIN 100 MG: 100 CAPSULE ORAL at 21:53

## 2019-05-16 RX ADMIN — HYDROMORPHONE HYDROCHLORIDE 2 MG: 2 TABLET ORAL at 14:28

## 2019-05-16 RX ADMIN — DEXAMETHASONE SODIUM PHOSPHATE 5 MG: 4 INJECTION, SOLUTION INTRA-ARTICULAR; INTRALESIONAL; INTRAMUSCULAR; INTRAVENOUS; SOFT TISSUE at 09:42

## 2019-05-16 RX ADMIN — NEOSTIGMINE METHYLSULFATE 3 MG: 1 INJECTION INTRAVENOUS at 11:59

## 2019-05-16 RX ADMIN — VALSARTAN 320 MG: 320 TABLET, FILM COATED ORAL at 21:52

## 2019-05-16 RX ADMIN — HYDROMORPHONE HYDROCHLORIDE 0.5 MG: 2 INJECTION INTRAMUSCULAR; INTRAVENOUS; SUBCUTANEOUS at 12:56

## 2019-05-16 RX ADMIN — LIDOCAINE HYDROCHLORIDE 100 MG: 20 INJECTION, SOLUTION EPIDURAL; INFILTRATION; INTRACAUDAL; PERINEURAL at 10:35

## 2019-05-16 RX ADMIN — PROPOFOL 100 MG: 10 INJECTION, EMULSION INTRAVENOUS at 10:35

## 2019-05-16 RX ADMIN — HYDROMORPHONE HYDROCHLORIDE 0.5 MG: 2 INJECTION INTRAMUSCULAR; INTRAVENOUS; SUBCUTANEOUS at 13:00

## 2019-05-16 RX ADMIN — LABETALOL HYDROCHLORIDE 5 MG: 5 INJECTION, SOLUTION INTRAVENOUS at 11:11

## 2019-05-16 RX ADMIN — HYDROMORPHONE HYDROCHLORIDE 2 MG: 2 TABLET ORAL at 21:53

## 2019-05-16 RX ADMIN — HYDROMORPHONE HYDROCHLORIDE 0.5 MG: 2 INJECTION INTRAMUSCULAR; INTRAVENOUS; SUBCUTANEOUS at 12:40

## 2019-05-16 RX ADMIN — Medication 3 AMPULE: at 08:23

## 2019-05-16 RX ADMIN — LIDOCAINE HYDROCHLORIDE 0.3 ML: 10 INJECTION, SOLUTION INFILTRATION; PERINEURAL at 08:38

## 2019-05-16 RX ADMIN — ROPIVACAINE HYDROCHLORIDE 40 MG: 2 INJECTION, SOLUTION EPIDURAL; INFILTRATION; PERINEURAL at 09:42

## 2019-05-16 RX ADMIN — Medication 2 G: at 10:45

## 2019-05-16 RX ADMIN — Medication 2 G: at 18:01

## 2019-05-16 RX ADMIN — DEXAMETHASONE SODIUM PHOSPHATE 10 MG: 4 INJECTION, SOLUTION INTRA-ARTICULAR; INTRALESIONAL; INTRAMUSCULAR; INTRAVENOUS; SOFT TISSUE at 10:45

## 2019-05-16 RX ADMIN — ACETAMINOPHEN 1000 MG: 10 INJECTION, SOLUTION INTRAVENOUS at 18:02

## 2019-05-16 RX ADMIN — HYDROMORPHONE HYDROCHLORIDE 2 MG: 2 TABLET ORAL at 18:01

## 2019-05-16 RX ADMIN — ESCITALOPRAM OXALATE 20 MG: 10 TABLET ORAL at 21:53

## 2019-05-16 RX ADMIN — BUSPIRONE HYDROCHLORIDE 15 MG: 5 TABLET ORAL at 18:00

## 2019-05-16 RX ADMIN — HYDROMORPHONE HYDROCHLORIDE 0.5 MG: 2 INJECTION INTRAMUSCULAR; INTRAVENOUS; SUBCUTANEOUS at 13:20

## 2019-05-16 RX ADMIN — GLYCOPYRROLATE 0.2 MG: 0.2 INJECTION INTRAMUSCULAR; INTRAVENOUS at 10:45

## 2019-05-16 NOTE — OP NOTES
[address] [place] [address]  Cemented Total Knee Arthroplasty  Patient:Elva Pantoja   : 1945  Medical Record [id number]  Pre-operative Diagnosis:  Unilateral primary osteoarthritis, right knee [M17.11]  Post-operative Diagnosis: Unilateral primary osteoarthritis, right knee [M17.11]    Surgeon: Julian Sykes MD  Assistant: Narayan Weaver PA-C    Anesthesia: Spinal    Procedure: Total Knee Arthroplasty with use of Bone Cement  The complexity of the total joint surgery requires the use of a first assistant for positioning, retraction and assistance in closure. The patient's Body mass index is 31.2 kg/m²., BMI's greater then 40 make surgical exposure and retraction extremely difficult and increase operative time. Tourniquet Time: none  EBL: 150cc  Additional Findings: Severe DJD / Pre-op ROM / post-op 0-125  Releases none    Berta Tapia was brought to the operating room and positioned on the operating table. She was anethestized  A echeverria catheter was placed preoperatively and IV antibiotics was administered. Prior to the incision being made a timeout was called identifying the patient, procedure ,operative side and surgeon. . The right leg was prepped and draped in the usual sterile manner  An anterior longitudinal incision was accomplished just medial to the tibial tubercle and extending approximal 6 centimeters proximal to the superior pole of the patella. A medial parapatellar capsular incision was performed. The medial capsular flap was elevated around to the insertion of the semimembranous tendon. The patella was everted and the knee flexed and externally rotated. The medial and external menisci were excised. The lateral half of the fat pad excised and the patella femoral ligament was released. The anterior cruciate ligament was resected and the posterior cruciate ligament was substituted.   Using extramedullary instrumentation, the tibial cut was accomplished with appropriate posterior slope. Approxiamately 2 mm of bone was removed from the low side of the tibia. The distal femur was next addressed. A drill hole was made above the intracondylar notch. Using appropriate intramedullary instrumentation,a 6 degree valgus distal cut was accomplished. A femur was sized. The anterior and posterior cuts were then made about the distal femur. The osteophytes were removed from the tibial and femoral surfaces. The flexion and extension gaps were assessed with the appropriate spacer blocks. Additional surgical procedures included none. The flexion and extension gaps were deemed appropriately balanced. The appropriate cutting blocks were then utilized to perform the anterior chamfer, posterior chamfer and notch cuts, with appropriate lateral tranlation accomplished for the patellofemoral groove. The tibia was sized. The tibial base plate was pinned into place with the appropriate external rotation and stem site prepared. A preliminary range of motion was accomplished with the above size trial components. A polyethylene insert allowed the patient to obtain full extension as well as appropriate flexion. The patient's ligaments were stable in flexion and extension to medial and lateral stressing and the alignment was through the appropriate mechanical axis. The patella was then everted. The bone was resected appropriately for a pegged patella button. A trial reduction revealed appropriate tracking through the patellofemoral groove. All trial components were removed and the cut surfaces prepared for cementing with irrigation and debridement of the bone interstices. The implants were cemented into position and pressurized in the usual fashion. Bone and cement debris were meticulously removed. The betadine lavage protocol was used. [facility] knee was placed through range of motion and noted to be stable as mentioned above with the trail components. The wound was dry, therefore no drain was used. The operative knee was injected with 60cc of Naropin, 10 cc's of morphine and 1 cc of 30mg of Toradol. The capsular layer was closed using a #1 vicryl suture, while subcutaneous layers were closed using 2-0 Vicryl interrupted sutures. Finally the skin was closed using 3-0 Vicryl and skin staples, which were applied in occlusive fashion and sterile bandage applied. An Iceman cryo pad was applied on the operative leg. Sponge count and needle counts were correct. Eric Saldivar left the operating room     Implants:   Implant Name Type Inv.  Item Serial No.  Lot No. LRB No. Used   CEMENT BNE HV R 40GM -- PALACOS R [id number] - [id number]  CEMENT BNE HV R 40GM -- PALACOS R [id number] [facility] [id number] Right 2   BEARING TIB BASE FIX SZ4 CARRIE -- ATTUNE - [id number]  BEARING TIB BASE FIX SZ4 CARRIE -- ATTUNE [id number] [facility] [id number] Right 1   FEM PS SZ 5 RT CARRIE -- ATTUNE - [id number]  FEM PS SZ 5 RT CARRIE -- ATTUNE [id number] [facility] [id number] Right 1   PAT CARRIE DOME MEDIAL 38MM -- ATTUNE - [id number]  PAT CARRIE DOME MEDIAL 38MM -- ATTUNE [id number] [facility] [id number] Right 1   INSERT TIB FB PS SZ 5 7MM -- ATTUNE - [id number]  INSERT TIB FB PS SZ 5 7MM -- ATTUNE [id number] [facility] [id number] Right 1     Signed By: Alexia Terry MD

## 2019-05-16 NOTE — PROGRESS NOTES
Admission assessment complete. Patient in bed. Instructed on use of lighting, menu, fall risk, knee precautions and incentive spirometer 10x hourly. Dressing dry and intact. Neurovascular status WDL, warm with palpable pulses bilaterally. Positive dorsiflexion and plantar flexion. Patient requesting pain medicine, RN to give shortly. Instructed not to get up by themselves and call for assistance or any needs. Patient verbalized understanding. Call bell within reach. Side rails up x3. Bed low and locked. No distress noted. Family member at bedside.

## 2019-05-16 NOTE — PERIOP NOTES
Teach back method used with patient concerning hibiclens wash, TB screening, incentive spirometer(1500 demostrated preop), and pain management goals.  Patient and family were provided with home discharge needs list.

## 2019-05-16 NOTE — ANESTHESIA POSTPROCEDURE EVALUATION
Procedure(s): RIGHT TOTAL KNEE ARTHROPLASTY. general 
 
Anesthesia Post Evaluation Multimodal analgesia: multimodal analgesia used between 6 hours prior to anesthesia start to PACU discharge Patient location during evaluation: PACU Patient participation: complete - patient participated Level of consciousness: responsive to verbal stimuli Pain management: adequate Airway patency: patent Anesthetic complications: no 
Cardiovascular status: acceptable Respiratory status: acceptable Hydration status: acceptable Post anesthesia nausea and vomiting:  none Vitals Value Taken Time /67 5/16/2019  1:41 PM  
Temp 37 °C (98.6 °F) 5/16/2019 12:19 PM  
Pulse 68 5/16/2019  1:41 PM  
Resp 15 5/16/2019  1:41 PM  
SpO2 97 % 5/16/2019  1:41 PM

## 2019-05-16 NOTE — INTERVAL H&P NOTE
H&P Update: 
Mima Ribeiro was seen and examined. History and physical has been reviewed. The patient has been examined.  There have been no significant clinical changes since the completion of the originally dated History and Physical.

## 2019-05-16 NOTE — ANESTHESIA PROCEDURE NOTES
Adductor canal block with ultrasound Start time: 5/16/2019 9:39 AM 
End time: 5/16/2019 9:42 AM 
Performed by: Spencer Sanchez MD 
Authorized by: Spencer Sanchez MD  
 
 
Pre-procedure: Indications: at surgeon's request and post-op pain management Preanesthetic Checklist: patient identified, risks and benefits discussed, site marked, timeout performed, anesthesia consent given and patient being monitored Timeout Time: 09:39 Block Type:  
Block Type: Adductor canal 
Laterality:  Right Monitoring:  Continuous pulse ox, frequent vital sign checks, heart rate, oxygen and responsive to questions Injection Technique:  Single shot Procedures: ultrasound guided Patient Position: supine Prep: chlorhexidine Location:  Mid thigh Needle Gauge:  20 G Needle Localization:  Ultrasound guidance Assessment: 
Number of attempts:  1 Injection Assessment:  Incremental injection every 5 mL, local visualized surrounding nerve on ultrasound, negative aspiration for blood, no intravascular symptoms, no paresthesia and ultrasound image on chart Patient tolerance:  Patient tolerated the procedure well with no immediate complications

## 2019-05-16 NOTE — PROGRESS NOTES
Problem: Mobility Impaired (Adult and Pediatric) Goal: *Acute Goals and Plan of Care (Insert Text) Description GOALS (1-4 days): 
(1.)Ms. Alla Low will move from supine to sit and sit to supine  in bed with STAND BY ASSIST. 
(2.)Ms. Alla Low will transfer from bed to chair and chair to bed with STAND BY ASSIST using the least restrictive device. (3.)Ms. Alla Low will ambulate with STAND BY ASSIST for 300 feet with the least restrictive device. (4.)Ms. Alla Low will ambulate up/down 2 steps with bilateral  railing with CONTACT GUARD ASSIST with no device. (5.)Ms. Alla Low will increase right knee ROM to 5°-80°. 
________________________________________________________________________________________________ Outcome: Progressing Towards PHYSICAL THERAPY JOINT CAMP TKA: Initial Assessment, Treatment Day: Day of Assessment and PM 5/16/2019 INPATIENT: Hospital Day: 1 [place] / [place] B / Product Type: Medicare /  
  
NAME/AGE/GENDER: Magdy Lockett is a 68 y.o. female PRIMARY DIAGNOSIS:  Unilateral primary osteoarthritis, right knee [M17.11] Procedure(s) and Anesthesia Type: 
   * RIGHT TOTAL KNEE ARTHROPLASTY - Spinal (Right) ICD-10: Treatment Diagnosis:  
 Pain in Right Knee (M25.561) Stiffness of Right Knee, Not elsewhere classified (M25.661) Difficulty in walking, Not elsewhere classified (R26.2) ASSESSMENT:  
 
Ms. Alla Low presents with decreased strength and range of motion right lower extremity and decreased independence with functional mobility s/p right TKA. Pt will benefit from skilled PT interventions to maximize independence with functional mobility and TKA HEP. Pt having a good bit of pain and had general anesthesia. She did a short walk in room and then worked on some exercises in the chair with verbal cues. She stayed up in chair with needs in reach. Hope to progress at next therapy session. She had her left knee done in Feb of 2017. This section established at most recent assessment PROBLEM LIST (Impairments causing functional limitations): 
Decreased Strength Decreased ADL/Functional Activities Decreased Transfer Abilities Decreased Ambulation Ability/Technique Increased Pain Decreased Flexibility/Joint Mobility Decreased [place] with Home Exercise Program 
 INTERVENTIONS PLANNED: (Benefits and precautions of physical therapy have been discussed with the patient.) Cold 
bed mobility 
gait training 
home exercise program (HEP) Range of Motion: active/assisted/passive Therapeutic Activities 
therapeutic exercise/strengthening 
transfer training Group Therapy TREATMENT PLAN: Frequency/Duration: Follow patient BID for duration of hospital stay to address above goals. Rehabilitation Potential For Stated Goals: Good RECOMMENDED REHABILITATION/EQUIPMENT: (at time of discharge pending progress): Continue Skilled Therapy, Home Health: Physical Therapy and Outpatient: Physical Therapy. HISTORY:  
History of Present Injury/Illness (Reason for Referral): 
Pt s/p right TKA on 5/16/19 Past Medical History/Comorbidities:  
Ms. Cruz Montes  has a past medical history of Acute pancreatitis (2/2008), Aneurysm ([address] Anxiety, Bilateral sacroiliitis ([address] Breast cyst, CAD (coronary artery disease), Cellulitis (6/16/15), Chest pain, Chronic kidney disease, Chronic pain, CKD (chronic kidney disease), Degenerative disc disease, Depression (12/23/2015), Diabetes mellitus type II, Dyslipidemia, Dyspnea, Edema (12/23/2015), Fatty liver, Fibromyalgia, Food allergy, GERD (gastroesophageal reflux disease), Heart failure ([address] echocardiogram (08/21/2015), Hypertension, Hypertriglyceridemia, Hypothyroidism, Lumbar herniated disc (Aug 2013), Lumbar radiculopathy, Macrocytic anemia, Microcytic anemia, Mild pulmonary hypertension ([address] Nausea & vomiting, Neuropathy, Obesity (BMI 30.0-34.9) (10/2/14), Osteoarthritis, Panic attacks, Sacroiliac dysfunction, Seasonal allergic reaction, Sleep apnea, and Syncope and collapse (12/23/2015). Ms. Javy Garcia  has a past surgical history that includes hx partial thyroidectomy (2008); hx breast lumpectomy (Right); hx breast biopsy (Bilateral, R/1988  L/2012); hx premalig/benign skin lesion excision (09/29/2014); hx cyst removal (Left, 02/2019); hx heart catheterization (2005); hx jorge and bso (1995); hx knee arthroscopy (Left, 2004); hx orthopaedic (Left, 2010); hx orthopaedic (Right, 2009); hx knee arthroscopy (10/03/2014); hx back surgery (08/30/2013); hx other surgical (12/22/09); hx other surgical; hx other surgical (10/28/13); hx knee replacement (Left, 03/14/2017); and hx orthopaedic (04/03/2019). Social History/Living Environment:  
Home Environment: Private residence # Steps to Enter: 2 One/Two Story Residence: One story Living Alone: No 
Support Systems: Spouse/Significant Other/Partner Patient Expects to be Discharged to[de-identified] Private residence Current DME Used/Available at Home: Cane, quad, [place], straight, Grab bars, Raised toilet seat, Walker, rolling Tub or Shower Type: (walk in tub) Prior Level of Function/Work/Activity: 
Pt living at home, independent with gait and ADLs Number of Personal Factors/Comorbidities that affect the Plan of Care: 0: LOW COMPLEXITY EXAMINATION:  
Most Recent Physical Functioning:  
Gross Assessment: Yes Gross Assessment AROM: Within functional limits(except left lower extremity s/p TKA) Strength: Within functional limits(except left lower extremity s/p TKA) Bed Mobility Supine to Sit: Minimum assistance Transfers Sit to Stand: Contact guard assistance Stand to Sit: Contact guard assistance Balance Sitting: Intact Standing: Pull to stand; With support    Posture Posture (WDL): Within defined limits Weight Bearing Status Left Side Weight Bearing: As tolerated Distance (ft): 10 Feet (ft) Ambulation - Level of Assistance: Minimal assistance Assistive Device: Walker, rolling Speed/Lindsay: Pace decreased (<100 feet/min) Step Length: Right shortened Stance: Left decreased Gait Abnormalities: Antalgic Braces/Orthotics: none Body Structures Involved: Joints Muscles Body Functions Affected: Movement Related Activities and Participation Affected: Mobility Self Care Number of elements that affect the Plan of Care: 4+: HIGH COMPLEXITY CLINICAL PRESENTATION:  
Presentation: Stable and uncomplicated: LOW COMPLEXITY CLINICAL DECISION MAKIN[address] [place] [address] AM-PAC? ?6 Clicks? Basic Mobility Inpatient Short Form How much difficulty does the patient currently have. .. Unable A Lot A Little None 1. Turning over in bed (including adjusting bedclothes, sheets and blankets)? ? 1   ? 2   ? 3   ? 4  
2. Sitting down on and standing up from a chair with arms ( e.g., wheelchair, bedside commode, etc.)   ? 1   ? 2   ? 3   ? 4  
3. Moving from lying on back to sitting on the side of the bed?   ? 1   ? 2   ? 3   ? 4 How much help from another person does the patient currently need. .. Total A Lot A Little None 4. Moving to and from a bed to a chair (including a wheelchair)? ? 1   ? 2   ? 3   ? 4  
5. Need to walk in hospital room? ? 1   ? 2   ? 3   ? 4  
6. Climbing 3-5 steps with a railing? ? 1   ? 2   ? 3   ? 4  
© , Trustees of [address] [place] [address], under license to [employer]. All rights reserved Score:  Initial: 18 Most Recent: X (Date: -- ) Interpretation of Tool:  Represents activities that are increasingly more difficult (i.e. Bed mobility, Transfers, Gait). Medical Necessity:    
Patient is expected to demonstrate progress in strength, range of motion and functional technique 
 to decrease assistance required with functional mobility and TKA IMANI Walker Reason for Services/Other Comments: Patient continues to require skilled intervention due to inability to complete functional mobility and TKA HEP independently Ceci Hoffman Use of outcome tool(s) and clinical judgement create a POC that gives a: Clear prediction of patient's progress: LOW COMPLEXITY  
  
 
 
 
TREATMENT:  
(In addition to Assessment/Re-Assessment sessions the following treatments were rendered) Pre-treatment Symptoms/Complaints:  having pain Pain Initial: having some pain, had pain medicine already Post Session:  still hurting Therapeutic Exercise: (10 Minutes):  Exercises per grid below to improve mobility and strength. Required minimal verbal and manual cues to promote proper body alignment and promote proper body posture. Progressed repetitions as indicated. Date: 
5/16/19 Date: 
 Date: 
  
ACTIVITY/EXERCISE AM PM AM PM AM PM  
GROUP THERAPY  ?  ?  ?  ?  ?  ? Ankle Pumps  10 Quad Sets  10 Gluteal Sets  10 Hip ABd/ADduction  10 Straight Leg Raises Knee Slides  10 Short Arc The [address] Chair Slides B = bilateral; AA = active assistive; A = active; P = passive Treatment/Session Assessment:   
 Response to Treatment:  tolerated with pain. Education: ? Home Exercises ? Fall Precautions ? D/C Instruction Review ? Knee Prosthesis Review ? Walker Management/Safety ? Adaptive Equipment as Needed Interdisciplinary Collaboration: Occupational Therapist 
Registered Nurse After treatment position/precautions:  
Up in chair Bed/Chair-wheels locked Call light within reach Family at bedside Compliance with Program/Exercises: Will assess as treatment progresses. Recommendations/Intent for next treatment session:  Treatment next visit will focus on increasing Ms. Daley's independence with bed mobility, transfers, gait training, strength/ROM exercises, modalities for pain, and patient education. Total Treatment Duration: PT Patient Time In/Time Out Time In: 1500 Time Out: 1520 Maye Mayfield, PT

## 2019-05-16 NOTE — ANESTHESIA PREPROCEDURE EVALUATION
Anesthetic History PONV Review of Systems / Medical History Patient summary reviewed and pertinent labs reviewed Pulmonary Sleep apnea Neuro/Psych Cardiovascular Hypertension: well controlled Valvular problems/murmurs (mild): mitral insufficiency CAD Exercise tolerance: >4 METS Comments: NL perfusion scan 2/17 GI/Hepatic/Renal 
  
GERD: well controlled Endo/Other Diabetes: type 2 Hypothyroidism: well controlled Obesity and arthritis Other Findings Comments: Spinal Cord Stimulator Physical Exam 
 
Airway Mallampati: III Neck ROM: decreased range of motion Mouth opening: Normal 
 
 Cardiovascular Regular rate and rhythm,  S1 and S2 normal,  no murmur, click, rub, or gallop Rhythm: regular Rate: normal 
 
 
Pertinent negatives: No murmur Dental 
 
Dentition: Caps/crowns Pulmonary Breath sounds clear to auscultation Abdominal 
 
 
 
 Other Findings Anesthetic Plan ASA: 3 Anesthesia type: general 
 
 
Post-op pain plan if not by surgeon: peripheral nerve block single Induction: Intravenous Anesthetic plan and risks discussed with: Patient

## 2019-05-16 NOTE — PROGRESS NOTES
[facility] [place] - INPATIENT Face to Face Encounter Patients Name: Garo Camargo    YOB: 1945 Ordering Physician: Henny Tobin Primary Diagnosis: Unilateral primary osteoarthritis, right knee [M17.11] Arthritis of knee, right [M17.11] Date of Face to Face:   5/16/2019 Face to Face Encounter findings are related to primary reason for home care:   yes. 1. I certify that the patient needs intermittent care as follows: physical therapy: strengthening and stretching/ROM 2. I certify that this patient is homebound, that is: 1) patient requires the use of a walker device, special transportation, or assistance of another to leave the home; or 2) patient's condition makes leaving the home medically contraindicated; and 3) patient has a normal inability to leave the home and leaving the home requires considerable and taxing effort. Patient may leave the home for infrequent and short duration for medical reasons, and occasional absences for non-medical reasons. Homebound status is due to the following functional limitations: Patient currently under activity restrictions secondary to recent surgical procedure, this hinders their ability to safely leave the home. 3. I certify that this patient is under my care and that I, or a nurse practitioner or  [id number], or clinical nurse specialist, or certified nurse midwife, working with me, had a Face-to-Face Encounter that meets the physician Face-to-Face Encounter requirements. The following are the clinical findings from the [address] [place] [address] encounter that support the need for skilled services and is a summary of the encounter: progress notes See attached progess note AALIYAH Akers 
5/16/2019 THE FOLLOWING TO BE COMPLETED BY THE COMMUNITY PHYSICIAN: 
 
I concur with the findings described above from the [facility] encounter that this patient is homebound and in need of a skilled service. Certifying Physician: _____________________________________ Printed Certifying Physician Name: _____________________________________ Date: _________________

## 2019-05-16 NOTE — CONSULTS
HOSPITALIST H&P/CONSULT  NAME:  Marisol Yoon   Age:  68 y.o.  :   1945   MRN:   [id number]  PCP: Bonnie Stack MD  Consulting MD:  Treatment Team: Attending Provider: Koko Valentine MD; Consulting Provider: Emilia Baer MD; Care Manager: Caitlin Simon; Consulting Provider: Lee Ann Rogel MD  HPI:   Pt is a 68 y.o. female with DM2, HTN, OA, HLD,hypothyroidism who underwent R TKA earlier today. Hospitalist consulted for DM mgmt. Pt reports taking Levemir 40 units yesterday evening. No insulin today. Glucoses normally well controlled on home regimen of trulicity, metformin XR 500mg daily, Levemir 50 units qHS. She reports some moderate post op pain but is otherwise doing well.       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 ARTERY; has REJI-1 flight of steps    Cellulitis 6/16/15    right leg    Chest pain     Chronic kidney disease     stage 3    Chronic pain     lumbar/spine/thoracic, left knee    CKD (chronic kidney disease)     stage 3    Degenerative disc disease     lumbosacral    Depression 2015    managed with medication    Diabetes mellitus type II     avg 100-150, hgb A1C 5.4, symptoms of hypglycemia below 70    Dyslipidemia     managed with medication    Dyspnea     Edema 2015    Fatty liver     Fibromyalgia     managed with Medication    Food allergy     GERD (gastroesophageal reflux disease)     controlled with medication    Heart failure ([address]     per Dr Kalani Gould- due to valve leakage (pt denies)    Hx of echocardiogram 2015    mitral valve regurgitation    Hypertension     controlled with medication    Hypertriglyceridemia     managed with medication    Hypothyroidism     managed with medication    Lumbar herniated disc Aug 2013    Lumbar radiculopathy     Macrocytic anemia     Microcytic anemia     Mild pulmonary hypertension (HCC)     per [facility] ()    Nausea & vomiting     post-op nausea    Neuropathy     bilateral legs    Obesity (BMI 30.0-34.9) 10/2/14    BMI- 30.7    Osteoarthritis     Panic attacks     Sacroiliac dysfunction     Seasonal allergic reaction     Sleep apnea     NO CPAP    Syncope and collapse 2015      Past Surgical History:   Procedure Laterality Date    HX BACK SURGERY  2013    spinal stimulator    HX BREAST BIOPSY Bilateral R/  L/    HX BREAST LUMPECTOMY Right     HX CYST REMOVAL Left 2019    L thumb    HX HEART CATHETERIZATION  2005    HX KNEE ARTHROSCOPY Left     left knee    HX KNEE ARTHROSCOPY  10/03/2014    left knee torn menincus    HX KNEE REPLACEMENT Left 2017    HX ORTHOPAEDIC Left 2010    elbow scope    HX ORTHOPAEDIC Right 2009    hand nodule removed    HX ORTHOPAEDIC  2019    right big toe mass resection exostectomy    HX OTHER SURGICAL  09    TENS implant [place]    HX OTHER SURGICAL      nodule removed left foot and hand    HX OTHER SURGICAL  10/28/13    left hand-pointer and pinky finger cyst removed    HX PARTIAL THYROIDECTOMY      partial thyroidectomy and parathyroidectomy    HX PREMALIG/BENIGN SKIN LESION EXCISION  2014    lower lip    HX MARLEE AND BSO        Prior to Admission Medications   Prescriptions Last Dose Informant Patient Reported? Taking? LEVEMIR FLEXTOUCH U-100 INSULN 100 unit/mL (3 mL) inpn 5/15/2019 at Unknown time  No Yes   Si Units by SubCUTAneous route nightly. TRULICITY 1.5 GL/4.7 mL sub-q pen 2019  No No   Si.5 mL by SubCUTAneous route every seven (7) days.    Patient taking differently: 1.5 mg by SubCUTAneous route every seven (7) days. Indications: Takes on Sunday nights   amLODIPine (NORVASC) 5 mg tablet 5/16/2019 at Unknown time  No Yes   Sig: Take 1 Tab by mouth daily. busPIRone (BUSPAR) 15 mg tablet 5/16/2019 at Unknown time  No Yes   Sig: Take 1 tablet twice a day. butalbital-acetaminophen-caffeine (FIORICET, ESGIC) -40 mg per tablet 5/13/2019  No No   Sig: Take 1 Tab by mouth every six (6) hours as needed for Pain. cetirizine (ALLERGY RELIEF, CETIRIZINE,) 10 mg tablet 5/15/2019 at Unknown time  Yes Yes   Sig: Take 10 mg by mouth nightly. Indications: ALLERGIC RHINITIS   ergocalciferol (ERGOCALCIFEROL) 50,000 unit capsule 5/15/2019 at Unknown time  No Yes   Sig: Take 1 Cap by mouth every seven (7) days. Every Sunday   escitalopram oxalate (LEXAPRO) 20 mg tablet 5/16/2019 at Unknown time  No Yes   Sig: Take 1 Tab by mouth daily. Patient taking differently: Take 20 mg by mouth nightly.   ezetimibe-simvastatin (VYTORIN 10/40) 10-40 mg per tablet 5/15/2019 at Unknown time  No Yes   Sig: Take 1 Tab by mouth nightly. fexofenadine (ALLEGRA) 180 mg tablet 5/15/2019 at Unknown time  Yes Yes   Sig: Take 180 mg by mouth nightly.   gabapentin (NEURONTIN) 100 mg capsule 5/16/2019 at Unknown time  No Yes   Sig: TAKE 1 CAPSULE THREE TIMES A DAY. ibuprofen (ADVIL) 200 mg tablet 5/10/2019  Yes No   Sig: Take 400 mg by mouth. irbesartan (AVAPRO) 300 mg tablet 5/15/2019 at Unknown time  No Yes   Sig: Take 1 Tab by mouth nightly. levothyroxine (SYNTHROID) 112 mcg tablet 5/16/2019 at Unknown time  No Yes   Sig: Take 1 Tab by mouth Daily (before breakfast). magnesium oxide (MAG-OX) 400 mg tablet 5/15/2019 at Unknown time  No Yes   Sig: Take 1 Tab by mouth two (2) times a day. metFORMIN ER (GLUCOPHAGE XR) 500 mg tablet 5/15/2019 at Unknown time  No Yes   Sig: Take 1 Tab by mouth daily. metoprolol succinate (TOPROL XL) 100 mg tablet 5/16/2019 at Unknown time  No Yes   Sig: Take 2 Tabs by mouth daily.    Patient taking differently: Take 200 mg by mouth nightly. Two tabs at night   oxyCODONE IR (OXY-IR) 15 mg immediate release tablet 5/16/2019 at Unknown time  Yes Yes   Sig: Take 15 mg by mouth every six (6) hours as needed. temazepam (RESTORIL) 30 mg capsule 5/15/2019 at Unknown time  No Yes   Sig: Take 1 Cap by mouth nightly as needed for Sleep. Max Daily Amount: 30 mg.       Facility-Administered Medications: None     Allergies   Allergen Reactions    Shellfish Containing Products Anaphylaxis    Celebrex [Celecoxib] Swelling     Red rash, itching    Catapres [Clonidine] Rash, Swelling and Other (comments)     Catapres patch,  headache    Cymbalta [Duloxetine] Rash    Morphine Rash and Swelling     migraines    Oxycontin [Oxycodone] Other (comments)     Migraines, elevated BP    Sulfa (Sulfonamide Antibiotics) Unknown (comments)      Social History     Tobacco Use    Smoking status: Never Smoker    Smokeless tobacco: Never Used   Substance Use Topics    Alcohol use: No      Family History   Problem Relation Age of Onset    Diabetes Father     Heart Disease Father         CHF/ CAD    Lung Disease Father         \"holes in lungs- exposed to toxins in textiles\"    Cancer Father         leukemia    Other Father         polio    Hypertension Father     Asthma Father     Diabetes Maternal Grandmother     Heart Disease Maternal Grandmother     Stroke Maternal Grandmother     Diabetes Maternal Grandfather     Heart Disease Maternal Grandfather     Diabetes Paternal Grandmother     Heart Failure Paternal Grandmother         CHF    Diabetes Paternal Grandfather     Heart Disease Paternal Grandfather     Breast Cancer Mother     Diabetes Mother     Hypertension Mother     Stroke Mother         TIAs- several    Breast Cancer Maternal Aunt         3 Maternal Aunts    Breast Cancer Paternal Aunt       All history personally reviewed  Objective:     Patient Vitals for the past 24 hrs:   Temp Pulse Resp BP SpO2 05/16/19 1418 97.6 °F (36.4 °C) 69 16 158/70 98 %   05/16/19 1341  68 15 133/67 97 %   05/16/19 1326  70 15 143/69 96 %   05/16/19 1311  67 14 (!) 172/94 98 %   05/16/19 1256  68 15 176/75 96 %   05/16/19 1249  65 16 178/81 96 %   05/16/19 1234  62 16 163/75 95 %   05/16/19 1229  62 16 163/77 94 %   05/16/19 1224  62 16 153/72 93 %   05/16/19 1219 98.6 °F (37 °C) 70 16 164/70 93 %   05/16/19 1015  (!) 59 16 139/63 100 %   05/16/19 0959  (!) 58 16 133/65 100 %   05/16/19 0954  (!) 57 16 125/65 99 %   05/16/19 0949  (!) 57 16 115/58 99 %   05/16/19 0944  (!) 58 16 118/62 99 %   05/16/19 0939  (!) 57 16 116/68 99 %   05/16/19 0930  (!) 56 16 162/59 99 %   05/16/19 0806 97.6 °F (36.4 °C) 66 16 176/86 98 %     Oxygen Therapy  O2 Sat (%): 98 % (05/16/19 1418)  O2 Device: Nasal cannula (05/16/19 1410)  O2 Flow Rate (L/min): 2 l/min (05/16/19 1410)  Physical Exam:  General:    Alert, cooperative, no distress, appears stated age. Head:   Normocephalic, without obvious abnormality, atraumatic. Nose:  Nares normal. No drainage or sinus tenderness. Lungs:   Clear to auscultation bilaterally. No Wheezing or Rhonchi. No rales. Heart:   Regular rate and rhythm,  no murmur, rub or gallop. Abdomen:   Soft, non-tender. Not distended. Bowel sounds normal.   Extremities: No cyanosis. No edema. No clubbing  Skin:     Texture, turgor normal. No rashes or lesions. Not Jaundiced  Neurologic: Alert and oriented x 3, no focal deficits   Data Review:   Recent Results (from the past 24 hour(s))   TYPE & SCREEN    Collection Time: 05/16/19  8:22 AM   Result Value Ref Range    Crossmatch Expiration 05/19/2019     ABO/Rh(D) AB POSITIVE     Antibody screen NEG    GLUCOSE, POC    Collection Time: 05/16/19  8:49 AM   Result Value Ref Range    Glucose (POC) 78 65 - 100 mg/dL     Imaging /Procedures /Studies   No results found. Assessment and Plan:      Active Hospital Problems    Diagnosis Date Noted    Arthritis of knee, right 05/16/2019       DM2  Glucose 70s currently.   - resume Levemir 15 units tonight  - hold metformin  - SSI  - expect some hyperglycemia given periop steroids     HTN  - cont home meds    Hypothyroidism  - cont synthroid    OA s/p R TKA  - per ortho      Signed By: Jaylene Dickey MD     May 16, 2019

## 2019-05-16 NOTE — ROUTINE PROCESS
TRANSFER - IN REPORT: 
 
Verbal report received from rosa rn(name) on Guillermo Sick  being received from ortho(unit) for routine post - op Report consisted of patients Situation, Background, Assessment and  
Recommendations(SBAR). Information from the following report(s) SBAR was reviewed with the receiving nurse. Opportunity for questions and clarification was provided. Assessment completed upon patients arrival to unit and care assumed.

## 2019-05-16 NOTE — PROGRESS NOTES
Problem: Self Care Deficits Care Plan (Adult) Goal: *Acute Goals and Plan of Care (Insert Text) Description GOALS:  
DISCHARGE GOALS (in preparation for going home/rehab):  3 days 1. Ms. Padmaja Ellis will perform one lower body dressing activity with minimal assistance required to demonstrate improved functional mobility and safety. 2.  Ms. Padmaja Ellis will perform one lower body bathing activity with minimal assistance required to demonstrate improved functional mobility and safety. 3.  Ms. Padmaja Ellis will perform toileting/toilet transfer with contact guard assistance to demonstrate improved functional mobility and safety. 4.  Ms. Padmaja Ellis will perform shower transfer with contact guard assistance to demonstrate improved functional mobility and safety. JOINT CAMP OCCUPATIONAL THERAPY TKA: Initial Assessment and Treatment Day: 1st 5/16/2019 INPATIENT: Hospital Day: 1 [place] / [place] B / Product Type: Medicare /  
  
NAME/AGE/GENDER: Lubna Steele is a 68 y.o. female PRIMARY DIAGNOSIS:  Unilateral primary osteoarthritis, right knee [M17.11] Procedure(s) and Anesthesia Type: 
   * RIGHT TOTAL KNEE ARTHROPLASTY - Spinal (Right) ICD-10: Treatment Diagnosis:  
 Pain in Right Knee (M25.561) Stiffness of Right Knee, Not elsewhere classified (M25.661) ASSESSMENT:  
Ms. Padmaja Ellis is s/p Right TKA and presents with decreased weight bearing on R LE and decreased independence with functional mobility and activities of daily living as compared to baseline level of function and safety. Patient would benefit from skilled Occupational Therapy to maximize independence and safety with self-care task and functional mobility. Pt would also benefit from education on adaptive equipment and safety precautions in preparation for going home. General Anesthesia and patient in more pain at start of session, but agreeable to mobilization. Did well from bed to reclienr using a RW. Should progress well with ADL's tomorrow. This section established at most recent assessment PROBLEM LIST (Impairments causing functional limitations): 
Decreased Strength Decreased ADL/Functional Activities Decreased Transfer Abilities Increased Pain Increased Fatigue Decreased Flexibility/Joint Mobility Decreased Knowledge of Precautions INTERVENTIONS PLANNED: (Benefits and precautions of occupational therapy have been discussed with the patient.) Activities of daily living training Adaptive equipment training Balance training Clothing management Donning&doffing training Theraputic activity TREATMENT PLAN: Frequency/Duration: Follow patient 1-2tx to address above goals. Rehabilitation Potential For Stated Goals: Excellent RECOMMENDED REHABILITATION/EQUIPMENT: (at time of discharge pending progress): Continue Skilled Therapy. OCCUPATIONAL PROFILE AND HISTORY:  
History of Present Injury/Illness (Reason for Referral): Pt presents this date s/p (Right) TKA.    
Past Medical History/Comorbidities:  
Ms. Aurora Simmonds  has a past medical history of Acute pancreatitis (2/2008), Aneurysm ([address] Anxiety, Bilateral sacroiliitis ([address] Breast cyst, CAD (coronary artery disease), Cellulitis (6/16/15), Chest pain, Chronic kidney disease, Chronic pain, CKD (chronic kidney disease), Degenerative disc disease, Depression (12/23/2015), Diabetes mellitus type II, Dyslipidemia, Dyspnea, Edema (12/23/2015), Fatty liver, Fibromyalgia, Food allergy, GERD (gastroesophageal reflux disease), Heart failure ([address] echocardiogram (08/21/2015), Hypertension, Hypertriglyceridemia, Hypothyroidism, Lumbar herniated disc (Aug 2013), Lumbar radiculopathy, Macrocytic anemia, Microcytic anemia, Mild pulmonary hypertension ([address] Nausea & vomiting, Neuropathy, Obesity (BMI 30.0-34.9) (10/2/14), Osteoarthritis, Panic attacks, Sacroiliac dysfunction, Seasonal allergic reaction, Sleep apnea, and Syncope and collapse (12/23/2015). Ms. Yoana Rojas  has a past surgical history that includes hx partial thyroidectomy (2008); hx breast lumpectomy (Right); hx breast biopsy (Bilateral, R/1988  L/2012); hx premalig/benign skin lesion excision (09/29/2014); hx cyst removal (Left, 02/2019); hx heart catheterization (2005); hx jorge and bso (1995); hx knee arthroscopy (Left, 2004); hx orthopaedic (Left, 2010); hx orthopaedic (Right, 2009); hx knee arthroscopy (10/03/2014); hx back surgery (08/30/2013); hx other surgical (12/22/09); hx other surgical; hx other surgical (10/28/13); hx knee replacement (Left, 03/14/2017); and hx orthopaedic (04/03/2019). Social History/Living Environment:  
Home Environment: Private residence # Steps to Enter: 2 One/Two Story Residence: One story Living Alone: No 
Support Systems: Spouse/Significant Other/Partner Patient Expects to be Discharged to[de-identified] Private residence Current DME Used/Available at Home: Cane, quad, [place] , straight, Grab bars, Raised toilet seat, Walker, rolling Tub or Shower Type: (walk in tub) Prior Level of Function/Work/Activity: 
Independent prior. Number of Personal Factors/Comorbidities that affect the Plan of Care: Brief history (0):  LOW COMPLEXITY ASSESSMENT OF OCCUPATIONAL PERFORMANCE[de-identified]  
Most Recent Physical Functioning:  
Balance Sitting: Intact Standing: Pull to stand; With support Gross Assessment: Yes Gross Assessment AROM: Within functional limits(except left lower extremity s/p TKA) Strength: Within functional limits(except left lower extremity s/p TKA) Coordination Fine Motor Skills-Upper: Left Intact; Right Intact Gross Motor Skills-Upper: Left Intact; Right Intact Mental Status Neurologic State: Alert Orientation Level: Oriented X4 Cognition: Appropriate decision making Perception: Appears intact Basic ADLs (From Assessment) Complex ADLs (From Assessment) Basic ADL Feeding: Independent Oral Facial Hygiene/Grooming: Setup Bathing: Minimum assistance Upper Body Dressing: Setup Lower Body Dressing: Moderate assistance Grooming/Bathing/Dressing Activities of Daily Living Functional Transfers Bathroom Mobility: Minimum assistance Shower Transfer: Moderate assistance Bed/Mat Mobility Supine to Sit: Minimum assistance Sit to Stand: Contact guard assistance Stand to Sit: Contact guard assistance Bed to Chair: Contact guard assistance Scooting: Contact guard assistance Physical Skills Involved: 
Range of Motion Balance Strength Cognitive Skills Affected (resulting in the inability to perform in a timely and safe manner): [facility]  Psychosocial Skills Affected: WFL Number of elements that affect the Plan of Care: 1-3:  LOW COMPLEXITY CLINICAL DECISION MAKING:  
[facility] AM-PAC? ?6 Clicks? Daily Activity Inpatient Short Form How much help from another person does the patient currently need. .. Total A Lot A Little None 1. Putting on and taking off regular lower body clothing? ? 1   ? 2   ? 3   ? 4  
2. Bathing (including washing, rinsing, drying)? ? 1   ? 2   ? 3   ? 4  
3. Toileting, which includes using toilet, bedpan or urinal?   ? 1   ? 2   ? 3   ? 4  
4. Putting on and taking off regular upper body clothing? ? 1   ? 2   ? 3   ? 4  
5. Taking care of personal grooming such as brushing teeth? ? 1   ? 2   ? 3   ? 4  
6. Eating meals? ? 1   ? 2   ? 3   ? 4  
© 2007, Trustees of [facility], under license to [employer]. All rights reserved Score:  Initial: 18 Most Recent: X (Date: -- ) Interpretation of Tool:  Represents activities that are increasingly more difficult (i.e. Bed mobility, Transfers, Gait). Medical Necessity:    
Skilled intervention continues to be required due to Deficits listed above. Reason for Services/Other Comments: Patient continues to require skilled intervention due to new TKA Jaime Solis Use of outcome tool(s) and clinical judgement create a POC that gives a: MODERATE COMPLEXITY  
  
 
 
 
TREATMENT:  
(In addition to Assessment/Re-Assessment sessions the following treatments were rendered) Pre-treatment Symptoms/Complaints:   
Pain: Initial:  
Pain Intensity 1: 5  Post Session:  5 Therapeutic Activity: (    10): Therapeutic activities including Bed transfers, Toilet transfers and Ambulation on level ground to improve mobility and balance. Required minimal   to promote motor control of bilateral, upper extremity(s), lower extremity(s). Initial evaluation 10 minutes. Treatment/Session Assessment:   
 Response to Treatment:  Good, sitting up in recliner. Education: ? Home Exercises ? Fall Precautions ? Hip Precautions ? Going Home Video ? Knee/Hip Prosthesis Review ? Walker Management/Safety ? Adaptive Equipment as Needed Interdisciplinary Collaboration:  
Physical Therapist 
Occupational Therapist 
Registered Nurse After treatment position/precautions:  
Up in chair Bed/Chair-wheels locked Caregiver at bedside Call light within reach RN notified Compliance with Program/Exercises: Compliant all of the time, Will assess as treatment progresses. Recommendations/Intent for next treatment session:  Treatment next visit will focus on increasing Ms. Daley's independence with bed mobility, transfers, self care, functional mobility, modalities for pain, and patient education. Total Treatment Duration: OT Patient Time In/Time Out Time In: 1440 Time Out: 1500 Lacie Allen OT

## 2019-05-16 NOTE — PERIOP NOTES
TRANSFER - OUT REPORT: 
 
Verbal report given to receiving nurse Maddison(name) on Glorine Thicket being transferred to [facility](unit) for routine progression of care Report consisted of patient's Situation, Background, Assessment and  
Recommendations(SBAR). Information from the following report(s) OR Summary, Procedure Summary, Intake/Output and MAR was reviewed with the receiving nurse. Opportunity for questions and clarification was provided. Patient transported with: 
 O2 @ 2 liters Tech

## 2019-05-16 NOTE — PERIOP NOTES
TRANSFER - OUT REPORT: 
 
Verbal report given to Arturo Mendoza RN on Angelica Verde  being transferred to [facility] for routine progression of care Report consisted of patients Situation, Background, Assessment and  
Recommendations(SBAR). Information from the following report(s) Kardex, Intake/Output and Recent Results was reviewed with the receiving nurse. Lines:  
Peripheral IV 05/16/19 Right Hand (Active) Site Assessment Clean, dry, & intact 5/16/2019  8:25 AM  
Phlebitis Assessment 0 5/16/2019  8:25 AM  
Infiltration Assessment 0 5/16/2019  8:25 AM  
Dressing Status Clean, dry, & intact 5/16/2019  8:25 AM  
Dressing Type Tape;Transparent 5/16/2019  8:25 AM  
Hub Color/Line Status Pink; Infusing;Patent 5/16/2019  8:25 AM  
Action Taken Blood drawn 5/16/2019  8:25 AM  
  
 
Opportunity for questions and clarification was provided. Patient transported with: 
 [employer]

## 2019-05-16 NOTE — PROGRESS NOTES
Care Management Interventions Transition of Care Consult (CM Consult): Home Health [address]: Yes Physical Therapy Consult: Yes Current Support Network: Lives with Spouse Confirm Follow Up Transport: Family Plan discussed with Pt/Family/Caregiver: Yes Discharge Location Discharge Placement: Home with home health SW met with pt and spouse to discuss dc plans. Pt is having a Right TKA today and had a Left TKA 2017. Pt plans to return home with her spouse and HHPT. Pt had [facility] [place] previously and would like to use them again. [facility] [place] referral completed and F2F completed. Pt has DME from previous surgery. Pt plans to dc home on Saturday. No additional needs or questions identified at this time. Abdiaziz Garcia

## 2019-05-16 NOTE — PROGRESS NOTES
TRANSFER - IN REPORT: 
 
Verbal report received from [id number] on Merry Cui  being received from PACU for routine post - op Report consisted of patients Situation, Background, Assessment and  
Recommendations(SBAR). Information from the following report(s) SBAR, Kardex, OR Summary, Procedure Summary, Intake/Output, MAR, Accordion, Recent Results and Med Rec Status was reviewed with the receiving nurse. Opportunity for questions and clarification was provided. Assessment completed upon patients arrival to unit and care assumed.

## 2019-05-17 PROBLEM — Z96.651 S/P TKR (TOTAL KNEE REPLACEMENT) USING CEMENT, RIGHT: Status: ACTIVE | Noted: 2019-05-17

## 2019-05-17 LAB
ANION GAP SERPL CALC-SCNC: 7 MMOL/L (ref 7–16)
BUN SERPL-MCNC: 12 MG/DL (ref 8–23)
CALCIUM SERPL-MCNC: 8.6 MG/DL (ref 8.3–10.4)
CHLORIDE SERPL-SCNC: 101 MMOL/L (ref 98–107)
CO2 SERPL-SCNC: 28 MMOL/L (ref 21–32)
CREAT SERPL-MCNC: 0.96 MG/DL (ref 0.6–1)
GLUCOSE BLD STRIP.AUTO-MCNC: 154 MG/DL (ref 65–100)
GLUCOSE BLD STRIP.AUTO-MCNC: 172 MG/DL (ref 65–100)
GLUCOSE BLD STRIP.AUTO-MCNC: 204 MG/DL (ref 65–100)
GLUCOSE SERPL-MCNC: 119 MG/DL (ref 65–100)
POTASSIUM SERPL-SCNC: 4.4 MMOL/L (ref 3.5–5.1)
SODIUM SERPL-SCNC: 136 MMOL/L (ref 136–145)

## 2019-05-17 PROCEDURE — 97150 GROUP THERAPEUTIC PROCEDURES: CPT

## 2019-05-17 PROCEDURE — 74011250636 HC RX REV CODE- 250/636: Performed by: ORTHOPAEDIC SURGERY

## 2019-05-17 PROCEDURE — 97110 THERAPEUTIC EXERCISES: CPT

## 2019-05-17 PROCEDURE — 74011250637 HC RX REV CODE- 250/637: Performed by: HOSPITALIST

## 2019-05-17 PROCEDURE — 74011636637 HC RX REV CODE- 636/637: Performed by: INTERNAL MEDICINE

## 2019-05-17 PROCEDURE — 36415 COLL VENOUS BLD VENIPUNCTURE: CPT

## 2019-05-17 PROCEDURE — 82962 GLUCOSE BLOOD TEST: CPT

## 2019-05-17 PROCEDURE — 97116 GAIT TRAINING THERAPY: CPT

## 2019-05-17 PROCEDURE — 97535 SELF CARE MNGMENT TRAINING: CPT

## 2019-05-17 PROCEDURE — 65270000029 HC RM PRIVATE

## 2019-05-17 PROCEDURE — 80048 BASIC METABOLIC PNL TOTAL CA: CPT

## 2019-05-17 PROCEDURE — 74011250637 HC RX REV CODE- 250/637: Performed by: ORTHOPAEDIC SURGERY

## 2019-05-17 RX ORDER — HYDROMORPHONE HYDROCHLORIDE 2 MG/1
2-4 TABLET ORAL
Qty: 60 TAB | Refills: 0 | Status: SHIPPED | OUTPATIENT
Start: 2019-05-17 | End: 2019-05-24

## 2019-05-17 RX ORDER — ASPIRIN 81 MG/1
81 TABLET ORAL EVERY 12 HOURS
Qty: 60 TAB | Refills: 0 | Status: SHIPPED | OUTPATIENT
Start: 2019-05-17 | End: 2019-06-16

## 2019-05-17 RX ORDER — ACETAMINOPHEN 500 MG
1000 TABLET ORAL
Status: DISCONTINUED | OUTPATIENT
Start: 2019-05-17 | End: 2019-05-19 | Stop reason: HOSPADM

## 2019-05-17 RX ORDER — BUTALBITAL, ACETAMINOPHEN AND CAFFEINE 50; 325; 40 MG/1; MG/1; MG/1
1 TABLET ORAL
Status: DISCONTINUED | OUTPATIENT
Start: 2019-05-17 | End: 2019-05-19 | Stop reason: HOSPADM

## 2019-05-17 RX ADMIN — Medication 400 MG: at 08:42

## 2019-05-17 RX ADMIN — ASPIRIN 81 MG: 81 TABLET ORAL at 08:42

## 2019-05-17 RX ADMIN — ESCITALOPRAM OXALATE 20 MG: 10 TABLET ORAL at 20:38

## 2019-05-17 RX ADMIN — HYDROMORPHONE HYDROCHLORIDE 1 MG: 2 INJECTION INTRAMUSCULAR; INTRAVENOUS; SUBCUTANEOUS at 08:36

## 2019-05-17 RX ADMIN — TEMAZEPAM 30 MG: 15 CAPSULE ORAL at 00:19

## 2019-05-17 RX ADMIN — SENNOSIDES AND DOCUSATE SODIUM 2 TABLET: 8.6; 5 TABLET ORAL at 08:42

## 2019-05-17 RX ADMIN — METFORMIN HYDROCHLORIDE 500 MG: 500 TABLET ORAL at 08:42

## 2019-05-17 RX ADMIN — METOPROLOL SUCCINATE 200 MG: 100 TABLET, EXTENDED RELEASE ORAL at 20:38

## 2019-05-17 RX ADMIN — Medication 2 G: at 01:17

## 2019-05-17 RX ADMIN — Medication 5 ML: at 20:41

## 2019-05-17 RX ADMIN — SIMVASTATIN 20 MG: 20 TABLET, FILM COATED ORAL at 20:40

## 2019-05-17 RX ADMIN — HYDROMORPHONE HYDROCHLORIDE 1 MG: 2 INJECTION INTRAMUSCULAR; INTRAVENOUS; SUBCUTANEOUS at 14:04

## 2019-05-17 RX ADMIN — HYDROMORPHONE HYDROCHLORIDE 1 MG: 2 INJECTION INTRAMUSCULAR; INTRAVENOUS; SUBCUTANEOUS at 20:51

## 2019-05-17 RX ADMIN — GABAPENTIN 100 MG: 100 CAPSULE ORAL at 08:42

## 2019-05-17 RX ADMIN — Medication 400 MG: at 17:10

## 2019-05-17 RX ADMIN — ASPIRIN 81 MG: 81 TABLET ORAL at 20:40

## 2019-05-17 RX ADMIN — INSULIN LISPRO 4 UNITS: 100 INJECTION, SOLUTION INTRAVENOUS; SUBCUTANEOUS at 22:04

## 2019-05-17 RX ADMIN — VALSARTAN 320 MG: 320 TABLET, FILM COATED ORAL at 20:39

## 2019-05-17 RX ADMIN — HYDROMORPHONE HYDROCHLORIDE 4 MG: 2 TABLET ORAL at 11:03

## 2019-05-17 RX ADMIN — AMLODIPINE BESYLATE 5 MG: 5 TABLET ORAL at 08:42

## 2019-05-17 RX ADMIN — ACETAMINOPHEN 1000 MG: 500 TABLET, FILM COATED ORAL at 11:03

## 2019-05-17 RX ADMIN — INSULIN LISPRO 2 UNITS: 100 INJECTION, SOLUTION INTRAVENOUS; SUBCUTANEOUS at 12:06

## 2019-05-17 RX ADMIN — TEMAZEPAM 30 MG: 15 CAPSULE ORAL at 23:06

## 2019-05-17 RX ADMIN — HYDROMORPHONE HYDROCHLORIDE 4 MG: 2 TABLET ORAL at 17:10

## 2019-05-17 RX ADMIN — BUTALBITAL, ACETAMINOPHEN AND CAFFEINE 1 TABLET: 50; 325; 40 TABLET ORAL at 00:17

## 2019-05-17 RX ADMIN — HYDROMORPHONE HYDROCHLORIDE 2 MG: 2 TABLET ORAL at 00:17

## 2019-05-17 RX ADMIN — LORATADINE 10 MG: 10 TABLET ORAL at 08:42

## 2019-05-17 RX ADMIN — GABAPENTIN 100 MG: 100 CAPSULE ORAL at 17:10

## 2019-05-17 RX ADMIN — BUSPIRONE HYDROCHLORIDE 15 MG: 5 TABLET ORAL at 17:10

## 2019-05-17 RX ADMIN — INSULIN LISPRO 2 UNITS: 100 INJECTION, SOLUTION INTRAVENOUS; SUBCUTANEOUS at 17:10

## 2019-05-17 RX ADMIN — GABAPENTIN 100 MG: 100 CAPSULE ORAL at 20:39

## 2019-05-17 RX ADMIN — HYDROMORPHONE HYDROCHLORIDE 2 MG: 2 TABLET ORAL at 04:25

## 2019-05-17 RX ADMIN — BUSPIRONE HYDROCHLORIDE 15 MG: 5 TABLET ORAL at 08:42

## 2019-05-17 RX ADMIN — Medication 1 AMPULE: at 20:40

## 2019-05-17 RX ADMIN — BUTALBITAL, ACETAMINOPHEN AND CAFFEINE 1 TABLET: 50; 325; 40 TABLET ORAL at 23:06

## 2019-05-17 RX ADMIN — HYDROMORPHONE HYDROCHLORIDE 2 MG: 2 TABLET ORAL at 06:36

## 2019-05-17 RX ADMIN — LEVOTHYROXINE SODIUM 112 MCG: 112 TABLET ORAL at 06:36

## 2019-05-17 RX ADMIN — Medication 1 AMPULE: at 08:47

## 2019-05-17 NOTE — PROGRESS NOTES
Patient is A&Ox4. Able to verbalize needs. Resting quietly with no distress noted. Dressing to surgical site is dry and intact. Neurovascular and peripheral vascular checks WNL. June draining clear yellow urine to bag. Denies needs. Bed low and locked. Call light within reach. Instructed to call for assistance. Patient verbalizes understanding. Will monitor.

## 2019-05-17 NOTE — PROGRESS NOTES
Problem: Self Care Deficits Care Plan (Adult) Goal: *Acute Goals and Plan of Care (Insert Text) Description GOALS: GOAL MET 5/17/2019 DISCHARGE GOALS (in preparation for going home/rehab):  3 days 1. Ms. Wily Ghosh will perform one lower body dressing activity with minimal assistance required to demonstrate improved functional mobility and safety. 2.  Ms. Wily Ghosh will perform one lower body bathing activity with minimal assistance required to demonstrate improved functional mobility and safety. 3.  Ms. Wily Ghosh will perform toileting/toilet transfer with contact guard assistance to demonstrate improved functional mobility and safety. 4.  Ms. Wily Ghosh will perform shower transfer with contact guard assistance to demonstrate improved functional mobility and safety. JOINT CAMP OCCUPATIONAL THERAPY TKA: Daily Note, Discharge, Treatment Day: 1st and AM 5/17/2019 INPATIENT: Hospital Day: 2 [place] / Product Type: Medicare /  
  
NAME/AGE/GENDER: Misty Anthony is a 68 y.o. female PRIMARY DIAGNOSIS:  Unilateral primary osteoarthritis, right knee [M17.11] Procedure(s) and Anesthesia Type: 
   * RIGHT TOTAL KNEE ARTHROPLASTY - Spinal (Right) ICD-10: Treatment Diagnosis:  
 · Pain in Right Knee (M25.561) · Stiffness of Right Knee, Not elsewhere classified (M25.661) ASSESSMENT:  
 Ms. Wily Ghosh is s/p right TKA and presents with decreased weight bearing on right LE and decreased independence with functional mobility and activities of daily living. Patient completed shower and dressing as charter below in ADL grid and is ambulating with rolling walker and stand by assist.  Pt with complaint of pain during ambulation, RN notified. Pt slow to move due to pain. Patient has met 4/4 goals and plans to return home with good family support. Family able to provide patient with appropriate level of assistance at this time.   OT reviewed safety precautions throughout session and therapy schedule for the remainder of today. Patient instructed to call for assistance when needing to get up from recliner and all needs in reach. Patient verbalized understanding of call light. This section established at most recent assessment PROBLEM LIST (Impairments causing functional limitations): 1. Decreased Strength 2. Decreased ADL/Functional Activities 3. Decreased Transfer Abilities 4. Increased Pain 5. Increased Fatigue 6. Decreased Flexibility/Joint Mobility 7. Decreased Knowledge of Precautions INTERVENTIONS PLANNED: (Benefits and precautions of occupational therapy have been discussed with the patient.) 1. Activities of daily living training 2. Adaptive equipment training 3. Balance training 4. Clothing management 5. Donning&doffing training 6. Theraputic activity TREATMENT PLAN: Frequency/Duration: Follow patient 1-2tx to address above goals. Rehabilitation Potential For Stated Goals: Excellent RECOMMENDED REHABILITATION/EQUIPMENT: (at time of discharge pending progress): Continue Skilled Therapy. OCCUPATIONAL PROFILE AND HISTORY:  
History of Present Injury/Illness (Reason for Referral): Pt presents this date s/p (Right) TKA.    
Past Medical History/Comorbidities:  
Ms. Riya Sin  has a past medical history of Acute pancreatitis (2/2008), Aneurysm ([address] Anxiety, Bilateral sacroiliitis ([address] Breast cyst, CAD (coronary artery disease), Cellulitis (6/16/15), Chest pain, Chronic kidney disease, Chronic pain, CKD (chronic kidney disease), Degenerative disc disease, Depression (12/23/2015), Diabetes mellitus type II, Dyslipidemia, Dyspnea, Edema (12/23/2015), Fatty liver, Fibromyalgia, Food allergy, GERD (gastroesophageal reflux disease), Heart failure ([address] echocardiogram (08/21/2015), Hypertension, Hypertriglyceridemia, Hypothyroidism, Lumbar herniated disc (Aug 2013), Lumbar radiculopathy, Macrocytic anemia, Microcytic anemia, Mild pulmonary hypertension ([facility] Nausea & vomiting, Neuropathy, Obesity (BMI 30.0-34.9) (10/2/14), Osteoarthritis, Panic attacks, Sacroiliac dysfunction, Seasonal allergic reaction, Sleep apnea, and Syncope and collapse (12/23/2015). Ms. Jg Rae  has a past surgical history that includes hx partial thyroidectomy (2008); hx breast lumpectomy (Right); hx breast biopsy (Bilateral, R/1988  L/2012); hx premalig/benign skin lesion excision (09/29/2014); hx cyst removal (Left, 02/2019); hx heart catheterization (2005); hx jorge and bso (1995); hx knee arthroscopy (Left, 2004); hx orthopaedic (Left, 2010); hx orthopaedic (Right, 2009); hx knee arthroscopy (10/03/2014); hx back surgery (08/30/2013); hx other surgical (12/22/09); hx other surgical; hx other surgical (10/28/13); hx knee replacement (Left, 03/14/2017); and hx orthopaedic (04/03/2019). Social History/Living Environment:  
Home Environment: Private residence # Steps to Enter: 2 One/Two Story Residence: One story Living Alone: No 
Support Systems: Spouse/Significant Other/Partner Patient Expects to be Discharged to[de-identified] Private residence Current DME Used/Available at Home: Cane, quad, [place], straight, Grab bars, Raised toilet seat, Walker, rolling Tub or Shower Type: (walk in tub) Prior Level of Function/Work/Activity: 
Independent prior. Number of Personal Factors/Comorbidities that affect the Plan of Care: Brief history (0):  LOW COMPLEXITY ASSESSMENT OF OCCUPATIONAL PERFORMANCE[de-identified]  
Most Recent Physical Functioning:  
Balance Sitting: Intact Standing: Intact; With support(rolling walker) Mental Status Neurologic State: Alert Orientation Level: Oriented X4 Basic ADLs (From Assessment) Complex ADLs (From Assessment) Basic ADL Feeding: Independent Oral Facial Hygiene/Grooming: Setup Bathing: Minimum assistance Type of Bath: Chlorhexidine (CHG), Full, Shower Upper Body Dressing: Setup Lower Body Dressing: Moderate assistance Grooming/Bathing/Dressing Activities of Daily Living Grooming Grooming Assistance: Stand-by assistance(standing at sink) Upper Body Bathing Bathing Assistance: Supervision(seated on shower chair) Lower Body Bathing Bathing Assistance: Stand-by assistance(verbal cues for safety) Toileting Toileting Assistance: Stand-by assistance(elevated seat, walker) Upper Body Dressing Assistance Dressing Assistance: Supervision Functional Transfers Bathroom Mobility: Stand-by assistance Toilet Transfer : Stand-by assistance Shower Transfer: Stand-by assistance Cues: Verbal cues provided Adaptive Equipment: Shower chair with back;Grab bars Lower Body Dressing Assistance Dressing Assistance: Minimum assistance(pants and underpants) Bed/Mat Mobility Supine to Sit: Stand-by assistance Sit to Supine: (stayed up in chair) Sit to Stand: Contact guard assistance;Minimum assistance Stand to Sit: Contact guard assistance;Minimum assistance Physical Skills Involved: 1. Range of Motion 2. Balance 3. Strength Cognitive Skills Affected (resulting in the inability to perform in a timely and safe manner): 
1. [facility]  Psychosocial Skills Affected: 
1. WFL Number of elements that affect the Plan of Care: 1-3:  LOW COMPLEXITY CLINICAL DECISION MAKING:  
[employer] AM-PAC 6 Clicks Daily Activity Inpatient Short Form How much help from another person does the patient currently need. .. Total A Lot A Little None 1. Putting on and taking off regular lower body clothing? ? 1   ? 2   ? 3   ? 4  
2. Bathing (including washing, rinsing, drying)? ? 1   ? 2   ? 3   ? 4  
3. Toileting, which includes using toilet, bedpan or urinal?   ? 1   ? 2   ? 3   ? 4  
4. Putting on and taking off regular upper body clothing? ? 1   ? 2   ? 3   ? 4  
5. Taking care of personal grooming such as brushing teeth?    ? 1   ? 2   ? 3   ? 4  
 6.  Eating meals? ? 1   ? 2   ? 3   ? 4  
© 2007, Trustees of [address] [place] [address] [address] [address], under license to [employer]. All rights reserved Score:  Initial: 18 Most Recent: X (Date: -- ) Interpretation of Tool:  Represents activities that are increasingly more difficult (i.e. Bed mobility, Transfers, Gait). Medical Necessity:    
· Skilled intervention continues to be required due to Deficits listed above. Reason for Services/Other Comments: 
· Patient continues to require skilled intervention due to new TKA · . Use of outcome tool(s) and clinical judgement create a POC that gives a: MODERATE COMPLEXITY  
  
 
 
 
TREATMENT:  
(In addition to Assessment/Re-Assessment sessions the following treatments were rendered) Pre-treatment Symptoms/Complaints:   
Pain: Initial:  
Pain Intensity 1: 7  Post Session:  5 Self Care: (55 min): Procedure(s) (per grid) utilized to improve and/or restore self-care/home management as related to dressing, bathing, toileting and grooming. Required minimal verbal and   cueing to facilitate activities of daily living skills and compensatory activities. Treatment/Session Assessment:   
 Response to Treatment:  Good, up to shower slow moving secondary to pain Education: ? Home Exercises ? Fall Precautions ? Hip Precautions ? Going Home Video ? Knee/Hip Prosthesis Review ? Walker Management/Safety ? Adaptive Equipment as Needed Interdisciplinary Collaboration:  
o Physical Therapist 
o Occupational Therapist 
o Registered Nurse After treatment position/precautions:  
o Up in chair 
o Bed/Chair-wheels locked 
o Call light within reach 
o RN notified Compliance with Program/Exercises: Compliant all of the time. Recommendations/Intent for next treatment session:  Pt doing well all goals met and will do well at home with support from . Patient will be discharged home with home health PT.  No further Occupational Therapy warranted, will discharge Occupational Therapy services. Total Treatment Duration: OT Patient Time In/Time Out Time In: 9247 Time Out: 1135 Miri Espinoza, OT

## 2019-05-17 NOTE — PROGRESS NOTES
05/16/19 2004 Oxygen Therapy O2 Sat (%) 100 % Pulse via Oximetry 72 beats per minute O2 Device Nasal cannula O2 Flow Rate (L/min) 3 l/min Pt. Placed on CS monitor. Limits and alarms set. Data cleared. Pt placed on 3L NC for sleep per recommendations. Pt shows no signs of distress at this time.

## 2019-05-17 NOTE — PROGRESS NOTES
Problem: Mobility Impaired (Adult and Pediatric) Goal: *Acute Goals and Plan of Care (Insert Text) Description GOALS (1-4 days): 
(1.)Ms. Douglas Hatchet will move from supine to sit and sit to supine  in bed with STAND BY ASSIST. 
(2.)Ms. Douglas Hatchet will transfer from bed to chair and chair to bed with STAND BY ASSIST using the least restrictive device. (3.)Ms. Douglas Hatchet will ambulate with STAND BY ASSIST for 300 feet with the least restrictive device. (4.)Ms. Douglas Hatchet will ambulate up/down 2 steps with bilateral  railing with CONTACT GUARD ASSIST with no device. (5.)Ms. Douglas Hatchet will increase right knee ROM to 5°-80°. 
________________________________________________________________________________________________ Outcome: Progressing Towards PHYSICAL THERAPY JOINT CAMP TKA: Daily Note, Treatment Day: 1st and AM 5/17/2019 INPATIENT: Hospital Day: 2 [place] / Product Type: Medicare /  
  
NAME/AGE/GENDER: Marisol Yoon is a 68 y.o. female PRIMARY DIAGNOSIS:  Unilateral primary osteoarthritis, right knee [M17.11] Procedure(s) and Anesthesia Type: 
   * RIGHT TOTAL KNEE ARTHROPLASTY - Spinal (Right) ICD-10: Treatment Diagnosis:  
 · Pain in Right Knee (M25.561) · Stiffness of Right Knee, Not elsewhere classified (M25.661) · Difficulty in walking, Not elsewhere classified (R26.2) ASSESSMENT:  
Ms. Douglas Hatchet presents supine on contact and on first attempt in a lot of pain, got IV pain medicine and PT came back later. Pt agreeable and said she was ready. Worked on bed mobility, sit to stand and walked into bathroom. Pt attempted to void. Then walked in room. Up in chair worked on TKA exercises with verbal cues progressing with repetitions. Pt stayed up in chair with ice on knee and needs in reach, having more pain after therapy. Hope to progress this afternoon. This section established at most recent assessment PROBLEM LIST (Impairments causing functional limitations): 1. Decreased Strength 2. Decreased ADL/Functional Activities 3. Decreased Transfer Abilities 4. Decreased Ambulation Ability/Technique 5. Increased Pain 6. Decreased Flexibility/Joint Mobility 7. Decreased [place] with Home Exercise Program 
 INTERVENTIONS PLANNED: (Benefits and precautions of physical therapy have been discussed with the patient.) 1. Cold 2. bed mobility 3. gait training 4. home exercise program (HEP) 5. Range of Motion: active/assisted/passive 6. Therapeutic Activities 7. therapeutic exercise/strengthening 8. transfer training 9. Group Therapy TREATMENT PLAN: Frequency/Duration: Follow patient BID for duration of hospital stay to address above goals. Rehabilitation Potential For Stated Goals: Good RECOMMENDED REHABILITATION/EQUIPMENT: (at time of discharge pending progress): Continue Skilled Therapy, Home Health: Physical Therapy and Outpatient: Physical Therapy. HISTORY:  
History of Present Injury/Illness (Reason for Referral): 
Pt s/p right TKA on 5/16/19 Past Medical History/Comorbidities:  
Ms. Jurline Shone  has a past medical history of Acute pancreatitis (2/2008), Aneurysm ([address] Anxiety, Bilateral sacroiliitis ([address] Breast cyst, CAD (coronary artery disease), Cellulitis (6/16/15), Chest pain, Chronic kidney disease, Chronic pain, CKD (chronic kidney disease), Degenerative disc disease, Depression (12/23/2015), Diabetes mellitus type II, Dyslipidemia, Dyspnea, Edema (12/23/2015), Fatty liver, Fibromyalgia, Food allergy, GERD (gastroesophageal reflux disease), Heart failure ([address] echocardiogram (08/21/2015), Hypertension, Hypertriglyceridemia, Hypothyroidism, Lumbar herniated disc (Aug 2013), Lumbar radiculopathy, Macrocytic anemia, Microcytic anemia, Mild pulmonary hypertension ([address] Nausea & vomiting, Neuropathy, Obesity (BMI 30.0-34.9) (10/2/14), Osteoarthritis, Panic attacks, Sacroiliac dysfunction, Seasonal allergic reaction, Sleep apnea, and Syncope and collapse (12/23/2015). Ms. Bette Maurice  has a past surgical history that includes hx partial thyroidectomy (2008); hx breast lumpectomy (Right); hx breast biopsy (Bilateral, R/1988  L/2012); hx premalig/benign skin lesion excision (09/29/2014); hx cyst removal (Left, 02/2019); hx heart catheterization (2005); hx jorge and bso (1995); hx knee arthroscopy (Left, 2004); hx orthopaedic (Left, 2010); hx orthopaedic (Right, 2009); hx knee arthroscopy (10/03/2014); hx back surgery (08/30/2013); hx other surgical (12/22/09); hx other surgical; hx other surgical (10/28/13); hx knee replacement (Left, 03/14/2017); and hx orthopaedic (04/03/2019). Social History/Living Environment:  
Home Environment: Private residence # Steps to Enter: 2 One/Two Story Residence: One story Living Alone: No 
Support Systems: Spouse/Significant Other/Partner Patient Expects to be Discharged to[de-identified] Private residence Current DME Used/Available at Home: Cane, quad, [place], straight, Grab bars, Raised toilet seat, Walker, rolling Tub or Shower Type: (walk in tub) Prior Level of Function/Work/Activity: 
Pt living at home, independent with gait and ADLs Number of Personal Factors/Comorbidities that affect the Plan of Care: 0: LOW COMPLEXITY EXAMINATION:  
Most Recent Physical Functioning:  
  
  
 
         
 
  
 
Bed Mobility Supine to Sit: Stand-by assistance Sit to Supine: (stayed up in chair) Transfers Sit to Stand: Contact guard assistance;Minimum assistance Stand to Sit: Contact guard assistance;Minimum assistance Balance Sitting: Intact Standing: Pull to stand; With support Gait Training: Yes 
 
Weight Bearing Status Left Side Weight Bearing: As tolerated Distance (ft): 20 Feet (ft)(and another 15 feet) Ambulation - Level of Assistance: Contact guard assistance;Minimal assistance Assistive Device: Walker, rolling Speed/Lindsay: Pace decreased (<100 feet/min) Step Length: Right shortened Stance: Left decreased Gait Abnormalities: Antalgic Interventions: Safety awareness training;Verbal cues Braces/Orthotics: none Right Knee Cold Type: Cryocuff Patient Position: Sitting Body Structures Involved: 1. Joints 2. Muscles Body Functions Affected: 1. Movement Related Activities and Participation Affected: 1. Mobility 2. Self Care Number of elements that affect the Plan of Care: 4+: HIGH COMPLEXITY CLINICAL PRESENTATION:  
Presentation: Stable and uncomplicated: LOW COMPLEXITY CLINICAL DECISION MAKING:  
[facility] [place] [facility] [employer] AM-PAC 6 Clicks Basic Mobility Inpatient Short Form How much difficulty does the patient currently have. .. Unable A Lot A Little None 1. Turning over in bed (including adjusting bedclothes, sheets and blankets)? ? 1   ? 2   ? 3   ? 4  
2. Sitting down on and standing up from a chair with arms ( e.g., wheelchair, bedside commode, etc.)   ? 1   ? 2   ? 3   ? 4  
3. Moving from lying on back to sitting on the side of the bed?   ? 1   ? 2   ? 3   ? 4 How much help from another person does the patient currently need. .. Total A Lot A Little None 4. Moving to and from a bed to a chair (including a wheelchair)? ? 1   ? 2   ? 3   ? 4  
5. Need to walk in hospital room? ? 1   ? 2   ? 3   ? 4  
6. Climbing 3-5 steps with a railing? ? 1   ? 2   ? 3   ? 4  
© 2007, Trustees of [facility] [place] [facility] [employer], under license to [employer]. All rights reserved Score:  Initial: 18 Most Recent: X (Date: -- ) Interpretation of Tool:  Represents activities that are increasingly more difficult (i.e. Bed mobility, Transfers, Gait). Medical Necessity:    
· Patient is expected to demonstrate progress in strength, range of motion and functional technique ·  to decrease assistance required with functional mobility and TKA HEP · . Reason for Services/Other Comments: 
· Patient continues to require skilled intervention due to inability to complete functional mobility and TKA HEP independently · . Use of outcome tool(s) and clinical judgement create a POC that gives a: Clear prediction of patient's progress: LOW COMPLEXITY  
  
 
 
 
TREATMENT:  
(In addition to Assessment/Re-Assessment sessions the following treatments were rendered) Pre-treatment Symptoms/Complaints:  having pain Pain Initial: having some pain, had pain medicine already Pain Intensity 1: 6  Post Session:  still hurting Therapeutic Exercise: (15 Minutes):  Exercises per grid below to improve mobility and strength. Required minimal verbal and manual cues to promote proper body alignment and promote proper body posture. Progressed repetitions as indicated. Gait Training (15 Minutes):  Gait training to improve and/or restore physical functioning as related to mobility and strength. Ambulated 20 Feet (ft)(and another 15 feet) with Contact guard assistance;Minimal assistance using a Walker, rolling and minimal Safety awareness training;Verbal cues related to their stance phase and stride length to promote proper body alignment and promote proper body posture. Instruction in performance of walker use and gait sequencing to correct stance phase and stride length. Date: 
5/16/19 Date: 
5/17/19 Date: 
  
ACTIVITY/EXERCISE AM PM AM PM AM PM  
GROUP THERAPY  ?  ?  ?  ?  ?  ? Ankle Pumps  10 15 Quad Sets  10 15 Gluteal Sets  10 15 Hip ABd/ADduction  10 15 Straight Leg Raises   15 Knee Slides  10 15 Short Arc The [address] Chair Slides B = bilateral; AA = active assistive; A = active; P = passive Treatment/Session Assessment:   
 Response to Treatment:  tolerated with pain, up in chair Education: 
X Home Exercises ? Fall Precautions ? D/C Instruction Review ? Knee Prosthesis Review ? Walker Management/Safety ? Adaptive Equipment as Needed Interdisciplinary Collaboration:  
o Registered Nurse 
o Rehabilitation Attendant After treatment position/precautions:  
o Up in chair 
o Bed/Chair-wheels locked 
o Call light within reach Compliance with Program/Exercises: Will assess as treatment progresses. Recommendations/Intent for next treatment session:  Treatment next visit will focus on increasing Ms. Daley's independence with bed mobility, transfers, gait training, strength/ROM exercises, modalities for pain, and patient education. Total Treatment Duration: PT Patient Time In/Time Out Time In: 0900 Time Out: 0930 Rachid Venegas PT

## 2019-05-17 NOTE — PROGRESS NOTES
Hospitalist Progress Note 2019 Admit Date: 2019  7:41 AM  
NAME: Evan Hancock :  1945 MRN:  [id number] Attending: Pancho Watkins MD 
PCP:  Chaitanya Sanchez MD 
 
SUBJECTIVE:  
Pt is a 68 y.o. female with DM2, HTN, OA, HLD,hypothyroidism who underwent R TKA earlier today. Hospitalist consulted for DM mgmt. Pt reports appetite improving and good PO intake. Review of Systems negative with exception of pertinent positives noted above PHYSICAL EXAM  
 
Patient Vitals for the past 24 hrs: 
 Temp Pulse Resp BP SpO2  
19 0723 98.3 °F (36.8 °C) 76 18 188/81 99 % 19 0418 98.1 °F (36.7 °C) 80 16 177/83 100 % 19 2352 98.1 °F (36.7 °C) 70 16 166/79 100 % 19     100 % 19 1930 97.6 °F (36.4 °C) 74 16 154/78 96 % 19 1535     98 % 19 1418 97.6 °F (36.4 °C) 69 16 158/70 98 % 19 1341  68 15 133/67 97 % 19 1326  70 15 143/69 96 % 19 1311  67 14 (!) 172/94 98 % 19 1256  68 15 176/75 96 % 19 1249  65 16 178/81 96 % 19 1234  62 16 163/75 95 % 19 1229  62 16 163/77 94 % 19 1224  62 16 153/72 93 % 19 1219 98.6 °F (37 °C) 70 16 164/70 93 % Oxygen Therapy O2 Sat (%): 99 % (19 0723) Pulse via Oximetry: 72 beats per minute (19) O2 Device: Nasal cannula (19) O2 Flow Rate (L/min): 3 l/min (19) Intake/Output Summary (Last 24 hours) at 2019 1046 Last data filed at 2019 5088 Gross per 24 hour Intake 1350 ml Output 4650 ml Net -3300 ml General: No acute distress   
Neck:  No LAD Lungs:  CTA Bilaterally Heart:  Regular rate and rhythm,  No murmur, rub, or gallop Abdomen: Soft, Non distended, Non tender, Positive bowel sounds Extremities: No cyanosis, clubbing or edema Neurologic:  No focal deficits Psych:  Calm, cooperative Recent Results (from the past 24 hour(s)) HEMOGLOBIN  
 Collection Time: 05/16/19  7:41 PM  
Result Value Ref Range HGB 11.6 (L) 11.7 - 15.4 g/dL GLUCOSE, POC Collection Time: 05/16/19  8:59 PM  
Result Value Ref Range Glucose (POC) 348 (H) 65 - 100 mg/dL METABOLIC PANEL, BASIC Collection Time: 05/17/19  4:13 AM  
Result Value Ref Range Sodium 136 136 - 145 mmol/L Potassium 4.4 3.5 - 5.1 mmol/L Chloride 101 98 - 107 mmol/L  
 CO2 28 21 - 32 mmol/L Anion gap 7 7 - 16 mmol/L Glucose 119 (H) 65 - 100 mg/dL BUN 12 8 - 23 MG/DL Creatinine 0.96 0.6 - 1.0 MG/DL  
 GFR est AA >60 >60 ml/min/1.73m2 GFR est non-AA >60 >60 ml/min/1.73m2 Calcium 8.6 8.3 - 10.4 MG/DL Micro: All Micro Results None Other studies last 24 hours: 
No results found. All imaging, labs reviewed. ASSESSMENT Hospital Problems as of 5/17/2019 Date Reviewed: 5/9/2019 Codes Class Noted - Resolved POA * (Principal) S/P TKR (total knee replacement) using cement, right ICD-10-CM: O64.296 ICD-9-CM: V43.65  5/17/2019 - Present Unknown Arthritis of knee, right ICD-10-CM: M17.11 ICD-9-CM: 716.96  5/16/2019 - Present Unknown DM2 Glucose 119 this AM. 
- increase Levemir to 35 units  
- hold metformin 
- SSI 
- expect some postprandial hyperglycemia due to periop steroids  
  
HTN 
- cont home meds 
  
Hypothyroidism 
- cont synthroid 
  
OA s/p R TKA - per ortho Signed By: Tony Real MD   
 May 17, 2019

## 2019-05-17 NOTE — PROGRESS NOTES
Nutrition Assessment for:   
Best Practice Alert for Malnutrition Screening Tool positive for weight loss of 14-23 pounds. Patient states she started losing weight intentionally when she was diagnosed with levimir, trulicity and a more healthful diet. She states this started later than 2 years ago and her weight has been stable for longer than 6 months. No further assessment at this time. Theresa Sabillon, [address] [place] [address], [address]

## 2019-05-17 NOTE — PROGRESS NOTES
Orthopedic Joint Progress Note May 17, 2019 Admit Date: 2019 Admit Diagnosis: Unilateral primary osteoarthritis, right knee [M17.11] Arthritis of knee, right [M17.11] 1 Day Post-Op Subjective:  
 
Marisol Yoon awake and alert Review of Systems: Pertinent items are noted in HPI. Objective:  
 
PT/OT:  
 
PATIENT MOBILITY Bed Mobility Supine to Sit: Minimum assistance Scooting: Contact guard assistance Transfers Sit to Stand: Contact guard assistance Stand to Sit: Contact guard assistance Bed to Chair: Contact guard assistance Gait Speed/Lindsay: Pace decreased (<100 feet/min) Step Length: Right shortened Stance: Left decreased Gait Abnormalities: Antalgic Ambulation - Level of Assistance: Minimal assistance Distance (ft): 10 Feet (ft) Assistive Device: Walker, rolling Weight Bearing Status Left Side Weight Bearing: As tolerated Vital Signs:   
Blood pressure 177/83, pulse 80, temperature 98.1 °F (36.7 °C), resp. rate 16, height 5' 5\" (1.651 m), weight 85 kg (187 lb 8 oz), SpO2 100 %. Temp (24hrs), Av.9 °F (36.6 °C), Min:97.6 °F (36.4 °C), Max:98.6 °F (37 °C) Pain Control:  
Pain Assessment Pain Scale 1: Numeric (0 - 10) Pain Intensity 1: 8 Pain Onset 1: post op Pain Location 1: Head 
Pain Orientation 1: Right Pain Description 1: Aching, Constant, Shooting Pain Intervention(s) 1: Medication (see MAR) Meds: 
Current Facility-Administered Medications Medication Dose Route Frequency  butalbital-acetaminophen-caffeine (FIORICET, ESGIC) -40 mg per tablet 1 Tab  1 Tab Oral Q6H PRN  
 acetaminophen (TYLENOL) tablet 1,000 mg  1,000 mg Oral Q6H PRN  
 tuberculin injection 5 Units  5 Units IntraDERMal ONCE  
 amLODIPine (NORVASC) tablet 5 mg  5 mg Oral DAILY  busPIRone (BUSPAR) tablet 15 mg  15 mg Oral BID  escitalopram oxalate (LEXAPRO) tablet 20 mg  20 mg Oral QHS  loratadine (CLARITIN) tablet 10 mg  10 mg Oral DAILY  gabapentin (NEURONTIN) capsule 100 mg  100 mg Oral TID  levothyroxine (SYNTHROID) tablet 112 mcg  112 mcg Oral ACB  magnesium oxide (MAG-OX) tablet 400 mg  400 mg Oral BID  metoprolol succinate (TOPROL-XL) tablet 200 mg  200 mg Oral QHS  temazepam (RESTORIL) capsule 30 mg  30 mg Oral QHS PRN  
 alcohol 62% (NOZIN) nasal  1 Ampule  1 Ampule Topical Q12H  
 0.9% sodium chloride infusion  100 mL/hr IntraVENous CONTINUOUS  
 sodium chloride (NS) flush 5-40 mL  5-40 mL IntraVENous Q8H  
 sodium chloride (NS) flush 5-40 mL  5-40 mL IntraVENous PRN  
 HYDROmorphone (PF) (DILAUDID) injection 1 mg  1 mg IntraVENous Q3H PRN  
 naloxone (NARCAN) injection 0.2-0.4 mg  0.2-0.4 mg IntraVENous Q10MIN PRN  
 dexamethasone (DECADRON) injection 10 mg  10 mg IntraVENous ONCE  promethazine (PHENERGAN) tablet 25 mg  25 mg Oral Q6H PRN  
 diphenhydrAMINE (BENADRYL) capsule 25 mg  25 mg Oral Q4H PRN  
 senna-docusate (PERICOLACE) 8.6-50 mg per tablet 2 Tab  2 Tab Oral DAILY  aspirin delayed-release tablet 81 mg  81 mg Oral Q12H  
 HYDROmorphone (DILAUDID) tablet 2-4 mg  2-4 mg Oral Q4H PRN  
 ondansetron (ZOFRAN ODT) tablet 8 mg  8 mg Oral Q8H PRN  
 insulin lispro (HUMALOG) injection   SubCUTAneous AC&HS  
 dextrose 40% (GLUTOSE) oral gel 1 Tube  15 g Oral PRN  
 glucagon (GLUCAGEN) injection 1 mg  1 mg IntraMUSCular PRN  
 dextrose (D50W) injection syrg 12.5-25 g  25-50 mL IntraVENous PRN  
 ezetimibe (ZETIA) tablet 10 mg  10 mg Oral QHS Or  
 simvastatin (ZOCOR) tablet 20 mg  20 mg Oral QHS  metFORMIN (GLUCOPHAGE) tablet 500 mg  500 mg Oral DAILY WITH BREAKFAST  valsartan (DIOVAN) tablet 320 mg  320 mg Oral QHS  insulin detemir U-100 (LEVEMIR FLEXTOUCH) pen 15 Units (Patient Supplied)  15 Units SubCUTAneous QHS  
  
 
LAB:   
Lab Results Component Value Date/Time INR 1.0 05/09/2019 01:21 PM  
 INR 1.0 02/20/2017 10:28 AM  
 
Lab Results Component Value Date/Time HGB 11.6 (L) 05/16/2019 07:41 PM  
 HGB 14.2 05/09/2019 01:21 PM  
 HGB 10.4 (L) 03/16/2017 04:20 AM  
 
 
Wound Toe Right (Active) Number of days: 1983 Wound Knee Left (Active) Number of days: 340 Wound Foot Right (Active) Number of days: 44 Wound Knee Right (Active) Dressing Status Clean, dry, and intact 5/16/2019  8:00 PM  
Dressing Type Aquacel 5/16/2019  8:00 PM  
Drainage Amount None 5/16/2019  2:10 PM  
Number of days: 1 Physical Exam: 
Calves soft/ neuro intact Assessment:  
  
Principal Problem: S/P TKR (total knee replacement) using cement, right (5/17/2019) Active Problems: 
  Arthritis of knee, right (5/16/2019) Plan:  
 
Continue PT/OT/Rehab Consult: Rehab team including PT, OT, recreational therapy, and  Home soon Patient Expects to be Discharged to[de-identified] Private residence

## 2019-05-17 NOTE — DISCHARGE SUMMARY
[address] [place] [address]  Total Joint Discharge Summary      Patient ID:  Griselda Huitron  [id number]  35 y.o.  1945    Admit date: 5/16/2019  Discharge date and time: 5-17-19  Admitting Physician: Cha Gonzalez MD  Surgeon: Same  Admission Diagnoses: Unilateral primary osteoarthritis, right knee [M17.11]  Arthritis of knee, right [M17.11]  Discharge Diagnoses: Principal Problem:    S/P TKR (total knee replacement) using cement, right (5/17/2019)    Active Problems:    Arthritis of knee, right (5/16/2019)                                Perioperative Antibiotics: Ancef 1 to 2 mg was given depending on patient's weight. If allergic to Ancef or due to other indications, patient was given Vancomycin. Hospital Medications given:   [unfilled]  [unfilled]  [unfilled]    Discharge Medications given:  Current Discharge Medication List      START taking these medications    Details   aspirin delayed-release 81 mg tablet Take 1 Tab by mouth every twelve (12) hours every twelve (12) hours for 30 days. Qty: 60 Tab, Refills: 0      HYDROmorphone (DILAUDID) 2 mg tablet Take 1-2 Tabs by mouth every four (4) hours as needed for Pain for up to 7 days. Max Daily Amount: 24 mg. Qty: 60 Tab, Refills: 0    Associated Diagnoses: S/P TKR (total knee replacement) using cement, right         CONTINUE these medications which have NOT CHANGED    Details   fexofenadine (ALLEGRA) 180 mg tablet Take 180 mg by mouth nightly.      ergocalciferol (ERGOCALCIFEROL) 50,000 unit capsule Take 1 Cap by mouth every seven (7) days. Every Sunday  Qty: 4 Cap, Refills: 5      escitalopram oxalate (LEXAPRO) 20 mg tablet Take 1 Tab by mouth daily. Qty: 30 Tab, Refills: 5    Associated Diagnoses: Anxious depression      amLODIPine (NORVASC) 5 mg tablet Take 1 Tab by mouth daily.   Qty: 30 Tab, Refills: 5    Associated Diagnoses: Essential hypertension      ezetimibe-simvastatin (VYTORIN 10/40) 10-40 mg per tablet Take 1 Tab by mouth nightly. Qty: 30 Tab, Refills: 5    Associated Diagnoses: Mixed hyperlipidemia      metFORMIN ER (GLUCOPHAGE XR) 500 mg tablet Take 1 Tab by mouth daily. Qty: 30 Tab, Refills: 5    Associated Diagnoses: Controlled type 2 diabetes mellitus with diabetic polyneuropathy, with long-term current use of insulin ([facility])      LEVEMIR FLEXTOUCH U-100 INSULN 100 unit/mL (3 mL) inpn 50 Units by SubCUTAneous route nightly. Qty: 5 Pen, Refills: 11    Associated Diagnoses: Controlled type 2 diabetes mellitus with diabetic polyneuropathy, with long-term current use of insulin ([facility])      levothyroxine (SYNTHROID) 112 mcg tablet Take 1 Tab by mouth Daily (before breakfast). Qty: 30 Tab, Refills: 11    Associated Diagnoses: Postsurgical hypothyroidism      metoprolol succinate (TOPROL XL) 100 mg tablet Take 2 Tabs by mouth daily. Qty: 60 Tab, Refills: 11    Associated Diagnoses: Essential hypertension      irbesartan (AVAPRO) 300 mg tablet Take 1 Tab by mouth nightly. Qty: 30 Tab, Refills: 11    Associated Diagnoses: Essential hypertension      magnesium oxide (MAG-OX) 400 mg tablet Take 1 Tab by mouth two (2) times a day. Qty: 60 Tab, Refills: 11      temazepam (RESTORIL) 30 mg capsule Take 1 Cap by mouth nightly as needed for Sleep. Max Daily Amount: 30 mg.  Qty: 30 Cap, Refills: 5    Associated Diagnoses: Insomnia, unspecified type      busPIRone (BUSPAR) 15 mg tablet Take 1 tablet twice a day. Qty: 60 Tab, Refills: 5      gabapentin (NEURONTIN) 100 mg capsule TAKE 1 CAPSULE THREE TIMES A DAY. Qty: 90 Cap, Refills: 5      cetirizine (ALLERGY RELIEF, CETIRIZINE,) 10 mg tablet Take 10 mg by mouth nightly. Indications: ALLERGIC RHINITIS      TRULICITY 1.5 [place]/1.2 mL sub-q pen 0.5 mL by SubCUTAneous route every seven (7) days.   Qty: 4 Pen, Refills: 11    Associated Diagnoses: Controlled type 2 diabetes mellitus with diabetic polyneuropathy, with long-term current use of insulin ([facility])         STOP taking these medications oxyCODONE IR (OXY-IR) 15 mg immediate release tablet Comments:   Reason for Stopping:         ibuprofen (ADVIL) 200 mg tablet Comments:   Reason for Stopping:         butalbital-acetaminophen-caffeine (FIORICET, ESGIC) -40 mg per tablet Comments:   Reason for Stopping:                Additional DVT Prophylaxis:  ZARA Hose,Plexi-Pulse    Postoperative transfusions:   none  Post Op complications: none    Hemoglobin at discharge:   Lab Results   Component Value Date/Time    HGB 11.6 (L) 05/16/2019 07:41 PM       Wound appears to be healing without any evidence of infection. Physical Therapy started on the day following surgery and progressed to independent ambulation with the aid of a walker. At the time of discharge, able to go up and down stairs and had understanding of precautions needed following surgery.       PT/OT:            Assistive Device: Walker (comment)                Discharged to: home    Discharge instructions:  -Rx pain medication given  - Anticoagulate with: Ecotrin 81 mg PO BID x 4 weeks  -Resume pre hospital diet             -Resume home medications per medical continuation form     -Ambulate with walker, appropriate total joint protocol  -Follow up in office as scheduled       Signed:  STEFAN Valles  5/17/2019  7:46 AM

## 2019-05-18 LAB
GLUCOSE BLD STRIP.AUTO-MCNC: 143 MG/DL (ref 65–100)
GLUCOSE BLD STRIP.AUTO-MCNC: 149 MG/DL (ref 65–100)
GLUCOSE BLD STRIP.AUTO-MCNC: 177 MG/DL (ref 65–100)
GLUCOSE BLD STRIP.AUTO-MCNC: 178 MG/DL (ref 65–100)

## 2019-05-18 PROCEDURE — 97110 THERAPEUTIC EXERCISES: CPT

## 2019-05-18 PROCEDURE — 97150 GROUP THERAPEUTIC PROCEDURES: CPT

## 2019-05-18 PROCEDURE — 65270000029 HC RM PRIVATE

## 2019-05-18 PROCEDURE — 94760 N-INVAS EAR/PLS OXIMETRY 1: CPT

## 2019-05-18 PROCEDURE — 97116 GAIT TRAINING THERAPY: CPT

## 2019-05-18 PROCEDURE — 74011250637 HC RX REV CODE- 250/637: Performed by: ORTHOPAEDIC SURGERY

## 2019-05-18 PROCEDURE — 74011250637 HC RX REV CODE- 250/637: Performed by: INTERNAL MEDICINE

## 2019-05-18 PROCEDURE — 82962 GLUCOSE BLOOD TEST: CPT

## 2019-05-18 PROCEDURE — 74011250636 HC RX REV CODE- 250/636: Performed by: HOSPITALIST

## 2019-05-18 PROCEDURE — 74011250636 HC RX REV CODE- 250/636: Performed by: ORTHOPAEDIC SURGERY

## 2019-05-18 PROCEDURE — 97530 THERAPEUTIC ACTIVITIES: CPT

## 2019-05-18 PROCEDURE — 74011250637 HC RX REV CODE- 250/637: Performed by: HOSPITALIST

## 2019-05-18 PROCEDURE — 74011636637 HC RX REV CODE- 636/637: Performed by: INTERNAL MEDICINE

## 2019-05-18 RX ORDER — AMLODIPINE BESYLATE 5 MG/1
5 TABLET ORAL ONCE
Status: COMPLETED | OUTPATIENT
Start: 2019-05-18 | End: 2019-05-18

## 2019-05-18 RX ORDER — AMLODIPINE BESYLATE 10 MG/1
10 TABLET ORAL DAILY
Status: DISCONTINUED | OUTPATIENT
Start: 2019-05-19 | End: 2019-05-19 | Stop reason: HOSPADM

## 2019-05-18 RX ORDER — HYDRALAZINE HYDROCHLORIDE 20 MG/ML
10 INJECTION INTRAMUSCULAR; INTRAVENOUS
Status: DISCONTINUED | OUTPATIENT
Start: 2019-05-18 | End: 2019-05-19 | Stop reason: HOSPADM

## 2019-05-18 RX ORDER — HYDROCHLOROTHIAZIDE 25 MG/1
12.5 TABLET ORAL DAILY
Status: DISCONTINUED | OUTPATIENT
Start: 2019-05-18 | End: 2019-05-19 | Stop reason: HOSPADM

## 2019-05-18 RX ADMIN — HYDROMORPHONE HYDROCHLORIDE 4 MG: 2 TABLET ORAL at 02:42

## 2019-05-18 RX ADMIN — HYDROMORPHONE HYDROCHLORIDE 1 MG: 2 INJECTION INTRAMUSCULAR; INTRAVENOUS; SUBCUTANEOUS at 19:09

## 2019-05-18 RX ADMIN — TEMAZEPAM 30 MG: 15 CAPSULE ORAL at 22:44

## 2019-05-18 RX ADMIN — Medication 10 ML: at 21:01

## 2019-05-18 RX ADMIN — ACETAMINOPHEN 1000 MG: 500 TABLET, FILM COATED ORAL at 12:20

## 2019-05-18 RX ADMIN — VALSARTAN 320 MG: 320 TABLET, FILM COATED ORAL at 20:47

## 2019-05-18 RX ADMIN — HYDRALAZINE HYDROCHLORIDE 10 MG: 20 INJECTION INTRAMUSCULAR; INTRAVENOUS at 05:26

## 2019-05-18 RX ADMIN — HYDROMORPHONE HYDROCHLORIDE 4 MG: 2 TABLET ORAL at 10:39

## 2019-05-18 RX ADMIN — BUSPIRONE HYDROCHLORIDE 15 MG: 5 TABLET ORAL at 09:05

## 2019-05-18 RX ADMIN — GABAPENTIN 100 MG: 100 CAPSULE ORAL at 15:34

## 2019-05-18 RX ADMIN — HYDRALAZINE HYDROCHLORIDE 10 MG: 20 INJECTION INTRAMUSCULAR; INTRAVENOUS at 11:40

## 2019-05-18 RX ADMIN — METOPROLOL SUCCINATE 200 MG: 100 TABLET, EXTENDED RELEASE ORAL at 20:49

## 2019-05-18 RX ADMIN — ESCITALOPRAM OXALATE 20 MG: 10 TABLET ORAL at 20:49

## 2019-05-18 RX ADMIN — GABAPENTIN 100 MG: 100 CAPSULE ORAL at 20:50

## 2019-05-18 RX ADMIN — ASPIRIN 81 MG: 81 TABLET ORAL at 20:50

## 2019-05-18 RX ADMIN — Medication 1 AMPULE: at 20:47

## 2019-05-18 RX ADMIN — METFORMIN HYDROCHLORIDE 500 MG: 500 TABLET ORAL at 09:04

## 2019-05-18 RX ADMIN — HYDROMORPHONE HYDROCHLORIDE 4 MG: 2 TABLET ORAL at 06:04

## 2019-05-18 RX ADMIN — HYDROMORPHONE HYDROCHLORIDE 4 MG: 2 TABLET ORAL at 15:34

## 2019-05-18 RX ADMIN — EZETIMIBE 10 MG: 10 TABLET ORAL at 20:50

## 2019-05-18 RX ADMIN — ASPIRIN 81 MG: 81 TABLET ORAL at 09:05

## 2019-05-18 RX ADMIN — LORATADINE 10 MG: 10 TABLET ORAL at 09:04

## 2019-05-18 RX ADMIN — GABAPENTIN 100 MG: 100 CAPSULE ORAL at 09:04

## 2019-05-18 RX ADMIN — AMLODIPINE BESYLATE 5 MG: 5 TABLET ORAL at 10:00

## 2019-05-18 RX ADMIN — Medication 1 AMPULE: at 09:07

## 2019-05-18 RX ADMIN — Medication 10 ML: at 06:11

## 2019-05-18 RX ADMIN — BUTALBITAL, ACETAMINOPHEN AND CAFFEINE 1 TABLET: 50; 325; 40 TABLET ORAL at 22:44

## 2019-05-18 RX ADMIN — HYDROMORPHONE HYDROCHLORIDE 4 MG: 2 TABLET ORAL at 22:40

## 2019-05-18 RX ADMIN — HYDROCHLOROTHIAZIDE 12.5 MG: 25 TABLET ORAL at 09:04

## 2019-05-18 RX ADMIN — INSULIN LISPRO 2 UNITS: 100 INJECTION, SOLUTION INTRAVENOUS; SUBCUTANEOUS at 11:42

## 2019-05-18 RX ADMIN — BUSPIRONE HYDROCHLORIDE 15 MG: 5 TABLET ORAL at 18:03

## 2019-05-18 RX ADMIN — SENNOSIDES AND DOCUSATE SODIUM 2 TABLET: 8.6; 5 TABLET ORAL at 09:04

## 2019-05-18 RX ADMIN — AMLODIPINE BESYLATE 5 MG: 5 TABLET ORAL at 05:26

## 2019-05-18 RX ADMIN — Medication 400 MG: at 09:05

## 2019-05-18 RX ADMIN — HYDROMORPHONE HYDROCHLORIDE 1 MG: 2 INJECTION INTRAMUSCULAR; INTRAVENOUS; SUBCUTANEOUS at 12:20

## 2019-05-18 RX ADMIN — HYDROMORPHONE HYDROCHLORIDE 1 MG: 2 INJECTION INTRAMUSCULAR; INTRAVENOUS; SUBCUTANEOUS at 09:01

## 2019-05-18 RX ADMIN — LEVOTHYROXINE SODIUM 112 MCG: 112 TABLET ORAL at 05:28

## 2019-05-18 RX ADMIN — Medication 400 MG: at 18:03

## 2019-05-18 NOTE — PROGRESS NOTES
Care Management Interventions PCP Verified by CM: Yes Transition of Care Consult (CM Consult): Home Health [address] [place] [address] Road: Yes Physical Therapy Consult: Yes Current Support Network: Lives with Spouse Confirm Follow Up Transport: Family Plan discussed with Pt/Family/Caregiver: Yes Discharge Location Discharge Placement: Home with home health

## 2019-05-18 NOTE — PROGRESS NOTES
Patient c/o poor pain control with PO dilaudid. When asking the patient if any other pain medication has worked for her in the past she states the she takes 15mg IR oxycodone 4 times daily at home for back pain. She took this up until the day of surgery and has been taking for 10+ years.

## 2019-05-18 NOTE — PROGRESS NOTES
IV dilaudid given for pain 10/10. Patient is c/o foot heaviness/numbness that started last night. Weak plantar and dorsi flexion. Gabapentin given this AM, will monitor.

## 2019-05-18 NOTE — PROGRESS NOTES
Orthopedic Joint Progress Note May 18, 2019 Admit Date: 2019 Admit Diagnosis: Unilateral primary osteoarthritis, right knee [M17.11] Arthritis of knee, right [M17.11] 2 Days Post-Op Subjective:  
 
Vandana Garcia Review of Systems: Pertinent items are noted in HPI. Objective:  
 
PT/OT:  
 
PATIENT MOBILITY Bed Mobility Supine to Sit: Stand-by assistance Sit to Supine: Stand-by assistance, Contact guard assistance Scooting: Contact guard assistance Transfers Sit to Stand: Contact guard assistance, Minimum assistance Stand to Sit: Contact guard assistance, Minimum assistance Bed to Chair: Contact guard assistance Gait Speed/Lindsay: Pace decreased (<100 feet/min) Step Length: Right shortened Stance: Left decreased Gait Abnormalities: Antalgic Ambulation - Level of Assistance: Contact guard assistance Distance (ft): 15 Feet (ft)(and another 15 feet) Assistive Device: Walker, rolling Interventions: Safety awareness training, Verbal cues Duration: 15 Minutes Weight Bearing Status Left Side Weight Bearing: As tolerated Vital Signs:   
Blood pressure 166/80, pulse 67, temperature 98.2 °F (36.8 °C), resp. rate 18, height 5' 5\" (1.651 m), weight 85 kg (187 lb 8 oz), SpO2 95 %. Temp (24hrs), Av.3 °F (36.8 °C), Min:98.1 °F (36.7 °C), Max:98.6 °F (37 °C) Pain Control:  
Pain Assessment Pain Scale 1: Numeric (0 - 10) Pain Intensity 1: 5 Pain Onset 1: post op Pain Location 1: Knee Pain Orientation 1: Right Pain Description 1: Aching Pain Intervention(s) 1: Medication (see MAR) Meds: 
Current Facility-Administered Medications Medication Dose Route Frequency  hydrALAZINE (APRESOLINE) 20 mg/mL injection 10 mg  10 mg IntraVENous Q6H PRN  
 insulin detemir U-100 (LEVEMIR FLEXTOUCH) pen 50 Units  (Patient Supplied)  50 Units SubCUTAneous QHS  amLODIPine (NORVASC) tablet 10 mg  10 mg Oral DAILY  hydroCHLOROthiazide (HYDRODIURIL) tablet 12.5 mg  12.5 mg Oral DAILY  butalbital-acetaminophen-caffeine (FIORICET, ESGIC) -40 mg per tablet 1 Tab  1 Tab Oral Q6H PRN  
 acetaminophen (TYLENOL) tablet 1,000 mg  1,000 mg Oral Q6H PRN  
 busPIRone (BUSPAR) tablet 15 mg  15 mg Oral BID  escitalopram oxalate (LEXAPRO) tablet 20 mg  20 mg Oral QHS  loratadine (CLARITIN) tablet 10 mg  10 mg Oral DAILY  gabapentin (NEURONTIN) capsule 100 mg  100 mg Oral TID  levothyroxine (SYNTHROID) tablet 112 mcg  112 mcg Oral ACB  magnesium oxide (MAG-OX) tablet 400 mg  400 mg Oral BID  metoprolol succinate (TOPROL-XL) tablet 200 mg  200 mg Oral QHS  temazepam (RESTORIL) capsule 30 mg  30 mg Oral QHS PRN  
 alcohol 62% (NOZIN) nasal  1 Ampule  1 Ampule Topical Q12H  
 sodium chloride (NS) flush 5-40 mL  5-40 mL IntraVENous Q8H  
 sodium chloride (NS) flush 5-40 mL  5-40 mL IntraVENous PRN  
 HYDROmorphone (PF) (DILAUDID) injection 1 mg  1 mg IntraVENous Q3H PRN  
 naloxone (NARCAN) injection 0.2-0.4 mg  0.2-0.4 mg IntraVENous Q10MIN PRN  promethazine (PHENERGAN) tablet 25 mg  25 mg Oral Q6H PRN  
 diphenhydrAMINE (BENADRYL) capsule 25 mg  25 mg Oral Q4H PRN  
 senna-docusate (PERICOLACE) 8.6-50 mg per tablet 2 Tab  2 Tab Oral DAILY  aspirin delayed-release tablet 81 mg  81 mg Oral Q12H  
 HYDROmorphone (DILAUDID) tablet 2-4 mg  2-4 mg Oral Q4H PRN  
 ondansetron (ZOFRAN ODT) tablet 8 mg  8 mg Oral Q8H PRN  
 insulin lispro (HUMALOG) injection   SubCUTAneous AC&HS  
 dextrose 40% (GLUTOSE) oral gel 1 Tube  15 g Oral PRN  
 glucagon (GLUCAGEN) injection 1 mg  1 mg IntraMUSCular PRN  
 dextrose (D50W) injection syrg 12.5-25 g  25-50 mL IntraVENous PRN  
 ezetimibe (ZETIA) tablet 10 mg  10 mg Oral QHS Or  
 simvastatin (ZOCOR) tablet 20 mg  20 mg Oral QHS  metFORMIN (GLUCOPHAGE) tablet 500 mg  500 mg Oral DAILY WITH BREAKFAST  valsartan (DIOVAN) tablet 320 mg  320 mg Oral QHS  
  
 
LAB:   
Lab Results Component Value Date/Time INR 1.0 05/09/2019 01:21 PM  
 INR 1.0 02/20/2017 10:28 AM  
 
Lab Results Component Value Date/Time HGB 11.6 (L) 05/16/2019 07:41 PM  
 HGB 14.2 05/09/2019 01:21 PM  
 HGB 10.4 (L) 03/16/2017 04:20 AM  
 
 
Wound Toe Right (Active) Number of days: 1984 Wound Knee Left (Active) Number of days: 684 Wound Foot Right (Active) Number of days: 45 Wound Knee Right (Active) Dressing Status Clean, dry, and intact 5/18/2019  3:00 AM  
Dressing Type Aquacel 5/18/2019  3:00 AM  
Drainage Amount None 5/16/2019  2:10 PM  
Number of days: 2 Physical Exam: No significant changes Assessment:  
  
Principal Problem: S/P TKR (total knee replacement) using cement, right (5/17/2019) Active Problems: 
  Arthritis of knee, right (5/16/2019) Plan:  
 
Continue PT/OT/Rehab Consult: Rehab team including PT, OT, recreational therapy, and  Patient Expects to be Discharged to[de-identified] Private residence

## 2019-05-18 NOTE — PROGRESS NOTES
Problem: Mobility Impaired (Adult and Pediatric) Goal: *Acute Goals and Plan of Care (Insert Text) Description GOALS (1-4 days): 
(1.)Ms. Andrez Montesinos will move from supine to sit and sit to supine  in bed with STAND BY ASSIST. 
(2.)Ms. Andrez Montesinos will transfer from bed to chair and chair to bed with STAND BY ASSIST using the least restrictive device. (3.)Ms. Andrez Montesinos will ambulate with STAND BY ASSIST for 300 feet with the least restrictive device. (4.)Ms. Andrez Montesinos will ambulate up/down 2 steps with bilateral  railing with CONTACT GUARD ASSIST with no device. (5.)Ms. Andrez Montesinos will increase right knee ROM to 5°-80°. 
________________________________________________________________________________________________ Outcome: Progressing Towards PHYSICAL THERAPY JOINT CAMP TKA: Daily Note and AM 5/18/2019 INPATIENT: Hospital Day: 3 [place] / [place] / Product Type: Medicare /  
  
NAME/AGE/GENDER: Eric Saldivar is a 68 y.o. female PRIMARY DIAGNOSIS:  Unilateral primary osteoarthritis, right knee [M17.11] Procedure(s) and Anesthesia Type: 
   * RIGHT TOTAL KNEE ARTHROPLASTY - Spinal (Right) ICD-10: Treatment Diagnosis:  
 · Pain in Right Knee (M25.561) · Stiffness of Right Knee, Not elsewhere classified (M25.661) · Difficulty in walking, Not elsewhere classified (R26.2) ASSESSMENT:  
Ms. Andrez Montesinos is in the bed upon contact. She states I need to practice getting in/out of bed. Bed mobility as follows: supine>EOB with Min>CGA and verbal cues hand placement and getting her body toward the EOB before bring her R leg off. She walks 20 ft with CGA. She performs exercises in the gym with help and C/O pain. She return to her room and stay in the chair. This section established at most recent assessment PROBLEM LIST (Impairments causing functional limitations): 1. Decreased Strength 2. Decreased ADL/Functional Activities 3. Decreased Transfer Abilities 4. Decreased Ambulation Ability/Technique 5. Increased Pain 6. Decreased Flexibility/Joint Mobility 7. Decreased [place] with Home Exercise Program 
 INTERVENTIONS PLANNED: (Benefits and precautions of physical therapy have been discussed with the patient.) 1. Cold 2. bed mobility 3. gait training 4. home exercise program (HEP) 5. Range of Motion: active/assisted/passive 6. Therapeutic Activities 7. therapeutic exercise/strengthening 8. transfer training 9. Group Therapy TREATMENT PLAN: Frequency/Duration: Follow patient BID for duration of hospital stay to address above goals. Rehabilitation Potential For Stated Goals: Good RECOMMENDED REHABILITATION/EQUIPMENT: (at time of discharge pending progress): Continue Skilled Therapy, Home Health: Physical Therapy and Outpatient: Physical Therapy. HISTORY:  
History of Present Injury/Illness (Reason for Referral): 
Pt s/p right TKA on 5/16/19 Past Medical History/Comorbidities:  
Ms. Jg Rae  has a past medical history of Acute pancreatitis (2/2008), Aneurysm ([address] Anxiety, Bilateral sacroiliitis ([address] Breast cyst, CAD (coronary artery disease), Cellulitis (6/16/15), Chest pain, Chronic kidney disease, Chronic pain, CKD (chronic kidney disease), Degenerative disc disease, Depression (12/23/2015), Diabetes mellitus type II, Dyslipidemia, Dyspnea, Edema (12/23/2015), Fatty liver, Fibromyalgia, Food allergy, GERD (gastroesophageal reflux disease), Heart failure ([address] echocardiogram (08/21/2015), Hypertension, Hypertriglyceridemia, Hypothyroidism, Lumbar herniated disc (Aug 2013), Lumbar radiculopathy, Macrocytic anemia, Microcytic anemia, Mild pulmonary hypertension ([address] Nausea & vomiting, Neuropathy, Obesity (BMI 30.0-34.9) (10/2/14), Osteoarthritis, Panic attacks, Sacroiliac dysfunction, Seasonal allergic reaction, Sleep apnea, and Syncope and collapse (12/23/2015).   Ms. Jg Rae has a past surgical history that includes hx partial thyroidectomy (2008); hx breast lumpectomy (Right); hx breast biopsy (Bilateral, R/1988  L/2012); hx premalig/benign skin lesion excision (09/29/2014); hx cyst removal (Left, 02/2019); hx heart catheterization (2005); hx jorge and bso (1995); hx knee arthroscopy (Left, 2004); hx orthopaedic (Left, 2010); hx orthopaedic (Right, 2009); hx knee arthroscopy (10/03/2014); hx back surgery (08/30/2013); hx other surgical (12/22/09); hx other surgical; hx other surgical (10/28/13); hx knee replacement (Left, 03/14/2017); and hx orthopaedic (04/03/2019). Social History/Living Environment:  
Home Environment: Private residence # Steps to Enter: 2 One/Two Story Residence: One story Living Alone: No 
Support Systems: Spouse/Significant Other/Partner Patient Expects to be Discharged to[de-identified] Private residence Current DME Used/Available at Home: Cane, quad, [place], straight, Grab bars, Raised toilet seat, Walker, rolling Tub or Shower Type: (walk in tub) Prior Level of Function/Work/Activity: 
Pt living at home, independent with gait and ADLs Number of Personal Factors/Comorbidities that affect the Plan of Care: 0: LOW COMPLEXITY EXAMINATION:  
Most Recent Physical Functioning: RLE AROM 
R Knee Flexion: 53(more swelliing and pain) R Knee Extension: 9 Bed Mobility Sit to Supine: Contact guard assistance Transfers Sit to Stand: Contact guard assistance Stand to Sit: Contact guard assistance Weight Bearing Status Left Side Weight Bearing: As tolerated Distance (ft): 20 Feet (ft) Ambulation - Level of Assistance: Contact guard assistance Assistive Device: Walker, rolling Speed/Lindsay: Pace decreased (<100 feet/min) Step Length: Right shortened Stance: Left decreased Gait Abnormalities: Antalgic Interventions: Safety awareness training Braces/Orthotics: none Right Knee Cold Type: Cryocuff Body Structures Involved: 1. Joints 2. Muscles Body Functions Affected: 1. Movement Related Activities and Participation Affected: 1. Mobility 2. Self Care Number of elements that affect the Plan of Care: 4+: HIGH COMPLEXITY CLINICAL PRESENTATION:  
Presentation: Stable and uncomplicated: LOW COMPLEXITY CLINICAL DECISION [facility] [place] [address] AM-PAC 6 Clicks Basic Mobility Inpatient Short Form How much difficulty does the patient currently have. .. Unable A Lot A Little None 1. Turning over in bed (including adjusting bedclothes, sheets and blankets)? ? 1   ? 2   ? 3   ? 4  
2. Sitting down on and standing up from a chair with arms ( e.g., wheelchair, bedside commode, etc.)   ? 1   ? 2   ? 3   ? 4  
3. Moving from lying on back to sitting on the side of the bed?   ? 1   ? 2   ? 3   ? 4 How much help from another person does the patient currently need. .. Total A Lot A Little None 4. Moving to and from a bed to a chair (including a wheelchair)? ? 1   ? 2   ? 3   ? 4  
5. Need to walk in hospital room? ? 1   ? 2   ? 3   ? 4  
6. Climbing 3-5 steps with a railing? ? 1   ? 2   ? 3   ? 4  
© , Trustees of [address] [place] [address], under license to [employer]. All rights reserved Score:  Initial: 18 Most Recent: X (Date: -- ) Interpretation of Tool:  Represents activities that are increasingly more difficult (i.e. Bed mobility, Transfers, Gait). Medical Necessity:    
· Patient is expected to demonstrate progress in strength, range of motion and functional technique ·  to decrease assistance required with functional mobility and TKA HEP · . Reason for Services/Other Comments: 
· Patient continues to require skilled intervention due to inability to complete functional mobility and TKA HEP independently · . Use of outcome tool(s) and clinical judgement create a POC that gives a: Clear prediction of patient's progress: LOW COMPLEXITY  
  
 
 
 
TREATMENT:  
 (In addition to Assessment/Re-Assessment sessions the following treatments were rendered) Pre-treatment Symptoms/Complaints:  Having some pain Pain Initial: having some pain, had pain medicine already Pain Intensity 1: 7  Post Session:  8/10 after therapy Therapeutic Exercise: (45 Minutes(group therapy)):  Exercises per grid below to improve mobility and strength. Required minimal verbal and manual cues to promote proper body alignment and promote proper body posture. Progressed repetitions as indicated. Gait Training (15 Minutes):  Gait training to improve and/or restore physical functioning as related to mobility and strength. Ambulated 20 Feet (ft) with Contact guard assistance using a Walker, rolling and minimal Safety awareness training related to their stance phase and stride length to promote proper body alignment and promote proper body posture. Instruction in performance of walker use and gait sequencing to correct stance phase and stride length. Therapeutic Activity: (  8 Minutes ):  Therapeutic activities including bed mobility with pt demonstration and verbal cues from therapist on getting in/out Date: 
5/16/19 Date: 
5/17 /19 Date: 
5/18 ACTIVITY/EXERCISE AM PM AM PM AM PM  
GROUP THERAPY  ? ?  ?  X  x  ? Ankle Pumps  10 15 15 20 Quad Sets  10 15 15 20 Gluteal Sets  10 15 15 20 Hip ABd/ADduction  10 15 15 20 Straight Leg Raises   15 15 20 Knee Slides  10 15 15 20 Short Arc Quads    15 20 [facility] Chair Slides    15 20 B = bilateral; AA = active assistive; A = active; P = passive Treatment/Session Assessment:   
 Response to Treatment:  Tolerated group therapy well, but C/O pain. Education: 
X Home Exercises X Fall Precautions X D/C Instruction Review X Knee Prosthesis Review Crystal Lerma Management/Safety ? Adaptive Equipment as Needed Interdisciplinary Collaboration:  
o Registered Nurse o Rehabilitation Attendant After treatment position/precautions:  
o Up in chair 
o Bed/Chair-wheels locked 
o Bed in low position 
o Call light within reach 
o Family at bedside Compliance with Program/Exercises: Will assess as treatment progresses. Recommendations/Intent for next treatment session:  Treatment next visit will focus on increasing Ms. Daley's independence with bed mobility, transfers, gait training, strength/ROM exercises, modalities for pain, and patient education. Total Treatment Duration: PT Patient Time In/Time Out Time In: 0930 Time Out: 1038 Aishwarya Bravo, PTA

## 2019-05-18 NOTE — PROGRESS NOTES
Hospitalist Progress Note 2019 Admit Date: 2019  7:41 AM  
NAME: Darci Wilson :  1945 MRN:  [id number] Attending: Shamika Andres MD 
PCP:  Sadaf Madrid MD 
 
SUBJECTIVE:  
Pt is a 68 y.o. female with DM2, HTN, OA, HLD,hypothyroidism who underwent R TKA earlier today. Hospitalist consulted for DM mgmt. Pt reports ongoing pain, feels this is driving her high BP. States SBP normally 140-150s. Appetite good. Review of Systems negative with exception of pertinent positives noted above PHYSICAL EXAM  
 
Patient Vitals for the past 24 hrs: 
 Temp Pulse Resp BP SpO2  
19 1210  73  162/70 94 % 19 1121 98.5 °F (36.9 °C) 66 16 196/81 96 % 19 0707 98.5 °F (36.9 °C) 66 16 183/81 96 % 19 0618  67  166/80   
19 0501 98.2 °F (36.8 °C) 64 18 (!) 189/109 95 % 19 0330 98.1 °F (36.7 °C) (!) 59 16 (!) 170/94 96 % 19 0045 98.3 °F (36.8 °C) 72 16 152/88 96 % 19     97 % 19 98.5 °F (36.9 °C) 65 18 166/82 93 % 19 1538 98.6 °F (37 °C) 62 19 159/78 91 % Oxygen Therapy O2 Sat (%): 94 % (19 1210) Pulse via Oximetry: 72 beats per minute (19) O2 Device: Room air (19) O2 Flow Rate (L/min): 3 l/min (19) Intake/Output Summary (Last 24 hours) at 2019 1235 Last data filed at 2019 Gross per 24 hour Intake 600 ml Output  Net 600 ml General: No acute distress   
Lungs:  CTA Bilaterally Heart:  Regular rate and rhythm,  No murmur, rub, or gallop Abdomen: Soft, Non distended, Non tender, Positive bowel sounds Extremities: No cyanosis, clubbing or edema Neurologic:  No focal deficits Psych:  Calm, cooperative Recent Results (from the past 24 hour(s)) GLUCOSE, POC Collection Time: 19  3:40 PM  
Result Value Ref Range Glucose (POC) 154 (H) 65 - 100 mg/dL GLUCOSE, POC  Collection Time: 19  8:09 PM  
 Result Value Ref Range Glucose (POC) 204 (H) 65 - 100 mg/dL GLUCOSE, POC Collection Time: 05/18/19  7:40 AM  
Result Value Ref Range Glucose (POC) 149 (H) 65 - 100 mg/dL GLUCOSE, POC Collection Time: 05/18/19 11:26 AM  
Result Value Ref Range Glucose (POC) 178 (H) 65 - 100 mg/dL Micro: All Micro Results None Other studies last 24 hours: 
No results found. All imaging, labs reviewed. ASSESSMENT Hospital Problems as of 5/18/2019 Date Reviewed: 5/9/2019 Codes Class Noted - Resolved POA * (Principal) S/P TKR (total knee replacement) using cement, right ICD-10-CM: H92.610 ICD-9-CM: V43.65  5/17/2019 - Present Unknown Arthritis of knee, right ICD-10-CM: M17.11 ICD-9-CM: 716.96  5/16/2019 - Present Unknown DM2 Glucose 119 this AM. 
- increase Levemir to 50 units  
- hold metformin 
- SSI 
- expect some postprandial hyperglycemia due to periop steroids  
  
HTN 
? If pain is driving elevation. - increase home norvasc to 10mg 
- add HCTZ 
- Hydralazine PRN 
- pain control 
  
Hypothyroidism 
- cont synthroid 
  
OA s/p R TKA - per ortho We will continue to follow along with you. Signed By: Laya Grimes MD   
 May 18, 2019

## 2019-05-18 NOTE — DISCHARGE INSTRUCTIONS
[facility]   Patient Discharge Instructions    Jennie Dickinson / [id number] : 1945    Admitted 2019 Discharged: 2019     IF YOU HAVE ANY PROBLEMS ONCE YOU ARE AT HOME CALL THE FOLLOWING NUMBERS:   Main office number: [phone]    Take Home Medications     · It is important that you take the medication exactly as they are prescribed. · Keep your medication in the bottles provided by the pharmacist and keep a list of the medication names, dosages, and times to be taken in your wallet. · Do not take other medications without consulting your doctor. What to do at [address] your prehospital diet. If you have excessive nausea or vomitting call your doctor's office     Home Physical Therapy is arranged. Use rolling walker when walking. Patients who have had a joint replacement should not drive until you are seen for your follow up appointment by Dr. Arpit Sommers. When to Call    - Call if you have a temperature greater then 101  - Unable to keep food down  - Loose control of your bladder or bowel function  - Are unable to bear any weight   - Need a pain medication refill     Information obtained by :  I understand that if any problems occur once I am at home I am to contact my physician. I understand and acknowledge receipt of the instructions indicated above.                                                                                                                                            Physician's or R.N.'s Signature                                                                  Date/Time                                                                                                                                              Patient or Representative Signature                                                          Date/Time    Patient Education        Total Knee Replacement: What to Expect at Home  Your Recovery    When you leave the hospital, you should be able to move around with a walker or crutches. But you will need someone to help you at home for the next few weeks or until you have more energy and can move around better. If there is no one to help you at home, you may go to a rehabilitation center. You will go home with a bandage and stitches, staples, tissue glue, or tape strips. Change the bandage as your doctor tells you to. If you have stitches or staples, your doctor will remove them 10 to 21 days after your surgery. Glue or tape strips will fall off on their own over time. You may still have some mild pain, and the area may be swollen for 3 to 6 months after surgery. Your knee will continue to improve for 6 to 12 months. You will probably use a walker for 1 to 3 weeks and then use crutches. When you are ready, you can use a cane. You will probably be able to walk on your own in 4 to 8 weeks. You will need to do months of physical rehabilitation (rehab) after a knee replacement. Rehab will help you strengthen the muscles of the knee and help you regain movement. After you recover, your artificial knee will allow you to do normal daily activities with less pain or no pain at all. You may be able to hike, dance, ride a bike, and play golf. Talk to your doctor about whether you can do more strenuous activities. Always tell your caregivers that you have an artificial knee. How long it will take to walk on your own, return to normal activities, and go back to work depends on your health and how well your rehabilitation (rehab) program goes. The better you do with your rehab exercises, the quicker you will get your strength and movement back. This care sheet gives you a general idea about how long it will take for you to recover. But each person recovers at a different pace. Follow the steps below to get better as quickly as possible. How can you care for yourself at home? Activity    · Rest when you feel tired.  You may take a nap, but do not stay in bed all day. When you sit, use a chair with arms. You can use the arms to help you stand up.     · Work with your physical therapist to find the best way to exercise. You may be able to take frequent, short walks using crutches or a walker. What you can do as your knee heals will depend on whether your new knee is cemented or uncemented. You may not be able to do certain things for a while if your new knee is uncemented.     · After your knee has healed enough, you can do more strenuous activities with caution. ? You can golf, but use a golf cart, and do not wear shoes with spikes. ? You can bike on a flat road or on a stationary bike. Avoid biking up hills. ? Your doctor may suggest that you stay away from activities that put stress on your knee. These include tennis or badminton, squash or racquetball, contact sports like football, jumping (such as in basketball), jogging, or running. ? Avoid activities where you might fall. These include horseback riding, skiing, and mountain biking.     · Do not sit for more than 1 hour at a time. Get up and walk around for a while before you sit again. If you must sit for a long time, prop up your leg with a chair or footstool. This will help you avoid swelling.     · Ask your doctor when you can drive again. It may take up to 8 weeks after knee replacement surgery before it is safe for you to drive.     · When you get into a car, sit on the edge of the seat. Then pull in your legs, and turn to face the front.     · You should be able to do many everyday activities 3 to 6 weeks after your surgery. You will probably need to take 4 to 16 weeks off from work. When you can go back to work depends on the type of work you do and how you feel.     · Ask your doctor when it is okay for you to have sex.     · Do not lift anything heavier than 10 pounds and do not lift weights for 12 weeks. Diet    · By the time you leave the hospital, you should be eating your normal diet. If your stomach is upset, try bland, low-fat foods like plain rice, broiled chicken, toast, and yogurt. Your doctor may suggest that you take iron and vitamin supplements.     · Drink plenty of fluids (unless your doctor tells you not to).   · Eat healthy foods, and watch your portion sizes. Try to stay at your ideal weight. Too much weight puts more stress on your new knee.     · You may notice that your bowel movements are not regular right after your surgery. This is common. Try to avoid constipation and straining with bowel movements. You may want to take a fiber supplement every day. If you have not had a bowel movement after a couple of days, ask your doctor about taking a mild laxative. Medicines    · Your doctor will tell you if and when you can restart your medicines. He or she will also give you instructions about taking any new medicines.     · If you take blood thinners, such as warfarin (Coumadin), clopidogrel (Plavix), or aspirin, be sure to talk to your doctor. He or she will tell you if and when to start taking those medicines again. Make sure that you understand exactly what your doctor wants you to do.     · Your doctor may give you a blood-thinning medicine to prevent blood clots. If you take a blood thinner, be sure you get instructions about how to take your medicine safely. Blood thinners can cause serious bleeding problems. This medicine could be in pill form or as a shot (injection). If a shot is necessary, your doctor will tell you how to do this.     · Be safe with medicines. Take pain medicines exactly as directed. ? If the doctor gave you a prescription medicine for pain, take it as prescribed. ? If you are not taking a prescription pain medicine, ask your doctor if you can take an over-the-counter medicine. ? Plan to take your pain medicine 30 minutes before exercises.  It is easier to prevent pain before it starts than to stop it once it has started.     · If you think your pain medicine is making you sick to your stomach:  ? Take your medicine after meals (unless your doctor has told you not to). ? Ask your doctor for a different pain medicine.     · If your doctor prescribed antibiotics, take them as directed. Do not stop taking them just because you feel better. You need to take the full course of antibiotics. Incision care    · If your doctor told you how to care for your cut (incision), follow your doctor's instructions. You will have a dressing over the cut. A dressing helps the incision heal and protects it. Your doctor will tell you how to take care of this.     · If you did not get instructions, follow this general advice:  ? If you have strips of tape on the cut the doctor made, leave the tape on for a week or until it falls off.  ? If you have stitches or staples, your doctor will tell you when to come back to have them removed. ? If you have skin adhesive on the cut, leave it on until it falls off. Skin adhesive is also called glue or liquid stitches. ? Change the bandage every day. ? Wash the area daily with warm water, and pat it dry. Don't use hydrogen peroxide or alcohol. They can slow healing. ? You may cover the area with a gauze bandage if it oozes fluid or rubs against clothing. ? You may shower 24 to 48 hours after surgery. Pat the incision dry. Don't swim or take a bath for the first 2 weeks, or until your doctor tells you it is okay. Exercise    · Your rehab program will give you a number of exercises to do to help you get back your knee's range of motion and strength. Always do them as your therapist tells you. Ice and elevation    · For pain and swelling, put ice or a cold pack on the area for 10 to 20 minutes at a time. Put a thin cloth between the ice and your skin. Other instructions    · Continue to wear your support stockings as your doctor says. These help to prevent blood clots.  The length of time that you will have to wear them depends on your activity level and the amount of swelling.     · You have metal pieces in your knee. These may set off some airport metal detectors. Carry a medical alert card that says you have an artificial joint, just in case. Follow-up care is a key part of your treatment and safety. Be sure to make and go to all appointments, and call your doctor if you are having problems. It's also a good idea to know your test results and keep a list of the medicines you take. When should you call for help? Call 911 anytime you think you may need emergency care. For example, call if:    · You passed out (lost consciousness).     · You have severe trouble breathing.     · You have sudden chest pain and shortness of breath, or you cough up blood.    Call your doctor now or seek immediate medical care if:    · You have signs of infection, such as:  ? Increased pain, swelling, warmth, or redness. ? Red streaks leading from the incision. ? Pus draining from the incision. ? A fever.     · You have signs of a blood clot, such as:  ? Pain in your calf, back of the knee, thigh, or groin. ? Redness and swelling in your leg or groin.     · Your incision comes open and begins to bleed, or the bleeding increases.     · You have pain that does not get better after you take pain medicine.    Watch closely for changes in your health, and be sure to contact your doctor if:    · You do not have a bowel movement after taking a laxative. Where can you learn more? Go to http://hernan-gwen.info/. Enter G812 in the search box to learn more about \"Total Knee Replacement: What to Expect at Home. \"  Current as of: September 20, 2018  Content Version: 11.9  © 2958-2336 [employer]. Care instructions adapted under license by [employer] (which disclaims liability or warranty for this information).  If you have questions about a medical condition or this instruction, always ask your healthcare professional. [employer] disclaims any warranty or liability for your use of this information.

## 2019-05-18 NOTE — PROGRESS NOTES
Sensation present in foot and leg. Patient wants to discharge home today, thinks her pain would be better controlled at home

## 2019-05-18 NOTE — PROGRESS NOTES
Checked pt's bp at 0330 and was 170/94, rechecked at 0500 and was 189/109 for lowest reading. Notified physician and was given order of hydralazine 10mg q 6 hours for bp's > 468 systolic or 95 diastolic. The Physician also requested that the pt's 0900 amolodipine be given early at 0530. Will recheck pt's BP 1 hour after med administration.

## 2019-05-18 NOTE — PROGRESS NOTES
Problem: Mobility Impaired (Adult and Pediatric) Goal: *Acute Goals and Plan of Care (Insert Text) Description GOALS (1-4 days): 
(1.)Ms. Micah Ogden will move from supine to sit and sit to supine  in bed with STAND BY ASSIST. 
(2.)Ms. Micah Ogden will transfer from bed to chair and chair to bed with STAND BY ASSIST using the least restrictive device. (3.)Ms. Micah Ogden will ambulate with STAND BY ASSIST for 300 feet with the least restrictive device. (4.)Ms. Micah Ogden will ambulate up/down 2 steps with bilateral  railing with CONTACT GUARD ASSIST with no device. (5.)Ms. Micah Ogden will increase right knee ROM to 5°-80°. 
________________________________________________________________________________________________ Outcome: Progressing Towards PHYSICAL THERAPY JOINT CAMP TKA: Daily Note and PM 5/18/2019 INPATIENT: Hospital Day: 3 [place] / [place] B / Product Type: Medicare /  
  
NAME/AGE/GENDER: Cathie Amaro is a 68 y.o. female PRIMARY DIAGNOSIS:  Unilateral primary osteoarthritis, right knee [M17.11] Procedure(s) and Anesthesia Type: 
   * RIGHT TOTAL KNEE ARTHROPLASTY - Spinal (Right) ICD-10: Treatment Diagnosis:  
 · Pain in Right Knee (M25.561) · Stiffness of Right Knee, Not elsewhere classified (M25.661) · Difficulty in walking, Not elsewhere classified (R26.2) ASSESSMENT:  
Ms. Micah Ogden is still having some pain, but B/P is high so she want leave today. Performs exercises with help, and return to bed. This section established at most recent assessment PROBLEM LIST (Impairments causing functional limitations): 1. Decreased Strength 2. Decreased ADL/Functional Activities 3. Decreased Transfer Abilities 4. Decreased Ambulation Ability/Technique 5. Increased Pain 6. Decreased Flexibility/Joint Mobility 7. Decreased [place] with Home Exercise Program 
 INTERVENTIONS PLANNED: (Benefits and precautions of physical therapy have been discussed with the patient.) 1. Cold 2. bed mobility 3. gait training 4. home exercise program (HEP) 5. Range of Motion: active/assisted/passive 6. Therapeutic Activities 7. therapeutic exercise/strengthening 8. transfer training 9. Group Therapy TREATMENT PLAN: Frequency/Duration: Follow patient BID for duration of hospital stay to address above goals. Rehabilitation Potential For Stated Goals: Good RECOMMENDED REHABILITATION/EQUIPMENT: (at time of discharge pending progress): Continue Skilled Therapy, Home Health: Physical Therapy and Outpatient: Physical Therapy. HISTORY:  
History of Present Injury/Illness (Reason for Referral): 
Pt s/p right TKA on 5/16/19 Past Medical History/Comorbidities:  
Ms. Yoana Rojas  has a past medical history of Acute pancreatitis (2/2008), Aneurysm ([address] Anxiety, Bilateral sacroiliitis ([address] Breast cyst, CAD (coronary artery disease), Cellulitis (6/16/15), Chest pain, Chronic kidney disease, Chronic pain, CKD (chronic kidney disease), Degenerative disc disease, Depression (12/23/2015), Diabetes mellitus type II, Dyslipidemia, Dyspnea, Edema (12/23/2015), Fatty liver, Fibromyalgia, Food allergy, GERD (gastroesophageal reflux disease), Heart failure ([address] echocardiogram (08/21/2015), Hypertension, Hypertriglyceridemia, Hypothyroidism, Lumbar herniated disc (Aug 2013), Lumbar radiculopathy, Macrocytic anemia, Microcytic anemia, Mild pulmonary hypertension ([address] Nausea & vomiting, Neuropathy, Obesity (BMI 30.0-34.9) (10/2/14), Osteoarthritis, Panic attacks, Sacroiliac dysfunction, Seasonal allergic reaction, Sleep apnea, and Syncope and collapse (12/23/2015).   Ms. Yoana Rojas  has a past surgical history that includes hx partial thyroidectomy (2008); hx breast lumpectomy (Right); hx breast biopsy (Bilateral, R/1988  L/2012); hx premalig/benign skin lesion excision (09/29/2014); hx cyst removal (Left, 02/2019); hx heart catheterization (2005); hx jorge and bso (1995); hx knee arthroscopy (Left, 2004); hx orthopaedic (Left, 2010); hx orthopaedic (Right, 2009); hx knee arthroscopy (10/03/2014); hx back surgery (08/30/2013); hx other surgical (12/22/09); hx other surgical; hx other surgical (10/28/13); hx knee replacement (Left, 03/14/2017); and hx orthopaedic (04/03/2019). Social History/Living Environment:  
Home Environment: Private residence # Steps to Enter: 2 One/Two Story Residence: One story Living Alone: No 
Support Systems: Spouse/Significant Other/Partner Patient Expects to be Discharged to[de-identified] Private residence Current DME Used/Available at Home: Cane, quad, Rexene Lee, straight, Grab bars, Raised toilet seat, Walker, rolling Tub or Shower Type: (walk in tub) Prior Level of Function/Work/Activity: 
Pt living at home, independent with gait and ADLs Number of Personal Factors/Comorbidities that affect the Plan of Care: 0: LOW COMPLEXITY EXAMINATION:  
Most Recent Physical Functioning: RLE AROM 
R Knee Flexion: 53(more swelliing and pain) R Knee Extension: 9 Bed Mobility Sit to Supine: Contact guard assistance Transfers Sit to Stand: Contact guard assistance Stand to Sit: Contact guard assistance Weight Bearing Status Left Side Weight Bearing: As tolerated Distance (ft): 10 Feet (ft)(to bed) Ambulation - Level of Assistance: Contact guard assistance Assistive Device: Walker, rolling Speed/Lindsay: Pace decreased (<100 feet/min) Step Length: Right shortened Stance: Left decreased Gait Abnormalities: Antalgic Interventions: Safety awareness training Braces/Orthotics: none Right Knee Cold Type: Cryocuff Body Structures Involved: 1. Joints 2. Muscles Body Functions Affected: 1. Movement Related Activities and Participation Affected: 1. Mobility 2. Self Care Number of elements that affect the Plan of Care: 4+: HIGH COMPLEXITY CLINICAL PRESENTATION:  
 Presentation: Stable and uncomplicated: LOW COMPLEXITY CLINICAL DECISION MAKIN[address] [place] [id number] AM-PAC 6 Clicks Basic Mobility Inpatient Short Form How much difficulty does the patient currently have. .. Unable A Lot A Little None 1. Turning over in bed (including adjusting bedclothes, sheets and blankets)? ? 1   ? 2   ? 3   ? 4  
2. Sitting down on and standing up from a chair with arms ( e.g., wheelchair, bedside commode, etc.)   ? 1   ? 2   ? 3   ? 4  
3. Moving from lying on back to sitting on the side of the bed?   ? 1   ? 2   ? 3   ? 4 How much help from another person does the patient currently need. .. Total A Lot A Little None 4. Moving to and from a bed to a chair (including a wheelchair)? ? 1   ? 2   ? 3   ? 4  
5. Need to walk in hospital room? ? 1   ? 2   ? 3   ? 4  
6. Climbing 3-5 steps with a railing? ? 1   ? 2   ? 3   ? 4  
© , Trustees of [address] [place] [address], under license to [employer]. All rights reserved Score:  Initial: 18 Most Recent: X (Date: -- ) Interpretation of Tool:  Represents activities that are increasingly more difficult (i.e. Bed mobility, Transfers, Gait). Medical Necessity:    
· Patient is expected to demonstrate progress in strength, range of motion and functional technique ·  to decrease assistance required with functional mobility and TKA HEP · . Reason for Services/Other Comments: 
· Patient continues to require skilled intervention due to inability to complete functional mobility and TKA HEP independently · . Use of outcome tool(s) and clinical judgement create a POC that gives a: Clear prediction of patient's progress: LOW COMPLEXITY  
  
 
 
 
TREATMENT:  
(In addition to Assessment/Re-Assessment sessions the following treatments were rendered) Pre-treatment Symptoms/Complaints:  I just got something for pain Pain Initial: having some pain, had pain medicine already Pain Intensity 1: 2(0/10 after therapy)  Post Session:   
 
Therapeutic Exercise: (15 Minutes):  Exercises per grid below to improve mobility and strength. Required minimal verbal and manual cues to promote proper body alignment and promote proper body posture. Progressed repetitions as indicated. Gait Training (8 Minutes):  Gait training to improve and/or restore physical functioning as related to mobility and strength. Ambulated 10 Feet (ft)(to bed) with Contact guard assistance using a Walker, rolling and minimal Safety awareness training related to their stance phase and stride length to promote proper body alignment and promote proper body posture. Instruction in performance of walker use and gait sequencing to correct stance phase and stride length. Therapeutic Activity: (  0 Minutes ):  Therapeutic activities including bed mobility with pt demonstration and verbal cues from therapist on getting in/out Date: 
5/16/19 Date: 
5/17 /19 Date: 
5/18 ACTIVITY/EXERCISE AM PM AM PM AM PM  
GROUP THERAPY  ? ?  ?  X  x  ? Ankle Pumps  10 15 15 20 Quad Sets  10 15 15 20 Gluteal Sets  10 15 15 20 Hip ABd/ADduction  10 15 15 20 Straight Leg Raises   15 15 20 Knee Slides  10 15 15 20 Short Arc Quads    15 20 2 [facility] Chair Slides    15 20 B = bilateral; AA = active assistive; A = active; P = passive Treatment/Session Assessment:   
 Response to Treatment:  Tolerated group therapy well, but C/O pain. Education: 
X Home Exercises X Fall Precautions X D/C Instruction Review X Knee Prosthesis Review Melvi Milton Management/Safety ? Adaptive Equipment as Needed Interdisciplinary Collaboration:  
o Registered Nurse 
o Rehabilitation Attendant After treatment position/precautions:  
o Up in chair 
o Bed/Chair-wheels locked 
o Bed in low position 
o Call light within reach 
o Family at bedside Compliance with Program/Exercises: Will assess as treatment progresses. Recommendations/Intent for next treatment session:  Treatment next visit will focus on increasing Ms. Daley's independence with bed mobility, transfers, gait training, strength/ROM exercises, modalities for pain, and patient education. Total Treatment Duration: PT Patient Time In/Time Out Time In: 1300 Time Out: 1323 Aishwarya Bravo, PTA

## 2019-05-18 NOTE — PROGRESS NOTES
End of shift note: 
  
All hourly rounds were completed on pt throughout shift. Pt was cooperative and med compliant. Pt was able to dorsi and plantar flex bilaterally and dressing was C/D/I. All pt needs are met at this time. Will continue to monitor pt throughout shift and give report to oncoming day shift nurse.

## 2019-05-19 VITALS
HEIGHT: 65 IN | RESPIRATION RATE: 16 BRPM | OXYGEN SATURATION: 94 % | TEMPERATURE: 98.3 F | DIASTOLIC BLOOD PRESSURE: 72 MMHG | SYSTOLIC BLOOD PRESSURE: 147 MMHG | HEART RATE: 66 BPM | BODY MASS INDEX: 31.24 KG/M2 | WEIGHT: 187.5 LBS

## 2019-05-19 LAB — GLUCOSE BLD STRIP.AUTO-MCNC: 111 MG/DL (ref 65–100)

## 2019-05-19 PROCEDURE — 97110 THERAPEUTIC EXERCISES: CPT

## 2019-05-19 PROCEDURE — 74011250637 HC RX REV CODE- 250/637: Performed by: INTERNAL MEDICINE

## 2019-05-19 PROCEDURE — 82962 GLUCOSE BLOOD TEST: CPT

## 2019-05-19 PROCEDURE — 97530 THERAPEUTIC ACTIVITIES: CPT

## 2019-05-19 PROCEDURE — 74011250637 HC RX REV CODE- 250/637: Performed by: ORTHOPAEDIC SURGERY

## 2019-05-19 RX ORDER — AMLODIPINE BESYLATE 10 MG/1
10 TABLET ORAL DAILY
Qty: 30 TAB | Refills: 0 | Status: SHIPPED | OUTPATIENT
Start: 2019-05-19 | End: 2019-10-01 | Stop reason: SDUPTHER

## 2019-05-19 RX ADMIN — METFORMIN HYDROCHLORIDE 500 MG: 500 TABLET ORAL at 09:12

## 2019-05-19 RX ADMIN — GABAPENTIN 100 MG: 100 CAPSULE ORAL at 09:12

## 2019-05-19 RX ADMIN — AMLODIPINE BESYLATE 10 MG: 10 TABLET ORAL at 09:12

## 2019-05-19 RX ADMIN — Medication 5 ML: at 05:50

## 2019-05-19 RX ADMIN — HYDROMORPHONE HYDROCHLORIDE 4 MG: 2 TABLET ORAL at 05:49

## 2019-05-19 RX ADMIN — ASPIRIN 81 MG: 81 TABLET ORAL at 09:12

## 2019-05-19 RX ADMIN — Medication 400 MG: at 09:13

## 2019-05-19 RX ADMIN — HYDROMORPHONE HYDROCHLORIDE 4 MG: 2 TABLET ORAL at 09:12

## 2019-05-19 RX ADMIN — HYDROMORPHONE HYDROCHLORIDE 4 MG: 2 TABLET ORAL at 02:32

## 2019-05-19 RX ADMIN — LEVOTHYROXINE SODIUM 112 MCG: 112 TABLET ORAL at 05:50

## 2019-05-19 RX ADMIN — SENNOSIDES AND DOCUSATE SODIUM 2 TABLET: 8.6; 5 TABLET ORAL at 09:12

## 2019-05-19 RX ADMIN — HYDROCHLOROTHIAZIDE 12.5 MG: 25 TABLET ORAL at 09:13

## 2019-05-19 RX ADMIN — LORATADINE 10 MG: 10 TABLET ORAL at 09:12

## 2019-05-19 RX ADMIN — Medication 1 AMPULE: at 09:15

## 2019-05-19 RX ADMIN — BUSPIRONE HYDROCHLORIDE 15 MG: 5 TABLET ORAL at 09:12

## 2019-05-19 NOTE — PROGRESS NOTES
Orthopedic Joint Progress Note May 19, 2019 Admit Date: 2019 Admit Diagnosis: Unilateral primary osteoarthritis, right knee [M17.11] Arthritis of knee, right [M17.11] 3 Days Post-Op Subjective:  
 
Elisha Vang Review of Systems: Pertinent items are noted in HPI. Objective:  
 
PT/OT:  
 
PATIENT MOBILITY Bed Mobility Supine to Sit: Stand-by assistance Sit to Supine: Contact guard assistance Scooting: Contact guard assistance Transfers Sit to Stand: Contact guard assistance Stand to Sit: Contact guard assistance Bed to Chair: Contact guard assistance Gait Speed/Lindsay: Pace decreased (<100 feet/min) Step Length: Right shortened Stance: Left decreased Gait Abnormalities: Antalgic Ambulation - Level of Assistance: Contact guard assistance Distance (ft): 10 Feet (ft)(to bed) Assistive Device: Walker, rolling Interventions: Safety awareness training Duration: 8 Minutes Weight Bearing Status Left Side Weight Bearing: As tolerated Vital Signs:   
Blood pressure 152/80, pulse 67, temperature 98.8 °F (37.1 °C), resp. rate 16, height 5' 5\" (1.651 m), weight 85 kg (187 lb 8 oz), SpO2 93 %. Temp (24hrs), Av.6 °F (37 °C), Min:97.5 °F (36.4 °C), Max:99.4 °F (37.4 °C) Pain Control:  
Pain Assessment Pain Scale 1: Numeric (0 - 10) Pain Intensity 1: 6 Pain Onset 1: post op Pain Location 1: Knee Pain Orientation 1: Right Pain Description 1: Aching Pain Intervention(s) 1: Medication (see MAR) Meds: 
Current Facility-Administered Medications Medication Dose Route Frequency  hydrALAZINE (APRESOLINE) 20 mg/mL injection 10 mg  10 mg IntraVENous Q6H PRN  
 insulin detemir U-100 (LEVEMIR FLEXTOUCH) pen 50 Units  (Patient Supplied)  50 Units SubCUTAneous QHS  amLODIPine (NORVASC) tablet 10 mg  10 mg Oral DAILY  hydroCHLOROthiazide (HYDRODIURIL) tablet 12.5 mg  12.5 mg Oral DAILY  butalbital-acetaminophen-caffeine (FIORICET, ESGIC) -40 mg per tablet 1 Tab  1 Tab Oral Q6H PRN  
 acetaminophen (TYLENOL) tablet 1,000 mg  1,000 mg Oral Q6H PRN  
 busPIRone (BUSPAR) tablet 15 mg  15 mg Oral BID  escitalopram oxalate (LEXAPRO) tablet 20 mg  20 mg Oral QHS  loratadine (CLARITIN) tablet 10 mg  10 mg Oral DAILY  gabapentin (NEURONTIN) capsule 100 mg  100 mg Oral TID  levothyroxine (SYNTHROID) tablet 112 mcg  112 mcg Oral ACB  magnesium oxide (MAG-OX) tablet 400 mg  400 mg Oral BID  metoprolol succinate (TOPROL-XL) tablet 200 mg  200 mg Oral QHS  temazepam (RESTORIL) capsule 30 mg  30 mg Oral QHS PRN  
 alcohol 62% (NOZIN) nasal  1 Ampule  1 Ampule Topical Q12H  
 sodium chloride (NS) flush 5-40 mL  5-40 mL IntraVENous Q8H  
 sodium chloride (NS) flush 5-40 mL  5-40 mL IntraVENous PRN  
 HYDROmorphone (PF) (DILAUDID) injection 1 mg  1 mg IntraVENous Q3H PRN  
 naloxone (NARCAN) injection 0.2-0.4 mg  0.2-0.4 mg IntraVENous Q10MIN PRN  promethazine (PHENERGAN) tablet 25 mg  25 mg Oral Q6H PRN  
 diphenhydrAMINE (BENADRYL) capsule 25 mg  25 mg Oral Q4H PRN  
 senna-docusate (PERICOLACE) 8.6-50 mg per tablet 2 Tab  2 Tab Oral DAILY  aspirin delayed-release tablet 81 mg  81 mg Oral Q12H  
 HYDROmorphone (DILAUDID) tablet 2-4 mg  2-4 mg Oral Q4H PRN  
 ondansetron (ZOFRAN ODT) tablet 8 mg  8 mg Oral Q8H PRN  
 insulin lispro (HUMALOG) injection   SubCUTAneous AC&HS  
 dextrose 40% (GLUTOSE) oral gel 1 Tube  15 g Oral PRN  
 glucagon (GLUCAGEN) injection 1 mg  1 mg IntraMUSCular PRN  
 dextrose (D50W) injection syrg 12.5-25 g  25-50 mL IntraVENous PRN  
 ezetimibe (ZETIA) tablet 10 mg  10 mg Oral QHS Or  
 simvastatin (ZOCOR) tablet 20 mg  20 mg Oral QHS  metFORMIN (GLUCOPHAGE) tablet 500 mg  500 mg Oral DAILY WITH BREAKFAST  valsartan (DIOVAN) tablet 320 mg  320 mg Oral QHS  
  
 
LAB:   
Lab Results Component Value Date/Time INR 1.0 05/09/2019 01:21 PM  
 INR 1.0 02/20/2017 10:28 AM  
 
Lab Results Component Value Date/Time HGB 11.6 (L) 05/16/2019 07:41 PM  
 HGB 14.2 05/09/2019 01:21 PM  
 HGB 10.4 (L) 03/16/2017 04:20 AM  
 
 
Wound Toe Right (Active) Number of days: 1985 Wound Knee Left (Active) Number of days: 738 Wound Foot Right (Active) Number of days: 46 Wound Knee Right (Active) Dressing Status Clean, dry, and intact 5/19/2019  3:00 AM  
Dressing Type Aquacel 5/19/2019  3:00 AM  
Drainage Amount None 5/16/2019  2:10 PM  
Number of days: 3 Physical Exam: No significant changes Assessment:  
  
Principal Problem: S/P TKR (total knee replacement) using cement, right (5/17/2019) Active Problems: 
  Arthritis of knee, right (5/16/2019) Plan:  
 
Continue PT/OT/Rehab Consult: Rehab team including PT, OT, recreational therapy, and  Patient Expects to be Discharged to[de-identified] [facility]

## 2019-05-19 NOTE — PROGRESS NOTES
Problem: Mobility Impaired (Adult and Pediatric) Goal: *Acute Goals and Plan of Care (Insert Text) Description GOALS (1-4 days): 
(1.)Ms. Adelina Najera will move from supine to sit and sit to supine  in bed with STAND BY ASSIST. 
(2.)Ms. Adelina Najera will transfer from bed to chair and chair to bed with STAND BY ASSIST using the least restrictive device. (3.)Ms. Adelina Najera will ambulate with STAND BY ASSIST for 300 feet with the least restrictive device. continue (4.)Ms. Adelina Najera will ambulate up/down 2 steps with bilateral  railing with CONTACT GUARD ASSIST with no device. Ramp to enter home. (5.)Ms. Adelina Najera will increase right knee ROM to 5°-80°. continue 
________________________________________________________________________________________________ Outcome: Progressing Towards PHYSICAL THERAPY JOINT CAMP TKA: Daily Note and AM 5/19/2019 INPATIENT: Hospital Day: 4 [place] / [place] / Product Type: Medicare /  
  
NAME/AGE/GENDER: Leonardo Saleh is a 68 y.o. female PRIMARY DIAGNOSIS:  Unilateral primary osteoarthritis, right knee [M17.11] Procedure(s) and Anesthesia Type: 
   * RIGHT TOTAL KNEE ARTHROPLASTY - Spinal (Right) ICD-10: Treatment Diagnosis:  
 · Pain in Right Knee (M25.561) · Stiffness of Right Knee, Not elsewhere classified (M25.661) · Difficulty in walking, Not elsewhere classified (R26.2) ASSESSMENT:  
Ms. Adelina Najera requiring lots of encouragement to work her way through exercises and mobility. Stressed the importance of moving frequently at home and continuing with HEP, including ROM. Patient needing assist with multiple exercises and very dramatic throughout. Constantly reporting she can't lift her leg and needs someone to lift it for her. She has had a L TKA in the past but reports \"it wasn't this bad\".  is supportive. This section established at most recent assessment PROBLEM LIST (Impairments causing functional limitations): 
 1. Decreased Strength 2. Decreased ADL/Functional Activities 3. Decreased Transfer Abilities 4. Decreased Ambulation Ability/Technique 5. Increased Pain 6. Decreased Flexibility/Joint Mobility 7. Decreased [place] with Home Exercise Program 
 INTERVENTIONS PLANNED: (Benefits and precautions of physical therapy have been discussed with the patient.) 1. Cold 2. bed mobility 3. gait training 4. home exercise program (HEP) 5. Range of Motion: active/assisted/passive 6. Therapeutic Activities 7. therapeutic exercise/strengthening 8. transfer training 9. Group Therapy TREATMENT PLAN: Frequency/Duration: Follow patient BID for duration of hospital stay to address above goals. Rehabilitation Potential For Stated Goals: Good RECOMMENDED REHABILITATION/EQUIPMENT: (at time of discharge pending progress): Continue Skilled Therapy, Home Health: Physical Therapy and Outpatient: Physical Therapy. HISTORY:  
History of Present Injury/Illness (Reason for Referral): 
Pt s/p right TKA on 5/16/19 Past Medical History/Comorbidities:  
Ms. Brittany Recio  has a past medical history of Acute pancreatitis (2/2008), Aneurysm ([address] Anxiety, Bilateral sacroiliitis ([address] Breast cyst, CAD (coronary artery disease), Cellulitis (6/16/15), Chest pain, Chronic kidney disease, Chronic pain, CKD (chronic kidney disease), Degenerative disc disease, Depression (12/23/2015), Diabetes mellitus type II, Dyslipidemia, Dyspnea, Edema (12/23/2015), Fatty liver, Fibromyalgia, Food allergy, GERD (gastroesophageal reflux disease), Heart failure ([address] echocardiogram (08/21/2015), Hypertension, Hypertriglyceridemia, Hypothyroidism, Lumbar herniated disc (Aug 2013), Lumbar radiculopathy, Macrocytic anemia, Microcytic anemia, Mild pulmonary hypertension ([address] Nausea & vomiting, Neuropathy, Obesity (BMI 30.0-34.9) (10/2/14), Osteoarthritis, Panic attacks, Sacroiliac dysfunction, Seasonal allergic reaction, Sleep apnea, and Syncope and collapse (12/23/2015). Ms. Bette Maurice  has a past surgical history that includes hx partial thyroidectomy (2008); hx breast lumpectomy (Right); hx breast biopsy (Bilateral, R/1988  L/2012); hx premalig/benign skin lesion excision (09/29/2014); hx cyst removal (Left, 02/2019); hx heart catheterization (2005); hx jorge and bso (1995); hx knee arthroscopy (Left, 2004); hx orthopaedic (Left, 2010); hx orthopaedic (Right, 2009); hx knee arthroscopy (10/03/2014); hx back surgery (08/30/2013); hx other surgical (12/22/09); hx other surgical; hx other surgical (10/28/13); hx knee replacement (Left, 03/14/2017); and hx orthopaedic (04/03/2019). Social History/Living Environment:  
Home Environment: Private residence # Steps to Enter: 2 One/Two Story Residence: One story Living Alone: No 
Support Systems: Spouse/Significant Other/Partner Patient Expects to be Discharged to[de-identified] Private residence Current DME Used/Available at Home: Cane, quad, Dietra Hives, straight, Grab bars, Raised toilet seat, Walker, rolling Tub or Shower Type: (walk in tub) Prior Level of Function/Work/Activity: 
Pt living at home, independent with gait and ADLs Number of Personal Factors/Comorbidities that affect the Plan of Care: 0: LOW COMPLEXITY EXAMINATION:  
Most Recent Physical Functioning:  
  
Gross Assessment AROM: Within functional limits(L LE) Strength: Within functional limits(L LE) RLE AROM 
R Knee Flexion: 60 R Knee Extension: 5    
 
RLE Strength 
R Hip Flexion: 1 
R Knee Flexion: 2 
R Knee Extension: 2- 
 
Bed Mobility Supine to Sit: Minimum assistance(for R LE) Transfers Sit to Stand: Stand-by assistance Stand to Sit: Stand-by assistance Bed to Chair: Contact guard assistance Balance Sitting: Intact Standing: With support Weight Bearing Status Left Side Weight Bearing: As tolerated Distance (ft): 20 Feet (ft)(x 1; 5' x 1) Ambulation - Level of Assistance: Contact guard assistance Assistive Device: Walker, rolling Speed/Lindsay: Shuffled; Slow Step Length: Left shortened Stance: Right decreased Gait Abnormalities: Antalgic; Shuffling gait Interventions: Safety awareness training;Verbal cues; Visual/Demos Braces/Orthotics: none Right Knee Cold Type: Cryocuff Body Structures Involved: 1. Joints 2. Muscles Body Functions Affected: 1. Movement Related Activities and Participation Affected: 1. Mobility 2. Self Care Number of elements that affect the Plan of Care: 4+: HIGH COMPLEXITY CLINICAL PRESENTATION:  
Presentation: Stable and uncomplicated: LOW COMPLEXITY CLINICAL DECISION MAKING:  
[facility] 6 Clicks Basic Mobility Inpatient Short Form How much difficulty does the patient currently have. .. Unable A Lot A Little None 1. Turning over in bed (including adjusting bedclothes, sheets and blankets)? ? 1   ? 2   ? 3   ? 4  
2. Sitting down on and standing up from a chair with arms ( e.g., wheelchair, bedside commode, etc.)   ? 1   ? 2   ? 3   ? 4  
3. Moving from lying on back to sitting on the side of the bed?   ? 1   ? 2   ? 3   ? 4 How much help from another person does the patient currently need. .. Total A Lot A Little None 4. Moving to and from a bed to a chair (including a wheelchair)? ? 1   ? 2   ? 3   ? 4  
5. Need to walk in hospital room? ? 1   ? 2   ? 3   ? 4  
6. Climbing 3-5 steps with a railing? ? 1   ? 2   ? 3   ? 4  
© 2007, Trustees of [facility], under license to [employer]. All rights reserved Score:  Initial: 18 Most Recent: X (Date: -- ) Interpretation of Tool:  Represents activities that are increasingly more difficult (i.e. Bed mobility, Transfers, Gait). Medical Necessity:    
· Patient is expected to demonstrate progress in strength, range of motion and functional technique ·  to decrease assistance required with functional mobility and TKA HEP · . Reason for Services/Other Comments: 
· Patient continues to require skilled intervention due to inability to complete functional mobility and TKA HEP independently · . Use of outcome tool(s) and clinical judgement create a POC that gives a: Clear prediction of patient's progress: LOW COMPLEXITY  
  
 
 
 
TREATMENT:  
(In addition to Assessment/Re-Assessment sessions the following treatments were rendered) Pre-treatment Symptoms/Complaints:  Patient reports she just moved around in the room. Pain Initial:  
Pain Intensity 1: 8 Pain Location 1: Knee Pain Orientation 1: Right Pain Intervention(s) 1: Ambulation/Increased Activity, Cold pack, Exercise, Nurse notified, Repositioned  Post Session: 8/10 Therapeutic Exercise: (30 Minutes):  Exercises per grid below to improve mobility and strength. Required minimal verbal and manual cues to promote proper body alignment and promote proper body posture. Progressed repetitions as indicated. Therapeutic Activity: (    25 minutes): Therapeutic activities including Bed transfers, Chair transfers and Ambulation on level ground to improve mobility, strength, balance and coordination. Required moderate Safety awareness training;Verbal cues; Visual/Demos to promote coordination of bilateral, lower extremity(s), and advancement of walker. Date: 
5/19/19 Date: 
 Date: 
 
  
ACTIVITY/EXERCISE AM PM AM PM AM PM  
GROUP THERAPY  ? ?  ?  X  x  ? Ankle Pumps 20 Quad Sets 20 Gluteal Sets 20 Hip ABd/ADduction 20 AA Straight Leg Raises 20 AA Knee Slides 20 AA Short Arc Quads 20  [facility] Chair Slides 20 B = bilateral; AA = active assistive; A = active; P = passive Treatment/Session Assessment:   
 Response to Treatment:  Patient with multiple complaints of pain and needing assist with most exercises. Questions whether 20 is too many reps of exercises. Patient reminded of importance of HEP and changing positions frequently at home. Education: 
X Home Exercises X Fall Precautions X D/C Instruction Review X Knee Prosthesis Review Brena Lips Management/Safety ? Adaptive Equipment as Needed Interdisciplinary Collaboration:  
o Physical Therapist 
o Registered Nurse After treatment position/precautions:  
o Up in chair 
o Bed/Chair-wheels locked 
o Bed in low position 
o Call light within reach 
o RN notified 
o Family at bedside Compliance with Program/Exercises: Will assess as treatment progresses. Recommendations/Intent for next treatment session:  Treatment next visit will focus on increasing Ms. Daley's independence with bed mobility, transfers, gait training, strength/ROM exercises, modalities for pain, and patient education. Total Treatment Duration: PT Patient Time In/Time Out Time In: 0820 Time Out: 7351 Remigio Serve, PT

## 2019-05-19 NOTE — PROGRESS NOTES
Hospitalist Progress Note 2019 Admit Date: 2019  7:41 AM  
NAME: Evan Hancock :  1945 MRN:  [id number] Attending: Pancho Watkins MD 
PCP:  Chaitanya Sanchez MD 
 
SUBJECTIVE:  
Pt is a 68 y.o. female with DM2, HTN, OA, HLD,hypothyroidism who underwent R TKA earlier today. Hospitalist consulted for DM mgmt. Pain better controlled, BP, glucoses improved. Review of Systems negative with exception of pertinent positives noted above PHYSICAL EXAM  
 
Patient Vitals for the past 24 hrs: 
 Temp Pulse Resp BP SpO2  
19 0708 98.3 °F (36.8 °C) 66 16 147/72 94 % 19 0347 98.8 °F (37.1 °C) 67 16 152/80 93 % 19 0033 99.4 °F (37.4 °C) 71 18 159/75 94 % 19     93 % 19 2000 98.7 °F (37.1 °C) 72 16 152/66 93 % 19 1545 97.5 °F (36.4 °C) 68 18 157/76 94 % 19 1210  73  162/70 94 % 19 1121 98.5 °F (36.9 °C) 66 16 196/81 96 % Oxygen Therapy O2 Sat (%): 94 % (19 0708) Pulse via Oximetry: 70 beats per minute (19) O2 Device: Room air (19) O2 Flow Rate (L/min): 0 l/min (19) No intake or output data in the 24 hours ending 19 0911 General: No acute distress   
Lungs:  CTA Bilaterally Heart:  Regular rate and rhythm,  No murmur, rub, or gallop Abdomen: Soft, Non distended, Non tender, Positive bowel sounds Extremities: No cyanosis, clubbing or edema Neurologic:  No focal deficits Psych:  Calm, cooperative Recent Results (from the past 24 hour(s)) GLUCOSE, POC Collection Time: 19 11:26 AM  
Result Value Ref Range Glucose (POC) 178 (H) 65 - 100 mg/dL GLUCOSE, POC Collection Time: 19  4:43 PM  
Result Value Ref Range Glucose (POC) 143 (H) 65 - 100 mg/dL GLUCOSE, POC Collection Time: 19  9:44 PM  
Result Value Ref Range Glucose (POC) 177 (H) 65 - 100 mg/dL GLUCOSE, POC  Collection Time: 19  5:54 AM  
 Result Value Ref Range Glucose (POC) 111 (H) 65 - 100 mg/dL Micro: All Micro Results None Other studies last 24 hours: 
No results found. All imaging, labs reviewed. ASSESSMENT Hospital Problems as of 5/19/2019 Date Reviewed: 5/9/2019 Codes Class Noted - Resolved POA * (Principal) S/P TKR (total knee replacement) using cement, right ICD-10-CM: S42.139 ICD-9-CM: V43.65  5/17/2019 - Present Unknown Arthritis of knee, right ICD-10-CM: M17.11 ICD-9-CM: 716.96  5/16/2019 - Present Unknown DM2 Glucose 119 this AM. 
- cont Levemir to 50 units  
- resume metformin 
  
HTN Bp improved. - increased home norvasc to 10mg 
- f/u BP check in 2-3 days 
  
Hypothyroidism 
- cont synthroid 
  
OA s/p R TKA - per vesta Waters  for d/c. I will sign off. Signed By: Berny Max MD   
 May 19, 2019

## 2019-05-19 NOTE — PROGRESS NOTES
Care Management Interventions PCP Verified by CM: Yes Transition of Care Consult (CM Consult): Home Health [address] [place] [address] Road: Yes Physical Therapy Consult: Yes Current Support Network: Lives with Spouse Confirm Follow Up Transport: Family Plan discussed with Pt/Family/Caregiver: Yes Discharge Location Discharge Placement: Home with home health Patient did not feel ready to go home on Saturday due to pain.

## 2019-05-20 ENCOUNTER — PATIENT OUTREACH (OUTPATIENT)
Dept: CASE MANAGEMENT | Age: 74
End: 2019-05-20

## 2019-05-21 ENCOUNTER — HOME CARE VISIT (OUTPATIENT)
Dept: SCHEDULING | Facility: HOME HEALTH | Age: 74
End: 2019-05-21
Payer: MEDICARE

## 2019-05-21 VITALS
HEART RATE: 72 BPM | TEMPERATURE: 98.4 F | SYSTOLIC BLOOD PRESSURE: 140 MMHG | DIASTOLIC BLOOD PRESSURE: 70 MMHG | RESPIRATION RATE: 20 BRPM

## 2019-05-21 PROCEDURE — 3331090002 HH PPS REVENUE DEBIT

## 2019-05-21 PROCEDURE — 400013 HH SOC

## 2019-05-21 PROCEDURE — G0151 HHCP-SERV OF PT,EA 15 MIN: HCPCS

## 2019-05-21 PROCEDURE — 3331090001 HH PPS REVENUE CREDIT

## 2019-05-22 PROCEDURE — 3331090002 HH PPS REVENUE DEBIT

## 2019-05-22 PROCEDURE — 3331090001 HH PPS REVENUE CREDIT

## 2019-05-23 ENCOUNTER — HOME CARE VISIT (OUTPATIENT)
Dept: SCHEDULING | Facility: HOME HEALTH | Age: 74
End: 2019-05-23
Payer: MEDICARE

## 2019-05-23 VITALS
HEART RATE: 66 BPM | TEMPERATURE: 98.3 F | RESPIRATION RATE: 18 BRPM | DIASTOLIC BLOOD PRESSURE: 68 MMHG | SYSTOLIC BLOOD PRESSURE: 122 MMHG

## 2019-05-23 PROCEDURE — 3331090002 HH PPS REVENUE DEBIT

## 2019-05-23 PROCEDURE — G0157 HHC PT ASSISTANT EA 15: HCPCS

## 2019-05-23 PROCEDURE — 3331090001 HH PPS REVENUE CREDIT

## 2019-05-24 ENCOUNTER — HOME CARE VISIT (OUTPATIENT)
Dept: SCHEDULING | Facility: HOME HEALTH | Age: 74
End: 2019-05-24
Payer: MEDICARE

## 2019-05-24 VITALS
HEART RATE: 75 BPM | TEMPERATURE: 98.7 F | DIASTOLIC BLOOD PRESSURE: 63 MMHG | SYSTOLIC BLOOD PRESSURE: 128 MMHG | RESPIRATION RATE: 17 BRPM

## 2019-05-24 PROCEDURE — 3331090001 HH PPS REVENUE CREDIT

## 2019-05-24 PROCEDURE — G0151 HHCP-SERV OF PT,EA 15 MIN: HCPCS

## 2019-05-24 PROCEDURE — 3331090002 HH PPS REVENUE DEBIT

## 2019-05-25 PROCEDURE — 3331090002 HH PPS REVENUE DEBIT

## 2019-05-25 PROCEDURE — 3331090001 HH PPS REVENUE CREDIT

## 2019-05-26 PROCEDURE — 3331090002 HH PPS REVENUE DEBIT

## 2019-05-26 PROCEDURE — 3331090001 HH PPS REVENUE CREDIT

## 2019-05-27 ENCOUNTER — HOME CARE VISIT (OUTPATIENT)
Dept: SCHEDULING | Facility: HOME HEALTH | Age: 74
End: 2019-05-27
Payer: MEDICARE

## 2019-05-27 VITALS
RESPIRATION RATE: 16 BRPM | HEART RATE: 72 BPM | TEMPERATURE: 97.5 F | SYSTOLIC BLOOD PRESSURE: 126 MMHG | DIASTOLIC BLOOD PRESSURE: 70 MMHG

## 2019-05-27 PROCEDURE — 3331090001 HH PPS REVENUE CREDIT

## 2019-05-27 PROCEDURE — 3331090002 HH PPS REVENUE DEBIT

## 2019-05-27 PROCEDURE — G0157 HHC PT ASSISTANT EA 15: HCPCS

## 2019-05-28 PROCEDURE — 3331090001 HH PPS REVENUE CREDIT

## 2019-05-28 PROCEDURE — 3331090002 HH PPS REVENUE DEBIT

## 2019-05-29 ENCOUNTER — HOME CARE VISIT (OUTPATIENT)
Dept: SCHEDULING | Facility: HOME HEALTH | Age: 74
End: 2019-05-29
Payer: MEDICARE

## 2019-05-29 PROCEDURE — G0157 HHC PT ASSISTANT EA 15: HCPCS

## 2019-05-29 PROCEDURE — 3331090002 HH PPS REVENUE DEBIT

## 2019-05-29 PROCEDURE — 3331090001 HH PPS REVENUE CREDIT

## 2019-05-30 VITALS
TEMPERATURE: 97.3 F | HEART RATE: 76 BPM | SYSTOLIC BLOOD PRESSURE: 134 MMHG | RESPIRATION RATE: 16 BRPM | DIASTOLIC BLOOD PRESSURE: 84 MMHG

## 2019-05-30 PROCEDURE — 3331090002 HH PPS REVENUE DEBIT

## 2019-05-30 PROCEDURE — 3331090001 HH PPS REVENUE CREDIT

## 2019-05-31 ENCOUNTER — HOME CARE VISIT (OUTPATIENT)
Dept: SCHEDULING | Facility: HOME HEALTH | Age: 74
End: 2019-05-31
Payer: MEDICARE

## 2019-05-31 VITALS
SYSTOLIC BLOOD PRESSURE: 120 MMHG | TEMPERATURE: 97.3 F | DIASTOLIC BLOOD PRESSURE: 70 MMHG | RESPIRATION RATE: 16 BRPM | HEART RATE: 72 BPM

## 2019-05-31 PROCEDURE — G0157 HHC PT ASSISTANT EA 15: HCPCS

## 2019-05-31 PROCEDURE — 3331090002 HH PPS REVENUE DEBIT

## 2019-05-31 PROCEDURE — 3331090001 HH PPS REVENUE CREDIT

## 2019-06-01 PROCEDURE — 3331090002 HH PPS REVENUE DEBIT

## 2019-06-01 PROCEDURE — 3331090001 HH PPS REVENUE CREDIT

## 2019-06-02 PROCEDURE — 3331090002 HH PPS REVENUE DEBIT

## 2019-06-02 PROCEDURE — 3331090001 HH PPS REVENUE CREDIT

## 2019-06-03 ENCOUNTER — HOME CARE VISIT (OUTPATIENT)
Dept: HOME HEALTH SERVICES | Facility: HOME HEALTH | Age: 74
End: 2019-06-03
Payer: MEDICARE

## 2019-06-03 ENCOUNTER — HOME CARE VISIT (OUTPATIENT)
Dept: SCHEDULING | Facility: HOME HEALTH | Age: 74
End: 2019-06-03
Payer: MEDICARE

## 2019-06-03 PROCEDURE — 3331090002 HH PPS REVENUE DEBIT

## 2019-06-03 PROCEDURE — 3331090001 HH PPS REVENUE CREDIT

## 2019-06-03 PROCEDURE — G0157 HHC PT ASSISTANT EA 15: HCPCS

## 2019-06-04 VITALS
SYSTOLIC BLOOD PRESSURE: 118 MMHG | HEART RATE: 74 BPM | TEMPERATURE: 97.2 F | DIASTOLIC BLOOD PRESSURE: 64 MMHG | RESPIRATION RATE: 16 BRPM

## 2019-06-04 PROCEDURE — 3331090001 HH PPS REVENUE CREDIT

## 2019-06-04 PROCEDURE — 3331090002 HH PPS REVENUE DEBIT

## 2019-06-05 PROCEDURE — 3331090002 HH PPS REVENUE DEBIT

## 2019-06-05 PROCEDURE — 3331090001 HH PPS REVENUE CREDIT

## 2019-06-06 ENCOUNTER — HOME CARE VISIT (OUTPATIENT)
Dept: SCHEDULING | Facility: HOME HEALTH | Age: 74
End: 2019-06-06
Payer: MEDICARE

## 2019-06-06 VITALS
TEMPERATURE: 97.3 F | RESPIRATION RATE: 16 BRPM | HEART RATE: 76 BPM | SYSTOLIC BLOOD PRESSURE: 124 MMHG | DIASTOLIC BLOOD PRESSURE: 72 MMHG

## 2019-06-06 PROCEDURE — G0157 HHC PT ASSISTANT EA 15: HCPCS

## 2019-06-06 PROCEDURE — 3331090002 HH PPS REVENUE DEBIT

## 2019-06-06 PROCEDURE — 3331090001 HH PPS REVENUE CREDIT

## 2019-06-07 PROCEDURE — 3331090001 HH PPS REVENUE CREDIT

## 2019-06-07 PROCEDURE — 3331090002 HH PPS REVENUE DEBIT

## 2019-06-08 PROCEDURE — 3331090001 HH PPS REVENUE CREDIT

## 2019-06-08 PROCEDURE — 3331090002 HH PPS REVENUE DEBIT

## 2019-06-09 PROCEDURE — 3331090002 HH PPS REVENUE DEBIT

## 2019-06-09 PROCEDURE — 3331090001 HH PPS REVENUE CREDIT

## 2019-06-10 PROCEDURE — 3331090002 HH PPS REVENUE DEBIT

## 2019-06-10 PROCEDURE — 3331090001 HH PPS REVENUE CREDIT

## 2019-06-11 PROCEDURE — 3331090001 HH PPS REVENUE CREDIT

## 2019-06-11 PROCEDURE — 3331090002 HH PPS REVENUE DEBIT

## 2019-06-12 ENCOUNTER — HOME CARE VISIT (OUTPATIENT)
Dept: SCHEDULING | Facility: HOME HEALTH | Age: 74
End: 2019-06-12
Payer: MEDICARE

## 2019-06-12 VITALS
SYSTOLIC BLOOD PRESSURE: 122 MMHG | RESPIRATION RATE: 18 BRPM | HEART RATE: 78 BPM | DIASTOLIC BLOOD PRESSURE: 64 MMHG | TEMPERATURE: 98.1 F

## 2019-06-12 PROCEDURE — 3331090002 HH PPS REVENUE DEBIT

## 2019-06-12 PROCEDURE — G0151 HHCP-SERV OF PT,EA 15 MIN: HCPCS

## 2019-06-12 PROCEDURE — 3331090001 HH PPS REVENUE CREDIT

## 2019-06-13 ENCOUNTER — HOSPITAL ENCOUNTER (OUTPATIENT)
Dept: PHYSICAL THERAPY | Age: 74
Discharge: HOME | End: 2019-06-13
Payer: MEDICARE

## 2019-06-13 PROCEDURE — 97161 PT EVAL LOW COMPLEX 20 MIN: CPT

## 2019-06-13 NOTE — THERAPY EVALUATION
Eric Saldivar  : 1945  [place]  Secondary: [address] [facility] at [facility] [address],  [place].  [phone]   [phone]       OUTPATIENT PHYSICAL THERAPY:Initial Assessment 2019     ICD-10: Treatment Diagnosis: Presence of right artificial knee joint (Z21.747)  Precautions/Allergies: per EMR, allergic to Shellfish containing products; Celebrex; Catapres Cymbalta, Morphine; Oxycontin and Sulfa     MD Orders: Eval and treat, 2-3x/wk for 4-8 weeks based on needs of patient MEDICAL/REFERRING DIAGNOSIS:  R tka    DATE OF ONSET: 19  REFERRING PHYSICIAN: Tina Peres MD  RETURN PHYSICIAN APPOINTMENT: 19     INITIAL ASSESSMENT:  Ms. Andrez Montesinos is a 68year old female s/p R TKA on 19. She is known to PT from previous encounters. She presents with R knee pain, decreased ROM and strength in R knee and decreased functional mobility. She could benefit from PT to address deficits and work toward goals. PROBLEM LIST (Impacting functional limitations):  1. Decreased Strength  2. Decreased Ambulation Ability/Technique  3. Increased Pain  4. Decreased Flexibility/Joint Mobility INTERVENTIONS PLANNED: (Treatment may consist of any combination of the following)  1. Balance Exercise  2. Gait Training  3. Home Exercise Program (HEP)  4. Manual Therapy  5. Therapeutic Exercise/Strengthening  6. modals as needed   TREATMENT PLAN:  Effective Dates: 2019 TO 2019 (60 days). Frequency/Duration: 2 times a week for 60 Day(s)  GOALS: (Goals have been discussed and agreed upon with patient.)  Patient's stated goal is to walk without assistive device and be able to drive again. Short-Term Functional Goals: Time Frame: 30 days  1. Patient to be independent with HEP  2. Patient to report decrease in R knee pain to 3 at rest  3.  Patient to increase AROM R knee to 0-100 for improved mobility  Discharge Goals: Time Frame: 60 days  1. Patient to report no more than minimal R knee pain with functional activity  2. Patient to improve R knee AROM to 0 to 120 for improved movement  3. Patient to improve LEFS score to 40 to demonstrate improved functional mobility  4. Patient to ascend and descend 4 steps with reciprocal gait pattern     OUTCOME MEASURE:   Tool Used: Lower Extremity Functional Scale (LEFS)  Score:  Initial: 19/80 Most Recent: X/80 (Date: -- )   Interpretation of Score: 20 questions each scored on a 5 point scale with 0 representing \"extreme difficulty or unable to perform\" and 4 representing \"no difficulty\". The lower the score, the greater the functional disability. 80/80 represents no disability. Minimal detectable change is 9 points. MEDICAL NECESSITY:   · Patient demonstrates fair rehab potential due to higher previous functional level. REASON FOR SERVICES/OTHER COMMENTS:  · Patient continues to require skilled intervention due to need to return to higher level of function. Total Duration:  PT Patient Time In/Time Out  Time In: 1345  Time Out: 1425    Rehabilitation Potential For Stated Goals: Fair  Regarding [employer] therapy, I certify that the treatment plan above will be carried out by a therapist or under their direction. Thank you for this referral,  David Wright, PT     Referring Physician Signature: Margarita Delarosa MD _______________________________ Date _____________     PAIN/SUBJECTIVE:   Initial: Pain Intensity 1: 6(6 now, 9 at worst, 4 at best)   Post Session:  No increase in pain. HISTORY:   History of Injury/Illness (Reason for Referral):  Patient reports OA in R knee worsened over time and knee started to 'catch' and 'give out'. She had knee surgery on 5/16/19 and had 7 visits of home health rehab since then. Had 32 staples in knee initially, but they are out now. She walked without a walker prior to surgery, but uses one now.   Has tried a cane inside her home, but was told she is not ready to use it exclusively yet. Past Medical History/Comorbidities: from EMR and patient: Acute pancreatitis, Aneurysm, Anxiety, Bilateral sacroiliitis, Breast cyst, CAD, Cellulitis, Chest pain, Chronic kidney disease, Chronic pain, DDD,  Depression, Diabetes mellitus type II, Dyslipidemia, Dyspnea, Edema, Fatty liver, Fibromyalgia, Food allergy, GERD, Heart failure, Hypertension, Hypertriglyceridemia, Hypothyroidism, Lumbar herniated disc, Lumbar radiculopathy, Macrocytic anemia, Microcytic anemia, Mild pulmonary hypertension, Neuropathy, Obesity, Osteoarthritis, Panic attacks, Sacroiliac dysfunction, Seasonal allergic reaction, Sleep apnea, Syncope and collapse, partial thyroidectomy (2008); breast lumpectomy (Right);  breast biopsy (Bilateral, R/1988  L/2012); premalig/benign skin lesion excision (09/29/2014); cyst removal (Left, 02/2019); heart catheterization (2005); jorge and bso (1995); knee arthroscopy (Left, 2004); orthopaedic (Left, 2010); orthopaedic (Right, 2009); knee arthroscopy (10/03/2014); back surgery (08/30/2013); neurostimulator implant (2009); knee replacement (Left, 03/14/2017); R toe urgery (2018) and L thumb surgery (2018). Social History/Living Environment:     1 story home with 2 steps from carport, but may be moving to 'related living' on daughter's property. .   Prior Level of Function/Work/Activity:  Retired. Watches [employer] 2-3 days per week. Personal Factors: Many previous surgeries and illnesses   Ambulatory/Rehab Services H2 Model Falls Risk Assessment   Risk Factors:       (2)  Symptomatic Depression       (1)  Altered Elimination Ability to Rise from Chair:       (1)  Pushes up, successful in one attempt   Falls Prevention Plan:       No modifications necessary   Total: (5 or greater = High Risk): 4   ©2010 [employer] of [employer]. All Rights Reserved. [facility] Patent #[id number].  Federal Law prohibits the replication, distribution or use without written permission from [facility]   Current Medications: Advil, Amlodipine,Cetirizine, Buspirone, Fiorcet, Ergocalciferol, Lexapro, Vytorin, Allegra, Gabapentin, Avapro, Levemir, Levothyroxine, Mag-ox, Metformin, Metoprolol, Oxycodone, Temazepam, Trulicity. Date Last Reviewed:  6/13/19   Number of Personal Factors/Comorbidities that affect the Plan of Care: 1-2: MODERATE COMPLEXITY   EXAMINATION:      Observation/Orthostatic Postural Assessment: patient using rolling walker for gait into PT clinic. Incision on R knee closed with staples removed. Slight redness noted around edges of incision and slight warmth felt on R knee. Patient instructed to keep an eye on it and call MD office if redness worsens. Palpation: tender throughout R knee   ROM: AROM L knee flex 120, extn -2. AROM R knee flex 85, extn -8. Strength: 5/5 in L knee, R knee not tested  Special Tests: none today  Neurological Screen: dulled light touch from R knee distally. .  Functional Mobility:  Independent gait with rolling walker. Independent sit to/from stand, and sit to/from supine. Balance:  Not formally tested     Body Structures Involved:  1. Joints  2. Muscles Body Functions Affected:  1. Neuromusculoskeletal  2. Movement Related Activities and Participation Affected:  1. General Tasks and Demands  2. Mobility  3.  Community, Social and [place]   Number of elements (examined above) that affect the Plan of Care: 1-2: LOW COMPLEXITY   CLINICAL PRESENTATION:   Presentation: Stable and uncomplicated: LOW COMPLEXITY   CLINICAL DECISION MAKING:   Use of outcome tool(s) and clinical judgement create a POC that gives a: Questionable prediction of patient's progress: MODERATE COMPLEXITY

## 2019-06-17 ENCOUNTER — PATIENT OUTREACH (OUTPATIENT)
Dept: CASE MANAGEMENT | Age: 74
End: 2019-06-17

## 2019-06-18 ENCOUNTER — HOSPITAL ENCOUNTER (OUTPATIENT)
Dept: PHYSICAL THERAPY | Age: 74
Discharge: HOME | End: 2019-06-18
Payer: MEDICARE

## 2019-06-18 PROCEDURE — 97140 MANUAL THERAPY 1/> REGIONS: CPT

## 2019-06-18 NOTE — PROGRESS NOTES
Lubna Steele  : 1945  [place] / Product Type: Medicare /  [address] at [facility] [place]DS  [address] [facility], [address], [address]  [phone]   [phone]       OUTPATIENT PHYSICAL THERAPY: Daily Treatment Note 2019  Visit Count: 2  ICD-10: Treatment Diagnosis: Presence of right artificial knee joint (G12.597)  Precautions/Allergies: per EMR, allergic to Shellfish containing products; Celebrex; Catapres Cymbalta, Morphine; Oxycontin and Sulfa     MD Orders: Eval and treat, 2-3x/wk for 4-8 weeks based on needs of patient MEDICAL/REFERRING DIAGNOSIS:  R tka    DATE OF ONSET: 19  REFERRING PHYSICIAN: Shan Velázquez MD  RETURN PHYSICIAN APPOINTMENT: TBD              Pre-treatment Symptoms/Complaints:  Reports she saw MD yesterday and he was not worried about the redness around the incision. Told her she could get off the walker and start using a cane. MD told her she had a contracture before the knee surgery so he didn't expect full flex or extn soon. Pain: Initial: Pain Intensity 1: 4   Post Session: \"No worse\"   Medications Last Reviewed: Will be off Dilaudid when she finishes them in 3 to 4 days    Updated Objective Findings:   None Today     TREATMENT:     Manual therapy ( 40 minutes) - grade 2 to 4- physio mobs R knee flex in sitting and extn in long sitting with back of table propped up for patient to sit against.  Grade 2 to 4-- patellar glides in all directions. HEP: No changes       Treatment/Session Summary:    · Response to Treatment: patient did well with manual therapy. ROM seems to be improving and patient reports that there is not much pain with manual therapy. · Communication/Consultation:  None today  · Equipment provided today:  None today  · Recommendations/Intent for next treatment session: Next visit will focus on knee ROM and strengthening.      Treatment Plan of Care Effective Dates: 6/13/2019 TO 9/11/2019 (60 days). Frequency/Duration: 2 times a week for 60 Day(s).         Total Treatment Billable Duration:  40 minutes  PT Patient Time In/Time Out  Time In: 1601  Time Out: [address] Albin Klinefelter

## 2019-06-20 ENCOUNTER — HOSPITAL ENCOUNTER (OUTPATIENT)
Dept: PHYSICAL THERAPY | Age: 74
Discharge: HOME | End: 2019-06-20
Payer: MEDICARE

## 2019-06-20 PROCEDURE — 97140 MANUAL THERAPY 1/> REGIONS: CPT

## 2019-06-20 NOTE — PROGRESS NOTES
Vandana Garcia  : 1945  [place] / Product Type: Medicare /  [address]  at [facility] [address]  [address] [facility], [address], [place] [address].  [phone]   [phone]       OUTPATIENT PHYSICAL THERAPY: Daily Treatment Note 2019  Visit Count: 3  ICD-10: Treatment Diagnosis: Presence of right artificial knee joint (J77.896)  Precautions/Allergies: per EMR, allergic to Shellfish containing products; Celebrex; Catapres Cymbalta, Morphine; Oxycontin and Sulfa     MD Orders: Eval and treat, 2-3x/wk for 4-8 weeks based on needs of patient MEDICAL/REFERRING DIAGNOSIS:  R tka    DATE OF ONSET: 19  REFERRING PHYSICIAN: Elham Vazquez MD  RETURN PHYSICIAN APPOINTMENT: TBD              Pre-treatment Symptoms/Complaints:  Reports she is a little more sore today. Not sure why. Pain: Initial: Pain Intensity 1: 7   Post Session: \"No pain, feels better\" according to patient. Medications Last Reviewed: Will be off Dilaudid when she finishes them in 3 to 4 days  19    Updated Objective Findings:   None Today     TREATMENT:     Manual therapy ( 40 minutes) - grade 2 to 4- physio mobs R knee flex in sitting and extn in long sitting with back of table propped up for patient to sit against.  Grade 2 to 4-- patellar glides in all directions. HEP: No changes       Treatment/Session Summary:    · Response to Treatment: patient did well with manual therapy again. Focused on ROM instead of strengthening due to pain levels today. Reported that knee felt much better at end of session. · Communication/Consultation:  None today  · Equipment provided today:  None today  · Recommendations/Intent for next treatment session: Next visit will focus on knee ROM and strengthening. Treatment Plan of Care Effective Dates:  2019 TO 2019 (60 days). Frequency/Duration: 2 times a week for 60 Day(s).         Total Treatment Billable Duration: 40 minutes  PT Patient Time In/Time Out  Time In: 8249  Time Out: [address] [facility] Jesus Manuel Mejia

## 2019-06-25 ENCOUNTER — HOSPITAL ENCOUNTER (OUTPATIENT)
Dept: PHYSICAL THERAPY | Age: 74
Discharge: HOME | End: 2019-06-25
Payer: MEDICARE

## 2019-06-25 PROCEDURE — 97140 MANUAL THERAPY 1/> REGIONS: CPT

## 2019-06-25 NOTE — PROGRESS NOTES
Marylu Sepulveda  : 1945  [place] / Product Type: Medicare /  [address] at [facility] [place]  [address] [place] [address], [address], [place] [address].  [phone]   [phone]       OUTPATIENT PHYSICAL THERAPY: Daily Treatment Note 2019  Visit Count: 4  ICD-10: Treatment Diagnosis: Presence of right artificial knee joint (V09.965)  Precautions/Allergies: per EMR, allergic to Shellfish containing products; Celebrex; Catapres Cymbalta, Morphine; Oxycontin and Sulfa     MD Orders: Eval and treat, 2-3x/wk for 4-8 weeks based on needs of patient MEDICAL/REFERRING DIAGNOSIS:  R tka    DATE OF ONSET: 19  REFERRING PHYSICIAN: Yvrose Puckett MD  RETURN PHYSICIAN APPOINTMENT: TBD              Pre-treatment Symptoms/Complaints:  Reports she did fine over the weekend, even did some out door acitivty, but it is more sore today. She was concerned about her knee being red so she took a picture and sent it by phone to the MD assistant for the doctor to look at and get back to her. Pain: Initial: Pain Intensity 1: 3   Post Session:  No change in pain noted by patient. Medications Last Reviewed:  off Dilaudid  now  19    Updated Objective Findings:   None Today     TREATMENT:     Manual therapy ( 40 minutes) - grade 2 to 4- physio mobs R knee flex in sitting and extn in long sitting with back of table propped up for patient to sit against.  Grade 2 to 4-- patellar glides in all directions. HEP: No changes   Therapeutic Modalities:   Patient took ice with her at end of session. Treatment/Session Summary:    · Response to Treatment: patient did well with manual therapy again. Focused on ROM today with patient waiting to hear back from doctor about her concerns with redness in the knee.    · Communication/Consultation:  None today  · Equipment provided today:  None today  · Recommendations/Intent for next treatment session: Next visit will focus on knee ROM and strengthening. Resume strengthening on Thursday. Treatment Plan of Care Effective Dates:  6/13/2019 TO 9/11/2019 (60 days). Frequency/Duration: 2 times a week for 60 Day(s).         Total Treatment Billable Duration:  40 minutes  PT Patient Time In/Time Out  Time In: 1400  Time Out: [address] [facility] Laura Morgan

## 2019-06-27 ENCOUNTER — HOSPITAL ENCOUNTER (OUTPATIENT)
Dept: PHYSICAL THERAPY | Age: 74
Discharge: HOME | End: 2019-06-27
Payer: MEDICARE

## 2019-06-27 NOTE — PROGRESS NOTES
Austyn Herrera  : 1945  [place] / Product Type: Medicare /  [address]  at [facility] [place] EDW[place]DS  [address] [place] [address], [address], [place] [address].  [phone]   [phone]       OUTPATIENT PHYSICAL THERAPY: Cancellation 2019     ICD-10: Treatment Diagnosis: Presence of right artificial knee joint (V44.387)  Precautions/Allergies: per EMR, allergic to Shellfish containing products; Celebrex; Catapres Cymbalta, Morphine;  Oxycontin and Sulfa     MD Orders: Eval and treat, 2-3x/wk for 4-8 weeks based on needs of patient MEDICAL/REFERRING DIAGNOSIS:  R tka    DATE OF ONSET: 19  REFERRING PHYSICIAN: Sathish Persaud MD  RETURN PHYSICIAN APPOINTMENT: TBD     Patient called to cancel scheduled appointment due to schedule conflict

## 2019-07-01 ENCOUNTER — HOSPITAL ENCOUNTER (OUTPATIENT)
Dept: PHYSICAL THERAPY | Age: 74
Discharge: HOME | End: 2019-07-01
Payer: MEDICARE

## 2019-07-01 NOTE — PROGRESS NOTES
Rosalia Le  : 1945  [place] / Product Type: Medicare /  [address] [place] [address]  at [facility] [place]  [address] [facility], [address], [place] [address].  [phone]   [phone]       OUTPATIENT PHYSICAL THERAPY: Cancellation Note 2019     ICD-10: Treatment Diagnosis: Presence of right artificial knee joint (K89.757)  Precautions/Allergies: per EMR, allergic to Shellfish containing products; Celebrex; Catapres Cymbalta, Morphine; Oxycontin and Sulfa     MD Orders: Eval and treat, 2-3x/wk for 4-8 weeks based on needs of patient MEDICAL/REFERRING DIAGNOSIS:  R tka    DATE OF ONSET: 19  REFERRING PHYSICIAN: Kathia Fletcher MD  RETURN PHYSICIAN APPOINTMENT: TBD              Patient called to cancel scheduled appointment due to illness.

## 2019-07-02 ENCOUNTER — HOSPITAL ENCOUNTER (OUTPATIENT)
Dept: PHYSICAL THERAPY | Age: 74
Discharge: HOME | End: 2019-07-02
Payer: MEDICARE

## 2019-07-02 PROCEDURE — 97140 MANUAL THERAPY 1/> REGIONS: CPT

## 2019-07-02 NOTE — PROGRESS NOTES
Iris Shahid  : 1945  [place] / Product Type: Medicare /  [address] at [facility] [place]DS  [address] [facility], [address], [address] [place] [address]  [phone]   [phone]       OUTPATIENT PHYSICAL THERAPY: Daily Treatment Note 2019  Visit Count: 5  ICD-10: Treatment Diagnosis: Presence of right artificial knee joint (J37.222)  Precautions/Allergies: per EMR, allergic to Shellfish containing products; Celebrex; Catapres Cymbalta, Morphine; Oxycontin and Sulfa     MD Orders: Eval and treat, 2-3x/wk for 4-8 weeks based on needs of patient MEDICAL/REFERRING DIAGNOSIS:  R tka    DATE OF ONSET: 19  REFERRING PHYSICIAN: Hailey Muse MD  RETURN PHYSICIAN APPOINTMENT: TBD              Pre-treatment Symptoms/Complaints:  Reports her knee does not have any pain. The redness in her knee is probably from \"ice burn\" according to her pain management doctor. Just has some tightness in her knee, not really any pain. Pain: Initial: Pain Intensity 1: 0   Post Session:  No change in pain noted by patient. Medications Last Reviewed:   19    Updated Objective Findings:    R knee AROM flex 101, extn -4. TREATMENT:     Manual therapy ( 40 minutes) - grade 2 to 4- physio mobs R knee flex in sitting and extn in long sitting with back of table propped up for patient to sit against.  Grade 2 to 4-- patellar glides in all directions. HEP: No changes   Therapeutic Modalities:  None today                                                                                                   Treatment/Session Summary:    · Response to Treatment: patient did well with manual therapy again. Kim flexion and extn have improved since initial evaluation. · Communication/Consultation:  None today  · Equipment provided today:  None today  · Recommendations/Intent for next treatment session: Next visit will focus on knee ROM and strengthening. Treatment Plan of Care Effective Dates:  6/13/2019 TO 9/11/2019 (60 days). Frequency/Duration: 2 times a week for 60 Day(s).         Total Treatment Billable Duration:  40 minutes  PT Patient Time In/Time Out  Time In: 1257  Time Out: [facility]

## 2019-07-16 ENCOUNTER — HOSPITAL ENCOUNTER (OUTPATIENT)
Dept: PHYSICAL THERAPY | Age: 74
Discharge: HOME | End: 2019-07-16
Payer: MEDICARE

## 2019-07-16 PROCEDURE — 97140 MANUAL THERAPY 1/> REGIONS: CPT

## 2019-07-16 NOTE — THERAPY DISCHARGE
Ren Ceballos  : 1945  [place]  Secondary: [address] [facility] at [facility] [address],  [place].  [phone]   [phone]       OUTPATIENT PHYSICAL THERAPY:Discharge Summary 2019     ICD-10: Treatment Diagnosis: Presence of right artificial knee joint (M83.564)  Precautions/Allergies: per EMR, allergic to Shellfish containing products; Celebrex; Catapres Cymbalta, Morphine; Oxycontin and Sulfa     MD Orders: Eval and treat, 2-3x/wk for 4-8 weeks based on needs of patient  Patient attended 6 PT sessions from 19 to 19 with 2 missed sessions MEDICAL/REFERRING DIAGNOSIS:  R tka    DATE OF ONSET: 19  REFERRING PHYSICIAN: Eliana Galarza MD  RETURN PHYSICIAN APPOINTMENT: 19     ASSESSMENT:  Ms. Lore Kimball is a 68year old female s/p R TKA on 19. She is known to PT from previous encounters. She presented with R knee pain, decreased ROM and strength in R knee and decreased functional mobility. She progressed well with ROM and has increased her level of function. She feels she is ready for discharge from PT and PT agrees. She will now be discharged from PT. TREATMENT PLAN:  Effective Dates: 2019 TO 2019 (60 days). Frequency/Duration: 2 times a week for 60 Day(s)  GOALS: (Goals have been discussed and agreed upon with patient.)  Patient's stated goal is to walk without assistive device and be able to drive again. Short-Term Functional Goals: Time Frame: 30 days  1. Patient to be independent with HEP - MET  2. Patient to report decrease in R knee pain to 3 at rest - MET  3. Patient to increase AROM R knee to 0-100 for improved mobility - Met for flexion (107) not for extn (-2)  (19)  Discharge Goals: Time Frame: 60 days  1. Patient to report no more than minimal R knee pain with functional activity - MET  2.  Patient to improve R knee AROM to 0 to 120 for improved movement - partially MET  3. Patient to improve LEFS score to 40 to demonstrate improved functional mobility - MET (60 on 7/16/19)  4. Patient to ascend and descend 4 steps with reciprocal gait pattern - MET  (6 steps on 7/16/19)    OUTCOME MEASURE:   Tool Used: Lower Extremity Functional Scale (LEFS)  Score:  Initial: 19/80 Most Recent: 60/80 (Date: 7/16/19 )   Interpretation of Score: 20 questions each scored on a 5 point scale with 0 representing \"extreme difficulty or unable to perform\" and 4 representing \"no difficulty\". The lower the score, the greater the functional disability. 80/80 represents no disability. Minimal detectable change is 9 points. PAIN/SUBJECTIVE:   Initial: Pain Intensity 1: 0   Post Session:  No increase in pain. HISTORY:   History of Injury/Illness (Reason for Referral):  Patient reports OA in R knee worsened over time and knee started to 'catch' and 'give out'. She had knee surgery on 5/16/19 and had 7 visits of home health rehab since then. Had 32 staples in knee initially, but they are out now. She walked without a walker prior to surgery, but uses one now. Has tried a cane inside her home, but was told she is not ready to use it exclusively yet.    Past Medical History/Comorbidities: from EMR and patient: Acute pancreatitis, Aneurysm, Anxiety, Bilateral sacroiliitis, Breast cyst, CAD, Cellulitis, Chest pain, Chronic kidney disease, Chronic pain, DDD,  Depression, Diabetes mellitus type II, Dyslipidemia, Dyspnea, Edema, Fatty liver, Fibromyalgia, Food allergy, GERD, Heart failure, Hypertension, Hypertriglyceridemia, Hypothyroidism, Lumbar herniated disc, Lumbar radiculopathy, Macrocytic anemia, Microcytic anemia, Mild pulmonary hypertension, Neuropathy, Obesity, Osteoarthritis, Panic attacks, Sacroiliac dysfunction, Seasonal allergic reaction, Sleep apnea, Syncope and collapse, partial thyroidectomy (2008); breast lumpectomy (Right);  breast biopsy (Bilateral, R/1988  L/2012); premalig/benign skin lesion excision (09/29/2014); cyst removal (Left, 02/2019); heart catheterization (2005); jorge and bso (1995); knee arthroscopy (Left, 2004); orthopaedic (Left, 2010); orthopaedic (Right, 2009); knee arthroscopy (10/03/2014); back surgery (08/30/2013); neurostimulator implant (2009); knee replacement (Left, 03/14/2017); R toe urgery (2018) and L thumb surgery (2018). Social History/Living Environment:     1 story home with 2 steps from carport, but may be moving to 'related living' on daughter's property. .   Prior Level of Function/Work/Activity:  Retired. Watches [employer] 2-3 days per week. Personal Factors: Many previous surgeries and illnesses      EXAMINATION:      Observation/Orthostatic Postural Assessment: decreased redness in R knee as compared to previous visits. ROM: AROM L knee flex 120, extn -2. AROM R knee flex 107, extn -2.   Functional Mobility:  Independent gait without assistive device

## 2019-07-16 NOTE — PROGRESS NOTES
Xiomara Bojorquez  : 1945  [place] / [place] / Product Type: Medicare /  [address] at [facility] CECIL EDWARDS  [address] [place] [address], [address], [address] [place] [address]  [phone]   [phone]       OUTPATIENT PHYSICAL THERAPY: Daily Treatment Note 2019  Visit Count: 6  ICD-10: Treatment Diagnosis: Presence of right artificial knee joint (W28.389)  Precautions/Allergies: per EMR, allergic to Shellfish containing products; Celebrex; Catapres Cymbalta, Morphine; Oxycontin and Sulfa     MD Orders: Eval and treat, 2-3x/wk for 4-8 weeks based on needs of patient MEDICAL/REFERRING DIAGNOSIS:  R tka    DATE OF ONSET: 19  REFERRING PHYSICIAN: Jacqueline Roca MD  RETURN PHYSICIAN APPOINTMENT: TBD              Pre-treatment Symptoms/Complaints:  Reports her knee does not have any pain. She has been walking and playing with the grand kids without any problems. Her redness in the knee is clearing up. Pain: Initial: Pain Intensity 1: 0   Post Session:  No change in pain noted by patient. Medications Last Reviewed:   19    Updated Objective Findings:    R knee AROM flex 107, extn -2. TREATMENT:     Manual therapy ( 30 minutes) - grade 2 to 4- physio mobs R knee flex in sitting, and extn in long sitting with back of table propped up for patient to sit against.  Grade 2 to 4-- patellar glides in all directions. HEP: No changes   Therapeutic Modalities:  None today                                                                                               Therapeutic exercise ( 2 minutes) - patient ascended and descended 6 steps with reciprocal gait pattern. Treatment/Session Summary:    · Response to Treatment: patient has progressed well and has returned to her previous activity level.  She agrees that she is ready for discharge from PT.   · Communication/Consultation:  None today  · Equipment provided today:  None today  · Recommendations/Intent for next treatment session: None      Treatment Plan of Care Effective Dates:  6/13/2019 TO 9/11/2019 (60 days). Frequency/Duration: 2 times a week for 60 Day(s).         Total Treatment Billable Duration:  32 minutes  PT Patient Time In/Time Out  Time In: 1301  Time Out: 364 [facility] Daniela Finney, PT

## 2019-07-18 ENCOUNTER — APPOINTMENT (OUTPATIENT)
Dept: PHYSICAL THERAPY | Age: 74
End: 2019-07-18
Payer: MEDICARE

## 2019-07-22 ENCOUNTER — APPOINTMENT (OUTPATIENT)
Dept: PHYSICAL THERAPY | Age: 74
End: 2019-07-22
Payer: MEDICARE

## 2019-07-23 ENCOUNTER — APPOINTMENT (OUTPATIENT)
Dept: PHYSICAL THERAPY | Age: 74
End: 2019-07-23
Payer: MEDICARE

## 2019-12-16 PROBLEM — E11.21 TYPE 2 DIABETES WITH NEPHROPATHY (HCC): Status: RESOLVED | Noted: 2018-04-24 | Resolved: 2019-12-16

## 2020-02-24 ENCOUNTER — HOSPITAL ENCOUNTER (OUTPATIENT)
Dept: PHYSICAL THERAPY | Age: 75
Discharge: HOME | End: 2020-02-24
Payer: MEDICARE

## 2020-02-24 PROCEDURE — 97161 PT EVAL LOW COMPLEX 20 MIN: CPT

## 2020-02-24 NOTE — THERAPY EVALUATION
Dannie Barclay  : 1945  [place]  Secondary: [facility] at [facility] CECIL TSEA  [address] [place] [address],  Irving Aranda.  [phone]   [phone]       OUTPATIENT PHYSICAL THERAPY:Initial Assessment 2020     ICD-10: Treatment Diagnosis: Low back pain (M54.5), Fibromyalgia (M79.7)  Precautions/Allergies: per EMR, allergic to shellfish containing products; Celebrex, Catapres,  Cymbalta, Morphine; Oxycontin, and Sulfa    TREATMENT PLAN:  Effective Dates: 2020 TO 2020 (90 days). Frequency/Duration: 1-2 times a week for 90 Day(s) MEDICAL/REFERRING DIAGNOSIS:  back pain, fibromyalgia    DATE OF ONSET: years ago, but worse in past year  REFERRING PHYSICIAN: To Sarabia MD MD Orders: Chi Sims and treat  Return MD Appointment: 20     INITIAL ASSESSMENT:  Ms. Daren Rodríguez is a 69year old female known to PT from previous encounters. She presents with back pain, pain and neuropathy in L LE, weakness in L LE, and antalgic gait. She has decreased balance on L LE. 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 She may benefit from PT to address deficits and work towards goals. PROBLEM LIST (Impacting functional limitations):  1. Decreased Strength  2. Decreased ADL/Functional Activities  3. Decreased Balance  4. Increased Pain  5. Decreased Flexibility/Joint Mobility INTERVENTIONS PLANNED: (Treatment may consist of any combination of the following)  1. Balance Exercise  2. Home Exercise Program (HEP)  3. Manual Therapy  4. Therapeutic Exercise/Strengthening  5. modals as needed   6. Aquatic PT     GOALS: (Goals have been discussed and agreed upon with patient.)  Patient's stated goal is to decrease her pain. Short-Term Functional Goals: Time Frame: 6 weeks  1. Patient to be independent with HEP  2.  Patient to tolerate 45 minute aquatic PT session  3. Patient to report decrease in back pain to 5/10 (from 7 at eval)  Discharge Goals: Time Frame: 90 days  1. Patient to report decrease in back pain to 3/10 with activity  2. Patient to improve Oswestry score to 20 (from 32) to demo improved functional mobility    OUTCOME MEASURE:   Tool Used: Modified Oswestry Low Back Pain Questionnaire  Score:  Initial: 32/50  Most Recent: X/50 (Date: -- )   Interpretation of Score: Each section is scored on a 0-5 scale, 5 representing the greatest disability. The scores of each section are added together for a total score of 50. MEDICAL NECESSITY:   · Patient demonstrates guarded rehab potential due to higher previous functional level. REASON FOR SERVICES/OTHER COMMENTS:  · Patient continues to require skilled intervention due to desire to reduce pain in her back. .  Total Duration:  35 minutes  PT Patient Time In/Time Out  Time In: 1435  Time Out: 1510    Rehabilitation Potential For Stated Goals: guarded  Regarding Laurence Daley's therapy, I certify that the treatment plan above will be carried out by a therapist or under their direction. Thank you for this referral,    Lilia Goldberg, PT      Referring Physician Signature: Mirza Kraft MD _______________________________ Date _____________       PAIN/SUBJECTIVE:   Initial: Pain Intensity 1: 7(7 now, 5 at best, 9 at worst)   Post Session:  No change noted by patient   HISTORY:   History of Injury/Illness (Reason for Referral):  Patient reports having back problems for years, but it has gotten worse over the past year. She has a stimulator in her back already and anticipates another surgery in 2021 to replace the batteries. She states that MD told her that her lower 3 vertebrae were 'soft' and one was crumbling. She reports that she is not to bend over too far and not to lift too much.    Past Medical History/Comorbidities: from EMR and patient: Acute pancreatitis, Aneurysm, Anxiety, Bilateral sacroiliitis, Breast cyst, CAD, Cellulitis, Chest pain, Chronic kidney disease, Chronic pain, DDD,  Depression, Diabetes mellitus type II, Dyslipidemia, Dyspnea, Edema, Fatty liver, Fibromyalgia, Food allergy, GERD, Heart failure, Hypertension, Hypertriglyceridemia, Hypothyroidism, Lumbar herniated disc, Lumbar radiculopathy, Macrocytic anemia, Microcytic anemia, Mild pulmonary hypertension, Neuropathy, Obesity, Osteoarthritis, Panic attacks, Sacroiliac dysfunction, Seasonal allergic reaction, Sleep apnea, Syncope and collapse, partial thyroidectomy (2008); breast lumpectomy (Right);  breast biopsy (Bilateral, R/1988  L/2012); premalig/benign skin lesion excision (09/29/2014); cyst removal (Left, 02/2019); heart catheterization (2005); jorge and bso (1995); knee arthroscopy (Left, 2004); orthopaedic (Left, 2010); orthopaedic (Right, 2009); knee arthroscopy (10/03/2014); back surgery (08/30/2013); neurostimulator implant (2009); knee replacement (Left, 03/14/2017); R toe urgery (2018) and L thumb surgery (2018), R TKA (2019), R cyst removal (2019)        Social History/Living Environment:    lives with   Prior Level of Function/Work/Activity:  Retired teacher. Watches her 2 grandchildren 3 days a week after she picks them up from school. Previous Treatment Approaches:          Has had PT in the past  Personal Factors:    History of degenerative changes in her back   Ambulatory/Rehab Services H2 Model Falls Risk Assessment   Risk Factors:       (2)  Symptomatic Depression       (1)  Dizziness/Vertigo       (2)  Any administered antiepileptics/anticonvulsants       (5)  History of Recent Falls [w/in 3 months] Ability to Rise from Chair:       (1)  Pushes up, successful in one attempt   Falls Prevention Plan:       Physical Limitations to Exercise (specify):  monitor balance during exercise   Total: (5 or greater = High Risk): 11   ©2010 [employer]. All Rights Reserved. [place] Patent #[id number]. Federal Law prohibits the replication, distribution or use without written permission from [facility] [place] [employer] [facility]   Current Medications: per patient, EMR should be up to date. Current Outpatient Medications:     busPIRone (BUSPAR) 15 mg tablet, TAKE 1 TABLET BY MOUTH TWICE DAILY, Disp: 60 Tab, Rfl: 5    levothyroxine (SYNTHROID) 112 mcg tablet, TAKE 1 TABLET BY MOUTH DAILY(BEFORE BREAKFAST), Disp: 30 Tab, Rfl: 5    temazepam (RESTORIL) 30 mg capsule, Take 1 Cap by mouth nightly as needed for Sleep. Max Daily Amount: 30 mg., Disp: 30 Cap, Rfl: 5    irbesartan (AVAPRO) 300 mg tablet, TAKE 1 TABLET NIGHTLY, Disp: 30 Tab, Rfl: 11    ergocalciferol (ERGOCALCIFEROL) 50,000 unit capsule, Take 1 Cap by mouth every seven (7) days. Every Sunday, Disp: 4 Cap, Rfl: 5    ezetimibe-simvastatin (VYTORIN) 10-40 mg per tablet, TAKE ONE TABLET NIGHTLY, Disp: 30 Tab, Rfl: 5    gabapentin (NEURONTIN) 100 mg capsule, TAKE 1 CAPSULE THREE TIMES A DAY, Disp: 90 Cap, Rfl: 5    butalbital-acetaminophen-caff (FIORICET) -40 mg per capsule, Take 1 Cap by mouth every four (4) hours as needed for Pain., Disp: 20 Cap, Rfl: 5    amLODIPine (NORVASC) 5 mg tablet, Take 1 Tab by mouth daily. , Disp: 30 Tab, Rfl: 5    escitalopram oxalate (LEXAPRO) 20 mg tablet, Take 1 Tab by mouth daily. , Disp: 30 Tab, Rfl: 5    metFORMIN ER (GLUCOPHAGE XR) 500 mg tablet, Take 1 Tab by mouth daily. , Disp: 30 Tab, Rfl: 5    LEVEMIR FLEXTOUCH U-100 INSULN 100 unit/mL (3 mL) inpn, 50 Units by SubCUTAneous route nightly., Disp: 5 Pen, Rfl: 11    TRULICITY 1.5 JU/0.3 mL sub-q pen, 0.5 mL by SubCUTAneous route every seven (7) days. , Disp: 4 Pen, Rfl: 11    metoprolol succinate (TOPROL XL) 100 mg tablet, Take 2 Tabs by mouth daily. , Disp: 60 Tab, Rfl: 11    BD BRYON 2ND GEN PEN NEEDLE 32 gauge x 5/32\" ndle, Once a day, Disp: 50 Pen Needle, Rfl: 11    oxyCODONE IR (OXY-IR) 15 mg immediate release tablet, Take 15 mg by mouth every six (6) hours as needed for Pain., Disp: , Rfl:     cycloSPORINE (RESTASIS MULTIDOSE) 0.05 % drop ophthalmic drops, Administer 1 Drop to both eyes daily as needed (dryness). , Disp: , Rfl:     docusate sodium (COLACE) 100 mg capsule, Take 100 mg by mouth daily as needed for Constipation. , Disp: , Rfl:     diphenhydrAMINE-acetaminophen (TYLENOL PM EXTRA STRENGTH)  mg tab, Take 2 Tabs by mouth every six (6) hours as needed for Pain., Disp: , Rfl:     fexofenadine (ALLEGRA) 180 mg tablet, Take 180 mg by mouth nightly., Disp: , Rfl:     magnesium oxide (MAG-OX) 400 mg tablet, Take 1 Tab by mouth two (2) times a day., Disp: 60 Tab, Rfl: 11    cetirizine (ALLERGY RELIEF, CETIRIZINE,) 10 mg tablet, Take 10 mg by mouth nightly. Indications: ALLERGIC RHINITIS, Disp: , Rfl:    Date Last Reviewed:  2/24/20   Number of Personal Factors/Comorbidities that affect the Plan of Care: 1-2: MODERATE COMPLEXITY   EXAMINATION:     Observation/Orthostatic Postural Assessment: L iliac crest higher than R.      Palpation: tender throughout back muscles. Very tight. ROM: AROM lumbar flex 25%, extn 25%, L rotation 50%, R rotation 75%, B side bend 10%  Strength: R LE grossly 5/5 except 4 to 4+ for hip IR and ER. L LE grossly 4/5. Special Tests: none  Neurological Screen: decreased L touch L LE. Jaime Solis Functional Mobility:  Sit to/from Stand: independent but pushes up. Balance:  SLS 5 seconds on R LE, 1 second on L LE and increased pain     Body Structures Involved:  1. Joints  2. Muscles Body Functions Affected:  1. Neuromusculoskeletal  2. Movement Related Activities and Participation Affected:  1. General Tasks and Demands  2. Mobility  3.  Community, Social and [place]   Number of elements (examined above) that affect the Plan of Care: 1-2: LOW COMPLEXITY   CLINICAL PRESENTATION:   Presentation: Evolving clinical presentation with changing clinical characteristics: MODERATE COMPLEXITY   CLINICAL DECISION MAKING:   Use of outcome tool(s) and clinical judgement create a POC that gives a: Questionable prediction of patient's progress: MODERATE COMPLEXITY

## 2020-03-03 ENCOUNTER — HOSPITAL ENCOUNTER (OUTPATIENT)
Dept: PHYSICAL THERAPY | Age: 75
Discharge: HOME | End: 2020-03-03
Payer: MEDICARE

## 2020-03-03 PROCEDURE — 97113 AQUATIC THERAPY/EXERCISES: CPT

## 2020-03-03 NOTE — PROGRESS NOTES
Mercedes Moore  : 1945  [place]  Secondary: [address] [facility] at [facility] CECIL TSE[address], [address], [address] [facility]  [phone]   [phone]      OUTPATIENT PHYSICAL THERAPY: Daily Treatment Note 3/3/2020  Visit Count:  2    ICD-10: Treatment Diagnosis: Low back pain (M54.5), Fibromyalgia (M79.7)  Precautions/Allergies: per EMR, allergic to shellfish containing products; Celebrex, Catapres,  Cymbalta, Morphine; Oxycontin, and Sulfa    TREATMENT PLAN:  Effective Dates: 2020 TO 2020 (90 days). Frequency/Duration: 1-2 times a week for 90 Day(s) MEDICAL/REFERRING DIAGNOSIS:  back pain, fibromyalgia    DATE OF ONSET: years ago, but worse in past year  REFERRING PHYSICIAN: Jolynn Verde MD MD Orders: Mitali Aguilar and treat  Return MD Appointment: 20       Pre-treatment Symptoms/Complaints:  Patient with pain at 5/10. She states today is a tolerable day. Pain: Initial:   5/10 Post Session:  6-7/10   Medications Last Reviewed:  3/3/2020  Updated Objective Findings:  None Today  TREATMENT:   Aquatic Therapy (50 minutes): Aquatic treatment performed per flow grid for Decreased muscle strength, Decreased endurance and Decreased activity endurance. Cues provided for breathing, staying with in pain free range, and posture.       Aquatic Exercise Log       Date  3-3-2020 Date   Date   Date     Activity/ Exercise Parameters Parameters Parameters Parameters   Walking forward 6 laps       Walking backward 6 laps       Walking sideways 6 laps       Marching 4 laps       Goose Step 4 laps       Tip toes 1 lap      Heels 1 lap      Lunges       Side step squats       LE Exercises       Hip Flex/Ext X 10 B      Hip Abd/Add X 10 B      Hip IR/ER Clams x 10 B  Reverse clams x 10 B      Calf raises X 10 B      Knee Flex X 10 B      Squats Mini x 5       Leg Circles 10/10 B      Step Ups Small x 10 B      UE Exercises       Squeeze In Push Down       Pull Down       Bicep/Tricep       Rows/Press outs        Chi Positions       Trunk Rotation       Deep H2O/ Noodles Rings in 4.5 and 7 ft      Stabilization       Arms only       Legs only Bicycle - 2 min       [place] 2 min       Scissors 2 min       Crab walk       Lower abdominal   work  [employer]    x 15       Cardio       Jogging       Lap Swimming       Stretches At [employer] X 2 B      Heelcords X 2 B      Piriformis       Neck                  MedBridge Portal  Treatment/Session Summary:  Patient did well with her first aquatic session today. She reports feeling tired and pain increased to 6-7/10 from 5/10. She reports she feels good in the water, but out she feels very heavy. · Response to Treatment:  Patient with no fatigue, but slight increase with pain. .  · Communication/Consultation:  None today  · Equipment provided today:  None today  · Recommendations/Intent for next treatment session: Next visit will focus on stabilization and hip strengthening.      Total Treatment Billable Duration:  50 minutes  PT Patient Time In/Time Out  Time In: 1140  Time Out: [facility]    Future Appointments   Date Time Provider Aleena Moreirai   3/5/2020  1:45 PM Aristeo Jones [facility]   3/31/2020  1:40 PM Luis Rodriguez MD END BS ENDO   4/1/2020  1:30 PM ECHO 36 [facility]   4/3/2020  1:15 PM Garrett Silva DO [facility]   4/7/2020 10:20 AM Gena Bush MD [facility] PST PST

## 2020-03-05 ENCOUNTER — HOSPITAL ENCOUNTER (OUTPATIENT)
Dept: PHYSICAL THERAPY | Age: 75
Discharge: HOME | End: 2020-03-05
Payer: MEDICARE

## 2020-03-05 PROCEDURE — 97113 AQUATIC THERAPY/EXERCISES: CPT

## 2020-03-05 NOTE — PROGRESS NOTES
Guillermo Solis  : 1945  [place]  Secondary: [facility] at [facility] CECIL EDWARDS  [address] [facility], [address], [place] [address].  [phone]   [phone]      OUTPATIENT PHYSICAL THERAPY: Daily Treatment Note 3/5/2020  Visit Count:  3    ICD-10: Treatment Diagnosis: Low back pain (M54.5), Fibromyalgia (M79.7)  Precautions/Allergies: per EMR, allergic to shellfish containing products; Celebrex, Catapres,  Cymbalta, Morphine; Oxycontin, and Sulfa    TREATMENT PLAN:  Effective Dates: 2020 TO 2020 (90 days). Frequency/Duration: 1-2 times a week for 90 Day(s) MEDICAL/REFERRING DIAGNOSIS:  back pain, fibromyalgia    DATE OF ONSET: years ago, but worse in past year  REFERRING PHYSICIAN: Christal Hoyos MD MD Orders: Yanet Lutz and treat  Return MD Appointment: 20       Pre-treatment Symptoms/Complaints:  Pt states she was sore after the last PT session but feels better today. Pain: Initial:   not rated Post Session:  Not rated    Medications Last Reviewed:  3/5/2020  Updated Objective Findings:  None Today  TREATMENT:   Aquatic Therapy (45 minutes): Aquatic treatment performed per flow grid for Decreased muscle strength, Decreased endurance and Decreased activity endurance. Cues provided for breathing, staying with in pain free range, and posture.       Aquatic Exercise Log       Date  3-3-2020 Date  3/5/20 Date   Date     Activity/ Exercise Parameters Parameters Parameters Parameters   Walking forward 6 laps  6     Walking backward 6 laps  6     Walking sideways 6 laps  6     Marching 4 laps  6     Goose Step 4 laps  6     Tip toes 1 lap 3     Heels 1 lap 3     Lunges  Long step 6     Side step squats       LE Exercises  4.5'      Hip Flex/Ext X 10 B 10     Hip Abd/Add X 10 B 10     Hip IR/ER Clams x 10 B  Reverse clams x 10 B      Calf raises X 10 B      Knee Flex X 10 B 10      Squats Mini x 5       Leg Circles 10/10 B 10/10     Step Ups Small x 10 B Small x 10      UE Exercises       Squeeze In       Push Down       Pull Down       Bicep/Tricep       Rows/Press outs        Chi Positions       Trunk Rotation       Deep H2O/ Noodles Rings in 4.5 and 7 ft 7' with blue rings      Stabilization       Arms only       Legs only Bicycle - 2 min  Bicycle 2 min     [place] 2 min  2 min     Scissors 2 min  2 min     Crab walk       Lower abdominal   work  DK[facility]    x 15  DTKC 2 min     Cardio       Jogging       Lap Swimming       Stretches At [employer] X 2 B      Heelcords X 2 B      Piriformis       Neck                  MedBridge Portal  Treatment/Session Summary:     · Response to Treatment:   Pt did well with aquatic therapy today. Will plan to possibly add weights next visit if she tolerated today well. · Communication/Consultation:  None today  · Equipment provided today:  None today  · Recommendations/Intent for next treatment session: Next visit will focus on stabilization and hip strengthening.      Total Treatment Billable Duration:  45 minutes  PT Patient Time In/Time Out  Time In: 6994  Time Out: [address], PTA    Future Appointments   Date Time Provider Aleena Moreirai   [facility]2020 [facility]   3/12/2020 11:45 AM [facility]   3/17/2020 10:00 AM Woodrow Quiroz PTA [facility]   3/19/2020 10:45 AM Woodrow Quiroz PTA [facility]   3/26/2020  1:45 PM [facility]   3/31/2020  1:40 PM Cadence Gama MD END BS ENDO   4/1/2020  1:30 PM ECHO 36 [facility]   4/3/2020  1:15 PM Dania Evans DO [facility]   4/7/2020 10:20 AM Ramiro Frye MD [facility] PST PST

## 2020-03-17 ENCOUNTER — HOSPITAL ENCOUNTER (OUTPATIENT)
Dept: PHYSICAL THERAPY | Age: 75
Discharge: HOME | End: 2020-03-17
Payer: MEDICARE

## 2020-03-17 NOTE — PROGRESS NOTES
Hodan Cassi  : 1945  [place]  Secondary: [address] [facility] at [address], [address] [place] [address],[address], [address] [facility]  [phone]   [phone]      Pt called to cancel appointment today due to Coronavirus concerns. Will follow up with pt on next visit.      Gomez Mendoza, PTA

## 2020-03-19 ENCOUNTER — APPOINTMENT (OUTPATIENT)
Dept: PHYSICAL THERAPY | Age: 75
End: 2020-03-19
Payer: MEDICARE

## 2020-03-26 ENCOUNTER — APPOINTMENT (OUTPATIENT)
Dept: PHYSICAL THERAPY | Age: 75
End: 2020-03-26
Payer: MEDICARE

## 2020-05-15 ENCOUNTER — HOSPITAL ENCOUNTER (OUTPATIENT)
Dept: GENERAL RADIOLOGY | Age: 75
Discharge: HOME | End: 2020-05-15
Attending: FAMILY MEDICINE

## 2020-05-15 DIAGNOSIS — J18.9 COMMUNITY ACQUIRED PNEUMONIA OF LEFT LOWER LOBE OF LUNG: ICD-10-CM

## 2020-06-11 NOTE — THERAPY DISCHARGE
Orlin Hurley  : 1945  [place]  Secondary: [facility] at [facility] CECIL EDWARDS  [address] [facility], [address] [place] [address],[address], [address] [facility]  [phone]   [phone]       OUTPATIENT PHYSICAL THERAPY:Discontinuation Summary 2020       ICD-10: Treatment Diagnosis: Low back pain (M54.5), Fibromyalgia (M79.7)  Precautions/Allergies: per EMR, allergic to shellfish containing products; Celebrex, Catapres,  Cymbalta, Morphine; Oxycontin, and Sulfa    TREATMENT PLAN:  Discontinue PT  Patient attended 5 PT sessions from 20 to 3/12/20 with no missed sessions and then cancelled appointment on 3/17 due to corona virus concerns. MEDICAL/REFERRING DIAGNOSIS:  back pain, fibromyalgia    DATE OF ONSET: years ago, but worse in past year  REFERRING PHYSICIAN: Edith Chamberlain MD MD Orders: Umu Pittman and treat  Return MD Appointment: 20     ASSESSMENT:  Ms. Elisa Mesa is a 69year old female known to PT from previous encounters. She presented with back pain, pain and neuropathy in L LE, weakness in L LE, and antalgic gait. She had decreased balance on L LE. She reported that she also had a hole in her heart atrium and she was to see the cardiologist later that week. She attended 5 PT sessions, then cancelled due to corona virus concerns. After that the clinic was closed due to pandemic. She will now be discontinued from PT.        GOALS: (Goals have been discussed and agreed upon with patient.)   Unable to assess progress toward goals. Patient's stated goal is to decrease her pain. Short-Term Functional Goals: Time Frame: 6 weeks  1. Patient to be independent with HEP  2. Patient to tolerate 45 minute aquatic PT session  3. Patient to report decrease in back pain to 5/10 (from 7 at eval)  Discharge Goals: Time Frame: 90 days  1. Patient to report decrease in back pain to 3/10 with activity  2.  Patient to improve Oswestry score to 20 (from 32) to demo improved functional mobility    OUTCOME MEASURE:   Tool Used: Modified Oswestry Low Back Pain Questionnaire  Score:  Initial: 32/50  Most Recent: X/50 (Date: -- )   Interpretation of Score: Each section is scored on a 0-5 scale, 5 representing the greatest disability. The scores of each section are added together for a total score of 50. Data from initial evaluation   PAIN/SUBJECTIVE:   Initial: Pain Intensity 1: 7(7 now, 5 at best, 9 at worst)   Post Session:  No change noted by patient   HISTORY:   History of Injury/Illness (Reason for Referral):  Patient reports having back problems for years, but it has gotten worse over the past year. She has a stimulator in her back already and anticipates another surgery in 2021 to replace the batteries. She states that MD told her that her lower 3 vertebrae were 'soft' and one was crumbling. She reports that she is not to bend over too far and not to lift too much.    Past Medical History/Comorbidities: from EMR and patient: Acute pancreatitis, Aneurysm, Anxiety, Bilateral sacroiliitis, Breast cyst, CAD, Cellulitis, Chest pain, Chronic kidney disease, Chronic pain, DDD,  Depression, Diabetes mellitus type II, Dyslipidemia, Dyspnea, Edema, Fatty liver, Fibromyalgia, Food allergy, GERD, Heart failure, Hypertension, Hypertriglyceridemia, Hypothyroidism, Lumbar herniated disc, Lumbar radiculopathy, Macrocytic anemia, Microcytic anemia, Mild pulmonary hypertension, Neuropathy, Obesity, Osteoarthritis, Panic attacks, Sacroiliac dysfunction, Seasonal allergic reaction, Sleep apnea, Syncope and collapse, partial thyroidectomy (2008); breast lumpectomy (Right);  breast biopsy (Bilateral, R/1988  L/2012); premalig/benign skin lesion excision (09/29/2014); cyst removal (Left, 02/2019); heart catheterization (2005); jorge and bso (1995); knee arthroscopy (Left, 2004); orthopaedic (Left, 2010); orthopaedic (Right, 2009); knee arthroscopy (10/03/2014); back surgery (08/30/2013); neurostimulator implant (2009); knee replacement (Left, 03/14/2017); R toe urgery (2018) and L thumb surgery (2018), R TKA (2019), R cyst removal (2019)        Social History/Living Environment:    lives with   Prior Level of Function/Work/Activity:  Retired teacher. Watches her 2 grandchildren 3 days a week after she picks them up from school. Previous Treatment Approaches:          Has had PT in the past  Personal Factors:    History of degenerative changes in her back   Current Medications: per patient, EMR should be up to date. Current Outpatient Medications:     gabapentin (NEURONTIN) 100 mg capsule, TAKE 1 CAPSULE BY MOUTH THREE TIMES DAILY, Disp: 90 Cap, Rfl: 5    ergocalciferol (ERGOCALCIFEROL) 1,250 mcg (50,000 unit) capsule, TAKE 1 CAPSULE BY MOUTH EVERY 7 DAYS, Disp: 4 Cap, Rfl: 5    escitalopram oxalate (LEXAPRO) 20 mg tablet, TAKE 1 TABLET BY MOUTH DAILY, Disp: 30 Tab, Rfl: 5    metFORMIN ER (GLUCOPHAGE XR) 500 mg tablet, TAKE 1 TABLET BY MOUTH DAILY, Disp: 30 Tab, Rfl: 5    amLODIPine (NORVASC) 5 mg tablet, TAKE 1 TABLET BY MOUTH DAILY, Disp: 30 Tab, Rfl: 5    busPIRone (BUSPAR) 15 mg tablet, Take 1 Tab by mouth three (3) times daily. , Disp: 90 Tab, Rfl: 5    amLODIPine (Norvasc) 10 mg tablet, Take 1 Tab by mouth daily. , Disp: 30 Tab, Rfl: 5    Toujeo SoloStar U-300 Insulin 300 unit/mL (1.5 mL) inpn pen, 50 Units by SubCUTAneous route nightly., Disp: 6 Pen, Rfl: 11    metoprolol succinate (TOPROL-XL) 100 mg tablet, TAKE 2 TABLETS BY MOUTH EVERY DAY, Disp: 60 Tab, Rfl: 11    levothyroxine (SYNTHROID) 112 mcg tablet, TAKE 1 TABLET BY MOUTH DAILY(BEFORE BREAKFAST), Disp: 30 Tab, Rfl: 5    temazepam (RESTORIL) 30 mg capsule, Take 1 Cap by mouth nightly as needed for Sleep.  Max Daily Amount: 30 mg., Disp: 30 Cap, Rfl: 5    irbesartan (AVAPRO) 300 mg tablet, TAKE 1 TABLET NIGHTLY, Disp: 30 Tab, Rfl: 11    ezetimibe-simvastatin (VYTORIN) 10-40 mg per tablet, TAKE ONE TABLET NIGHTLY, Disp: 30 Tab, Rfl: 5    butalbital-acetaminophen-caff (FIORICET) -40 mg per capsule, Take 1 Cap by mouth every four (4) hours as needed for Pain., Disp: 20 Cap, Rfl: 5    TRULICITY 1.5 AR/5.0 mL sub-q pen, 0.5 mL by SubCUTAneous route every seven (7) days. , Disp: 4 Pen, Rfl: 11    BD BRYON 2ND GEN PEN NEEDLE 32 gauge x 5/32\" ndle, Once a day, Disp: 50 Pen Needle, Rfl: 11    cycloSPORINE (RESTASIS MULTIDOSE) 0.05 % drop ophthalmic drops, Administer 1 Drop to both eyes daily as needed (dryness). , Disp: , Rfl:     docusate sodium (COLACE) 100 mg capsule, Take 100 mg by mouth daily as needed for Constipation. , Disp: , Rfl:     diphenhydrAMINE-acetaminophen (TYLENOL PM EXTRA STRENGTH)  mg tab, Take 2 Tabs by mouth every six (6) hours as needed for Pain., Disp: , Rfl:     fexofenadine (ALLEGRA) 180 mg tablet, Take 180 mg by mouth nightly., Disp: , Rfl:     magnesium oxide (MAG-OX) 400 mg tablet, Take 1 Tab by mouth two (2) times a day., Disp: 60 Tab, Rfl: 11    cetirizine (ALLERGY RELIEF, CETIRIZINE,) 10 mg tablet, Take 10 mg by mouth nightly. Indications: ALLERGIC RHINITIS, Disp: , Rfl:    Date Last Reviewed:  2/24/20   EXAMINATION:     Observation/Orthostatic Postural Assessment: L iliac crest higher than R.      Palpation: tender throughout back muscles. Very tight. ROM: AROM lumbar flex 25%, extn 25%, L rotation 50%, R rotation 75%, B side bend 10%  Strength: R LE grossly 5/5 except 4 to 4+ for hip IR and ER. L LE grossly 4/5. Special Tests: none  Neurological Screen: decreased L touch L LE. Abhishek Chavez Functional Mobility:  Sit to/from Stand: independent but pushes up.           Balance:  SLS 5 seconds on R LE, 1 second on L LE and increased pain

## 2020-08-05 ENCOUNTER — APPOINTMENT (RX ONLY)
Dept: URBAN - METROPOLITAN AREA CLINIC 349 | Facility: CLINIC | Age: 75
Setting detail: DERMATOLOGY
End: 2020-08-05

## 2020-08-05 DIAGNOSIS — L82.1 OTHER SEBORRHEIC KERATOSIS: ICD-10-CM

## 2020-08-05 PROBLEM — C44.01 BASAL CELL CARCINOMA OF SKIN OF LIP: Status: ACTIVE | Noted: 2020-08-05

## 2020-08-05 PROCEDURE — ? PATHOLOGY BILLING

## 2020-08-05 PROCEDURE — 88305 TISSUE EXAM BY PATHOLOGIST: CPT

## 2020-08-05 PROCEDURE — ? REFERRAL

## 2020-08-05 PROCEDURE — 99202 OFFICE O/P NEW SF 15 MIN: CPT | Mod: 25

## 2020-08-05 PROCEDURE — ? SHAVE REMOVAL AND DESTRUCTION

## 2020-08-05 PROCEDURE — ? RETURN TO REFERRING PROVIDER

## 2020-08-05 PROCEDURE — ? COUNSELING

## 2020-08-05 PROCEDURE — 17281 DSTR MAL LS F/E/E/N/L/M .6-1: CPT

## 2020-08-05 NOTE — PROCEDURE: PATHOLOGY BILLING
Immunohistochemistry ([id number] and [id number]) billing is not performed here. Please use the Immunohistochemistry Stain Billing plan to accomplish this. Immunohistochemistry ([id number] and [id number]) billing is not performed here. Please use the Immunohistochemistry Stain Billing plan to accomplish this.

## 2020-08-05 NOTE — PROCEDURE: SHAVE REMOVAL AND DESTRUCTION
Bill 26698 For Specimen Handling/Conveyance To Laboratory?: no
Anesthesia Volume In Cc: 0.2
Size After Destruction (Required For Destruction Billing): 0.7
Render Post-Care Instructions In Note?: yes
Dressing: Band-Aid
Size Of Lesion In Cm: 0
Anesthesia Type: 2% lidocaine with epinephrine
Cautery Type: electrodesiccation
Notification Instructions: Patient will be notified of biopsy results. However, patient instructed to call the office if not contacted within 2 weeks.
Consent: Written consent was obtained and risks were reviewed including but not limited to scarring, infection, bleeding, scabbing, incomplete removal, nerve damage and allergy to anesthesia.
Billing Type: Third-Party Bill
Bill As?: Note: Bill Malignant Destruction If Path Confirms Malignant Lesion. Only Bill As Shave Removal If Path Comes Back Benign. Do Not Bill Shave Removal On Malignant Lesions.: Malignant Destruction
Wound Care: Vaseline
Detail Level: Detailed
Accession #: dr hutson read
Hemostasis: Electrocautery
Number Of Curettages: 2
Post-Care Instructions: I reviewed with the patient in detail post-care instructions. Patient is to keep the biopsy site dry overnight, and then apply bacitracin twice daily until healed. Patient may apply hydrogen peroxide soaks to remove any crusting.  After the procedure, the patient was observed for 5-10 minutes and was oriented to,person, place and time and denied feeling dizzy, queasy and stated that they were not going to faint

## 2021-02-08 ENCOUNTER — APPOINTMENT (RX ONLY)
Dept: URBAN - METROPOLITAN AREA CLINIC 349 | Facility: CLINIC | Age: 76
Setting detail: DERMATOLOGY
End: 2021-02-08

## 2021-02-08 DIAGNOSIS — Z85.828 PERSONAL HISTORY OF OTHER MALIGNANT NEOPLASM OF SKIN: ICD-10-CM

## 2021-02-08 DIAGNOSIS — L57.8 OTHER SKIN CHANGES DUE TO CHRONIC EXPOSURE TO NONIONIZING RADIATION: ICD-10-CM

## 2021-02-08 DIAGNOSIS — L82.0 INFLAMED SEBORRHEIC KERATOSIS: ICD-10-CM

## 2021-02-08 DIAGNOSIS — L82.1 OTHER SEBORRHEIC KERATOSIS: ICD-10-CM

## 2021-02-08 PROCEDURE — 99213 OFFICE O/P EST LOW 20 MIN: CPT | Mod: 25

## 2021-02-08 PROCEDURE — 17110 DESTRUCTION B9 LES UP TO 14: CPT

## 2021-02-08 PROCEDURE — ? COUNSELING

## 2021-02-08 PROCEDURE — ? LIQUID NITROGEN

## 2021-02-08 ASSESSMENT — LOCATION DETAILED DESCRIPTION DERM
LOCATION DETAILED: RIGHT MEDIAL SUPERIOR CHEST
LOCATION DETAILED: LEFT INFERIOR CENTRAL BUCCAL CHEEK
LOCATION DETAILED: LEFT CENTRAL EYEBROW
LOCATION DETAILED: RIGHT INFERIOR MEDIAL UPPER BACK
LOCATION DETAILED: EPIGASTRIC SKIN
LOCATION DETAILED: LEFT MEDIAL UPPER BACK
LOCATION DETAILED: LEFT CENTRAL EYEBROW
LOCATION DETAILED: EPIGASTRIC SKIN

## 2021-02-08 ASSESSMENT — LOCATION SIMPLE DESCRIPTION DERM
LOCATION SIMPLE: ABDOMEN
LOCATION SIMPLE: LEFT UPPER BACK
LOCATION SIMPLE: ABDOMEN
LOCATION SIMPLE: RIGHT UPPER BACK
LOCATION SIMPLE: LEFT EYEBROW
LOCATION SIMPLE: LEFT EYEBROW
LOCATION SIMPLE: LEFT CHEEK
LOCATION SIMPLE: CHEST

## 2021-02-08 ASSESSMENT — LOCATION ZONE DERM
LOCATION ZONE: TRUNK
LOCATION ZONE: FACE
LOCATION ZONE: FACE
LOCATION ZONE: TRUNK

## 2021-02-08 NOTE — PROCEDURE: LIQUID NITROGEN
Number Of Freeze-Thaw Cycles: 3 freeze-thaw cycles
Post-Care Instructions: I reviewed with the patient in detail post-care instructions. Patient is to wear sunprotection, and avoid picking at any of the treated lesions. Pt may apply Vaseline to crusted or scabbing areas.
Duration Of Freeze Thaw-Cycle (Seconds): 2
Medical Necessity Information: It is in your best interest to select a reason for this procedure from the list below. All of these items fulfill various CMS LCD requirements except the new and changing color options.
Consent: The patient's consent was obtained including but not limited to risks of crusting, scabbing, blistering, scarring, darker or lighter pigmentary change, recurrence, incomplete removal and infection.
Render Post-Care Instructions In Note?: no
Include Z78.9 (Other Specified Conditions Influencing Health Status) As An Associated Diagnosis?: Yes
Medical Necessity Clause: This procedure was medically necessary because the lesions that were treated were:
Detail Level: Detailed

## 2021-06-10 ENCOUNTER — HOSPITAL ENCOUNTER (OUTPATIENT)
Dept: PHYSICAL THERAPY | Age: 76
Discharge: HOME | End: 2021-06-10
Attending: FAMILY MEDICINE
Payer: MEDICARE

## 2021-06-10 DIAGNOSIS — R26.81 UNSTEADINESS: ICD-10-CM

## 2021-06-10 PROCEDURE — 97162 PT EVAL MOD COMPLEX 30 MIN: CPT

## 2021-06-10 NOTE — THERAPY EVALUATION
Lakshmi Sellers  : 1945  [place]  Secondary: [address] [facility] CECIL TSEA  [address] [facility], [address] [place] [address],[address], [address] [facility]  [phone]   [phone]       OUTPATIENT PHYSICAL THERAPY:Initial Assessment 6/10/2021     ICD-10: Treatment Diagnosis: Unsteadiness on feet (R26.81)  Precautions/Allergies: per EMR, allergic to   Shellfish containing products, Celebrex [celecoxib], Catapres [clonidine], Cymbalta [duloxetine], Morphine, Oxycontin [oxycodone], and Sulfa (sulfonamide antibiotics)    TREATMENT PLAN:   Effective Dates: 6/10/2021 TO 2021 (90 days). Frequency/Duration: 1-2 times a week for 90 Day(s) MEDICAL/REFERRING DIAGNOSIS:  Unsteadiness [R26.81]    DATE OF ONSET: about a year ago  REFERRING PHYSICIAN: Raciel Askew MD MD Orders: eval and treat  Return MD Appointment: 21     INITIAL ASSESSMENT:  Ms. Eveline Sarah is a 77year old female known to PT from previous encounters. She presents with weakness in extremities, decreased balance with history of falls, and general malaise and fatigue. She reports she has been lethargic and has no appetite. She reports she is going to have a CT scan in about 2 weeks She also complains of headaches that started over a year ago. She may benefit from PT to improve strength and balance, but root cause of her balance difficulty and lack of energy is still unknown. PROBLEM LIST (Impacting functional limitations):  1. Decreased Strength  2. Decreased ADL/Functional Activities  3. Increased Pain INTERVENTIONS PLANNED: (Treatment may consist of any combination of the following)  1. Balance Exercise  2. Gait Training  3. Home Exercise Program (HEP)  4. Manual Therapy  5.  Therapeutic Exercise/Strengthening     GOALS: (Goals have been discussed and agreed upon with patient.)  Patient's stated goal is \"stop falling\" and \"get my energy back\"  Short-Term Functional Goals: Time Frame: 6 weeks  1. Patient to be independent with HEP  2. Patient to improve balance to 2 second SLS on each leg  Discharge Goals: Time Frame: 90 days  1. Patient to report no more than minimal pain with all activity  2. Patient to perform SLS of 5 seconds on each leg for improved balance during activity  3. Patient to improve LEFS from 18 at eval to 40 to demo improved functional mobility    OUTCOME MEASURE:   Tool Used: Lower Extremity Functional Scale (LEFS)  Score:  Initial: 18/80 Most Recent: X/80 (Date: -- )   Interpretation of Score: 20 questions each scored on a 5 point scale with 0 representing \"extreme difficulty or unable to perform\" and 4 representing \"no difficulty\". The lower the score, the greater the functional disability. 80/80 represents no disability. Minimal detectable change is 9 points. MEDICAL NECESSITY:   · Patient demonstrates guarded rehab potential due to higher previous functional level. REASON FOR SERVICES/OTHER COMMENTS:  · Patient continues to require skilled intervention due to need to stop falling and improve balance. Total Duration:   40 minutes  PT Patient Time In/Time Out  Time In: 1515  Time Out: 9208    Rehabilitation Potential For Stated Goals: guarded  Regarding Sammie Daley's therapy, I certify that the treatment plan above will be carried out by a therapist or under their direction. Thank you for this referral,    Lorrie Haines, PT      Referring Physician Signature: Mamie Vivar MD _______________________________ Date _____________       PAIN/SUBJECTIVE:   Initial: Pain Intensity 1: 4 (4 now, 6 at times)   Post Session: No change noted by patient   HISTORY:   History of Injury/Illness (Reason for Referral):  Patient reports that her dizziness started about a year ago. At first she thought it was fluid in her ears and she took decongestants but it didn't help. She reports she has been sleepy all the time and has no energy or appetite.  She has a CT scan jamshid in 2 weeks. She stumbles when she first gets up. She can no longer walk without an assistive device for safety. She has tried medicine changes prescribed by MD, but symptoms remain. She also notes more frequent headaches in the past year. Past Medical History/Comorbidities: patient reports that EMR should be accurate. Ms. Liudmila Gonzalez  has a past medical history of Acute pancreatitis (2/2008), Aneurysm ([address] Anxiety, Bilateral sacroiliitis ([address] Breast cyst, CAD (coronary artery disease), Cellulitis (6/16/15), Chest pain, Chronic kidney disease, Chronic pain, CKD (chronic kidney disease), Degenerative disc disease, Depression (12/23/2015), Diabetes mellitus type II, Dyslipidemia, Dyspnea, Edema (12/23/2015), Fatty liver, Fibromyalgia, Food allergy, GERD (gastroesophageal reflux disease), Heart failure ([address] echocardiogram (08/21/2015), Hypertension, Hypertriglyceridemia, Hypothyroidism, Lumbar herniated disc (Aug 2013), Lumbar radiculopathy, Macrocytic anemia, Microcytic anemia, Mild pulmonary hypertension ([address] Nausea & vomiting, Neuropathy, Obesity (BMI 30.0-34.9) (10/2/14), Osteoarthritis, Panic attacks, Sacroiliac dysfunction, Seasonal allergic reaction, Sleep apnea, and Syncope and collapse (12/23/2015). Ms. Liudmila Gonzalez  has a past surgical history that includes hx partial thyroidectomy (2008); hx breast lumpectomy (Right); hx breast biopsy (Bilateral, R/1988  L/2012); hx premalig/benign skin lesion excision (09/29/2014); hx cyst removal (Left, 02/2019); hx heart catheterization (2005); hx jorge and bso (1995); hx knee arthroscopy (Left, 2004); hx orthopaedic (Left, 2010); hx orthopaedic (Right, 2009); hx knee arthroscopy (10/03/2014); hx back surgery (08/30/2013); hx other surgical (12/22/09); hx other surgical; hx other surgical (10/28/13); hx knee replacement (Left, 03/14/2017); hx orthopaedic (04/03/2019); hx knee replacement (Right, 05/2019); and hx cyst removal (Right, 04/2019).     Social History/Living Environment:     moving into a new one story house in August adjacent to her daughter's family. Prior Level of Function/Work/Activity:  Retired. 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), but  does most of the work now   Cause of her lethargy and balance issues is still not known   Ambulatory/Rehab Services H2 Model Falls Risk Assessment   Risk Factors:       (2)  Symptomatic Depression       (1)  Dizziness/Vertigo       (1)  Any administered benzodiazepines       (5)  History of Recent Falls [w/in 3 months] Ability to Rise from Chair:       (1)  Pushes up, successful in one attempt   Falls Prevention Plan:       Physical Limitations to Exercise (specify):  monitor balance during activity       Mobility Assistance Device (specify):  cane   Total: (5 or greater = High Risk): 10   ©2010 [facility] . [facility] States Patent #[id number]. Federal Law prohibits the replication, distribution or use without written permission from [facility] [employer]   Current Medications: per patient, EMR should be up to date. Current Outpatient Medications:     gabapentin (NEURONTIN) 100 mg capsule, TAKE 1 CAPSULE BY MOUTH THREE TIMES DAILY, Disp: 90 Capsule, Rfl: 0    metoprolol succinate (TOPROL-XL) 100 mg tablet, Take 1 Tablet by mouth daily. , Disp: 30 Tablet, Rfl: 5    escitalopram oxalate (LEXAPRO) 20 mg tablet, TAKE 1 TABLET BY MOUTH DAILY, Disp: 30 Tablet, Rfl: 5    DISABLED PLACARD (DISABLED PLACARD) DMV, Supply prescription to [facility] with completed form [id number]., Disp: , Rfl:     oxyCODONE IR (OXY-IR) 15 mg immediate release tablet, Take 15 mg by mouth every six (6) hours as needed. , Disp: , Rfl:     hydroCHLOROthiazide (HYDRODIURIL) 25 mg tablet, Take 1 Tablet by mouth daily. , Disp: 30 Tablet, Rfl: 5    butalbital-acetaminophen-caff (FIORICET) -40 mg per capsule, Take 1 Cap by mouth every four (4) hours as needed (headache). , Disp: 20 Cap, Rfl: 1   ergocalciferol (ERGOCALCIFEROL) 1,250 mcg (50,000 unit) capsule, TAKE 1 CAPSULE BY MOUTH EVERY 7 DAYS, Disp: 4 Cap, Rfl: 5    metFORMIN ER (GLUCOPHAGE XR) 500 mg tablet, TAKE 1 TABLET BY MOUTH DAILY, Disp: 30 Tab, Rfl: 11    levothyroxine (SYNTHROID) 112 mcg tablet, TAKE 1 TABLET BY MOUTH DAILY BEFORE BREAKFAST, Disp: 30 Tab, Rfl: 11    Toujeo SoloStar U-300 Insulin 300 unit/mL (1.5 mL) inpn pen, 50 Units by SubCUTAneous route nightly., Disp: 6 Pen, Rfl: 11    Trulicity 1.5 TU/8.4 mL sub-q pen, 0.5 mL by SubCUTAneous route every seven (7) days. , Disp: 4 Syringe, Rfl: 11    BD Carolynn 2nd Gen Pen Needle 32 gauge x 5/32\" ndle, Once a day, Disp: 50 Pen Needle, Rfl: 11    ezetimibe-simvastatin (VYTORIN) 10-40 mg per tablet, Take 1 Tab by mouth nightly., Disp: 30 Tab, Rfl: 11    amLODIPine (Norvasc) 10 mg tablet, Take 1 Tab by mouth daily. , Disp: 30 Tab, Rfl: 11    irbesartan (AVAPRO) 300 mg tablet, Take 1 Tab by mouth daily. , Disp: 30 Tab, Rfl: 11    LORazepam (ATIVAN) 0.5 mg tablet, TK 1 T PO QID PRA, Disp: , Rfl:     zolpidem (AMBIEN) 10 mg tablet, , Disp: , Rfl:     naloxone (Narcan) 4 mg/actuation nasal spray, by Nasal route once., Disp: , Rfl:     OTHER, Neurop Away  Take 4 po qd, Disp: , Rfl:     busPIRone (BUSPAR) 15 mg tablet, Take 1 Tab by mouth four (4) times daily. , Disp: 360 Tab, Rfl: 3    cycloSPORINE (RESTASIS MULTIDOSE) 0.05 % drop ophthalmic drops, Administer 1 Drop to both eyes daily as needed (dryness). , Disp: , Rfl:     docusate sodium (COLACE) 100 mg capsule, Take 100 mg by mouth daily as needed for Constipation. , Disp: , Rfl:     fexofenadine (ALLEGRA) 180 mg tablet, Take 180 mg by mouth nightly., Disp: , Rfl:     magnesium oxide (MAG-OX) 400 mg tablet, Take 1 Tab by mouth two (2) times a day., Disp: 60 Tab, Rfl: 11   Date Last Reviewed:  6/10/21   Number of Personal Factors/Comorbidities that affect the Plan of Care: 1-2: MODERATE COMPLEXITY   EXAMINATION:     Observation/Orthostatic Postural Assessment: patient's gait into clinic is somewhat unsteady  ROM: grossly symmetrical and WFL  Strength: weak in B UEs and LEs  B shoulder flex 4-, extn 4+, abduct 4, IR 4 and ER 4-;  B LE DF 4, PF 4+, knee flex 4+, knee extn 4, hip IR 4, hip ER 4, hip abduct 4- and hip extn 4/5. Neurological Screen: neuropathy in B feet, light touch intact in B UEs  Functional Mobility:  Uses cane for ambulation. Balance:  Unable to perform SLS on either LE. Body Structures Involved:  1. Joints  2. Muscles Body Functions Affected:  1. Neuromusculoskeletal  2. Movement Related Activities and Participation Affected:  1. General Tasks and Demands  2. Mobility  3.  Self Care   Number of elements (examined above) that affect the Plan of Care: 3: MODERATE COMPLEXITY   CLINICAL PRESENTATION:   Presentation: Evolving clinical presentation with changing clinical characteristics: MODERATE COMPLEXITY   CLINICAL DECISION MAKING:   Use of outcome tool(s) and clinical judgement create a POC that gives a: Difficult prediction of patient's progress: HIGH COMPLEXITY

## 2021-06-10 NOTE — PROGRESS NOTES
Gucci Palacios  : 1945  [place] / Product Type: Medicare /  [address]  at [facility] CECIL EDWARDS  [address] [facility], [address], [place] [address].  [phone]   [phone]       OUTPATIENT PHYSICAL THERAPY: Daily Treatment Note 6/10/2021  Visit Count: 1  ICD-10: Treatment Diagnosis: Unsteadiness on feet (R26.81)  Precautions/Allergies: per EMR, allergic to   Shellfish containing products, Celebrex [celecoxib], Catapres [clonidine], Cymbalta [duloxetine], Morphine, Oxycontin [oxycodone], and Sulfa (sulfonamide antibiotics)    TREATMENT PLAN:   Effective Dates: 6/10/2021 TO 2021 (90 days).   Frequency/Duration: 1-2 times a week for 90 Day(s) MEDICAL/REFERRING DIAGNOSIS:  Unsteadiness [R26.81]    DATE OF ONSET: about a year ago  REFERRING PHYSICIAN: Meghna Bustamante MD MD Orders: eval and treat  Return MD Appointment: 21          Pre-treatment Symptoms/Complaints:  See initial evaluation  Pain: Initial: Pain Intensity 1: 4 (4 now, 6 at times)   Post Session:  No change noted by patient   Medications Last Reviewed:  6/10/21    Updated Objective Findings:   See evaluation note from today     TREATMENT:     No treatment provided today    HEP: None  [facility] Portal    Treatment/Session Summary:    · Response to Treatment:  No increased pain reported by patient  · Communication/Consultation:  Note to MD for signature  · Equipment provided today:  None today  · Recommendations/Intent for next treatment session: Next visit will focus on strength and balance    Total Treatment Billable Duration:  Eval only  PT Patient Time In/Time Out  Time In: 6442  Time Out: 710 [address], PT    Future Appointments   Date Time Provider Aleena Mandujano   6/15/2021  4:45 PM Norm Tillman, PT [facility]   2021  4:45 PM Norm Tillman PT LAUREN [facility]   2021  9:00 AM Jose Traore, VALENTINE AGUILAR [facility] 6/22/2021  9:45 AM Alvy Prim, PT [facility]   6/23/2021  0:26 PM SFE CT 16 SLICE UNIT 1 SFERCT SFE   6/29/2021  1:00 PM Alvy Prim, PT [facility]   7/1/2021  4:00 PM Alvy Prim, PT [facility]   7/6/2021 11:45 AM Apurva Lama, PT [facility]   7/8/2021  1:00 PM Apurva Lama, PT [facility]   7/13/2021  1:15 PM Apurva Lama, PT [facility]   7/15/2021  1:00 PM Alvy Prim, PT [facility]   7/20/2021  2:30 PM Alvy Prim, PT [facility]   7/22/2021  1:00 PM Alvy Prim, PT [facility]   7/27/2021  2:10 PM Yulisa Vogel MD SSA PST PST   8/24/2021  2:00 PM Marycruz Sosa,  SSA [facility]CDG UCD   8/31/2021 11:00 AM ENDO NURSE END BS ENDO   9/7/2021  3:00 PM Dawit Amaro MD END BS ENDO

## 2021-06-15 ENCOUNTER — HOSPITAL ENCOUNTER (OUTPATIENT)
Dept: PHYSICAL THERAPY | Age: 76
Discharge: HOME | End: 2021-06-15
Attending: FAMILY MEDICINE
Payer: MEDICARE

## 2021-06-15 PROCEDURE — 97110 THERAPEUTIC EXERCISES: CPT

## 2021-06-15 NOTE — PROGRESS NOTES
Balbina Bacon  : 1945  [place] / Product Type: Medicare /  [address] at [facility] CECIL [facility]AR[facility]  [address] [place] [address], [address], [place] [address].  [phone]   [phone]       OUTPATIENT PHYSICAL THERAPY: Daily Treatment Note 6/15/2021  Visit Count: 2  ICD-10: Treatment Diagnosis: Unsteadiness on feet (R26.81)  Precautions/Allergies: per EMR, allergic to   Shellfish containing products, Celebrex [celecoxib], Catapres [clonidine], Cymbalta [duloxetine], Morphine, Oxycontin [oxycodone], and Sulfa (sulfonamide antibiotics)    TREATMENT PLAN:   Effective Dates: 6/10/2021 TO 2021 (90 days). Frequency/Duration: 1-2 times a week for 90 Day(s) MEDICAL/REFERRING DIAGNOSIS:  Unsteadiness [R26.81]    DATE OF ONSET: about a year ago  REFERRING PHYSICIAN: Nasra Ward MD MD Orders: eval and treat  Return MD Appointment: 21          Pre-treatment Symptoms/Complaints:  Patient reports she has stumbled, but not fallen anymore. Pain is a little worse today than last week. Pain: Initial: Pain Intensity 1: 6   Post Session:  No change in pain noted by patient, but patient was tired. Medications Last Reviewed:  6/15/21    Updated Objective Findings:   None Today     TREATMENT:     Therapeutic Exercise: (38 Minutes):  Exercises per grid below to improve mobility and strength. Required minimal visual and verbal cues to ensure corrrect performance. Progressed complexity of movement as indicated.    Date:  6/15/21 Date:   Date:     Activity/Exercise Parameters Parameters Parameters   LAQs 1# B 2x15     Seated marching 1# B 2x15     Sit to stand      Ground                             foam 2x5  1x5     Standing heel raises 2x15     Standing hip abduction B 2x15     Partial knee bends 2x15     Tandem stance B 2 x ~30 sec           HEP: None  [facility] Portal    Treatment/Session Summary:    · Response to Treatment:  No increased pain reported by patient. She fatigues easily and needed rest breaks between some exercises.    · Communication/Consultation:  None today  · Equipment provided today:  None today  · Recommendations/Intent for next treatment session: Next visit will focus on strength and balance    Total Treatment Billable Duration:  38 minutes  PT Patient Time In/Time Out  Time In: 1650  Time Out: 1729    Conrad Counter, PT    Future Appointments   Date Time Provider Aleena Nazia   6/17/2021  4:45 PM Alvy Prim, PT [facility]   6/21/2021  9:00 AM Alvy Prim, PT [facility]   6/22/2021  9:45 AM Alvy Prim, PT [facility]   6/23/2021  8:94 PM SFE CT 16 SLICE UNIT 1 SFERCT SFE   6/29/2021  1:00 PM Alvy Prim, PT [facility]   7/1/2021  4:00 PM Alvy Prim, PT [facility]   7/6/2021 11:45 AM Apurva Lama, PT [facility]   7/8/2021  1:00 PM Apurva Lama, PT [facility]   7/13/2021  1:15 PM Apurva Lama, PT [facility]   7/15/2021  1:00 PM Alvy Prim, PT [facility]   7/20/2021  2:30 PM Alvy Prim, PT [facility]   7/22/2021  1:00 PM Alvy Prim, PT [facility]   7/27/2021  2:10 PM Yulisa Vogel MD SSA PST PST   8/24/2021  2:00 PM Marycruz Sosa, DO [facility] UCD   8/31/2021 11:00 AM ENDO NURSE DELFINA LUEVANO ENDO   9/7/2021  3:00 PM MD DELFINA Negro ENDO

## 2021-06-17 ENCOUNTER — HOSPITAL ENCOUNTER (OUTPATIENT)
Dept: PHYSICAL THERAPY | Age: 76
Discharge: HOME | End: 2021-06-17
Attending: FAMILY MEDICINE
Payer: MEDICARE

## 2021-06-17 PROCEDURE — 97110 THERAPEUTIC EXERCISES: CPT

## 2021-06-17 NOTE — PROGRESS NOTES
Usman Kovacs  : 1945  [place] / Product Type: Medicare /  [address] at [facility]EC[facility] EDWAR[facility]  [facility], [address], [place] [address].  [phone]   [phone]       OUTPATIENT PHYSICAL THERAPY: Daily Treatment Note 2021  Visit Count: 3  ICD-10: Treatment Diagnosis: Unsteadiness on feet (R26.81)  Precautions/Allergies: per EMR, allergic to   Shellfish containing products, Celebrex [celecoxib], Catapres [clonidine], Cymbalta [duloxetine], Morphine, Oxycontin [oxycodone], and Sulfa (sulfonamide antibiotics)    TREATMENT PLAN:   Effective Dates: 6/10/2021 TO 2021 (90 days). Frequency/Duration: 1-2 times a week for 90 Day(s) MEDICAL/REFERRING DIAGNOSIS:  Unsteadiness [R26.81]    DATE OF ONSET: about a year ago  REFERRING PHYSICIAN: Radha Mccarty MD MD Orders: eval and treat  Return MD Appointment: 21          Pre-treatment Symptoms/Complaints:  Patient reports she has stumbled a couple of times today. Feels jittery today and she is not sure why. Her blood sugar and her blood pressure were both okay when she checked them. She did okay after PT on Tuesday, she was just tired. Pain: Initial: Pain Intensity 1: 6   Post Session:  No change in pain     Medications Last Reviewed:  6/15/21    Updated Objective Findings:   None Today     TREATMENT:     Therapeutic Exercise: (40 Minutes):  Exercises per grid below to improve mobility and strength. Required minimal visual and verbal cues to ensure corrrect performance. Progressed complexity of movement as indicated.    Date:  6/15/21 Date:  21 Date:     Activity/Exercise Parameters Parameters Parameters   LAQs 1# B 2x15 1# B 2x15    Seated marching 1# B 2x15 1# B 2x15    Sit to stand      Ground                             foam 2x5  1x5 -  2x5    Standing heel raises 2x15 1x15    Standing hip abduction B 2x15 B 2x15    Partial knee bends 2x15 2x15    Tandem stance B 2 x ~30 sec B 2 ~30 sec    Standing  On foam  EO/EC  X 2                HEP: None  [facility] Portal    Treatment/Session Summary:    · Response to Treatment:  No increased pain reported by patient. She was able to do exercises a little more quickly today. Still needs rest breaks.    · Communication/Consultation:  None today  · Equipment provided today:  None today  · Recommendations/Intent for next treatment session: Next visit will focus on strength and balance    Total Treatment Billable Duration:  40 minutes  PT Patient Time In/Time Out  Time In: 1345  Time Out: [place] 9 Jane Rivera, PT    Future Appointments   Date Time Provider Aleena Mandujano   6/21/2021  9:00 AM Korey Briseno, PT [facility]   6/22/2021  9:45 AM Korey Briseno, PT [facility]   6/23/2021  5:18 PM SFE CT 16 SLICE UNIT 1 SFERCT SFE   6/29/2021  1:00 PM Korey Briseno, PT [facility]   7/1/2021  4:00 PM Korey Briseno, PT [facility]   7/6/2021 11:45 AM Apurva Downey, PT [facility]   7/8/2021  1:00 PM Apurva Downey, PT [facility]   7/13/2021  1:15 PM Apurva Downey, PT [facility]   7/15/2021  1:00 PM Korey Briseno, PT [facility]   7/20/2021  2:30 PM Korey Briseno, PT [facility]   7/22/2021  1:00 PM Korey Briseno, PT [facility]   7/27/2021  2:10 PM Arianna Salomon MD SSA PST PST   8/24/2021  2:00 PM DO TIFFANY Jones UCDG UCD   8/31/2021 11:00 AM ENDO NURSE END BS ENDO   9/7/2021  3:00 PM MD DELFINA Iraheta BS ENDO

## 2021-06-21 ENCOUNTER — HOSPITAL ENCOUNTER (OUTPATIENT)
Dept: PHYSICAL THERAPY | Age: 76
Discharge: HOME | End: 2021-06-21
Attending: FAMILY MEDICINE
Payer: MEDICARE

## 2021-06-21 PROCEDURE — 97110 THERAPEUTIC EXERCISES: CPT

## 2021-06-21 NOTE — PROGRESS NOTES
Remberto Rodriguez  : 1945  [place] / Product Type: Medicare /  [address] at [facility] CECIL EDWARDS  [address] [facility], [address], [address] [place] [address]  [phone]   [phone]       OUTPATIENT PHYSICAL THERAPY: Daily Treatment Note 2021  Visit Count: 4  ICD-10: Treatment Diagnosis: Unsteadiness on feet (R26.81)  Precautions/Allergies: per EMR, allergic to   Shellfish containing products, Celebrex [celecoxib], Catapres [clonidine], Cymbalta [duloxetine], Morphine, Oxycontin [oxycodone], and Sulfa (sulfonamide antibiotics)    TREATMENT PLAN:   Effective Dates: 6/10/2021 TO 2021 (90 days). Frequency/Duration: 1-2 times a week for 90 Day(s) MEDICAL/REFERRING DIAGNOSIS:  Unsteadiness [R26.81]    DATE OF ONSET: about a year ago  REFERRING PHYSICIAN: Tony Briscoe MD MD Orders: eval and treat  Return MD Appointment: 21          Pre-treatment Symptoms/Complaints:  Patient reports she woke up a little sore this morning. She is tired because she normally isn't up this early. She has been fighting a cold. Pain: Initial: Pain Intensity 1: 8   Post Session:  \"I can feel it in my left side now, and I couldn't before\"   Medications Last Reviewed:  21    Updated Objective Findings:   None Today     TREATMENT:     Therapeutic Exercise: (40 Minutes):  Exercises per grid below to improve mobility and strength. Required minimal visual and verbal cues to ensure corrrect performance. Progressed complexity of movement as indicated.    Date:  6/15/21 Date:  21 Date:  21   Activity/Exercise Parameters Parameters Parameters   LAQs 1# B 2x15 1# B 2x15 1.5# 2x15   Seated marching 1# B 2x15 1# B 2x15 1.5# 2x15   Sit to stand      Ground                             foam 2x5  1x5 -  2x5 -  2x5   Standing heel raises 2x15 1x15 1x15   Standing hip abduction B 2x15 B 2x15 B 2x15   Partial knee bends 2x15 2x15 2x15   Tandem stance B 2 x ~30 sec B 2 ~30 sec B 2 x ~30 sec   Standing  On foam  EO/EC  X 2 EO/EC x 3   Forward step ups   4\" B 1x15  4\" B 1x10         HEP: None  [facility] Portal    Treatment/Session Summary:    · Response to Treatment:  Increased pain reported by patient but she did not quantify it. She does exercises as asked. She continues to fatigue very easily.    · Communication/Consultation:  None today  · Equipment provided today:  None today  · Recommendations/Intent for next treatment session: Next visit will focus on strength and balance    Total Treatment Billable Duration:  40 minutes  PT Patient Time In/Time Out  Time In: 0900  Time Out: Velma 4464, PT    Future Appointments   Date Time Provider Aleena Nazia   6/22/2021  9:45 AM Sadie Mass, PT [facility]   6/23/2021  4:46 PM SFE CT 16 SLICE UNIT 1 SFERCT SFE   6/29/2021  1:00 PM Sadie Mass, PT [facility]   7/1/2021  4:00 PM Sadie Mass, PT [facility]   7/6/2021 11:45 AM Apurva Reddy, PT [facility]   7/8/2021  1:00 PM Ramos Paredes Apurva, PT [facility]   7/13/2021  1:15 PM Ramos Paredes Apurva, PT [facility]   7/15/2021  1:00 PM Sadie Mass, PT [facility]   7/20/2021  2:30 PM Sadie Mass, PT [facility]   7/22/2021  1:00 PM Sadie Mass, PT [facility]   7/27/2021  2:10 PM Martha Burrows MD SSA PST PST   8/24/2021  2:00 PM Annie Schmitz DO SSA UCDG UCD   8/31/2021 11:00 AM ENDO NURSE END BS ENDO   9/7/2021  3:00 PM Dede Muñoz MD END BS ENDO

## 2021-06-22 ENCOUNTER — HOSPITAL ENCOUNTER (OUTPATIENT)
Dept: PHYSICAL THERAPY | Age: 76
Discharge: HOME | End: 2021-06-22
Attending: FAMILY MEDICINE
Payer: MEDICARE

## 2021-06-22 NOTE — PROGRESS NOTES
David Quintanilla  : 1945  [place] / Product Type: Medicare /  [address]  at [facility] EDWARDS  [address] [place] [address], [place] [address].  [phone]   [phone]       OUTPATIENT PHYSICAL THERAPY: Cancellation 2021     ICD-10: Treatment Diagnosis: Unsteadiness on feet (R26.81)  Precautions/Allergies: per EMR, allergic to   Shellfish containing products, Celebrex [celecoxib], Catapres [clonidine], Cymbalta [duloxetine], Morphine, Oxycontin [oxycodone], and Sulfa (sulfonamide antibiotics)    TREATMENT PLAN:   Effective Dates: 6/10/2021 TO 2021 (90 days). Frequency/Duration: 1-2 times a week for 90 Day(s) MEDICAL/REFERRING DIAGNOSIS:  Unsteadiness [R26.81]    DATE OF ONSET: about a year ago  REFERRING PHYSICIAN: Ayla Sims MD MD Orders: eval and treat  Return MD Appointment: 21      Patient called to cancel scheduled appointment due to not feeling well.

## 2021-06-23 ENCOUNTER — HOSPITAL ENCOUNTER (OUTPATIENT)
Dept: CT IMAGING | Age: 76
Discharge: HOME | End: 2021-06-23
Attending: FAMILY MEDICINE
Payer: MEDICARE

## 2021-06-23 DIAGNOSIS — R26.81 UNSTEADINESS: ICD-10-CM

## 2021-06-23 PROCEDURE — 70450 CT HEAD/BRAIN W/O DYE: CPT

## 2021-06-29 ENCOUNTER — HOSPITAL ENCOUNTER (OUTPATIENT)
Dept: PHYSICAL THERAPY | Age: 76
Discharge: HOME | End: 2021-06-29
Attending: FAMILY MEDICINE
Payer: MEDICARE

## 2021-06-29 PROCEDURE — 97110 THERAPEUTIC EXERCISES: CPT

## 2021-06-29 NOTE — PROGRESS NOTES
Hemalatha Wagner  : 1945  [place] / Product Type: Medicare /  [address] at [facility] CEC[facility]  [address] [facility], [address], [place] [address].  [phone]   [phone]       OUTPATIENT PHYSICAL THERAPY: Daily Treatment Note 2021  Visit Count: 5  ICD-10: Treatment Diagnosis: Unsteadiness on feet (R26.81)  Precautions/Allergies: per EMR, allergic to   Shellfish containing products, Celebrex [celecoxib], Catapres [clonidine], Cymbalta [duloxetine], Morphine, Oxycontin [oxycodone], and Sulfa (sulfonamide antibiotics)    TREATMENT PLAN:   Effective Dates: 6/10/2021 TO 2021 (90 days). Frequency/Duration: 1-2 times a week for 90 Day(s) MEDICAL/REFERRING DIAGNOSIS:  Unsteadiness [R26.81]    DATE OF ONSET: about a year ago  REFERRING PHYSICIAN: Yury Lai MD MD Orders: eval and treat  Return MD Appointment: 21          Pre-treatment Symptoms/Complaints:  Patient reports she had a terrible migraine last week. Then she had neuropathy in her feet and an upset stomach. She called MD but he said to continue. Pain: Initial: Pain Intensity 1: 6   Post Session: \"Feels okay\"   Medications Last Reviewed:  21    Updated Objective Findings:   None Today     TREATMENT:     Therapeutic Exercise: (40 Minutes):  Exercises per grid below to improve mobility and strength. Required minimal visual and verbal cues to ensure corrrect performance. Progressed complexity of movement as indicated.    Date:  6/15/21 Date:  21 Date:  21 Date  21   Activity/Exercise Parameters Parameters Parameters    LAQs 1# B 2x15 1# B 2x15 1.5# 2x15 1.5# 2x15   Seated marching 1# B 2x15 1# B 2x15 1.5# 2x15 1.5# 2x15   Sit to stand      Ground                             foam 2x5  1x5 -  2x5 -  2x5 -  3x5   Standing heel raises 2x15 1x15 1x15 2x15   Standing hip abduction B 2x15 B 2x15 B 2x15 B 2x15   Partial knee bends 2x15 2x15 2x15 2x15   Tandem stance B 2 x ~30 sec B 2 ~30 sec B 2 x ~30 sec B 2 x~ 30 sec   Standing  On foam  EO/EC  X 2 EO/EC x 3 EO/EC x 3   Forward step ups   4\" B 1x15  4\" B 1x10 4\" B 2x15   Walking on straight line    12' x 4         HEP: None  [facility] Portal    Treatment/Session Summary:    · Response to Treatment:  Patient noted that she seems to be doing a little better. No increased pain reported.    · Communication/Consultation:  None today  · Equipment provided today:  None today  · Recommendations/Intent for next treatment session: Next visit will focus on strength and balance    Total Treatment Billable Duration:  40 minutes  PT Patient Time In/Time Out  Time In: 1250  Time Out: Chu Armenta PT    Future Appointments   Date Time Provider Aleena Mandujano   7/1/2021  4:00 PM Beth Mart, PT [facility]   7/6/2021 11:45 AM Radames Byers, PT [facility]   7/8/2021  1:00 PM Apurva Byers, PT [facility]   7/13/2021  1:15 PM Apurva Byers, PT [facility]   7/15/2021  1:00 PM Beth Gist, PT [facility]   7/20/2021  2:30 PM Beth Gist, PT [facility]   7/22/2021  1:00 PM Beth Gist, PT [facility]   7/27/2021  2:10 PM Igor Thorpe MD SSA PST PST   8/24/2021  2:00 PM DO TIFFANY Luu UCDG UCD   8/31/2021 11:00 AM ENDO NURSE END BS ENDO   9/7/2021  3:00 PM Natanael Nicholas MD END BS ENDO

## 2021-07-06 ENCOUNTER — HOSPITAL ENCOUNTER (OUTPATIENT)
Dept: PHYSICAL THERAPY | Age: 76
Discharge: HOME | End: 2021-07-06
Attending: FAMILY MEDICINE
Payer: MEDICARE

## 2021-07-06 PROCEDURE — 97110 THERAPEUTIC EXERCISES: CPT

## 2021-07-06 NOTE — PROGRESS NOTES
Juani Garcia  : 1945  [place] / Product Type: Medicare /  [address] at [facility]EC[facility]  [address] [place] [address], [address], [place] [address].  [phone]   [phone]       OUTPATIENT PHYSICAL THERAPY: Daily Treatment Note 2021  Visit Count: 6  ICD-10: Treatment Diagnosis: Unsteadiness on feet (R26.81)  Precautions/Allergies: per EMR, allergic to   Shellfish containing products, Celebrex [celecoxib], Catapres [clonidine], Cymbalta [duloxetine], Morphine, Oxycontin [oxycodone], and Sulfa (sulfonamide antibiotics)    TREATMENT PLAN:   Effective Dates: 6/10/2021 TO 2021 (90 days). Frequency/Duration: 1-2 times a week for 90 Day(s) MEDICAL/REFERRING DIAGNOSIS:  Unsteadiness [R26.81]    DATE OF ONSET: about a year ago  REFERRING PHYSICIAN: Junie Goode MD MD Orders: eval and treat  Return MD Appointment: 21          Pre-treatment Symptoms/Complaints:  Patient she fell on Saturday and hit the floor on the right side. She was walking and just lost her balance. Pain: Initial:    7/10 low back Post Session: \"Feels okay\"   Medications Last Reviewed:  21    Updated Objective Findings:   None Today     TREATMENT:     Therapeutic Exercise: (40 Minutes):  Exercises per grid below to improve mobility and strength. Required minimal visual and verbal cues to ensure corrrect performance. Progressed complexity of movement as indicated.    Date:  6/15/21 Date:  21 Date:  21 Date  21 Date  21   Activity/Exercise Parameters Parameters Parameters     LAQs 1# B 2x15 1# B 2x15 1.5# 2x15 1.5# 2x15 2# 2x10   Seated marching 1# B 2x15 1# B 2x15 1.5# 2x15 1.5# 2x15 2# 2x10   Sit to stand      Ground                             foam 2x5  1x5 -  2x5 -  2x5 -  3x5 2x10  2x5   Standing heel raises 2x15 1x15 1x15 2x15 2x15   Standing hip abduction B 2x15 B 2x15 B 2x15 B 2x15 B 2x15   Partial knee bends 2x15 2x15 2x15 2x15 2x15 Tandem stance B 2 x ~30 sec B 2 ~30 sec B 2 x ~30 sec B 2 x~ 30 sec B 2x30 sec    Standing  On foam  EO/EC  X 2 EO/EC x 3 EO/EC x 3 EO/ EC 3   Forward step ups   4\" B 1x15  4\" B 1x10 4\" B 2x15 Toe taps x10 ea   Walking on straight line    12' x 4 12' x2         HEP: None  [facility] Portal    Treatment/Session Summary:    · Response to Treatment:  She fatigues with standing exercises. · Communication/Consultation:  None today  · Equipment provided today:  None today  · Recommendations/Intent for next treatment session: Next visit will focus on strength and balance. Added walking drills next session.      Total Treatment Billable Duration:  40 minutes  PT Patient Time In/Time Out  Time In: 1140  Time Out: 143 [address]    Future Appointments   Date Time Provider Aleena Mandujano   7/8/2021  1:00 PM Elida Vigil, PT [facility]   7/13/2021  1:15 PM Yashira Perdue, PT [facility]   7/15/2021  1:00 PM Aguilar Barnhart, PT [facility]   7/20/2021  2:30 PM Aguilar Barnhart, PT [facility]   7/22/2021  1:00 PM Aguilar Barnhart, PT [facility]   7/27/2021  2:10 PM Yury Lai MD SSA PST PST   8/24/2021  2:00 PM DO TIFFANY Hastings UCDG UCD   8/31/2021 11:00 AM ENDO NURSE END BS ENDO   9/7/2021  3:00 PM Marj Hopkins MD END BS ENDO

## 2021-07-08 ENCOUNTER — HOSPITAL ENCOUNTER (OUTPATIENT)
Dept: PHYSICAL THERAPY | Age: 76
Discharge: HOME | End: 2021-07-08
Attending: FAMILY MEDICINE
Payer: MEDICARE

## 2021-07-08 PROCEDURE — 97110 THERAPEUTIC EXERCISES: CPT

## 2021-07-08 NOTE — PROGRESS NOTES
Yduelka Montenegro  : 1945  [place] / Product Type: Medicare /  [address] at [facility] CECIL EDWARDS  [address] [place] [address], [address], [address] [facility]  [phone]   [phone]       OUTPATIENT PHYSICAL THERAPY: Daily Treatment Note 2021  Visit Count: 7  ICD-10: Treatment Diagnosis: Unsteadiness on feet (R26.81)  Precautions/Allergies: per EMR, allergic to   Shellfish containing products, Celebrex [celecoxib], Catapres [clonidine], Cymbalta [duloxetine], Morphine, Oxycontin [oxycodone], and Sulfa (sulfonamide antibiotics)    TREATMENT PLAN:   Effective Dates: 6/10/2021 TO 2021 (90 days). Frequency/Duration: 1-2 times a week for 90 Day(s) MEDICAL/REFERRING DIAGNOSIS:  Unsteadiness [R26.81]    DATE OF ONSET: about a year ago  REFERRING PHYSICIAN: Sameer Thompson MD MD Orders: eval and treat  Return MD Appointment: 21          Pre-treatment Symptoms/Complaints:  Patient her left leg and her back are hurting her more today. She was very tired after Tuesday's appointment. Pain: Initial:    10/10 low back Post Session: \"Feels a little better\"    Medications Last Reviewed:  21    Updated Objective Findings:   None Today     TREATMENT:   Manual (3 minutes): pt in hooklying and left hip distraction   Therapeutic Exercise: (38 Minutes):  Exercises per grid below to improve mobility and strength. Required minimal visual and verbal cues to ensure corrrect performance. Progressed complexity of movement as indicated.    Date:  6/15/21 Date:  21 Date:  21 Date  21 Date  21 Date  21   Activity/Exercise Parameters Parameters Parameters   parameters   LAQs 1# B 2x15 1# B 2x15 1.5# 2x15 1.5# 2x15 2# 2x10 2# 2x10   Seated marching 1# B 2x15 1# B 2x15 1.5# 2x15 1.5# 2x15 2# 2x10 2# 2x10   Sit to stand      Ground                             foam 2x5  1x5 -  2x5 -  2x5 -  3x5 2x10  2x5 x10  x5    Standing heel raises 2x15 1x15 1x15 2x15 2x15 -   Standing hip abduction B 2x15 B 2x15 B 2x15 B 2x15 B 2x15 -   Partial knee bends 2x15 2x15 2x15 2x15 2x15 2x15   Tandem stance B 2 x ~30 sec B 2 ~30 sec B 2 x ~30 sec B 2 x~ 30 sec B 2x30 sec  B 2x30 sec ea   Standing  On foam  EO/EC  X 2 EO/EC x 3 EO/EC x 3 EO/ EC 3 EO 3x   Forward step ups   4\" B 1x15  4\" B 1x10 4\" B 2x15 Toe taps x10 ea -   Walking on straight line    12' x 4 12' x2 -   Sitting hip ER - - - - - Red t-band 2x10 B         HEP: None  [employer] Portal    Treatment/Session Summary:    · Response to Treatment:  Decreased exercises today due to increase in back and left leg pain. · Communication/Consultation:  None today  · Equipment provided today:  None today  · Recommendations/Intent for next treatment session: Next visit will focus on strength and balance. Added walking drills next session.      Total Treatment Billable Duration:  41 minutes  PT Patient Time In/Time Out  Time In: 1300  Time Out: [address] [facility],     Future Appointments   Date Time Provider Aleena Mandujano   7/13/2021  1:15 PM Rama Raza, PT [facility]   7/15/2021  1:00 PM Giles Dakin, PT [facility]   7/20/2021  2:30 PM Giles Dakin, PT [facility]   7/22/2021  1:00 PM Giles Dakin, PT [facility]   7/27/2021  2:10 PM Ivette Catherine MD [facility] PST PST   8/24/2021  2:00 PM DO TIFFANY Ordonez [facility] UCD   8/31/2021 11:00 AM ENDO NURSE END [facility] ENDO   9/7/2021  3:00 PM Mariya Gannon MD [facility] END[facility]

## 2021-07-13 ENCOUNTER — HOSPITAL ENCOUNTER (OUTPATIENT)
Dept: PHYSICAL THERAPY | Age: 76
Discharge: HOME | End: 2021-07-13
Attending: FAMILY MEDICINE
Payer: MEDICARE

## 2021-07-13 PROCEDURE — 97110 THERAPEUTIC EXERCISES: CPT

## 2021-07-13 NOTE — PROGRESS NOTES
Obdulia Sky  : 1945  [place] / Product Type: Medicare /  [address] at [facility] CECIL EDWARDS  [address] [facility], [address], [address] [facility]  [phone]   [phone]       OUTPATIENT PHYSICAL THERAPY: Daily Treatment Note 2021  Visit Count: 8  ICD-10: Treatment Diagnosis: Unsteadiness on feet (R26.81)  Precautions/Allergies: per EMR, allergic to   Shellfish containing products, Celebrex [celecoxib], Catapres [clonidine], Cymbalta [duloxetine], Morphine, Oxycontin [oxycodone], and Sulfa (sulfonamide antibiotics)    TREATMENT PLAN:   Effective Dates: 6/10/2021 TO 2021 (90 days). Frequency/Duration: 1-2 times a week for 90 Day(s) MEDICAL/REFERRING DIAGNOSIS:  Unsteadiness [R26.81]    DATE OF ONSET: about a year ago  REFERRING PHYSICIAN: Real Arthur MD MD Orders: eval and treat  Return MD Appointment: 21          Pre-treatment Symptoms/Complaints:  Patient left hip is hurting today. Pain: Initial:    7/10 low back Post Session: \"Feels a little better\"    Medications Last Reviewed:  21    Updated Objective Findings:   None Today     TREATMENT:     Therapeutic Exercise: (40 Minutes):  Exercises per grid below to improve mobility and strength. Required minimal visual and verbal cues to ensure corrrect performance. Progressed complexity of movement as indicated.   Walking drills: forward 30 ft, backwards 30 ft, side stepping 20 ft each way with cane and supervision   Date:  21 Date  21 Date  21 Date  21 Date  21   Activity/Exercise Parameters   parameters paraeters   LAQs 1.5# 2x15 1.5# 2x15 2# 2x10 2# 2x10 2# 2x10   Seated marching 1.5# 2x15 1.5# 2x15 2# 2x10 2# 2x10 2#2x15   Sit to stand      Ground                             foam -  2x5 -  3x5 2x10  2x5 x10  x5  x10  2x5   Standing heel raises 1x15 2x15 2x15 - 2x15   Standing hip abduction B 2x15 B 2x15 B 2x15 - -   Partial knee bends 2x15 2x15 2x15 2x15 -   Tandem stance B 2 x ~30 sec B 2 x~ 30 sec B 2x30 sec  B 2x30 sec ea B 2x30 sec each   Standing  On foam EO/EC x 3 EO/EC x 3 EO/ EC 3 EO 3x EO 2x   Forward step ups 4\" B 1x15  4\" B 1x10 4\" B 2x15 Toe taps x10 ea - -   Walking on straight line  12' x 4 12' x2 - Walking drills   Sitting hip ER - - - Red t-band 2x10 B Red t-band 2x10 B      HEP: walking at home for endurance  [employer] Portal    Treatment/Session Summary:    · Response to Treatment:  She reported slight dizziness with side stepping exercise today. She did fatigue quickly with walking drills and recommended patient to start walking more in her hallway at home. · Communication/Consultation:  None today  · Equipment provided today:  None today  · Recommendations/Intent for next treatment session: Next visit will focus on strength and balance.      Total Treatment Billable Duration:  40 minutes  PT Patient Time In/Time Out  Time In: 1278  Time Out: [address] [facility],     Future Appointments   Date Time Provider Aleena Mandujano   7/15/2021  1:00 PM Pamela Stain, PT [facility]   7/20/2021  2:30 PM Pamela Stain, PT [facility]   7/22/2021  1:00 PM Pamela Stain, PT [facility]   7/27/2021  2:10 PM Danika Espinosa MD [facility] PST PST   8/24/2021  2:00 PM DO TIFFANY Garcia [facility]CD[facility] UCD   8/31/2021 11:00 AM ENDO NURSE END BS ENDO   9/7/2021  3:00 PM MD DELFINA Allen BS ENDO

## 2021-07-15 ENCOUNTER — HOSPITAL ENCOUNTER (OUTPATIENT)
Dept: PHYSICAL THERAPY | Age: 76
Discharge: HOME | End: 2021-07-15
Attending: FAMILY MEDICINE
Payer: MEDICARE

## 2021-07-15 PROCEDURE — 97110 THERAPEUTIC EXERCISES: CPT

## 2021-07-15 NOTE — PROGRESS NOTES
Becky Sep  : 1945  [place] / Product Type: Medicare /  [address] at [facility] CECIL EDWARDS  [address] [facility], [address], [place] [address].  [phone]   [phone]       OUTPATIENT PHYSICAL THERAPY: Daily Treatment Note 7/15/2021  Visit Count: 9  ICD-10: Treatment Diagnosis: Unsteadiness on feet (R26.81)  Precautions/Allergies: per EMR, allergic to   Shellfish containing products, Celebrex [celecoxib], Catapres [clonidine], Cymbalta [duloxetine], Morphine, Oxycontin [oxycodone], and Sulfa (sulfonamide antibiotics)    TREATMENT PLAN:   Effective Dates: 6/10/2021 TO 2021 (90 days). Frequency/Duration: 1-2 times a week for 90 Day(s) MEDICAL/REFERRING DIAGNOSIS:  Unsteadiness [R26.81]    DATE OF ONSET: about a year ago  REFERRING PHYSICIAN: Terra Patterson MD MD Orders: eval and treat  Return MD Appointment: 21          Pre-treatment Symptoms/Complaints:  Patient reports she is hurting all over today. Her fibromyalgia is acting up. She got dizzy doing the walking drills last time. She had a CT scan done, but nothing showed up. Pain: Initial: Pain Intensity 1: 7    Post Session:  No more pain, just fatigue. Medications Last Reviewed:  21    Updated Objective Findings:   None Today     TREATMENT:     Therapeutic Exercise: (40 Minutes):  Exercises per grid below to improve mobility and strength. Required minimal visual and verbal cues to ensure corrrect performance. Progressed complexity of movement as indicated.       Date:  21 Date  21 Date  21 Date  21 Date  21 Date  7/15/21   Activity/Exercise Parameters   parameters paraeters    LAQs 1.5# 2x15 1.5# 2x15 2# 2x10 2# 2x10 2# 2x10 2.5# B 2x15   Seated marching 1.5# 2x15 1.5# 2x15 2# 2x10 2# 2x10 2#2x15 2.5# B 2x15   Sit to stand      Ground                             foam -  2x5 -  3x5 2x10  2x5 x10  x5  x10  2x5 -  3x5   Standing heel raises 1x15 2x15 2x15 - 2x15 2x15   Standing hip abduction B 2x15 B 2x15 B 2x15 - -  B 2x15   Partial knee bends 2x15 2x15 2x15 2x15 - -   Tandem stance B 2 x ~30 sec B 2 x~ 30 sec B 2x30 sec  B 2x30 sec ea B 2x30 sec each B 2x30 sec each   Standing  On foam EO/EC x 3 EO/EC x 3 EO/ EC 3 EO 3x EO 2x EO/EC x 3   Forward step ups 4\" B 1x15  4\" B 1x10 4\" B 2x15 Toe taps x10 ea - - 4\" B 2x15   Walking on straight line  12' x 4 12' x2 - Walking drills -   Sitting hip ER - - - Red t-band 2x10 B Red t-band 2x10 B  --     HEP: walking at home for endurance  [employer] Portal    Treatment/Session Summary:    · Response to Treatment:  Patient feels like she is getting stronger, but is still worried about her balance. She has PT scheduled next week, then cannot come the following week because of so many MD appointments and she will be at the beach the following week. · Communication/Consultation:  None today  · Equipment provided today:  None today  · Recommendations/Intent for next treatment session: Next visit will focus on strength and balance.      Total Treatment Billable Duration:  40 minutes  PT Patient Time In/Time Out  Time In: 1300  Time Out: [address] Lalita Mas PT    Future Appointments   Date Time Provider Aleena Mandujano   7/20/2021  2:30 PM Giles Dakin, [place] [facility]   7/22/2021  1:00 PM Giles Dakin, PT [facility]   7/27/2021  2:10 PM Ivette Catherine MD [facility] PST PST   8/24/2021  2:00 PM DO TIFFANY Ordonez UCDG UCD   8/31/2021 11:00 AM ENDO NURSE END BS ENDO   9/7/2021  3:00 PM MD DELFINA Miller BS ENDO

## 2021-07-20 ENCOUNTER — HOSPITAL ENCOUNTER (OUTPATIENT)
Dept: PHYSICAL THERAPY | Age: 76
Discharge: HOME | End: 2021-07-20
Attending: FAMILY MEDICINE
Payer: MEDICARE

## 2021-07-20 NOTE — PROGRESS NOTES
Michael Hernandez  : 1945  [place] / Product Type: Medicare /  [address]  [facility]  [address] [place] [address], [address] [place] [address].  [phone]   [phone]       OUTPATIENT PHYSICAL THERAPY: Cancellation 2021     ICD-10: Treatment Diagnosis: Unsteadiness on feet (R26.81)  Precautions/Allergies: per EMR, allergic to   Shellfish containing products, Celebrex [celecoxib], Catapres [clonidine], Cymbalta [duloxetine], Morphine, Oxycontin [oxycodone], and Sulfa (sulfonamide antibiotics)    TREATMENT PLAN:   Effective Dates: 6/10/2021 TO 2021 (90 days). Frequency/Duration: 1-2 times a week for 90 Day(s) MEDICAL/REFERRING DIAGNOSIS:  Unsteadiness [R26.81]    DATE OF ONSET: about a year ago  REFERRING PHYSICIAN: Dennis Sauceda MD MD Orders: eval and treat  Return MD Appointment: 21      Patient called to cancel scheduled appointment due to being at another appointment.

## 2021-07-22 ENCOUNTER — HOSPITAL ENCOUNTER (OUTPATIENT)
Dept: PHYSICAL THERAPY | Age: 76
Discharge: HOME | End: 2021-07-22
Attending: FAMILY MEDICINE
Payer: MEDICARE

## 2021-07-22 PROCEDURE — 97110 THERAPEUTIC EXERCISES: CPT

## 2021-07-22 NOTE — PROGRESS NOTES
Keren Wyatt  : 1945  [place] / Product Type: Medicare /  [address]  at [facility] CECIL EDWARDS  [address] [place] [address], [address], [place] [address].  [phone]   [phone]       OUTPATIENT PHYSICAL THERAPY: Daily Treatment Note 2021  Visit Count: 10  ICD-10: Treatment Diagnosis: Unsteadiness on feet (R26.81)  Precautions/Allergies: per EMR, allergic to   Shellfish containing products, Celebrex [celecoxib], Catapres [clonidine], Cymbalta [duloxetine], Morphine, Oxycontin [oxycodone], and Sulfa (sulfonamide antibiotics)    TREATMENT PLAN:   Effective Dates: 6/10/2021 TO 2021 (90 days). Frequency/Duration: 1-2 times a week for 90 Day(s) MEDICAL/REFERRING DIAGNOSIS:  Unsteadiness [R26.81]    DATE OF ONSET: about a year ago  REFERRING PHYSICIAN: Thedora Baumgarten, MD MD Orders: eval and treat  Return MD Appointment: 21          Pre-treatment Symptoms/Complaints:  Patient reports she went to one doctor on Tuesday and couldn't get to PT on time afterwards so she cancelled. Her doctor suggested that she injured her sciatic nerve again and that her fibromylagia is acting up. She returns to Dr. Paramjit Crowley next week. She reports that she is more sore today. Pain: Initial: Pain Intensity 1: 8    Post Session: \"Decreased, maybe a 6.5\"     Medications Last Reviewed:  21    Updated Objective Findings:   SLS R = 4 sec, L = 2 sec     TREATMENT:     Therapeutic Exercise: (40 Minutes):  Exercises per grid below to improve mobility and strength. Required minimal visual and verbal cues to ensure corrrect performance. Progressed complexity of movement as indicated.       Date  21 Date  21 Date  21 Date  7/15/21 Date  21   Activity/Exercise  parameters paraeters     LAQs 2# 2x10 2# 2x10 2# 2x10 2.5# B 2x15 2.5# B 2x15   Seated marching 2# 2x10 2# 2x10 2#2x15 2.5# B 2x15 2.5# B 2x15   Sit to stand      [facility] foam 2x10  2x5 x10  x5  x10  2x5 -  3x5 -  3x5   Standing heel raises 2x15 - 2x15 2x15 2x15   Standing hip abduction B 2x15 - -  B 2x15 B 2x15   Partial knee bends 2x15 2x15 - - -   Tandem stance B 2x30 sec  B 2x30 sec ea B 2x30 sec each B 2x30 sec each B 2x30 sec ea   Standing  On foam EO/ EC 3 EO 3x EO 2x EO/EC x 3 EO/EC x 4   Forward step ups Toe taps x10 ea - - 4\" B 2x15 4\" B 2x15   Walking on straight line 12' x2 - Walking drills - -   Sitting hip ER - Red t-band 2x10 B Red t-band 2x10 B  -- -     HEP: walking at home for endurance  [employer] Portal    Treatment/Session Summary:    · Response to Treatment:  Patient is gradually progressing in strength and balance. See Progress Report. She returns to MD next week and will be on vacation the week after that. She was instructed to call and schedule more appointments if the doctor suggests that she do so. · Communication/Consultation:  None today  · Equipment provided today:  None today  · Recommendations/Intent for next treatment session: Next visit will focus on strength and balance.      Total Treatment Billable Duration:  40 minutes  PT Patient Time In/Time Out  Time In: 1302  Time Out: [address], [place]    Future Appointments   Date Time Provider Aleena Mandujano   7/27/2021  2:10 PM MD TIFFANY Aguilera PST PST   8/24/2021  2:00 PM DO TIFFANY Hastings UCDG UCD   8/31/2021 11:00 AM ENDO NURSE DELFINA LUEVANO ENDO   9/7/2021  3:00 PM MD DELFINA Burroughs ENDO

## 2021-09-20 NOTE — THERAPY DISCHARGE
Perfecto Orellana  : 1945  [place]  Secondary: [address] [facility] [facility]  [address] [facility], [address] [place] [address],[address], [address] [facility]  [phone]   [phone]       OUTPATIENT PHYSICAL THERAPY:Discontinuation Summary 2021       ICD-10: Treatment Diagnosis: Unsteadiness on feet (R26.81)  Precautions/Allergies: per EMR, allergic to   Shellfish containing products, Celebrex [celecoxib], Catapres [clonidine], Cymbalta [duloxetine], Morphine, Oxycontin [oxycodone], and Sulfa (sulfonamide antibiotics)    TREATMENT PLAN:   Discontinue PT  Patient  attended 10 PT sessions from 6/10/21 to 21 with 3 missed sessions. MEDICAL/REFERRING DIAGNOSIS:  unsteadiness    DATE OF ONSET: about a year ago  REFERRING PHYSICIAN: Adrianna Estrella MD MD Orders: eval and treat  Return MD Appointment: unknown     ASSESSMENT:  Ms. Gerhard Drummond is a 77year old female known to PT from previous encounters. She presented with weakness in extremities, decreased balance with history of falls, and general malaise and fatigue. She reported she had been lethargic and had no appetite. She reported that she had a CT scan after initial PT eval, but no significant findings were shown. She also complained of headaches that started over a year ago. Her LEFS score and balance had both improved during the course of PT, but she did not return after she was scheduled to return to MD in July. She will now be discontinued from PT.       GOALS: (Goals have been discussed and agreed upon with patient.)  Goal attainment as of 21  Patient's stated goal is \"stop falling\" and \"get my energy back\"  Short-Term Functional Goals: Time Frame: 6 weeks  1. Patient to be independent with HEP - MET  2. Patient to improve balance to 2 second SLS on each leg - MET  Discharge Goals: Time Frame: 90 days - all in progress.    1. Patient to report no more than minimal pain with all activity - Not MET  2. Patient to perform SLS of 5 seconds on each leg for improved balance during activity - Not MET yet  3. Patient to improve LEFS from 18 at eval to 40 to demo improved functional mobility - Not MET, but progressed    OUTCOME MEASURE:   Tool Used: Lower Extremity Functional Scale (LEFS)  Score:  Initial: 18/80 Most Recent: 25/80 (Date: 7/22/21 )   Interpretation of Score: 20 questions each scored on a 5 point scale with 0 representing \"extreme difficulty or unable to perform\" and 4 representing \"no difficulty\". The lower the score, the greater the functional disability. 80/80 represents no disability. Minimal detectable change is 9 points. Data from initial evaluation unless otherwise indicated. PAIN/SUBJECTIVE:   Initial: Pain Intensity 1: 8  (7/22/21) Post Session: Decreased to 6.5  (7/22/21)   HISTORY:   History of Injury/Illness (Reason for Referral):  Patient reports that her dizziness started about a year ago. At first she thought it was fluid in her ears and she took decongestants but it didn't help. She reports she has been sleepy all the time and has no energy or appetite. She has a CT scan scheudles in 2 weeks. She stumbles when she first gets up. She can no longer walk without an assistive device for safety. She has tried medicine changes prescribed by MD, but symptoms remain. She also notes more frequent headaches in the past year. Past Medical History/Comorbidities: patient reports that EMR should be accurate.    Ms. Dary Reina  has a past medical history of Acute pancreatitis (2/2008), Aneurysm ([address] Anxiety, Bilateral sacroiliitis ([facility] [address] Breast cyst, CAD (coronary artery disease), Cellulitis (6/16/15), Chest pain, Chronic kidney disease, Chronic pain, CKD (chronic kidney disease), Degenerative disc disease, Depression (12/23/2015), Diabetes mellitus type II, Dyslipidemia, Dyspnea, Edema (12/23/2015), Fatty liver, Fibromyalgia, Food allergy, GERD (gastroesophageal reflux disease), Heart failure [facility]), echocardiogram (08/21/2015), Hypertension, Hypertriglyceridemia, Hypothyroidism, Lumbar herniated disc (Aug 2013), Lumbar radiculopathy, Macrocytic anemia, Microcytic anemia, Mild pulmonary hypertension ([address] Nausea & vomiting, Neuropathy, Obesity (BMI 30.0-34.9) (10/2/14), Osteoarthritis, Panic attacks, Sacroiliac dysfunction, Seasonal allergic reaction, Sleep apnea, and Syncope and collapse (12/23/2015). Ms. Valeria Obregon  has a past surgical history that includes hx partial thyroidectomy (2008); hx breast lumpectomy (Right); hx breast biopsy (Bilateral, R/1988  L/2012); hx premalig/benign skin lesion excision (09/29/2014); hx cyst removal (Left, 02/2019); hx heart catheterization (2005); hx jorge and bso (1995); hx knee arthroscopy (Left, 2004); hx orthopaedic (Left, 2010); hx orthopaedic (Right, 2009); hx knee arthroscopy (10/03/2014); hx back surgery (08/30/2013); hx other surgical (12/22/09); hx other surgical; hx other surgical (10/28/13); hx knee replacement (Left, 03/14/2017); hx orthopaedic (04/03/2019); hx knee replacement (Right, 05/2019); and hx cyst removal (Right, 04/2019). Social History/Living Environment:     moving into a new one story house in August adjacent to her daughter's family. Prior Level of Function/Work/Activity:  Retired. 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 Current Outpatient Medications:     Toujeo SoloStar U-300 Insulin 300 unit/mL (1.5 mL) inpn pen, 50 Units by SubCUTAneous route nightly., Disp: 6 Pen, Rfl: 11    Trulicity 1.5 FR/7.1 mL sub-q pen, 0.5 mL by SubCUTAneous route every seven (7) days. , Disp: 4 Each, Rfl: 11    metFORMIN ER (GLUCOPHAGE XR) 500 mg tablet, Take 1 Tablet by mouth daily. , Disp: 30 Tablet, Rfl: 11    BD Carolynn 2nd Gen Pen Needle 32 gauge x 5/32\" ndle, Once a day, Disp: 50 Pen Needle, Rfl: 11    ezetimibe-simvastatin (VYTORIN) 10-40 mg per tablet, Take 1 Tablet by mouth nightly., Disp: 30 Tablet, Rfl: 11    levothyroxine (SYNTHROID) 112 mcg tablet, Take 1 Tablet by mouth Daily (before breakfast). , Disp: 30 Tablet, Rfl: 11    amLODIPine (NORVASC) 5 mg tablet, Take 1 Tablet by mouth daily. , Disp: 30 Tablet, Rfl: 11    metoprolol succinate (TOPROL-XL) 100 mg tablet, Take 2 Tablets by mouth daily. , Disp: 60 Tablet, Rfl: 11    irbesartan (AVAPRO) 300 mg tablet, Take 1 Tablet by mouth daily. , Disp: 30 Tablet, Rfl: 11    gabapentin (NEURONTIN) 100 mg capsule, TAKE 1 CAPSULE BY MOUTH THREE TIMES DAILY, Disp: 90 Capsule, Rfl: 5    escitalopram oxalate (LEXAPRO) 20 mg tablet, TAKE 1 TABLET BY MOUTH DAILY, Disp: 30 Tablet, Rfl: 5    DISABLED PLACARD (DISABLED PLACARD) DMV, Supply prescription to [facility] with completed form [id number]., Disp: , Rfl:     hydroCHLOROthiazide (HYDRODIURIL) 25 mg tablet, Take 1 Tablet by mouth daily. , Disp: 30 Tablet, Rfl: 5    butalbital-acetaminophen-caff (FIORICET) -40 mg per capsule, Take 1 Cap by mouth every four (4) hours as needed (headache). , Disp: 20 Cap, Rfl: 1    ergocalciferol (ERGOCALCIFEROL) 1,250 mcg (50,000 unit) capsule, TAKE 1 CAPSULE BY MOUTH EVERY 7 DAYS, Disp: 4 Cap, Rfl: 5    LORazepam (ATIVAN) 0.5 mg tablet, TK 1 T PO QID PRA, Disp: , Rfl:     zolpidem (AMBIEN) 10 mg tablet, , Disp: , Rfl:     naloxone (Narcan) 4 mg/actuation nasal spray, by Nasal route once., Disp: , Rfl:     OTHER, Neurop Away  Take 4 po qd, Disp: , Rfl:     busPIRone (BUSPAR) 15 mg tablet, Take 1 Tab by mouth four (4) times daily. , Disp: 360 Tab, Rfl: 3    cycloSPORINE (RESTASIS MULTIDOSE) 0.05 % drop ophthalmic drops, Administer 1 Drop to both eyes daily as needed (dryness). , Disp: , Rfl:     docusate sodium (COLACE) 100 mg capsule, Take 100 mg by mouth daily as needed for Constipation. , Disp: , Rfl:     fexofenadine (ALLEGRA) 180 mg tablet, Take 180 mg by mouth nightly., Disp: , Rfl:     magnesium oxide (MAG-OX) 400 mg tablet, Take 1 Tab by mouth two (2) times a day., Disp: 60 Tab, Rfl: 11   Date Last Reviewed:  7/22/21   EXAMINATION:     Observation/Orthostatic Postural Assessment: patient's gait into clinic is somewhat more steady than at initial evaluation (7/22/21)  ROM: grossly symmetrical and WFL (at eval)  Strength: weak in B UEs and LEs  (at eval)  B shoulder flex 4-, extn 4+, abduct 4, IR 4 and ER 4-;  B LE DF 4, PF 4+, knee flex 4+, knee extn 4, hip IR 4, hip ER 4, hip abduct 4- and hip extn 4/5. Neurological Screen: neuropathy in B feet, light touch intact in B UEs (at eval)  Functional Mobility:  Uses cane for ambulation.  (7/22/21)  Balance:  Unable to perform SLS on either LE (at eval). SLS 2 seconds on L, 4 seconds on R (7/22/21).

## 2022-02-22 PROBLEM — R06.09 DOE (DYSPNEA ON EXERTION): Status: ACTIVE | Noted: 2022-02-22

## 2022-03-19 PROBLEM — Z91.14 NONCOMPLIANCE WITH CPAP TREATMENT: Status: ACTIVE | Noted: 2019-05-09

## 2022-03-19 PROBLEM — Z96.651 S/P TKR (TOTAL KNEE REPLACEMENT) USING CEMENT, RIGHT: Status: ACTIVE | Noted: 2019-05-17

## 2022-03-19 PROBLEM — R06.09 DOE (DYSPNEA ON EXERTION): Status: ACTIVE | Noted: 2022-02-22

## 2022-03-19 PROBLEM — M17.11 ARTHRITIS OF KNEE, RIGHT: Status: ACTIVE | Noted: 2019-05-16

## 2022-03-19 PROBLEM — Z91.199 NONCOMPLIANCE WITH CPAP TREATMENT: Status: ACTIVE | Noted: 2019-05-09

## 2022-03-20 PROBLEM — E11.42 CONTROLLED TYPE 2 DIABETES MELLITUS WITH DIABETIC POLYNEUROPATHY, WITH LONG-TERM CURRENT USE OF INSULIN (HCC): Status: ACTIVE | Noted: 2017-10-04

## 2022-03-20 PROBLEM — Z79.4 CONTROLLED TYPE 2 DIABETES MELLITUS WITH DIABETIC POLYNEUROPATHY, WITH LONG-TERM CURRENT USE OF INSULIN (HCC): Status: ACTIVE | Noted: 2017-10-04

## 2022-04-08 ENCOUNTER — HOSPITAL ENCOUNTER (OUTPATIENT)
Dept: LAB | Age: 77
Discharge: HOME | End: 2022-04-08
Payer: MEDICARE

## 2022-04-08 DIAGNOSIS — I34.0 NONRHEUMATIC MITRAL VALVE REGURGITATION: ICD-10-CM

## 2022-04-08 DIAGNOSIS — R60.0 LOCALIZED EDEMA: ICD-10-CM

## 2022-04-08 LAB
ALBUMIN SERPL-MCNC: 3.7 G/DL (ref 3.2–4.6)
ALBUMIN/GLOB SERPL: 1 {RATIO} (ref 1.2–3.5)
ALP SERPL-CCNC: 179 U/L (ref 50–136)
ALT SERPL-CCNC: 50 U/L (ref 12–65)
ANION GAP SERPL CALC-SCNC: 4 MMOL/L (ref 7–16)
AST SERPL-CCNC: 19 U/L (ref 15–37)
BASOPHILS # BLD: 0 K/UL (ref 0–0.2)
BASOPHILS NFR BLD: 0 % (ref 0–2)
BILIRUB SERPL-MCNC: 0.5 MG/DL (ref 0.2–1.1)
BNP SERPL-MCNC: 462 PG/ML
BUN SERPL-MCNC: 34 MG/DL (ref 8–23)
CALCIUM SERPL-MCNC: 9 MG/DL (ref 8.3–10.4)
CHLORIDE SERPL-SCNC: 97 MMOL/L (ref 98–107)
CO2 SERPL-SCNC: 27 MMOL/L (ref 21–32)
CREAT SERPL-MCNC: 1.1 MG/DL (ref 0.6–1)
DIFFERENTIAL METHOD BLD: ABNORMAL
EOSINOPHIL # BLD: 0.1 K/UL (ref 0–0.8)
EOSINOPHIL NFR BLD: 2 % (ref 0.5–7.8)
ERYTHROCYTE [DISTWIDTH] IN BLOOD BY AUTOMATED COUNT: 14.9 % (ref 11.9–14.6)
GLOBULIN SER CALC-MCNC: 3.7 G/DL (ref 2.3–3.5)
GLUCOSE SERPL-MCNC: 222 MG/DL (ref 65–100)
HCT VFR BLD AUTO: 34 % (ref 35.8–46.3)
HGB BLD-MCNC: 10.5 G/DL (ref 11.7–15.4)
IMM GRANULOCYTES # BLD AUTO: 0.1 K/UL (ref 0–0.5)
IMM GRANULOCYTES NFR BLD AUTO: 1 % (ref 0–5)
LYMPHOCYTES # BLD: 1.3 K/UL (ref 0.5–4.6)
LYMPHOCYTES NFR BLD: 17 % (ref 13–44)
MAGNESIUM SERPL-MCNC: 2.6 MG/DL (ref 1.8–2.4)
MCH RBC QN AUTO: 24 PG (ref 26.1–32.9)
MCHC RBC AUTO-ENTMCNC: 30.9 G/DL (ref 31.4–35)
MCV RBC AUTO: 77.6 FL (ref 79.6–97.8)
MONOCYTES # BLD: 0.6 K/UL (ref 0.1–1.3)
MONOCYTES NFR BLD: 8 % (ref 4–12)
NEUTS SEG # BLD: 5.4 K/UL (ref 1.7–8.2)
NEUTS SEG NFR BLD: 72 % (ref 43–78)
NRBC # BLD: 0 K/UL (ref 0–0.2)
PLATELET # BLD AUTO: 326 K/UL (ref 150–450)
PMV BLD AUTO: 9.8 FL (ref 9.4–12.3)
POTASSIUM SERPL-SCNC: 5.5 MMOL/L (ref 3.5–5.1)
PROT SERPL-MCNC: 7.4 G/DL (ref 6.3–8.2)
RBC # BLD AUTO: 4.38 M/UL (ref 4.05–5.2)
SODIUM SERPL-SCNC: 128 MMOL/L (ref 136–145)
TSH SERPL DL<=0.005 MIU/L-ACNC: 0.48 UIU/ML (ref 0.36–3.74)
WBC # BLD AUTO: 7.5 K/UL (ref 4.3–11.1)

## 2022-04-08 PROCEDURE — 83735 ASSAY OF MAGNESIUM: CPT

## 2022-04-08 PROCEDURE — 84443 ASSAY THYROID STIM HORMONE: CPT

## 2022-04-08 PROCEDURE — 83880 ASSAY OF NATRIURETIC PEPTIDE: CPT

## 2022-04-08 PROCEDURE — 80053 COMPREHEN METABOLIC PANEL: CPT

## 2022-04-08 PROCEDURE — 36415 COLL VENOUS BLD VENIPUNCTURE: CPT

## 2022-04-08 PROCEDURE — 85025 COMPLETE CBC W/AUTO DIFF WBC: CPT

## 2022-04-11 NOTE — PROGRESS NOTES
Please call herYessenia low on labs. Needs to change lasix to PRN based on weights, weight daily. Then, need another BMP this Thursday as well.   Thanks

## 2022-06-27 RX ORDER — METOPROLOL SUCCINATE 100 MG/1
200 TABLET, EXTENDED RELEASE ORAL DAILY
Qty: 60 TABLET | Refills: 5 | Status: SHIPPED | OUTPATIENT
Start: 2022-06-27 | End: 2022-09-28 | Stop reason: SDUPTHER

## 2022-06-27 RX ORDER — BUTALBITAL, ACETAMINOPHEN AND CAFFEINE 300; 40; 50 MG/1; MG/1; MG/1
1 CAPSULE ORAL EVERY 4 HOURS PRN
Qty: 20 CAPSULE | Refills: 2 | Status: SHIPPED | OUTPATIENT
Start: 2022-06-27 | End: 2022-10-19 | Stop reason: SDUPTHER

## 2022-07-07 RX ORDER — DULAGLUTIDE 1.5 MG/.5ML
INJECTION, SOLUTION SUBCUTANEOUS
Qty: 2 ML | Refills: 1 | Status: SHIPPED | OUTPATIENT
Start: 2022-07-07 | End: 2022-08-23 | Stop reason: SDUPTHER

## 2022-07-18 ENCOUNTER — OFFICE VISIT (OUTPATIENT)
Dept: CARDIOLOGY CLINIC | Age: 77
End: 2022-07-18
Payer: MEDICARE

## 2022-07-18 VITALS
SYSTOLIC BLOOD PRESSURE: 120 MMHG | DIASTOLIC BLOOD PRESSURE: 66 MMHG | HEART RATE: 72 BPM | WEIGHT: 196.6 LBS | HEIGHT: 65 IN | BODY MASS INDEX: 32.76 KG/M2

## 2022-07-18 DIAGNOSIS — I50.22 CHRONIC SYSTOLIC (CONGESTIVE) HEART FAILURE (HCC): ICD-10-CM

## 2022-07-18 DIAGNOSIS — I10 ESSENTIAL HYPERTENSION: ICD-10-CM

## 2022-07-18 DIAGNOSIS — I34.0 NONRHEUMATIC MITRAL VALVE REGURGITATION: Primary | ICD-10-CM

## 2022-07-18 DIAGNOSIS — N18.31 STAGE 3A CHRONIC KIDNEY DISEASE (HCC): ICD-10-CM

## 2022-07-18 PROBLEM — N18.30 CHRONIC RENAL DISEASE, STAGE III (HCC): Status: ACTIVE | Noted: 2022-07-18

## 2022-07-18 PROCEDURE — G8428 CUR MEDS NOT DOCUMENT: HCPCS | Performed by: INTERNAL MEDICINE

## 2022-07-18 PROCEDURE — 1123F ACP DISCUSS/DSCN MKR DOCD: CPT | Performed by: INTERNAL MEDICINE

## 2022-07-18 PROCEDURE — 1090F PRES/ABSN URINE INCON ASSESS: CPT | Performed by: INTERNAL MEDICINE

## 2022-07-18 PROCEDURE — 3017F COLORECTAL CA SCREEN DOC REV: CPT | Performed by: INTERNAL MEDICINE

## 2022-07-18 PROCEDURE — G8399 PT W/DXA RESULTS DOCUMENT: HCPCS | Performed by: INTERNAL MEDICINE

## 2022-07-18 PROCEDURE — 99214 OFFICE O/P EST MOD 30 MIN: CPT | Performed by: INTERNAL MEDICINE

## 2022-07-18 PROCEDURE — 1036F TOBACCO NON-USER: CPT | Performed by: INTERNAL MEDICINE

## 2022-07-18 PROCEDURE — G8419 CALC BMI OUT NRM PARAM NOF/U: HCPCS | Performed by: INTERNAL MEDICINE

## 2022-07-18 RX ORDER — CETIRIZINE HYDROCHLORIDE 10 MG/1
10 TABLET ORAL
COMMUNITY

## 2022-07-18 RX ORDER — GUAIFENESIN 600 MG/1
600 TABLET, EXTENDED RELEASE ORAL DAILY PRN
COMMUNITY

## 2022-07-18 RX ORDER — ZOLPIDEM TARTRATE 10 MG/1
TABLET ORAL
COMMUNITY
Start: 2022-06-30

## 2022-07-18 RX ORDER — FOLIC ACID 1 MG/1
5 TABLET ORAL DAILY
COMMUNITY
Start: 2022-06-03 | End: 2022-10-19 | Stop reason: ALTCHOICE

## 2022-07-18 RX ORDER — OXYCODONE HYDROCHLORIDE 15 MG/1
15 TABLET ORAL EVERY 6 HOURS PRN
COMMUNITY
Start: 2021-10-04 | End: 2022-08-08
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
## 2022-08-10 RX ORDER — NORTRIPTYLINE HYDROCHLORIDE 10 MG/1
CAPSULE ORAL
COMMUNITY
Start: 2022-07-08

## 2022-08-10 RX ORDER — INSULIN GLARGINE 300 U/ML
50 INJECTION, SOLUTION SUBCUTANEOUS NIGHTLY
COMMUNITY
End: 2022-08-10 | Stop reason: SDUPTHER

## 2022-08-10 RX ORDER — INSULIN GLARGINE 300 U/ML
50 INJECTION, SOLUTION SUBCUTANEOUS NIGHTLY
Qty: 9 ML | Refills: 0 | Status: SHIPPED | OUTPATIENT
Start: 2022-08-10 | End: 2022-08-23 | Stop reason: SDUPTHER

## 2022-08-10 NOTE — TELEPHONE ENCOUNTER
Pt LVM stating she needs a refill on her Toujeo and to send it to her [facility] pharmacy on [facility] in [place]. Last OV:9/7/21  Next OV:8/23/22  Last Refill:9/7/21-6 pens with 11 rfs.     Sending request to Provider for approval.

## 2022-08-12 RX ORDER — ERGOCALCIFEROL (VITAMIN D2) 1250 MCG
50000 CAPSULE ORAL WEEKLY
Qty: 4 CAPSULE | Refills: 5 | Status: SHIPPED | OUTPATIENT
Start: 2022-08-12

## 2022-08-17 ENCOUNTER — NURSE ONLY (OUTPATIENT)
Dept: FAMILY MEDICINE CLINIC | Facility: CLINIC | Age: 77
End: 2022-08-17

## 2022-08-17 DIAGNOSIS — E78.2 MIXED HYPERLIPIDEMIA: ICD-10-CM

## 2022-08-17 DIAGNOSIS — I10 ESSENTIAL HYPERTENSION: ICD-10-CM

## 2022-08-17 DIAGNOSIS — Z79.4 CONTROLLED TYPE 2 DIABETES MELLITUS WITH DIABETIC POLYNEUROPATHY, WITH LONG-TERM CURRENT USE OF INSULIN (HCC): Primary | ICD-10-CM

## 2022-08-17 DIAGNOSIS — E11.42 CONTROLLED TYPE 2 DIABETES MELLITUS WITH DIABETIC POLYNEUROPATHY, WITH LONG-TERM CURRENT USE OF INSULIN (HCC): Primary | ICD-10-CM

## 2022-08-17 LAB
ALBUMIN SERPL-MCNC: 3.7 G/DL (ref 3.2–4.6)
ALBUMIN/GLOB SERPL: 1.2 {RATIO} (ref 1.2–3.5)
ALP SERPL-CCNC: 76 U/L (ref 50–136)
ALT SERPL-CCNC: 25 U/L (ref 12–65)
ANION GAP SERPL CALC-SCNC: 7 MMOL/L (ref 7–16)
AST SERPL-CCNC: 14 U/L (ref 15–37)
BILIRUB SERPL-MCNC: 0.5 MG/DL (ref 0.2–1.1)
BUN SERPL-MCNC: 8 MG/DL (ref 8–23)
CALCIUM SERPL-MCNC: 8.7 MG/DL (ref 8.3–10.4)
CHLORIDE SERPL-SCNC: 101 MMOL/L (ref 98–107)
CHOLEST SERPL-MCNC: 108 MG/DL
CO2 SERPL-SCNC: 25 MMOL/L (ref 21–32)
CREAT SERPL-MCNC: 1 MG/DL (ref 0.6–1)
GLOBULIN SER CALC-MCNC: 3.2 G/DL (ref 2.3–3.5)
GLUCOSE SERPL-MCNC: 104 MG/DL (ref 65–100)
HDLC SERPL-MCNC: 30 MG/DL (ref 40–60)
HDLC SERPL: 3.6 {RATIO}
LDLC SERPL CALC-MCNC: 43.4 MG/DL
POTASSIUM SERPL-SCNC: 3.9 MMOL/L (ref 3.5–5.1)
PROT SERPL-MCNC: 6.9 G/DL (ref 6.3–8.2)
SODIUM SERPL-SCNC: 133 MMOL/L (ref 136–145)
TRIGL SERPL-MCNC: 173 MG/DL (ref 35–150)
VLDLC SERPL CALC-MCNC: 34.6 MG/DL (ref 6–23)

## 2022-08-18 LAB
EST. AVERAGE GLUCOSE BLD GHB EST-MCNC: 100 MG/DL
HBA1C MFR BLD: 5.1 % (ref 4.8–5.6)

## 2022-08-23 ENCOUNTER — OFFICE VISIT (OUTPATIENT)
Dept: ENDOCRINOLOGY | Age: 77
End: 2022-08-23
Payer: MEDICARE

## 2022-08-23 VITALS
HEART RATE: 86 BPM | OXYGEN SATURATION: 98 % | BODY MASS INDEX: 32.65 KG/M2 | HEIGHT: 65 IN | WEIGHT: 196 LBS | DIASTOLIC BLOOD PRESSURE: 90 MMHG | SYSTOLIC BLOOD PRESSURE: 156 MMHG

## 2022-08-23 DIAGNOSIS — I10 ESSENTIAL HYPERTENSION: ICD-10-CM

## 2022-08-23 DIAGNOSIS — E89.0 POSTSURGICAL HYPOTHYROIDISM: ICD-10-CM

## 2022-08-23 DIAGNOSIS — E11.42 CONTROLLED TYPE 2 DIABETES MELLITUS WITH DIABETIC POLYNEUROPATHY, WITH LONG-TERM CURRENT USE OF INSULIN (HCC): Primary | ICD-10-CM

## 2022-08-23 DIAGNOSIS — Z79.4 CONTROLLED TYPE 2 DIABETES MELLITUS WITH DIABETIC POLYNEUROPATHY, WITH LONG-TERM CURRENT USE OF INSULIN (HCC): Primary | ICD-10-CM

## 2022-08-23 DIAGNOSIS — E78.2 MIXED HYPERLIPIDEMIA: ICD-10-CM

## 2022-08-23 PROCEDURE — G8427 DOCREV CUR MEDS BY ELIG CLIN: HCPCS | Performed by: INTERNAL MEDICINE

## 2022-08-23 PROCEDURE — 1123F ACP DISCUSS/DSCN MKR DOCD: CPT | Performed by: INTERNAL MEDICINE

## 2022-08-23 PROCEDURE — 1090F PRES/ABSN URINE INCON ASSESS: CPT | Performed by: INTERNAL MEDICINE

## 2022-08-23 PROCEDURE — 3044F HG A1C LEVEL LT 7.0%: CPT | Performed by: INTERNAL MEDICINE

## 2022-08-23 PROCEDURE — 2022F DILAT RTA XM EVC RTNOPTHY: CPT | Performed by: INTERNAL MEDICINE

## 2022-08-23 PROCEDURE — 1036F TOBACCO NON-USER: CPT | Performed by: INTERNAL MEDICINE

## 2022-08-23 PROCEDURE — 99214 OFFICE O/P EST MOD 30 MIN: CPT | Performed by: INTERNAL MEDICINE

## 2022-08-23 PROCEDURE — 3017F COLORECTAL CA SCREEN DOC REV: CPT | Performed by: INTERNAL MEDICINE

## 2022-08-23 PROCEDURE — G8399 PT W/DXA RESULTS DOCUMENT: HCPCS | Performed by: INTERNAL MEDICINE

## 2022-08-23 PROCEDURE — G8417 CALC BMI ABV UP PARAM F/U: HCPCS | Performed by: INTERNAL MEDICINE

## 2022-08-23 RX ORDER — EZETIMIBE AND SIMVASTATIN 10; 40 MG/1; MG/1
1 TABLET ORAL NIGHTLY
Qty: 30 TABLET | Refills: 11 | Status: SHIPPED | OUTPATIENT
Start: 2022-08-23

## 2022-08-23 RX ORDER — DULAGLUTIDE 1.5 MG/.5ML
INJECTION, SOLUTION SUBCUTANEOUS
Qty: 2 ML | Refills: 11 | Status: SHIPPED | OUTPATIENT
Start: 2022-08-23

## 2022-08-23 RX ORDER — OXYCODONE HYDROCHLORIDE 15 MG/1
15 TABLET ORAL EVERY 6 HOURS PRN
COMMUNITY
Start: 2022-08-08

## 2022-08-23 RX ORDER — METFORMIN HYDROCHLORIDE 500 MG/1
500 TABLET, EXTENDED RELEASE ORAL DAILY
Qty: 30 TABLET | Refills: 11 | Status: SHIPPED | OUTPATIENT
Start: 2022-08-23

## 2022-08-23 RX ORDER — PEN NEEDLE, DIABETIC 32GX 5/32"
NEEDLE, DISPOSABLE MISCELLANEOUS
Qty: 50 EACH | Refills: 11 | Status: SHIPPED | OUTPATIENT
Start: 2022-08-23

## 2022-08-23 RX ORDER — FERROUS FUMARATE AND POLYSACCHRIDE IRON VITAMIN MINERAL COMPLEX SUPPLEMENT 191.2; 135.9; 1; 210; 20; 5; 5; 7; 25; 3 MG/1; MG/1; MG/1; MG/1; MG/1; MG/1; MG/1; MG/1; MG/1; UG/1
CAPSULE ORAL
COMMUNITY
Start: 2022-08-15

## 2022-08-23 RX ORDER — INSULIN GLARGINE 300 U/ML
50 INJECTION, SOLUTION SUBCUTANEOUS NIGHTLY
Qty: 9 ML | Refills: 11 | Status: SHIPPED | OUTPATIENT
Start: 2022-08-23

## 2022-08-23 RX ORDER — LEVOTHYROXINE SODIUM 112 UG/1
112 TABLET ORAL DAILY
Qty: 30 TABLET | Refills: 11 | Status: SHIPPED | OUTPATIENT
Start: 2022-08-23

## 2022-08-23 ASSESSMENT — ENCOUNTER SYMPTOMS
CONSTIPATION: 0
DIARRHEA: 0

## 2022-08-23 NOTE — PROGRESS NOTES
Aaron Huitron MD, [facility]  [address], [address] [facility], [address] [place] [address]  Phone [phone]          Radha Luther is a 77 y.o. female seen 8/23/2022 for follow-up of type 2 diabetes mellitus (has not been here since 9/2021)        ASSESSMENT AND PLAN:    1. Controlled type 2 diabetes mellitus with diabetic polyneuropathy, with long-term current use of insulin (HCC)  Her overall glycemic control is excellent and her hemoglobin A1c is at goal less than 7. Eye exam negative 1/2022 per her report. Urine microalbumin negative 8/2021 (she is on an ARB) and will be updated. .    - TOUJEO SOLOSTAR 300 UNIT/ML SOPN; Inject 50 Units into the skin at bedtime  Dispense: 9 mL; Refill: 11  - TRULICITY 1.5 BR/7.0DM SOPN; 1.5 mg, SubCUTAneous, EVERY 7 DAYS  Dispense: 2 mL; Refill: 11  - metFORMIN (GLUCOPHAGE-XR) 500 MG extended release tablet; Take 1 tablet by mouth daily  Dispense: 30 tablet; Refill: 11  - BD PEN NEEDLE CHINA 2ND GEN 32G X 4 MM MISC; Once a day  Dispense: 50 each; Refill: 11    2. Postsurgical hypothyroidism  Biochemically euthyroid 4/2022.    - levothyroxine (SYNTHROID) 112 MCG tablet; Take 1 tablet by mouth Daily  Dispense: 30 tablet; Refill: 11    3. Essential hypertension  Followed by her primary care physician and cardiologist.    4. Mixed hyperlipidemia  LDL at goal 8/2022.    - ezetimibe-simvastatin (VYTORIN) 10-40 MG per tablet; Take 1 tablet by mouth nightly  Dispense: 30 tablet; Refill: 11      Follow-up and Dispositions    Return in about 6 months (around 2/23/2023). HISTORY OF PRESENT ILLNESS:    DIABETES MELLITUS     Diagnosis: Type 2 diabetes mellitus diagnosed around 2011. Her chart states that she has a history of acute pancreatitis but she denies this adamantly. Diet: She usually tries to limit carbohydrates and fried foods. She has been avoiding sweets for the most part.   No sweet tea or regular soft drinks. Exercise: Walking daily. Monitorin-2 times per day. Blood Glucose: By recall: Fasting . Hypoglycemia: +Hypoglycemic awareness. No recent lows. Hemoglobin A1c:  3/15/2017: 8.9.  2017: 7.3.  2017: 8.4.  2017: 6.9.  2016: 5.8.  2018: 5.6.  2018: 5.8.  3/26/2019: 5.4.  10/1/2019: 5.0.  2019: 5.5.  7/15/2020: 5.5.  3/2/2021: 5.1.  2021: 6.1.  2022: 5.1. Microalbumin/Nephropathy:  2017: Creatinine 1.10 (GFR 51).    2017: Creatinine 1.63 (GFR 32). 10/4/2017: Creatinine 1.61 (GFR 32), urine microalbumin/creatinine 75.6.  3/28/2018: Creatinine 1.41 (GFR 43). 10/24/2018: Creatinine 1.00 (GFR 57).  2019: Creatinine 1.10 (GFR 52), urine microalbumin/creatinine 12.  2019: Creatinine 1.04 (GFR 55). 7/15/2020: Creatinine 1.09 (GFR 50), urine microalbumin-POC 20.  2021: Creatinine 1.35 (GFR 39), urine microalbumin/creatinine 8.  2022: Creatinine 1.00 (GFR 57). Neuropathy: +Diabetic peripheral neuropathy with burning and tingling of feet and legs. She has some numbness in her right 1st toe. Retinopathy: Eye exam 2022 - no retinopathy. Lipids:  2017: Total cholesterol 150, triglycerides 172, HDL 35, LDL 81, VLDL 34.    2017: Total cholesterol 123, triglycerides 280, HDL 24, LDL 43, VLDL 56.  3/28/2018: Total cholesterol 150, triglycerides 215, HDL 35, LDL 72, VLDL 43.  10/24/2018: Total cholesterol 138, triglycerides 205, HDL 29, LDL 68, VLDL 41.  2019: Total cholesterol 149, triglycerides 162, HDL 41, LDL 75.6, VLDL 32.4.  7/15/2020: Total cholesterol 124, triglycerides 136, HDL 39, LDL 58, VLDL 27.  2021: Total cholesterol 146, triglycerides 246, HDL 30, LDL 75, VLDL 41.  2022: Total cholesterol 108, triglycerides 173, HDL 30, LDL 43.4, VLDL 34.6. Prior Treatment: Glipizide and pioglitazone stopped at initial visit.          THYROID DISEASE Presentation/Diagnosis: Primary hyperparathyroidism status post parathyroidectomy/partial thyroidectomy 2008. Treatment: She has been on the current dose of levothyroxine for years. Takes generic in AM with other medications. Imaging: None. Labs:  9/9/2015: TSH 1.490.  10/4/2017: TSH 3.340.  3/28/2018: TSH 1.530.  4/16/2019: TSH 1.22.  7/15/2020: TSH 0.659.  8/30/2021: TSH 0.971.  4/8/2022: TSH 0.44. Review of Systems   Constitutional:  Negative for diaphoresis and fatigue. Weight decreased 9 pounds the past 4 months. Cardiovascular:  Negative for palpitations. Gastrointestinal:  Negative for constipation and diarrhea. Endocrine: Negative for cold intolerance and heat intolerance. Neurological:  Negative for tremors. Vital Signs:  BP (!) 156/90   Pulse 86   Ht 5' 5\" (1.651 m)   Wt 196 lb (88.9 kg)   SpO2 98%   BMI 32.62 kg/m²     Wt Readings from Last 3 Encounters:   08/23/22 196 lb (88.9 kg)   07/18/22 196 lb 9.6 oz (89.2 kg)   04/08/22 205 lb (93 kg)       Physical Exam  Constitutional:       General: She is not in acute distress. Neck:      Thyroid: No thyroid mass or thyromegaly. Cardiovascular:      Rate and Rhythm: Normal rate and regular rhythm. Comments: DP pulses 1+ bilaterally. No edema. Lymphadenopathy:      Cervical: No cervical adenopathy. Skin:     Comments: No lesions on feet. Neurological:      Motor: No tremor. Comments: Monofilament decreased. Ankle reflexes absent. Orders Placed This Encounter   Procedures    Microalbumin / Creatinine Urine Ratio     Standing Status:   Future     Standing Expiration Date:   8/23/2023       Current Outpatient Medications   Medication Sig Dispense Refill    [id number] Cmp-C-Biot (INTEGRA PLUS) CAPS TAKE 1 CAPSULE DAILY INTEGRA PLUS IS NAME BRAND      oxyCODONE (OXY-IR) 15 MG immediate release tablet Take 15 mg by mouth every 6 hours as needed.       TOUJEO SOLOSTAR 300 UNIT/ML SOPN Inject 50 Units into the skin at bedtime 9 mL 11    TRULICITY 1.5 KL/4.5PN SOPN 1.5 mg, SubCUTAneous, EVERY 7 DAYS 2 mL 11    metFORMIN (GLUCOPHAGE-XR) 500 MG extended release tablet Take 1 tablet by mouth daily 30 tablet 11    BD PEN NEEDLE CHINA 2ND GEN 32G X 4 MM MISC Once a day 50 each 11    levothyroxine (SYNTHROID) 112 MCG tablet Take 1 tablet by mouth Daily 30 tablet 11    ezetimibe-simvastatin (VYTORIN) 10-40 MG per tablet Take 1 tablet by mouth nightly 30 tablet 11    ergocalciferol (ERGOCALCIFEROL) 1.25 MG (35599 UT) capsule Take 1 capsule by mouth once a week TAKE 1 CAPSULE BY MOUTH EVERY 7 DAYS 4 capsule 5    nortriptyline (PAMELOR) 10 MG capsule TAKE 1 CAPSULE BY MOUTH 3 TIMES DAILY      bisacodyl (DULCOLAX) 5 MG EC tablet Take 5 mg by mouth      cetirizine (ZYRTEC) 10 MG tablet Take 10 mg by mouth      esomeprazole (NEXIUM) 20 MG delayed release capsule Take 20 mg by mouth in the morning. folic acid (FOLVITE) 1 MG tablet Take 5 mg by mouth in the morning.       guaiFENesin (MUCINEX) 600 MG extended release tablet Take 600 mg by mouth daily as needed      zolpidem (AMBIEN) 10 MG tablet       metoprolol succinate (TOPROL XL) 100 MG extended release tablet Take 2 tablets by mouth daily 60 tablet 5    butalbital-APAP-caffeine -40 MG CAPS per capsule Take 1 capsule by mouth every 4 hours as needed for Headaches 20 capsule 2    busPIRone (BUSPAR) 15 MG tablet Take 15 mg by mouth 4 times daily      cycloSPORINE (RESTASIS) 0.05 % ophthalmic emulsion Apply 1 drop to eye daily as needed      docusate (COLACE, DULCOLAX) 100 MG CAPS Take 100 mg by mouth daily as needed      escitalopram (LEXAPRO) 20 MG tablet TAKE 1 TABLET BY MOUTH DAILY      fexofenadine (ALLEGRA) 180 MG tablet Take 180 mg by mouth      furosemide (LASIX) 20 MG tablet Take 20 mg by mouth daily as needed      gabapentin (NEURONTIN) 100 MG capsule TAKE 1 CAPSULE BY MOUTH THREE TIMES DAILY      irbesartan (AVAPRO) 300 MG tablet TAKE 1 TABLET BY MOUTH EVERY NIGHT      LORazepam (ATIVAN) 0.5 MG tablet TK 1 T PO QID PRA      magnesium oxide (MAG-OX) 400 MG tablet Take 400 mg by mouth 2 times daily      naloxone 4 MG/0.1ML LIQD nasal spray by Nasal route once       No current facility-administered medications for this visit.

## 2022-08-30 ENCOUNTER — OFFICE VISIT (OUTPATIENT)
Dept: FAMILY MEDICINE CLINIC | Facility: CLINIC | Age: 77
End: 2022-08-30
Payer: MEDICARE

## 2022-08-30 VITALS
WEIGHT: 192 LBS | BODY MASS INDEX: 31.99 KG/M2 | HEIGHT: 65 IN | DIASTOLIC BLOOD PRESSURE: 72 MMHG | SYSTOLIC BLOOD PRESSURE: 124 MMHG

## 2022-08-30 DIAGNOSIS — N28.9 RENAL INSUFFICIENCY: ICD-10-CM

## 2022-08-30 DIAGNOSIS — E78.1 PURE HYPERGLYCERIDEMIA: ICD-10-CM

## 2022-08-30 DIAGNOSIS — E78.00 PURE HYPERCHOLESTEROLEMIA: ICD-10-CM

## 2022-08-30 DIAGNOSIS — F41.9 ANXIETY: ICD-10-CM

## 2022-08-30 DIAGNOSIS — I10 PRIMARY HYPERTENSION: Primary | ICD-10-CM

## 2022-08-30 DIAGNOSIS — E11.42 CONTROLLED TYPE 2 DIABETES MELLITUS WITH DIABETIC POLYNEUROPATHY, WITH LONG-TERM CURRENT USE OF INSULIN (HCC): ICD-10-CM

## 2022-08-30 DIAGNOSIS — E87.1 HYPONATREMIA: ICD-10-CM

## 2022-08-30 DIAGNOSIS — Z79.4 CONTROLLED TYPE 2 DIABETES MELLITUS WITH DIABETIC POLYNEUROPATHY, WITH LONG-TERM CURRENT USE OF INSULIN (HCC): ICD-10-CM

## 2022-08-30 PROCEDURE — 1123F ACP DISCUSS/DSCN MKR DOCD: CPT | Performed by: FAMILY MEDICINE

## 2022-08-30 PROCEDURE — G8427 DOCREV CUR MEDS BY ELIG CLIN: HCPCS | Performed by: FAMILY MEDICINE

## 2022-08-30 PROCEDURE — 3044F HG A1C LEVEL LT 7.0%: CPT | Performed by: FAMILY MEDICINE

## 2022-08-30 PROCEDURE — 3017F COLORECTAL CA SCREEN DOC REV: CPT | Performed by: FAMILY MEDICINE

## 2022-08-30 PROCEDURE — 1036F TOBACCO NON-USER: CPT | Performed by: FAMILY MEDICINE

## 2022-08-30 PROCEDURE — 99214 OFFICE O/P EST MOD 30 MIN: CPT | Performed by: FAMILY MEDICINE

## 2022-08-30 PROCEDURE — 1090F PRES/ABSN URINE INCON ASSESS: CPT | Performed by: FAMILY MEDICINE

## 2022-08-30 PROCEDURE — G8417 CALC BMI ABV UP PARAM F/U: HCPCS | Performed by: FAMILY MEDICINE

## 2022-08-30 PROCEDURE — G8399 PT W/DXA RESULTS DOCUMENT: HCPCS | Performed by: FAMILY MEDICINE

## 2022-08-30 PROCEDURE — 2022F DILAT RTA XM EVC RTNOPTHY: CPT | Performed by: FAMILY MEDICINE

## 2022-08-30 RX ORDER — ZOLPIDEM TARTRATE 10 MG/1
TABLET ORAL
Qty: 30 TABLET | Refills: 3 | Status: CANCELLED | OUTPATIENT
Start: 2022-08-30

## 2022-08-30 SDOH — ECONOMIC STABILITY: FOOD INSECURITY: WITHIN THE PAST 12 MONTHS, THE FOOD YOU BOUGHT JUST DIDN'T LAST AND YOU DIDN'T HAVE MONEY TO GET MORE.: NEVER TRUE

## 2022-08-30 SDOH — ECONOMIC STABILITY: FOOD INSECURITY: WITHIN THE PAST 12 MONTHS, YOU WORRIED THAT YOUR FOOD WOULD RUN OUT BEFORE YOU GOT MONEY TO BUY MORE.: NEVER TRUE

## 2022-08-30 ASSESSMENT — PATIENT HEALTH QUESTIONNAIRE - PHQ9
SUM OF ALL RESPONSES TO PHQ QUESTIONS 1-9: 2
SUM OF ALL RESPONSES TO PHQ9 QUESTIONS 1 & 2: 2
SUM OF ALL RESPONSES TO PHQ QUESTIONS 1-9: 2
SUM OF ALL RESPONSES TO PHQ QUESTIONS 1-9: 2
2. FEELING DOWN, DEPRESSED OR HOPELESS: 1
1. LITTLE INTEREST OR PLEASURE IN DOING THINGS: 1
SUM OF ALL RESPONSES TO PHQ QUESTIONS 1-9: 2

## 2022-08-30 ASSESSMENT — SOCIAL DETERMINANTS OF HEALTH (SDOH): HOW HARD IS IT FOR YOU TO PAY FOR THE VERY BASICS LIKE FOOD, HOUSING, MEDICAL CARE, AND HEATING?: NOT HARD AT ALL

## 2022-08-30 NOTE — PROGRESS NOTES
SUBJECTIVE:   Viki Traylor is a 77 y.o. female who has a past medical history significant for diabetes with neuropathy, hypertension, high cholesterol, high triglycerides, hypothyroidism, chronic pain syndrome and generalized anxiety. Current medicines are listed in the EMR and reviewed today. Time last seen the patient she is undergone cholecystectomy. She reports she is doing well postoperatively. This was in June. In addition several visits ago the patient was noted to have hyponatremia and an increase in creatinine. I withheld her HCTZ. She did very well after we did this and continues to do very well. No complaints of peripheral edema. HPI  See above    Past Medical History, Past Surgical History, Family history, Social History, and Medications were all reviewed with the patient today and updated as necessary. Current Outpatient Medications   Medication Sig Dispense Refill    DaUfw-TzFpyz-XU-B Cmp-C-Biot (INTEGRA PLUS) CAPS TAKE 1 CAPSULE DAILY INTEGRA PLUS IS NAME BRAND      oxyCODONE (OXY-IR) 15 MG immediate release tablet Take 15 mg by mouth every 6 hours as needed.       TOUJEO SOLOSTAR 300 UNIT/ML SOPN Inject 50 Units into the skin at bedtime 9 mL 11    TRULICITY 1.5 DB/7.2SE SOPN 1.5 mg, SubCUTAneous, EVERY 7 DAYS 2 mL 11    metFORMIN (GLUCOPHAGE-XR) 500 MG extended release tablet Take 1 tablet by mouth daily 30 tablet 11    BD PEN NEEDLE CHINA 2ND GEN 32G X 4 MM MISC Once a day 50 each 11    levothyroxine (SYNTHROID) 112 MCG tablet Take 1 tablet by mouth Daily 30 tablet 11    ezetimibe-simvastatin (VYTORIN) 10-40 MG per tablet Take 1 tablet by mouth nightly 30 tablet 11    ergocalciferol (ERGOCALCIFEROL) 1.25 MG (30415 UT) capsule Take 1 capsule by mouth once a week TAKE 1 CAPSULE BY MOUTH EVERY 7 DAYS 4 capsule 5    nortriptyline (PAMELOR) 10 MG capsule TAKE 1 CAPSULE BY MOUTH 3 TIMES DAILY      bisacodyl (DULCOLAX) 5 MG EC tablet Take 5 mg by mouth      cetirizine (ZYRTEC) 10 MG tablet Take 10 mg by mouth      esomeprazole (NEXIUM) 20 MG delayed release capsule Take 20 mg by mouth in the morning. folic acid (FOLVITE) 1 MG tablet Take 5 mg by mouth in the morning. guaiFENesin (MUCINEX) 600 MG extended release tablet Take 600 mg by mouth daily as needed      zolpidem (AMBIEN) 10 MG tablet       metoprolol succinate (TOPROL XL) 100 MG extended release tablet Take 2 tablets by mouth daily 60 tablet 5    butalbital-APAP-caffeine -40 MG CAPS per capsule Take 1 capsule by mouth every 4 hours as needed for Headaches 20 capsule 2    busPIRone (BUSPAR) 15 MG tablet Take 15 mg by mouth 4 times daily      cycloSPORINE (RESTASIS) 0.05 % ophthalmic emulsion Apply 1 drop to eye daily as needed      docusate (COLACE, DULCOLAX) 100 MG CAPS Take 100 mg by mouth daily as needed      escitalopram (LEXAPRO) 20 MG tablet TAKE 1 TABLET BY MOUTH DAILY      fexofenadine (ALLEGRA) 180 MG tablet Take 180 mg by mouth      furosemide (LASIX) 20 MG tablet Take 20 mg by mouth daily as needed      gabapentin (NEURONTIN) 100 MG capsule TAKE 1 CAPSULE BY MOUTH THREE TIMES DAILY      irbesartan (AVAPRO) 300 MG tablet TAKE 1 TABLET BY MOUTH EVERY NIGHT      LORazepam (ATIVAN) 0.5 MG tablet TK 1 T PO QID PRA      magnesium oxide (MAG-OX) 400 MG tablet Take 400 mg by mouth 2 times daily      naloxone 4 MG/0.1ML LIQD nasal spray by Nasal route once       No current facility-administered medications for this visit.      Allergies   Allergen Reactions    Morpholine Salicylate      Other reaction(s): Headache-Intolerance, Hives/Swelling-Allergy    Celecoxib Swelling     Red rash, itching    Canagliflozin Other (See Comments)     \"HOT, SWEATY, BLACKED OUT\"    Clonidine Other (See Comments), Rash and Swelling     Catapres patch,  headache    Duloxetine Rash    Morphine Rash and Swelling     migraines    Oxycodone Other (See Comments)     Migraines, elevated BP    Sulfa Antibiotics Other (See Comments)     Patient Active Problem List   Diagnosis    Postsurgical hypothyroidism    Osteoarthritis    Depression    Edema    Sleep apnea    GERD (gastroesophageal reflux disease)    Mixed hyperlipidemia    Noncompliance with CPAP treatment    Generalized anxiety disorder    Degenerative disc disease, lumbar    Essential hypertension    Fibromyalgia    WEBER (dyspnea on exertion)    S/P TKR (total knee replacement) using cement, right    Vitamin D deficiency    Mitral valve regurgitation    Arthritis of knee, right    Controlled type 2 diabetes mellitus with diabetic polyneuropathy, with long-term current use of insulin (HCC)    Chronic renal disease, stage III (HCC) [977322]    Chronic systolic (congestive) heart failure     Past Medical History:   Diagnosis Date    Acute pancreatitis 2/2008    Aneurysm ([address]     splenic- \"just over 1 cm\"-(CT done July '13- followed by Dr Lazaro Adams))    Anxiety     Bilateral sacroiliitis [facility])     Breast cyst     aspiration 6/14    CAD (coronary artery disease)     MITRAL VALVE LEAK AND ANEURYSM ON SPLENIC ARTERY; has SENTHIL-1 flight of steps    Cellulitis 6/16/15    right leg    Chest pain     Chronic kidney disease     stage 3    Chronic pain     lumbar/spine/thoracic, left knee    CKD (chronic kidney disease)     stage 3    Degenerative disc disease     lumbosacral    Depression 12/23/2015    managed with medication    Dyslipidemia     managed with medication    Dyspnea     Edema 12/23/2015    Fatty liver     Fibromyalgia     managed with Medication    Food allergy     GERD (gastroesophageal reflux disease)     controlled with medication    Heart failure ([address]     per Dr Meera Araujo- due to valve leakage (pt denies)    Hx of echocardiogram 08/21/2015    mitral valve regurgitation    Hypertension     controlled with medication    Hypertriglyceridemia     managed with medication    Hypothyroidism     managed with medication    Lumbar herniated disc Aug 2013    Lumbar radiculopathy     Macrocytic anemia Microcytic anemia     Mild pulmonary hypertension ([address]     per ECHO (2012)    Nausea & vomiting     post-op nausea    Neuropathy     bilateral legs    Obesity (BMI 30.0-34.9) 10/2/14    BMI- 30.7    Osteoarthritis     Panic attacks     Sacroiliac dysfunction     Seasonal allergic reaction     Sleep apnea     NO CPAP    Syncope and collapse 12/23/2015     Past Surgical History:   Procedure Laterality Date    BACK SURGERY  08/30/2013    spinal stimulator    BREAST BIOPSY Bilateral R/1988  L/2012    BREAST LUMPECTOMY Right     CARDIAC CATHETERIZATION  2005    CYST REMOVAL Left 02/2019    L thumb    CYST REMOVAL Right 04/2019    cyst on right foot removal    KNEE ARTHROSCOPY  10/03/2014    left knee torn menincus    KNEE ARTHROSCOPY Left 2004    left knee    ORTHOPEDIC SURGERY  04/03/2019    right big toe mass resection exostectomy    ORTHOPEDIC SURGERY Right 2009    hand nodule removed    ORTHOPEDIC SURGERY Left 2010    elbow scope    OTHER SURGICAL HISTORY  10/28/13    left hand-pointer and pinky finger cyst removed    OTHER SURGICAL HISTORY      nodule removed left foot and hand    OTHER SURGICAL HISTORY  12/22/09    TENS implant [facility]    PRE-MALIGNANT / BENIGN SKIN LESION EXCISION  09/29/2014    lower lip    SASKIA AND BSO (CERVIX REMOVED)  1995    THYROIDECTOMY, PARTIAL  2008    partial thyroidectomy and parathyroidectomy    TOTAL KNEE ARTHROPLASTY Right 05/2019    TOTAL KNEE ARTHROPLASTY Left 03/14/2017     Family History   Problem Relation Age of Onset    Hypertension Mother     Stroke Mother         TIAs- several    Breast Cancer Maternal Aunt         3 Maternal Aunts    Breast Cancer Paternal Aunt     Diabetes Father     Heart Disease Father         CHF/ CAD    Lung Disease Father         \"holes in lungs- exposed to toxins in textiles\"    Cancer Father         leukemia    Diabetes Mother     Breast Cancer Mother     Heart Disease Paternal Grandfather     Diabetes Paternal Grandfather     Heart Failure Paternal Grandmother         CHF    Diabetes Paternal Grandmother     Heart Disease Maternal Grandfather     Diabetes Maternal Grandfather     Stroke Maternal Grandmother     Heart Disease Maternal Grandmother     Diabetes Maternal Grandmother     Asthma Father     Other Father         polio    Hypertension Father      Social History     Tobacco Use    Smoking status: Never    Smokeless tobacco: Never   Substance Use Topics    Alcohol use: No         Review of Systems  See above    OBJECTIVE:  /72   Ht 5' 5\" (1.651 m)   Wt 192 lb (87.1 kg)   BMI 31.95 kg/m²      Physical Exam  Constitutional:       General: She is not in acute distress. Appearance: Normal appearance. She is not ill-appearing. HENT:      Head: Normocephalic and atraumatic. Cardiovascular:      Rate and Rhythm: Normal rate and regular rhythm. Heart sounds: Normal heart sounds. No murmur heard. Pulmonary:      Effort: Pulmonary effort is normal.      Breath sounds: Normal breath sounds. No wheezing or rhonchi. Musculoskeletal:         General: Normal range of motion. Cervical back: Normal range of motion and neck supple. Right lower leg: No edema. Left lower leg: No edema. Skin:     General: Skin is warm. Findings: No rash. Neurological:      Mental Status: She is alert and oriented to person, place, and time. Psychiatric:         Mood and Affect: Mood normal.         Behavior: Behavior normal.         Thought Content: Thought content normal.         Judgment: Judgment normal.       Medical problems and test results were reviewed with the patient today. ASSESSMENT and PLAN    1. Hypertension. BP is 124/72. Continue current therapy. Renal function and electrolytes are unremarkable. 2.  Diabetes with neuropathy. A1c 5.1. Consider decreasing some of her diabetic medicine at follow-up if A1c remains as good as it is. 3.  High cholesterol. LDL 43.  Statin therapy will be continued. Liver enzymes remain normal.    4.  Renal insufficiency. Creatinine is normalized at 1.0. Continue to withhold HCTZ. 5.  Hyponatremia. Sodium 133. Continue avoidance of diuretics. 6.  Anxiety. No breakthrough complaints reported. 7.  High triglycerides. Triglycerides are 173. Previously 140. Dietary focus. Elements of this note have been dictated using speech recognition software. As a result, errors of speech recognition may have occurred.

## 2022-09-21 RX ORDER — GABAPENTIN 100 MG/1
CAPSULE ORAL
Qty: 90 CAPSULE | Refills: 2 | Status: SHIPPED | OUTPATIENT
Start: 2022-09-21 | End: 2022-12-20

## 2022-09-28 RX ORDER — METOPROLOL SUCCINATE 100 MG/1
200 TABLET, EXTENDED RELEASE ORAL DAILY
Qty: 60 TABLET | Refills: 5 | Status: SHIPPED | OUTPATIENT
Start: 2022-09-28

## 2022-10-19 ENCOUNTER — OFFICE VISIT (OUTPATIENT)
Dept: CARDIOLOGY CLINIC | Age: 77
End: 2022-10-19
Payer: MEDICARE

## 2022-10-19 VITALS
HEIGHT: 64 IN | WEIGHT: 192 LBS | HEART RATE: 80 BPM | BODY MASS INDEX: 32.78 KG/M2 | SYSTOLIC BLOOD PRESSURE: 156 MMHG | DIASTOLIC BLOOD PRESSURE: 88 MMHG

## 2022-10-19 DIAGNOSIS — I50.22 CHRONIC SYSTOLIC (CONGESTIVE) HEART FAILURE (HCC): Primary | ICD-10-CM

## 2022-10-19 DIAGNOSIS — G44.209 TENSION-TYPE HEADACHE, NOT INTRACTABLE, UNSPECIFIED CHRONICITY PATTERN: Primary | ICD-10-CM

## 2022-10-19 DIAGNOSIS — I34.0 NONRHEUMATIC MITRAL VALVE REGURGITATION: ICD-10-CM

## 2022-10-19 DIAGNOSIS — I10 ESSENTIAL HYPERTENSION: ICD-10-CM

## 2022-10-19 PROCEDURE — 99214 OFFICE O/P EST MOD 30 MIN: CPT | Performed by: INTERNAL MEDICINE

## 2022-10-19 PROCEDURE — 1123F ACP DISCUSS/DSCN MKR DOCD: CPT | Performed by: INTERNAL MEDICINE

## 2022-10-19 PROCEDURE — G8417 CALC BMI ABV UP PARAM F/U: HCPCS | Performed by: INTERNAL MEDICINE

## 2022-10-19 PROCEDURE — G8427 DOCREV CUR MEDS BY ELIG CLIN: HCPCS | Performed by: INTERNAL MEDICINE

## 2022-10-19 PROCEDURE — 1036F TOBACCO NON-USER: CPT | Performed by: INTERNAL MEDICINE

## 2022-10-19 PROCEDURE — 3017F COLORECTAL CA SCREEN DOC REV: CPT | Performed by: INTERNAL MEDICINE

## 2022-10-19 PROCEDURE — 1090F PRES/ABSN URINE INCON ASSESS: CPT | Performed by: INTERNAL MEDICINE

## 2022-10-19 PROCEDURE — G8399 PT W/DXA RESULTS DOCUMENT: HCPCS | Performed by: INTERNAL MEDICINE

## 2022-10-19 PROCEDURE — G8484 FLU IMMUNIZE NO ADMIN: HCPCS | Performed by: INTERNAL MEDICINE

## 2022-10-19 RX ORDER — BUTALBITAL, ACETAMINOPHEN AND CAFFEINE 300; 40; 50 MG/1; MG/1; MG/1
1 CAPSULE ORAL EVERY 4 HOURS PRN
Qty: 20 CAPSULE | Refills: 0 | Status: SHIPPED | OUTPATIENT
Start: 2022-10-19

## 2022-10-19 RX ORDER — MAGNESIUM OXIDE 400 MG/1
400 TABLET ORAL 2 TIMES DAILY
Qty: 180 TABLET | Refills: 3 | Status: SHIPPED | OUTPATIENT
Start: 2022-10-19
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
## 2022-12-14 ENCOUNTER — APPOINTMENT (RX ONLY)
Dept: URBAN - METROPOLITAN AREA CLINIC 329 | Facility: CLINIC | Age: 77
Setting detail: DERMATOLOGY
End: 2022-12-14

## 2022-12-14 DIAGNOSIS — D18.0 HEMANGIOMA: ICD-10-CM

## 2022-12-14 DIAGNOSIS — L82.0 INFLAMED SEBORRHEIC KERATOSIS: ICD-10-CM

## 2022-12-14 DIAGNOSIS — D22 MELANOCYTIC NEVI: ICD-10-CM

## 2022-12-14 PROBLEM — D22.39 MELANOCYTIC NEVI OF OTHER PARTS OF FACE: Status: ACTIVE | Noted: 2022-12-14

## 2022-12-14 PROBLEM — C44.41 BASAL CELL CARCINOMA OF SKIN OF SCALP AND NECK: Status: ACTIVE | Noted: 2022-12-14

## 2022-12-14 PROBLEM — D18.01 HEMANGIOMA OF SKIN AND SUBCUTANEOUS TISSUE: Status: ACTIVE | Noted: 2022-12-14

## 2022-12-14 PROCEDURE — 17110 DESTRUCTION B9 LES UP TO 14: CPT | Mod: 59

## 2022-12-14 PROCEDURE — ? COUNSELING

## 2022-12-14 PROCEDURE — ? DERMATOSCOPIC EVALUATION

## 2022-12-14 PROCEDURE — 99212 OFFICE O/P EST SF 10 MIN: CPT | Mod: 25

## 2022-12-14 PROCEDURE — ? SHAVE REMOVAL AND DESTRUCTION

## 2022-12-14 PROCEDURE — 17272 DSTR MAL LES S/N/H/F/G 1.1-2: CPT

## 2022-12-14 PROCEDURE — ? LIQUID NITROGEN

## 2022-12-14 ASSESSMENT — LOCATION SIMPLE DESCRIPTION DERM
LOCATION SIMPLE: ABDOMEN
LOCATION SIMPLE: LEFT CHEEK
LOCATION SIMPLE: ABDOMEN
LOCATION SIMPLE: RIGHT ANTERIOR NECK
LOCATION SIMPLE: NECK

## 2022-12-14 ASSESSMENT — LOCATION DETAILED DESCRIPTION DERM
LOCATION DETAILED: RIGHT INFERIOR ANTERIOR NECK
LOCATION DETAILED: LEFT RIB CAGE
LOCATION DETAILED: RIGHT SUPERIOR LATERAL NECK
LOCATION DETAILED: RIGHT INFERIOR LATERAL NECK
LOCATION DETAILED: LEFT INFERIOR CENTRAL MALAR CHEEK
LOCATION DETAILED: RIGHT CENTRAL LATERAL NECK
LOCATION DETAILED: SUBXIPHOID
LOCATION DETAILED: LEFT RIB CAGE

## 2022-12-14 ASSESSMENT — LOCATION ZONE DERM
LOCATION ZONE: TRUNK
LOCATION ZONE: TRUNK
LOCATION ZONE: FACE
LOCATION ZONE: NECK

## 2022-12-14 NOTE — PROCEDURE: SHAVE REMOVAL AND DESTRUCTION
Detail Level: Detailed
Size Of Lesion In Cm: 1
Size After Destruction (Required For Destruction Billing): 1.5
Anesthesia Type: 1% lidocaine with epinephrine
Anesthesia Volume In Cc: 0
Hemostasis: Drysol
Cautery Type: electrodesiccation
Was Curettage Performed?: Yes
Number Of Curettages: 3
Wound Care: Petrolatum
Dressing: dry sterile dressing
Bill As?: Note: Bill Malignant Destruction If Path Confirms Malignant Lesion. Only Bill As Shave Removal If Path Comes Back Benign. Do Not Bill Shave Removal On Malignant Lesions.: Malignant Destruction
Lab: 6
Render Path Notes In Note?: No
Consent: Written consent was obtained and risks were reviewed including but not limited to scarring, infection, bleeding, scabbing, incomplete removal, nerve damage and allergy to anesthesia.
Post-Care Instructions: I reviewed with the patient in detail post-care instructions. Patient is to keep the biopsy site dry overnight, and then apply bacitracin twice daily until healed. Patient may apply hydrogen peroxide soaks to remove any crusting.
Notification Instructions: Patient will be notified of biopsy results. However, patient instructed to call the office if not contacted within 2 weeks.
Billing Type: Third-Party Bill

## 2022-12-14 NOTE — PROCEDURE: MIPS QUALITY
Quality 111:Pneumonia Vaccination Status For Older Adults: Pneumococcal vaccine (PPSV23) administered on or after patient’s 60th birthday and before the end of the measurement period
Detail Level: Generalized
Quality 47: Advance Care Plan: Advance care planning not documented, reason not otherwise specified.
Quality 110: Preventive Care And Screening: Influenza Immunization: Influenza Immunization Administered during Influenza season

## 2022-12-14 NOTE — PROCEDURE: LIQUID NITROGEN
Detail Level: Detailed
Post-Care Instructions: I reviewed with the patient in detail post-care instructions. Patient is to wear sunprotection, and avoid picking at any of the treated lesions. Pt may apply Vaseline to crusted or scabbing areas.
Show Spray Paint Technique Variable?: Yes
Medical Necessity Information: It is in your best interest to select a reason for this procedure from the list below. All of these items fulfill various CMS LCD requirements except the new and changing color options.
Spray Paint Text: The liquid nitrogen was applied to the skin utilizing a spray paint frosting technique.
Add 52 Modifier (Optional): no
Consent: The patient's consent was obtained including but not limited to risks of crusting, scabbing, blistering, scarring, darker or lighter pigmentary change, recurrence, incomplete removal and infection.
Medical Necessity Clause: This procedure was medically necessary because the lesions that were treated were: bleeding and enlarging

## 2022-12-21 RX ORDER — GABAPENTIN 100 MG/1
CAPSULE ORAL
Qty: 90 CAPSULE | Refills: 2 | Status: SHIPPED | OUTPATIENT
Start: 2022-12-21 | End: 2023-03-21

## 2022-12-27 RX ORDER — IRBESARTAN 300 MG/1
TABLET ORAL
Qty: 30 TABLET | Refills: 5 | Status: SHIPPED | OUTPATIENT
Start: 2022-12-27

## 2023-01-06 RX ORDER — ERGOCALCIFEROL 1.25 MG/1
CAPSULE ORAL
Qty: 12 CAPSULE | Refills: 2 | Status: SHIPPED | OUTPATIENT
Start: 2023-01-06

## 2023-01-09 DIAGNOSIS — G44.209 TENSION-TYPE HEADACHE, NOT INTRACTABLE, UNSPECIFIED CHRONICITY PATTERN: ICD-10-CM

## 2023-01-09 RX ORDER — BUTALBITAL, ACETAMINOPHEN AND CAFFEINE 300; 40; 50 MG/1; MG/1; MG/1
1 CAPSULE ORAL EVERY 4 HOURS PRN
Qty: 20 CAPSULE | Refills: 1 | Status: SHIPPED | OUTPATIENT
Start: 2023-01-09 | End: 2023-03-07 | Stop reason: SDUPTHER

## 2023-02-27 ENCOUNTER — OFFICE VISIT (OUTPATIENT)
Dept: ENDOCRINOLOGY | Age: 78
End: 2023-02-27
Payer: MEDICARE

## 2023-02-27 VITALS
HEART RATE: 79 BPM | DIASTOLIC BLOOD PRESSURE: 76 MMHG | WEIGHT: 188 LBS | OXYGEN SATURATION: 97 % | BODY MASS INDEX: 32.27 KG/M2 | SYSTOLIC BLOOD PRESSURE: 130 MMHG

## 2023-02-27 DIAGNOSIS — E11.42 CONTROLLED TYPE 2 DIABETES MELLITUS WITH DIABETIC POLYNEUROPATHY, WITH LONG-TERM CURRENT USE OF INSULIN (HCC): Primary | ICD-10-CM

## 2023-02-27 DIAGNOSIS — E78.2 MIXED HYPERLIPIDEMIA: ICD-10-CM

## 2023-02-27 DIAGNOSIS — I10 ESSENTIAL HYPERTENSION: ICD-10-CM

## 2023-02-27 DIAGNOSIS — Z79.4 CONTROLLED TYPE 2 DIABETES MELLITUS WITH DIABETIC POLYNEUROPATHY, WITH LONG-TERM CURRENT USE OF INSULIN (HCC): Primary | ICD-10-CM

## 2023-02-27 DIAGNOSIS — E89.0 POSTSURGICAL HYPOTHYROIDISM: ICD-10-CM

## 2023-02-27 LAB — HBA1C MFR BLD: 4.9 %

## 2023-02-27 PROCEDURE — 1123F ACP DISCUSS/DSCN MKR DOCD: CPT | Performed by: INTERNAL MEDICINE

## 2023-02-27 PROCEDURE — G8417 CALC BMI ABV UP PARAM F/U: HCPCS | Performed by: INTERNAL MEDICINE

## 2023-02-27 PROCEDURE — G8484 FLU IMMUNIZE NO ADMIN: HCPCS | Performed by: INTERNAL MEDICINE

## 2023-02-27 PROCEDURE — G8427 DOCREV CUR MEDS BY ELIG CLIN: HCPCS | Performed by: INTERNAL MEDICINE

## 2023-02-27 PROCEDURE — 1036F TOBACCO NON-USER: CPT | Performed by: INTERNAL MEDICINE

## 2023-02-27 PROCEDURE — 83036 HEMOGLOBIN GLYCOSYLATED A1C: CPT | Performed by: INTERNAL MEDICINE

## 2023-02-27 PROCEDURE — 99214 OFFICE O/P EST MOD 30 MIN: CPT | Performed by: INTERNAL MEDICINE

## 2023-02-27 PROCEDURE — 3075F SYST BP GE 130 - 139MM HG: CPT | Performed by: INTERNAL MEDICINE

## 2023-02-27 PROCEDURE — 1090F PRES/ABSN URINE INCON ASSESS: CPT | Performed by: INTERNAL MEDICINE

## 2023-02-27 PROCEDURE — G8399 PT W/DXA RESULTS DOCUMENT: HCPCS | Performed by: INTERNAL MEDICINE

## 2023-02-27 PROCEDURE — 3078F DIAST BP <80 MM HG: CPT | Performed by: INTERNAL MEDICINE

## 2023-02-27 RX ORDER — EZETIMIBE AND SIMVASTATIN 10; 40 MG/1; MG/1
1 TABLET ORAL NIGHTLY
Qty: 30 TABLET | Refills: 11 | Status: SHIPPED | OUTPATIENT
Start: 2023-02-27

## 2023-02-27 RX ORDER — DULAGLUTIDE 1.5 MG/.5ML
INJECTION, SOLUTION SUBCUTANEOUS
Qty: 2 ML | Refills: 11 | Status: SHIPPED | OUTPATIENT
Start: 2023-02-27

## 2023-02-27 RX ORDER — METFORMIN HYDROCHLORIDE 500 MG/1
500 TABLET, EXTENDED RELEASE ORAL DAILY
Qty: 30 TABLET | Refills: 11 | Status: SHIPPED | OUTPATIENT
Start: 2023-02-27

## 2023-02-27 RX ORDER — INSULIN GLARGINE 300 U/ML
50 INJECTION, SOLUTION SUBCUTANEOUS NIGHTLY
Qty: 9 ML | Refills: 11 | Status: SHIPPED | OUTPATIENT
Start: 2023-02-27

## 2023-02-27 RX ORDER — LEVOTHYROXINE SODIUM 112 UG/1
112 TABLET ORAL DAILY
Qty: 30 TABLET | Refills: 11 | Status: SHIPPED | OUTPATIENT
Start: 2023-02-27

## 2023-02-27 RX ORDER — PEN NEEDLE, DIABETIC 32GX 5/32"
NEEDLE, DISPOSABLE MISCELLANEOUS
Qty: 50 EACH | Refills: 11 | Status: SHIPPED | OUTPATIENT
Start: 2023-02-27

## 2023-02-27 ASSESSMENT — ENCOUNTER SYMPTOMS
CONSTIPATION: 1
DIARRHEA: 0

## 2023-02-27 NOTE — PROGRESS NOTES
Aaron Marroquin MD, [facility]  [address], [address] [facility], [address] [place] [address]  Phone [phone]          Jeyson Reddy is a 69 y.o. female seen 2/27/2023 for follow-up of type 2 diabetes mellitus (has not been here since 9/2021)        ASSESSMENT AND PLAN:    1. Controlled type 2 diabetes mellitus with diabetic polyneuropathy, with long-term current use of insulin (HCC)  Her overall glycemic control is excellent and her hemoglobin A1c is at goal less than 7. Eye exam negative 10/2022. Urine microalbumin negative 8/2021 (she is on an ARB). Update labs prior to next visit.      - TOUJEO SOLOSTAR 300 UNIT/ML SOPN; Inject 50 Units into the skin at bedtime  Dispense: 9 mL; Refill: 11  - TRULICITY 1.5 LE/5.2CB SOPN; 1.5 mg, SubCUTAneous, EVERY 7 DAYS  Dispense: 2 mL; Refill: 11  - metFORMIN (GLUCOPHAGE-XR) 500 MG extended release tablet; Take 1 tablet by mouth daily  Dispense: 30 tablet; Refill: 11  - BD PEN NEEDLE CHINA 2ND GEN 32G X 4 MM MISC; Once a day  Dispense: 50 each; Refill: 11    2. Postsurgical hypothyroidism  Biochemically euthyroid 4/2022.    - levothyroxine (SYNTHROID) 112 MCG tablet; Take 1 tablet by mouth Daily  Dispense: 30 tablet; Refill: 11    3. Essential hypertension  Followed by her primary care physician and cardiologist.    4. Mixed hyperlipidemia  LDL at goal 8/2022.    - ezetimibe-simvastatin (VYTORIN) 10-40 MG per tablet; Take 1 tablet by mouth nightly  Dispense: 30 tablet; Refill: 11      Follow-up and Dispositions    Return in about 6 months (around 8/27/2023). HISTORY OF PRESENT ILLNESS:    DIABETES MELLITUS     Diagnosis: Type 2 diabetes mellitus diagnosed around 2011. Her chart states that she has a history of acute pancreatitis but she denies this adamantly. Diet: She usually tries to limit carbohydrates and fried foods. She has been avoiding sweets for the most part.   No sweet tea or regular soft drinks. Exercise: Walking daily. Monitorin-2 times per day. Blood Glucose: By recall: Fasting , bedtime usually below 100. Hypoglycemia: +Hypoglycemic awareness. No recent lows. Hemoglobin A1c:  3/15/2017: 8.9.  2017: 7.3.  2017: 8.4.  2017: 6.9.  2016: 5.8.  2018: 5.6.  2018: 5.8.  3/26/2019: 5.4.  10/1/2019: 5.0.  2019: 5.5.  7/15/2020: 5.5.  3/2/2021: 5.1.  2021: 6.1.  2022: 5.1.  2023: 4.9. Microalbumin/Nephropathy:  2017: Creatinine 1.10 (GFR 51).    2017: Creatinine 1.63 (GFR 32). 10/4/2017: Creatinine 1.61 (GFR 32), urine microalbumin/creatinine 75.6.  3/28/2018: Creatinine 1.41 (GFR 43). 10/24/2018: Creatinine 1.00 (GFR 57).  2019: Creatinine 1.10 (GFR 52), urine microalbumin/creatinine 12.  2019: Creatinine 1.04 (GFR 55). 7/15/2020: Creatinine 1.09 (GFR 50), urine microalbumin-POC 20.  2021: Creatinine 1.35 (GFR 39), urine microalbumin/creatinine 8.  2022: Creatinine 1.00 (GFR 57). Neuropathy: +Diabetic peripheral neuropathy with burning and tingling of toes. Denies numbness. Retinopathy: Eye exam 10/2022 - no retinopathy. Lipids:  2017: Total cholesterol 150, triglycerides 172, HDL 35, LDL 81, VLDL 34.    2017: Total cholesterol 123, triglycerides 280, HDL 24, LDL 43, VLDL 56.  3/28/2018: Total cholesterol 150, triglycerides 215, HDL 35, LDL 72, VLDL 43.  10/24/2018: Total cholesterol 138, triglycerides 205, HDL 29, LDL 68, VLDL 41.  2019: Total cholesterol 149, triglycerides 162, HDL 41, LDL 75.6, VLDL 32.4.  7/15/2020: Total cholesterol 124, triglycerides 136, HDL 39, LDL 58, VLDL 27.  2021: Total cholesterol 146, triglycerides 246, HDL 30, LDL 75, VLDL 41.  2022: Total cholesterol 108, triglycerides 173, HDL 30, LDL 43.4, VLDL 34.6. Prior Treatment: Glipizide and pioglitazone stopped at initial visit.          THYROID DISEASE Presentation/Diagnosis: Primary hyperparathyroidism status post parathyroidectomy/partial thyroidectomy 2008. Treatment: She has been on the current dose of levothyroxine for years. Takes generic in AM with other medications. Imaging: None. Labs:  9/9/2015: TSH 1.490.  10/4/2017: TSH 3.340.  3/28/2018: TSH 1.530.  4/16/2019: TSH 1.22.  7/15/2020: TSH 0.659.  8/30/2021: TSH 0.971.  4/8/2022: TSH 0.44. Review of Systems   Constitutional:  Negative for diaphoresis and fatigue. Weight decreased 4 pounds since last visit. Cardiovascular:  Negative for palpitations. Gastrointestinal:  Positive for constipation. Negative for diarrhea. Endocrine: Negative for cold intolerance and heat intolerance. Neurological:  Negative for tremors. Vital Signs:  /76   Pulse 79   Wt 188 lb (85.3 kg)   SpO2 97%   BMI 32.27 kg/m²     Wt Readings from Last 3 Encounters:   02/27/23 188 lb (85.3 kg)   10/19/22 192 lb (87.1 kg)   08/30/22 192 lb (87.1 kg)       Physical Exam  Constitutional:       General: She is not in acute distress. Neck:      Thyroid: No thyroid mass or thyromegaly. Cardiovascular:      Rate and Rhythm: Normal rate and regular rhythm. Comments: DP pulses 1+ bilaterally. No edema. Lymphadenopathy:      Cervical: No cervical adenopathy. Skin:     Comments: No lesions on feet. Neurological:      Motor: No tremor. Comments: Monofilament decreased. Ankle reflexes absent.            Orders Placed This Encounter   Procedures    Hemoglobin A1C     Standing Status:   Future     Standing Expiration Date:   2/27/2024    Comprehensive Metabolic Panel     Standing Status:   Future     Standing Expiration Date:   2/27/2024    Lipid Panel     Standing Status:   Future     Standing Expiration Date:   2/27/2024    Microalbumin / Creatinine Urine Ratio     Standing Status:   Future     Standing Expiration Date:   2/27/2024    TSH with Reflex     Standing Status:   Future Standing Expiration Date:   2/27/2024    AMB POC HEMOGLOBIN A1C       Current Outpatient Medications   Medication Sig Dispense Refill    CHANDU SOLOSTAR 300 UNIT/ML concentrated injection pen Inject 50 Units into the skin at bedtime 9 mL 11    TRULICITY 1.5 JS/9.3XT SC injection 1.5 mg, SubCUTAneous, EVERY 7 DAYS 2 mL 11    metFORMIN (GLUCOPHAGE-XR) 500 MG extended release tablet Take 1 tablet by mouth daily 30 tablet 11    BD PEN NEEDLE CHINA 2ND GEN 32G X 4 MM MISC Once a day 50 each 11    ezetimibe-simvastatin (VYTORIN) 10-40 MG per tablet Take 1 tablet by mouth nightly 30 tablet 11    levothyroxine (SYNTHROID) 112 MCG tablet Take 1 tablet by mouth Daily 30 tablet 11    butalbital-APAP-caffeine -40 MG CAPS per capsule TAKE 1 CAPSULE BY MOUTH EVERY 4 HOURS AS NEEDED FOR HEADACHES 20 capsule 1    vitamin D (ERGOCALCIFEROL) 1.25 MG (53996 UT) CAPS capsule TAKE 1 CAPSULE BY MOUTH ONCE WEEKLY 12 capsule 2    irbesartan (AVAPRO) 300 MG tablet TAKE 1 TABLET BY MOUTH EVERY NIGHT 30 tablet 5    gabapentin (NEURONTIN) 100 MG capsule TAKE 1 CAPSULE BY MOUTH 3 TIMES DAILY 90 capsule 2    magnesium oxide (MAG-OX) 400 MG tablet Take 1 tablet by mouth 2 times daily 180 tablet 3    metoprolol succinate (TOPROL XL) 100 MG extended release tablet Take 2 tablets by mouth daily 60 tablet 5    DtOtm-KdOanl-GJ-B Cmp-C-Biot (INTEGRA PLUS) CAPS TAKE 1 CAPSULE DAILY INTEGRA PLUS IS NAME BRAND      oxyCODONE (OXY-IR) 15 MG immediate release tablet Take 15 mg by mouth every 6 hours as needed. nortriptyline (PAMELOR) 10 MG capsule TAKE 1 CAPSULE BY MOUTH 3 TIMES DAILY      bisacodyl (DULCOLAX) 5 MG EC tablet Take 5 mg by mouth      cetirizine (ZYRTEC) 10 MG tablet Take 10 mg by mouth      esomeprazole (NEXIUM) 20 MG delayed release capsule Take 20 mg by mouth in the morning.       guaiFENesin (MUCINEX) 600 MG extended release tablet Take 600 mg by mouth daily as needed      zolpidem (AMBIEN) 10 MG tablet       busPIRone (BUSPAR) 15 MG tablet Take 15 mg by mouth 4 times daily      cycloSPORINE (RESTASIS) 0.05 % ophthalmic emulsion Apply 1 drop to eye daily as needed      docusate (COLACE, DULCOLAX) 100 MG CAPS Take 100 mg by mouth daily as needed      escitalopram (LEXAPRO) 20 MG tablet TAKE 1 TABLET BY MOUTH DAILY      fexofenadine (ALLEGRA) 180 MG tablet Take 180 mg by mouth      furosemide (LASIX) 20 MG tablet Take 20 mg by mouth 2-3 times a week      LORazepam (ATIVAN) 0.5 MG tablet TK 1 T PO QID PRA      naloxone 4 MG/0.1ML LIQD nasal spray by Nasal route once       No current facility-administered medications for this visit.

## 2023-03-07 ENCOUNTER — OFFICE VISIT (OUTPATIENT)
Dept: FAMILY MEDICINE CLINIC | Facility: CLINIC | Age: 78
End: 2023-03-07

## 2023-03-07 VITALS
DIASTOLIC BLOOD PRESSURE: 70 MMHG | BODY MASS INDEX: 33.09 KG/M2 | WEIGHT: 193.8 LBS | SYSTOLIC BLOOD PRESSURE: 136 MMHG | HEIGHT: 64 IN

## 2023-03-07 DIAGNOSIS — E78.00 PURE HYPERCHOLESTEROLEMIA: ICD-10-CM

## 2023-03-07 DIAGNOSIS — R53.82 CHRONIC FATIGUE: ICD-10-CM

## 2023-03-07 DIAGNOSIS — F41.9 ANXIETY: ICD-10-CM

## 2023-03-07 DIAGNOSIS — M25.50 POLYARTHRALGIA: ICD-10-CM

## 2023-03-07 DIAGNOSIS — E78.1 PURE HYPERGLYCERIDEMIA: ICD-10-CM

## 2023-03-07 DIAGNOSIS — E03.4 HYPOTHYROIDISM DUE TO ACQUIRED ATROPHY OF THYROID: ICD-10-CM

## 2023-03-07 DIAGNOSIS — G89.4 CHRONIC PAIN SYNDROME: ICD-10-CM

## 2023-03-07 DIAGNOSIS — I10 PRIMARY HYPERTENSION: ICD-10-CM

## 2023-03-07 DIAGNOSIS — E11.42 CONTROLLED TYPE 2 DIABETES MELLITUS WITH DIABETIC POLYNEUROPATHY, WITH LONG-TERM CURRENT USE OF INSULIN (HCC): ICD-10-CM

## 2023-03-07 DIAGNOSIS — Z79.4 CONTROLLED TYPE 2 DIABETES MELLITUS WITH DIABETIC POLYNEUROPATHY, WITH LONG-TERM CURRENT USE OF INSULIN (HCC): ICD-10-CM

## 2023-03-07 DIAGNOSIS — G44.209 TENSION-TYPE HEADACHE, NOT INTRACTABLE, UNSPECIFIED CHRONICITY PATTERN: ICD-10-CM

## 2023-03-07 DIAGNOSIS — Z00.00 MEDICARE ANNUAL WELLNESS VISIT, SUBSEQUENT: Primary | ICD-10-CM

## 2023-03-07 LAB
ALBUMIN SERPL-MCNC: 3.7 G/DL (ref 3.2–4.6)
ALBUMIN/GLOB SERPL: 1.4 (ref 0.4–1.6)
ALP SERPL-CCNC: 62 U/L (ref 50–136)
ALT SERPL-CCNC: 40 U/L (ref 12–65)
ANION GAP SERPL CALC-SCNC: 3 MMOL/L (ref 2–11)
AST SERPL-CCNC: 27 U/L (ref 15–37)
BASOPHILS # BLD: 0 K/UL (ref 0–0.2)
BASOPHILS NFR BLD: 0 % (ref 0–2)
BILIRUB SERPL-MCNC: 0.9 MG/DL (ref 0.2–1.1)
BUN SERPL-MCNC: 16 MG/DL (ref 8–23)
CALCIUM SERPL-MCNC: 8.6 MG/DL (ref 8.3–10.4)
CHLORIDE SERPL-SCNC: 99 MMOL/L (ref 101–110)
CHOLEST SERPL-MCNC: 109 MG/DL
CO2 SERPL-SCNC: 30 MMOL/L (ref 21–32)
CREAT SERPL-MCNC: 0.9 MG/DL (ref 0.6–1)
DIFFERENTIAL METHOD BLD: ABNORMAL
EOSINOPHIL # BLD: 0.1 K/UL (ref 0–0.8)
EOSINOPHIL NFR BLD: 2 % (ref 0.5–7.8)
ERYTHROCYTE [DISTWIDTH] IN BLOOD BY AUTOMATED COUNT: 12.4 % (ref 11.9–14.6)
GLOBULIN SER CALC-MCNC: 2.7 G/DL (ref 2.8–4.5)
GLUCOSE SERPL-MCNC: 95 MG/DL (ref 65–100)
HCT VFR BLD AUTO: 37.2 % (ref 35.8–46.3)
HDLC SERPL-MCNC: 33 MG/DL (ref 40–60)
HDLC SERPL: 3.3
HGB BLD-MCNC: 12.8 G/DL (ref 11.7–15.4)
IMM GRANULOCYTES # BLD AUTO: 0 K/UL (ref 0–0.5)
IMM GRANULOCYTES NFR BLD AUTO: 0 % (ref 0–5)
LDLC SERPL CALC-MCNC: 43.8 MG/DL
LYMPHOCYTES # BLD: 2 K/UL (ref 0.5–4.6)
LYMPHOCYTES NFR BLD: 34 % (ref 13–44)
MCH RBC QN AUTO: 31.4 PG (ref 26.1–32.9)
MCHC RBC AUTO-ENTMCNC: 34.4 G/DL (ref 31.4–35)
MCV RBC AUTO: 91.2 FL (ref 82–102)
MONOCYTES # BLD: 0.4 K/UL (ref 0.1–1.3)
MONOCYTES NFR BLD: 7 % (ref 4–12)
NEUTS SEG # BLD: 3.2 K/UL (ref 1.7–8.2)
NEUTS SEG NFR BLD: 57 % (ref 43–78)
NRBC # BLD: 0 K/UL (ref 0–0.2)
PLATELET # BLD AUTO: 227 K/UL (ref 150–450)
PMV BLD AUTO: 9.3 FL (ref 9.4–12.3)
POTASSIUM SERPL-SCNC: 4.6 MMOL/L (ref 3.5–5.1)
PROT SERPL-MCNC: 6.4 G/DL (ref 6.3–8.2)
RBC # BLD AUTO: 4.08 M/UL (ref 4.05–5.2)
SODIUM SERPL-SCNC: 132 MMOL/L (ref 133–143)
TRIGL SERPL-MCNC: 161 MG/DL (ref 35–150)
TSH, 3RD GENERATION: 0.61 UIU/ML (ref 0.36–3.74)
VLDLC SERPL CALC-MCNC: 32.2 MG/DL (ref 6–23)
WBC # BLD AUTO: 5.8 K/UL (ref 4.3–11.1)

## 2023-03-07 RX ORDER — BUSPIRONE HYDROCHLORIDE 15 MG/1
15 TABLET ORAL 4 TIMES DAILY
Qty: 360 TABLET | Refills: 3 | Status: SHIPPED | OUTPATIENT
Start: 2023-03-07

## 2023-03-07 RX ORDER — GABAPENTIN 100 MG/1
CAPSULE ORAL
Qty: 90 CAPSULE | Refills: 2 | Status: SHIPPED | OUTPATIENT
Start: 2023-03-07 | End: 2023-06-05

## 2023-03-07 RX ORDER — BUTALBITAL, ACETAMINOPHEN AND CAFFEINE 300; 40; 50 MG/1; MG/1; MG/1
1 CAPSULE ORAL EVERY 4 HOURS PRN
Qty: 20 CAPSULE | Refills: 1 | Status: SHIPPED | OUTPATIENT
Start: 2023-03-07

## 2023-03-07 SDOH — ECONOMIC STABILITY: INCOME INSECURITY: HOW HARD IS IT FOR YOU TO PAY FOR THE VERY BASICS LIKE FOOD, HOUSING, MEDICAL CARE, AND HEATING?: NOT HARD AT ALL

## 2023-03-07 SDOH — ECONOMIC STABILITY: FOOD INSECURITY: WITHIN THE PAST 12 MONTHS, YOU WORRIED THAT YOUR FOOD WOULD RUN OUT BEFORE YOU GOT MONEY TO BUY MORE.: NEVER TRUE

## 2023-03-07 SDOH — ECONOMIC STABILITY: HOUSING INSECURITY
IN THE LAST 12 MONTHS, WAS THERE A TIME WHEN YOU DID NOT HAVE A STEADY PLACE TO SLEEP OR SLEPT IN A SHELTER (INCLUDING NOW)?: NO

## 2023-03-07 SDOH — ECONOMIC STABILITY: FOOD INSECURITY: WITHIN THE PAST 12 MONTHS, THE FOOD YOU BOUGHT JUST DIDN'T LAST AND YOU DIDN'T HAVE MONEY TO GET MORE.: NEVER TRUE

## 2023-03-07 ASSESSMENT — PATIENT HEALTH QUESTIONNAIRE - PHQ9
SUM OF ALL RESPONSES TO PHQ QUESTIONS 1-9: 2
SUM OF ALL RESPONSES TO PHQ QUESTIONS 1-9: 2
1. LITTLE INTEREST OR PLEASURE IN DOING THINGS: 1
2. FEELING DOWN, DEPRESSED OR HOPELESS: 1
SUM OF ALL RESPONSES TO PHQ9 QUESTIONS 1 & 2: 2
SUM OF ALL RESPONSES TO PHQ QUESTIONS 1-9: 2
SUM OF ALL RESPONSES TO PHQ QUESTIONS 1-9: 2

## 2023-03-07 ASSESSMENT — LIFESTYLE VARIABLES
HOW MANY STANDARD DRINKS CONTAINING ALCOHOL DO YOU HAVE ON A TYPICAL DAY: PATIENT DOES NOT DRINK
HOW OFTEN DO YOU HAVE A DRINK CONTAINING ALCOHOL: NEVER

## 2023-03-07 NOTE — PROGRESS NOTES
SUBJECTIVE:   Kyra Corley is a 77 y.o. female who has a past medical history significant for diabetes with neuropathy, hypertension, high cholesterol, high triglycerides, hypothyroidism, chronic pain syndrome, recurrent depression, tension headaches and generalized anxiety.  Review of systems reveals ongoing struggles with fatigue and joint pain.  This is not new.  She does report that her depression seems to be worsening.  She is undergoing counseling which seems to be helping.  She is also being followed by psychiatry.  She reports no homicidal or suicidal ideations.  She denies chest pain, shortness of breath, orthopnea or PND.  GI and  review of systems is unremarkable.    HPI  See above    Past Medical History, Past Surgical History, Family history, Social History, and Medications were all reviewed with the patient today and updated as necessary.       Current Outpatient Medications   Medication Sig Dispense Refill    gabapentin (NEURONTIN) 100 MG capsule TAKE 1 CAPSULE BY MOUTH 3 TIMES DAILY 90 capsule 2    butalbital-APAP-caffeine -40 MG CAPS per capsule Take 1 capsule by mouth every 4 hours as needed for Headaches 20 capsule 1    busPIRone (BUSPAR) 15 MG tablet Take 15 mg by mouth 4 times daily 360 tablet 3    TOUJEO SOLOSTAR 300 UNIT/ML concentrated injection pen Inject 50 Units into the skin at bedtime 9 mL 11    TRULICITY 1.5 MG/0.5ML SC injection 1.5 mg, SubCUTAneous, EVERY 7 DAYS 2 mL 11    metFORMIN (GLUCOPHAGE-XR) 500 MG extended release tablet Take 1 tablet by mouth daily 30 tablet 11    BD PEN NEEDLE CHINA 2ND GEN 32G X 4 MM MISC Once a day 50 each 11    ezetimibe-simvastatin (VYTORIN) 10-40 MG per tablet Take 1 tablet by mouth nightly 30 tablet 11    levothyroxine (SYNTHROID) 112 MCG tablet Take 1 tablet by mouth Daily 30 tablet 11    vitamin D (ERGOCALCIFEROL) 1.25 MG (99160 UT) CAPS capsule TAKE 1 CAPSULE BY MOUTH ONCE WEEKLY 12 capsule 2    irbesartan (AVAPRO) 300 MG tablet TAKE 1  TABLET BY MOUTH EVERY NIGHT 30 tablet 5    magnesium oxide (MAG-OX) 400 MG tablet Take 1 tablet by mouth 2 times daily 180 tablet 3    metoprolol succinate (TOPROL XL) 100 MG extended release tablet Take 2 tablets by mouth daily 60 tablet 5    LcWby-DgSpbj-GR-B Cmp-C-Biot (INTEGRA PLUS) CAPS TAKE 1 CAPSULE DAILY INTEGRA PLUS IS NAME BRAND      oxyCODONE (OXY-IR) 15 MG immediate release tablet Take 15 mg by mouth every 6 hours as needed. nortriptyline (PAMELOR) 10 MG capsule TAKE 1 CAPSULE BY MOUTH 3 TIMES DAILY      bisacodyl (DULCOLAX) 5 MG EC tablet Take 5 mg by mouth      cetirizine (ZYRTEC) 10 MG tablet Take 10 mg by mouth      esomeprazole (NEXIUM) 20 MG delayed release capsule Take 20 mg by mouth in the morning. guaiFENesin (MUCINEX) 600 MG extended release tablet Take 600 mg by mouth daily as needed      zolpidem (AMBIEN) 10 MG tablet       cycloSPORINE (RESTASIS) 0.05 % ophthalmic emulsion Apply 1 drop to eye daily as needed      docusate (COLACE, DULCOLAX) 100 MG CAPS Take 100 mg by mouth daily as needed      escitalopram (LEXAPRO) 20 MG tablet TAKE 1 TABLET BY MOUTH DAILY      fexofenadine (ALLEGRA) 180 MG tablet Take 180 mg by mouth      furosemide (LASIX) 20 MG tablet Take 20 mg by mouth 2-3 times a week      LORazepam (ATIVAN) 0.5 MG tablet TK 1 T PO QID PRA      naloxone 4 MG/0.1ML LIQD nasal spray by Nasal route once       No current facility-administered medications for this visit.      Allergies   Allergen Reactions    Morpholine Salicylate      Other reaction(s): Headache-Intolerance, Hives/Swelling-Allergy    Celecoxib Swelling     Red rash, itching    Canagliflozin Other (See Comments)     \"HOT, SWEATY, BLACKED OUT\"    Clonidine Other (See Comments), Rash and Swelling     Catapres patch,  headache    Duloxetine Rash    Morphine Rash and Swelling     migraines    Oxycodone Other (See Comments)     Migraines, elevated BP    Sulfa Antibiotics Other (See Comments)     Patient Active Problem List   Diagnosis    Postsurgical hypothyroidism    Osteoarthritis    Depression    Edema    Sleep apnea    GERD (gastroesophageal reflux disease)    Mixed hyperlipidemia    Noncompliance with CPAP treatment    Generalized anxiety disorder    Degenerative disc disease, lumbar    Essential hypertension    Fibromyalgia    WEBER (dyspnea on exertion)    S/P TKR (total knee replacement) using cement, right    Vitamin D deficiency    Mitral valve regurgitation    Arthritis of knee, right    Controlled type 2 diabetes mellitus with diabetic polyneuropathy, with long-term current use of insulin (HCC)    Chronic renal disease, stage III (HCC) [788447]    Chronic systolic (congestive) heart failure     Past Medical History:   Diagnosis Date    Acute pancreatitis 2/2008    Aneurysm ([facility] [address]     splenic- \"just over 1 cm\"-(CT done July '13- followed by Dr Gagan Almonte))    Anxiety     Bilateral sacroiliitis [facility])     Breast cyst     aspiration 6/14    CAD (coronary artery disease)     MITRAL VALVE LEAK AND ANEURYSM ON SPLENIC ARTERY; has SENTHIL-1 flight of steps    Cellulitis 6/16/15    right leg    Chest pain     Chronic kidney disease     stage 3    Chronic pain     lumbar/spine/thoracic, left knee    CKD (chronic kidney disease)     stage 3    Degenerative disc disease     lumbosacral    Depression 12/23/2015    managed with medication    Dyslipidemia     managed with medication    Dyspnea     Edema 12/23/2015    Fatty liver     Fibromyalgia     managed with Medication    Food allergy     GERD (gastroesophageal reflux disease)     controlled with medication    Heart failure ([facility] [address]     per Dr Steve Santiago- due to valve leakage (pt denies)    Hx of echocardiogram 08/21/2015    mitral valve regurgitation    Hypertension     controlled with medication    Hypertriglyceridemia     managed with medication    Hypothyroidism     managed with medication    Lumbar herniated disc Aug 2013    Lumbar radiculopathy     Macrocytic anemia     Microcytic anemia     Mild pulmonary hypertension ([facility] [address]     per ECHO (2012)    Nausea & vomiting     post-op nausea    Neuropathy     bilateral legs    Obesity (BMI 30.0-34.9) 10/2/14    BMI- 30.7    Osteoarthritis     Panic attacks     Sacroiliac dysfunction     Seasonal allergic reaction     Sleep apnea     NO CPAP    Syncope and collapse 12/23/2015     Past Surgical History:   Procedure Laterality Date    BACK SURGERY  08/30/2013    spinal stimulator    BREAST BIOPSY Bilateral R/1988  L/2012    BREAST LUMPECTOMY Right     CARDIAC CATHETERIZATION  2005    CYST REMOVAL Left 02/2019    L thumb    CYST REMOVAL Right 04/2019    cyst on right foot removal    KNEE ARTHROSCOPY  10/03/2014    left knee torn menincus    KNEE ARTHROSCOPY Left 2004    left knee    ORTHOPEDIC SURGERY  04/03/2019    right big toe mass resection exostectomy    ORTHOPEDIC SURGERY Right 2009    hand nodule removed    ORTHOPEDIC SURGERY Left 2010    elbow scope    OTHER SURGICAL HISTORY  10/28/13    left hand-pointer and pinky finger cyst removed    OTHER SURGICAL HISTORY      nodule removed left foot and hand    OTHER SURGICAL HISTORY  12/22/09    TENS implant [facility]    PRE-MALIGNANT / BENIGN SKIN LESION EXCISION  09/29/2014    lower lip    SASKIA AND BSO (CERVIX REMOVED)  1995    THYROIDECTOMY, PARTIAL  2008    partial thyroidectomy and parathyroidectomy    TOTAL KNEE ARTHROPLASTY Right 05/2019    TOTAL KNEE ARTHROPLASTY Left 03/14/2017     Family History   Problem Relation Age of Onset    Hypertension Mother     Stroke Mother         TIAs- several    Breast Cancer Maternal Aunt         3 Maternal Aunts    Breast Cancer Paternal Aunt     Diabetes Father     Heart Disease Father         CHF/ CAD    Lung Disease Father         \"holes in lungs- exposed to toxins in textiles\"    Cancer Father         leukemia    Diabetes Mother     Breast Cancer Mother     Heart Disease Paternal Grandfather     Diabetes Paternal Grandfather     Heart Failure Paternal Grandmother         CHF    Diabetes Paternal Grandmother     Heart Disease Maternal Grandfather     Diabetes Maternal Grandfather     Stroke Maternal Grandmother     Heart Disease Maternal Grandmother     Diabetes Maternal Grandmother     Asthma Father     Other Father         polio    Hypertension Father      Social History     Tobacco Use    Smoking status: Never    Smokeless tobacco: Never   Substance Use Topics    Alcohol use: No         Review of Systems  See above    OBJECTIVE:  /70   Ht 5' 4\" (1.626 m)   Wt 193 lb 12.8 oz (87.9 kg)   BMI 33.27 kg/m²      Physical Exam  Constitutional:       General: She is not in acute distress. Appearance: Normal appearance. She is not ill-appearing. HENT:      Head: Normocephalic and atraumatic. Right Ear: Ear canal and external ear normal. There is no impacted cerumen. Left Ear: Tympanic membrane, ear canal and external ear normal. There is no impacted cerumen. Nose: Nose normal.      Mouth/Throat:      Mouth: Mucous membranes are moist.      Pharynx: Oropharynx is clear. No oropharyngeal exudate or posterior oropharyngeal erythema. Eyes:      General: No scleral icterus. Extraocular Movements: Extraocular movements intact. Conjunctiva/sclera: Conjunctivae normal.      Pupils: Pupils are equal, round, and reactive to light. Neck:      Vascular: No carotid bruit. Cardiovascular:      Rate and Rhythm: Normal rate and regular rhythm. Pulses: Normal pulses. Heart sounds: Normal heart sounds. No murmur heard. Pulmonary:      Effort: Pulmonary effort is normal. No respiratory distress. Breath sounds: Normal breath sounds. No wheezing. Abdominal:      General: Abdomen is flat. Bowel sounds are normal. There is no distension. Palpations: Abdomen is soft. There is no mass. Tenderness: There is no abdominal tenderness. Hernia: No hernia is present.    Musculoskeletal:         General: No swelling, tenderness or deformity. Normal range of motion. Cervical back: Normal range of motion and neck supple. Right lower leg: No edema. Left lower leg: No edema. Lymphadenopathy:      Cervical: No cervical adenopathy. Skin:     General: Skin is warm. Findings: No rash. Neurological:      General: No focal deficit present. Mental Status: She is alert and oriented to person, place, and time. Cranial Nerves: No cranial nerve deficit. Motor: No weakness. Deep Tendon Reflexes: Reflexes normal.   Psychiatric:         Mood and Affect: Mood normal.         Behavior: Behavior normal.         Thought Content: Thought content normal.         Judgment: Judgment normal.       Medical problems and test results were reviewed with the patient today. ASSESSMENT and PLAN    1. Fatigue. Acute on chronic. Check TSH, CBC and metabolic panel. No chest pain or dyspnea on exertion reported. Suspect depression and physical deconditioning contributing. 2.  Polyarthralgias. Suspect fibromyalgia type syndrome. Reevaluate at follow-up. Patient will discuss with psychiatrist her worsening depression. 3.  Diabetes with neuropathy. Followed by endocrinology. Encourage yearly retinal exams and daily feet exams. 4.  Hypertension. /70.    5.  High cholesterol. Check lipid panel. 6.  High triglycerides. As above. 7.  Hypothyroidism. Check TSH. 8.  Chronic pain syndrome. Per pain management. 9.  Anxiety. Per psychiatry. 10.  Recurrent depression. Per psychiatry. 11.  Tension headaches. Appear to be stable. Elements of this note have been dictated using speech recognition software. As a result, errors of speech recognition may have occurred.

## 2023-03-07 NOTE — PATIENT INSTRUCTIONS
Fatigue: Care Instructions  Your Care Instructions     Fatigue is a feeling of tiredness, exhaustion, or lack of energy. You may feel fatigue because of too much or not enough activity. It can also come from stress, lack of sleep, boredom, and poor diet. Many medical problems, such as viral infections, can cause fatigue. Emotional problems, especially depression, are often the cause of fatigue. Fatigue is most often a symptom of another problem. Treatment for fatigue depends on the cause. For example, if you have fatigue because you have a certain health problem, treating this problem also treats your fatigue. If depression or anxiety is the cause, treatment may help. Follow-up care is a key part of your treatment and safety. Be sure to make and go to all appointments, and call your doctor if you are having problems. It's also a good idea to know your test results and keep a list of the medicines you take. How can you care for yourself at home? Get regular exercise. But don't overdo it. Go back and forth between rest and exercise. Get plenty of rest.  Eat a healthy diet. Do not skip meals, especially breakfast.  Reduce your use of caffeine, tobacco, and alcohol. Caffeine is most often found in coffee, tea, cola drinks, and chocolate. Limit medicines that can cause fatigue. This includes tranquilizers and cold and allergy medicines. When should you call for help? Watch closely for changes in your health, and be sure to contact your doctor if:    You have new symptoms such as fever or a rash.     Your fatigue gets worse.     You have been feeling down, depressed, or hopeless. Or you may have lost interest in things that you usually enjoy.     You are not getting better as expected. Where can you learn more? Go to http://www.woods.com/ and enter G519 to learn more about \"Fatigue: Care Instructions. \"  Current as of: February 9, 2022               Content Version: 13.5  © 4456-0084 Healthwise, [employer]. Care instructions adapted under license by [facility] ([facility]). If you have questions about a medical condition or this instruction, always ask your healthcare professional. [address] any warranty or liability for your use of this information. Advance Directives: Care Instructions  Overview  An advance directive is a legal way to state your wishes at the end of your life. It tells your family and your doctor what to do if you can't say what you want. There are two main types of advance directives. You can change them any time your wishes change. Living will. This form tells your family and your doctor your wishes about life support and other treatment. The form is also called a declaration. Medical power of . This form lets you name a person to make treatment decisions for you when you can't speak for yourself. This person is called a health care agent (health care proxy, health care surrogate). The form is also called a durable power of  for health care. If you do not have an advance directive, decisions about your medical care may be made by a family member, or by a doctor or a  who doesn't know you. It may help to think of an advance directive as a gift to the people who care for you. If you have one, they won't have to make tough decisions by themselves. For more information, including forms for your state, see the [address] website (www.caringinfo.org/planning/advance-directives/). Follow-up care is a key part of your treatment and safety. Be sure to make and go to all appointments, and call your doctor if you are having problems. It's also a good idea to know your test results and keep a list of the medicines you take. What should you include in an advance directive? Many states have a unique advance directive form.  (It may ask you to address specific issues.) Or you might use a universal form that's approved by many states. If your form doesn't tell you what to address, it may be hard to know what to include in your advance directive. Use the questions below to help you get started. Who do you want to make decisions about your medical care if you are not able to? What life-support measures do you want if you have a serious illness that gets worse over time or can't be cured? What are you most afraid of that might happen? (Maybe you're afraid of having pain, losing your independence, or being kept alive by machines.)  Where would you prefer to die? (Your home? A hospital? A nursing home?)  Do you want to donate your organs when you die? Do you want certain Scientology practices performed before you die? When should you call for help? Be sure to contact your doctor if you have any questions. Where can you learn more? Go to http://www.andrade.com/ and enter R264 to learn more about \"Advance Directives: Care Instructions. \"  Current as of: June 16, 2022               Content Version: 13.5  © 4006-3592 [employer]. Care instructions adapted under license by [facility] ([facility]). If you have questions about a medical condition or this instruction, always ask your healthcare professional. [address] any warranty or liability for your use of this information. Starting a Weight Loss Plan: Care Instructions  Overview     If you're thinking about losing weight, it can be hard to know where to start. Your doctor can help you set up a weight loss plan that best meets your needs. You may want to take a class on nutrition or exercise, or you could join a weight loss support group. If you have questions about how to make changes to your eating or exercise habits, ask your doctor about seeing a registered dietitian or an exercise specialist.  It can be a big challenge to lose weight. But you don't have to make huge changes at once. Make small changes, and stick with them.  When those changes become habit, add a few more changes. If you don't think you're ready to make changes right now, try to pick a date in the future. Make an appointment to see your doctor to discuss whether the time is right for you to start a plan. Follow-up care is a key part of your treatment and safety. Be sure to make and go to all appointments, and call your doctor if you are having problems. It's also a good idea to know your test results and keep a list of the medicines you take. How can you care for yourself at home? Set realistic goals. Many people expect to lose much more weight than is likely. A weight loss of 5% to 10% of your body weight may be enough to improve your health. Get family and friends involved to provide support. Talk to them about why you are trying to lose weight, and ask them to help. They can help by participating in exercise and having meals with you, even if they may be eating something different. Find what works best for you. If you do not have time or do not like to cook, a program that offers meal replacement bars or shakes may be better for you. Or if you like to prepare meals, finding a plan that includes daily menus and recipes may be best.  Ask your doctor about other health professionals who can help you achieve your weight loss goals. A dietitian can help you make healthy changes in your diet. An exercise specialist or  can help you develop a safe and effective exercise program.  A counselor or psychiatrist can help you cope with issues such as depression, anxiety, or family problems that can make it hard to focus on weight loss. Consider joining a support group for people who are trying to lose weight. Your doctor can suggest groups in your area. Where can you learn more? Go to http://www.woods.com/ and enter U357 to learn more about \"Starting a Weight Loss Plan: Care Instructions. \"  Current as of: August 25, 0123               NBIJGDM Version: 13.5  © 2902-6915 [employer]. Care instructions adapted under license by [facility] ([facility]). If you have questions about a medical condition or this instruction, always ask your healthcare professional. [address] any warranty or liability for your use of this information. A Healthy Heart: Care Instructions  Your Care Instructions     Coronary artery disease, also called heart disease, occurs when a substance called plaque builds up in the vessels that supply oxygen-rich blood to your heart muscle. This can narrow the blood vessels and reduce blood flow. A heart attack happens when blood flow is completely blocked. A high-fat diet, smoking, and other factors increase the risk of heart disease. Your doctor has found that you have a chance of having heart disease. You can do lots of things to keep your heart healthy. It may not be easy, but you can change your diet, exercise more, and quit smoking. These steps really work to lower your chance of heart disease. Follow-up care is a key part of your treatment and safety. Be sure to make and go to all appointments, and call your doctor if you are having problems. It's also a good idea to know your test results and keep a list of the medicines you take. How can you care for yourself at home? Diet    Use less salt when you cook and eat. This helps lower your blood pressure. Taste food before salting. Add only a little salt when you think you need it. With time, your taste buds will adjust to less salt.     Eat fewer snack items, fast foods, canned soups, and other high-salt, high-fat, processed foods.     Read food labels and try to avoid saturated and trans fats. They increase your risk of heart disease by raising cholesterol levels.     Limit the amount of solid fat-butter, margarine, and shortening-you eat. Use olive, peanut, or canola oil when you cook.  Bake, broil, and steam foods instead of frying them.     Eat a variety of fruit and vegetables every day. Dark green, deep orange, red, or yellow fruits and vegetables are especially good for you. Examples include spinach, carrots, peaches, and berries.     Foods high in fiber can reduce your cholesterol and provide important vitamins and minerals. High-fiber foods include whole-grain cereals and breads, oatmeal, beans, brown rice, citrus fruits, and apples.     Eat lean proteins. Heart-healthy proteins include seafood, lean meats and poultry, eggs, beans, peas, nuts, seeds, and soy products.     Limit drinks and foods with added sugar. These include candy, desserts, and soda pop. Lifestyle changes    If your doctor recommends it, get more exercise. Walking is a good choice. Bit by bit, increase the amount you walk every day. Try for at least 30 minutes on most days of the week. You also may want to swim, bike, or do other activities.     Do not smoke. If you need help quitting, talk to your doctor about stop-smoking programs and medicines. These can increase your chances of quitting for good. Quitting smoking may be the most important step you can take to protect your heart. It is never too late to quit.     Limit alcohol to 2 drinks a day for men and 1 drink a day for women. Too much alcohol can cause health problems.     Manage other health problems such as diabetes, high blood pressure, and high cholesterol. If you think you may have a problem with alcohol or drug use, talk to your doctor. Medicines    Take your medicines exactly as prescribed. Call your doctor if you think you are having a problem with your medicine.     If your doctor recommends aspirin, take the amount directed each day. Make sure you take aspirin and not another kind of pain reliever, such as acetaminophen (Tylenol). When should you call for help? Call 911 if you have symptoms of a heart attack.  These may include:    Chest pain or pressure, or a strange feeling in the chest.     Sweating.     Shortness of breath.     Pain, pressure, or a strange feeling in the back, neck, jaw, or upper belly or in one or both shoulders or arms.     Lightheadedness or sudden weakness.     A fast or irregular heartbeat. After you call 911, the  may tell you to chew 1 adult-strength or 2 to 4 low-dose aspirin. Wait for an ambulance. Do not try to drive yourself. Watch closely for changes in your health, and be sure to contact your doctor if you have any problems. Where can you learn more? Go to http://www.andrade.com/ and enter F075 to learn more about \"A Healthy Heart: Care Instructions. \"  Current as of: September 7, 2022               Content Version: 13.5  © 2006-2022 Healthwise, [employer]. Care instructions adapted under license by [facility]"[employer] [facility] ([facility]). If you have questions about a medical condition or this instruction, always ask your healthcare professional. [place] [address] any warranty or liability for your use of this information. Personalized Preventive Plan for Candace Alicea - 3/7/2023  Medicare offers a range of preventive health benefits. Some of the tests and screenings are paid in full while other may be subject to a deductible, co-insurance, and/or copay. Some of these benefits include a comprehensive review of your medical history including lifestyle, illnesses that may run in your family, and various assessments and screenings as appropriate. After reviewing your medical record and screening and assessments performed today your provider may have ordered immunizations, labs, imaging, and/or referrals for you. A list of these orders (if applicable) as well as your Preventive Care list are included within your After Visit Summary for your review. Other Preventive Recommendations:    A preventive eye exam performed by an eye specialist is recommended every 1-2 years to screen for glaucoma; cataracts, macular degeneration, and other eye disorders.   A preventive dental visit is recommended every 6 months. Try to get at least 150 minutes of exercise per week or 10,000 steps per day on a pedometer . Order or download the FREE \"Exercise & Physical Activity: Your Everyday Guide\" from [employer]. Call 8-602.987.1852 or search [employer] online. You need 9943-0097 mg of calcium and 8895-9892 IU of vitamin D per day. It is possible to meet your calcium requirement with diet alone, but a vitamin D supplement is usually necessary to meet this goal.  When exposed to the sun, use a sunscreen that protects against both UVA and UVB radiation with an SPF of 30 or greater. Reapply every 2 to 3 hours or after sweating, drying off with a towel, or swimming. Always wear a seat belt when traveling in a car. Always wear a helmet when riding a bicycle or motorcycle.

## 2023-03-07 NOTE — PROGRESS NOTES
Medicare Annual Wellness Visit    Elena Spears is here for Medicare AWV    Assessment & Plan   Medicare annual wellness visit, subsequent  Tension-type headache, not intractable, unspecified chronicity pattern  The following orders have not been finalized:  -     butalbital-APAP-caffeine -40 MG CAPS per capsule      Recommendations for Preventive Services Due: see orders and patient instructions/AVS.  Recommended screening schedule for the next 5-10 years is provided to the patient in written form: see Patient Instructions/AVS.     Return for Medicare Annual Wellness Visit in 1 year. Subjective   Diabetes with polyneuropathy, hypertension, high cholesterol, high triglycerides, hypothyroidism, recurrent depression, anxiety, chronic pain syndrome and tension headaches    Patient's complete Health Risk Assessment and screening values have been reviewed and are found in Flowsheets. The following problems were reviewed today and where indicated follow up appointments were made and/or referrals ordered. Positive Risk Factor Screenings with Interventions:                Opioid Risk: (Low risk score <55) Opioid risk score: 44    Patient is low risk for opioid use disorder or overdose. Last PDMP Odilon Burciaga as Reviewed:  Review User Review Instant Review Result                  General HRA Questions:  Select all that apply: (!) New or Increased Fatigue, New or Increased Pain    Pain Interventions:  Patient is followed by pain management    Fatigue Interventions:  Checking basic lab work including TSH, CBC and metabolic panel.        Weight and Activity:  Physical Activity: Insufficiently Active    Days of Exercise per Week: 7 days    Minutes of Exercise per Session: 20 min     On average, how many days per week do you engage in moderate to strenuous exercise (like a brisk walk)?: 7 days  Have you lost any weight without trying in the past 3 months?: No  Body mass index: (!) 33.26    Obesity Interventions:  Encourage proper diet and exercise           Hearing Screen:  Do you or your family notice any trouble with your hearing that hasn't been managed with hearing aids?: (!) Yes    Interventions:  Patient declines any further evaluation or treatment       Advanced Directives:  Do you have a Living Will?: (!) No    Intervention:  has NO advanced directive - information provided                       Objective   Vitals:    03/07/23 1408   BP: 136/70   Weight: 193 lb 12.8 oz (87.9 kg)   Height: 5' 4\" (1.626 m)      Body mass index is 33.27 kg/m².         General Appearance: alert and oriented to person, place and time, well developed and well- nourished, in no acute distress  Skin: warm and dry, no rash or erythema  Head: normocephalic and atraumatic  Eyes: pupils equal, round, and reactive to light, extraocular eye movements intact, conjunctivae normal  ENT: tympanic membrane, external ear and ear canal normal bilaterally, nose without deformity, nasal mucosa and turbinates normal without polyps  Neck: supple and non-tender without mass, no thyromegaly or thyroid nodules, no cervical lymphadenopathy  Pulmonary/Chest: clear to auscultation bilaterally- no wheezes, rales or rhonchi, normal air movement, no respiratory distress  Cardiovascular: normal rate, regular rhythm, normal S1 and S2, no murmurs, rubs, clicks, or gallops, distal pulses intact, no carotid bruits  Abdomen: soft, non-tender, non-distended, normal bowel sounds, no masses or organomegaly  Extremities: no cyanosis, clubbing or edema  Musculoskeletal: normal range of motion, no joint swelling, deformity or tenderness  Neurologic: reflexes normal and symmetric, no cranial nerve deficit, gait, coordination and speech normal       Allergies   Allergen Reactions    Morpholine Salicylate      Other reaction(s): Headache-Intolerance, Hives/Swelling-Allergy    Celecoxib Swelling     Red rash, itching    Canagliflozin Other (See Comments)     \"HOT, SWEATY, BLACKED OUT\" Clonidine Other (See Comments), Rash and Swelling     Catapres patch,  headache    Duloxetine Rash    Morphine Rash and Swelling     migraines    Oxycodone Other (See Comments)     Migraines, elevated BP    Sulfa Antibiotics Other (See Comments)     Prior to Visit Medications    Medication Sig Taking? Authorizing Provider   TOUJEO SOLOSTAR 300 UNIT/ML concentrated injection pen Inject 50 Units into the skin at bedtime Yes Aaron Fuller MD   TRULICITY 1.5 MG/0.5ML SC injection 1.5 mg, SubCUTAneous, EVERY 7 DAYS Yes Aaron Fuller MD   metFORMIN (GLUCOPHAGE-XR) 500 MG extended release tablet Take 1 tablet by mouth daily Yes Aaron Fuller MD   BD PEN NEEDLE CHINA 2ND GEN 32G X 4 MM MISC Once a day Yes Aaron Fuller MD   ezetimibe-simvastatin (VYTORIN) 10-40 MG per tablet Take 1 tablet by mouth nightly Yes Aaron Fuller MD   levothyroxine (SYNTHROID) 112 MCG tablet Take 1 tablet by mouth Daily Yes Aaron Fuller MD   butalbital-APAP-caffeine -40 MG CAPS per capsule TAKE 1 CAPSULE BY MOUTH EVERY 4 HOURS AS NEEDED FOR HEADACHES Yes LYNDA Kelsey - CNP   vitamin D (ERGOCALCIFEROL) 1.25 MG (37440 UT) CAPS capsule TAKE 1 CAPSULE BY MOUTH ONCE WEEKLY Yes Ty Albright MD   irbesartan (AVAPRO) 300 MG tablet TAKE 1 TABLET BY MOUTH EVERY NIGHT Yes Ty Albright MD   gabapentin (NEURONTIN) 100 MG capsule TAKE 1 CAPSULE BY MOUTH 3 TIMES DAILY Yes Ty Albright MD   magnesium oxide (MAG-OX) 400 MG tablet Take 1 tablet by mouth 2 times daily Yes Omar Cota,    metoprolol succinate (TOPROL XL) 100 MG extended release tablet Take 2 tablets by mouth daily Yes LYNDA Kelsey - CNP   XxOyl-MwPuru-AZ-B Cmp-C-Biot (INTEGRA PLUS) CAPS TAKE 1 CAPSULE DAILY INTEGRA PLUS IS NAME BRAND Yes Historical Provider, MD   oxyCODONE (OXY-IR) 15 MG immediate release tablet Take 15 mg by mouth every 6 hours as needed. Yes Historical Provider, MD   nortriptyline (PAMELOR) 10 MG capsule TAKE 1 CAPSULE BY MOUTH 3 TIMES DAILY  Yes Beatrice Gibbons MD   bisacodyl (DULCOLAX) 5 MG EC tablet Take 5 mg by mouth Yes Historical Provider, MD   cetirizine (ZYRTEC) 10 MG tablet Take 10 mg by mouth Yes Historical Provider, MD   esomeprazole (NEXIUM) 20 MG delayed release capsule Take 20 mg by mouth in the morning.  Yes Historical Provider, MD   guaiFENesin (MUCINEX) 600 MG extended release tablet Take 600 mg by mouth daily as needed Yes Historical Provider, MD   zolpidem (AMBIEN) 10 MG tablet  Yes Historical Provider, MD   busPIRone (BUSPAR) 15 MG tablet Take 15 mg by mouth 4 times daily Yes Ar Automatic Reconciliation   cycloSPORINE (RESTASIS) 0.05 % ophthalmic emulsion Apply 1 drop to eye daily as needed Yes Ar Automatic Reconciliation   docusate (COLACE, DULCOLAX) 100 MG CAPS Take 100 mg by mouth daily as needed Yes Ar Automatic Reconciliation   escitalopram (LEXAPRO) 20 MG tablet TAKE 1 TABLET BY MOUTH DAILY Yes Ar Automatic Reconciliation   fexofenadine (ALLEGRA) 180 MG tablet Take 180 mg by mouth Yes Ar Automatic Reconciliation   furosemide (LASIX) 20 MG tablet Take 20 mg by mouth 2-3 times a week Yes Ar Automatic Reconciliation   LORazepam (ATIVAN) 0.5 MG tablet TK 1 T PO QID PRA Yes Ar Automatic Reconciliation   naloxone 4 MG/0.1ML LIQD nasal spray by Nasal route once Yes Ar Automatic Reconciliation       CareTeam (Including outside providers/suppliers regularly involved in providing care):   Patient Care Team:  Last Ch MD as PCP - General  Last Ch MD as PCP - Empaneled Provider  Braulio Guevara DO as Physician     Reviewed and updated this visit:  Tobacco  Allergies  Meds  Med Hx  Surg Hx  Soc Hx  Fam Hx             Sobia Winkler MD

## 2023-04-18 ENCOUNTER — OFFICE VISIT (OUTPATIENT)
Dept: FAMILY MEDICINE CLINIC | Facility: CLINIC | Age: 78
End: 2023-04-18
Payer: MEDICARE

## 2023-04-18 VITALS
SYSTOLIC BLOOD PRESSURE: 136 MMHG | WEIGHT: 195.2 LBS | DIASTOLIC BLOOD PRESSURE: 82 MMHG | BODY MASS INDEX: 33.32 KG/M2 | HEIGHT: 64 IN

## 2023-04-18 DIAGNOSIS — R53.83 FATIGUE, UNSPECIFIED TYPE: ICD-10-CM

## 2023-04-18 DIAGNOSIS — E78.1 PURE HYPERGLYCERIDEMIA: ICD-10-CM

## 2023-04-18 DIAGNOSIS — I10 PRIMARY HYPERTENSION: Primary | ICD-10-CM

## 2023-04-18 DIAGNOSIS — F41.9 ANXIETY: ICD-10-CM

## 2023-04-18 DIAGNOSIS — E11.42 CONTROLLED TYPE 2 DIABETES MELLITUS WITH DIABETIC POLYNEUROPATHY, WITH LONG-TERM CURRENT USE OF INSULIN (HCC): ICD-10-CM

## 2023-04-18 DIAGNOSIS — G44.209 TENSION-TYPE HEADACHE, NOT INTRACTABLE, UNSPECIFIED CHRONICITY PATTERN: ICD-10-CM

## 2023-04-18 DIAGNOSIS — Z79.4 CONTROLLED TYPE 2 DIABETES MELLITUS WITH DIABETIC POLYNEUROPATHY, WITH LONG-TERM CURRENT USE OF INSULIN (HCC): ICD-10-CM

## 2023-04-18 DIAGNOSIS — M79.7 FIBROMYALGIA: ICD-10-CM

## 2023-04-18 DIAGNOSIS — E78.00 PURE HYPERCHOLESTEROLEMIA: ICD-10-CM

## 2023-04-18 DIAGNOSIS — E03.4 HYPOTHYROIDISM DUE TO ACQUIRED ATROPHY OF THYROID: ICD-10-CM

## 2023-04-18 DIAGNOSIS — Z91.81 AT HIGH RISK FOR FALLS: ICD-10-CM

## 2023-04-18 PROCEDURE — 3075F SYST BP GE 130 - 139MM HG: CPT | Performed by: FAMILY MEDICINE

## 2023-04-18 PROCEDURE — 1036F TOBACCO NON-USER: CPT | Performed by: FAMILY MEDICINE

## 2023-04-18 PROCEDURE — G8427 DOCREV CUR MEDS BY ELIG CLIN: HCPCS | Performed by: FAMILY MEDICINE

## 2023-04-18 PROCEDURE — G8399 PT W/DXA RESULTS DOCUMENT: HCPCS | Performed by: FAMILY MEDICINE

## 2023-04-18 PROCEDURE — 99214 OFFICE O/P EST MOD 30 MIN: CPT | Performed by: FAMILY MEDICINE

## 2023-04-18 PROCEDURE — 3079F DIAST BP 80-89 MM HG: CPT | Performed by: FAMILY MEDICINE

## 2023-04-18 PROCEDURE — 1123F ACP DISCUSS/DSCN MKR DOCD: CPT | Performed by: FAMILY MEDICINE

## 2023-04-18 PROCEDURE — 1090F PRES/ABSN URINE INCON ASSESS: CPT | Performed by: FAMILY MEDICINE

## 2023-04-18 PROCEDURE — G8417 CALC BMI ABV UP PARAM F/U: HCPCS | Performed by: FAMILY MEDICINE

## 2023-04-18 RX ORDER — BUTALBITAL, ACETAMINOPHEN AND CAFFEINE 300; 40; 50 MG/1; MG/1; MG/1
1 CAPSULE ORAL EVERY 4 HOURS PRN
Qty: 20 CAPSULE | Refills: 1 | Status: SHIPPED | OUTPATIENT
Start: 2023-04-18

## 2023-04-18 ASSESSMENT — PATIENT HEALTH QUESTIONNAIRE - PHQ9
SUM OF ALL RESPONSES TO PHQ QUESTIONS 1-9: 1
1. LITTLE INTEREST OR PLEASURE IN DOING THINGS: 0
SUM OF ALL RESPONSES TO PHQ QUESTIONS 1-9: 1
SUM OF ALL RESPONSES TO PHQ9 QUESTIONS 1 & 2: 1
SUM OF ALL RESPONSES TO PHQ QUESTIONS 1-9: 1
SUM OF ALL RESPONSES TO PHQ QUESTIONS 1-9: 1
2. FEELING DOWN, DEPRESSED OR HOPELESS: 1

## 2023-04-18 NOTE — PROGRESS NOTES
Psychiatric:         Mood and Affect: Mood normal.         Behavior: Behavior normal.         Thought Content: Thought content normal.         Judgment: Judgment normal.       Medical problems and test results were reviewed with the patient today. ASSESSMENT and PLAN    1. Hypertension. /82. Renal function and electrolytes are normal.    2.  High cholesterol. LDL 43. Continue statin. Liver enzymes remain normal.    3.  High triglycerides. Triglycerides 161. Dietary focus. 4.  Hypothyroidism. TSH at goal.  Continue current therapy. 5.  Tension headaches. As needed Fioricet. No complaints. 6.  Diabetes with neuropathy. Per endocrinology. 7.  Chronic pain/fibromyalgia. Per pain management. 8.  Fatigue. Subjective improvement. Multifactorial.  TSH, CBC and metabolic panel unremarkable. 9.  Anxiety. Per psychiatry. 10.  At risk for falls. Fall precautions discussed. Head precautions discussed with the patient. No evidence of concussion at this time. Elements of this note have been dictated using speech recognition software. As a result, errors of speech recognition may have occurred.

## 2023-04-19 ENCOUNTER — OFFICE VISIT (OUTPATIENT)
Dept: CARDIOLOGY CLINIC | Age: 78
End: 2023-04-19

## 2023-04-19 VITALS
HEIGHT: 64 IN | BODY MASS INDEX: 33.63 KG/M2 | WEIGHT: 197 LBS | DIASTOLIC BLOOD PRESSURE: 90 MMHG | SYSTOLIC BLOOD PRESSURE: 148 MMHG | HEART RATE: 76 BPM

## 2023-04-19 DIAGNOSIS — I10 ESSENTIAL HYPERTENSION: ICD-10-CM

## 2023-04-19 DIAGNOSIS — I34.0 NONRHEUMATIC MITRAL VALVE REGURGITATION: ICD-10-CM

## 2023-04-19 DIAGNOSIS — I50.22 CHRONIC SYSTOLIC (CONGESTIVE) HEART FAILURE (HCC): Primary | ICD-10-CM

## 2023-04-19 DIAGNOSIS — N18.31 STAGE 3A CHRONIC KIDNEY DISEASE (HCC): ICD-10-CM

## 2023-04-19 RX ORDER — MAGNESIUM OXIDE 400 MG/1
400 TABLET ORAL 2 TIMES DAILY
Qty: 180 TABLET | Refills: 3 | Status: SHIPPED | OUTPATIENT
Start: 2023-04-19

## 2023-04-19 RX ORDER — FUROSEMIDE 20 MG/1
20 TABLET ORAL
Qty: 108 TABLET | Refills: 3 | Status: SHIPPED | OUTPATIENT
Start: 2023-04-19

## 2023-04-19 NOTE — PROGRESS NOTES
32G X 4 MM MISC Once a day 50 each 11    ezetimibe-simvastatin (VYTORIN) 10-40 MG per tablet Take 1 tablet by mouth nightly 30 tablet 11    levothyroxine (SYNTHROID) 112 MCG tablet Take 1 tablet by mouth Daily 30 tablet 11    vitamin D (ERGOCALCIFEROL) 1.25 MG (27361 UT) CAPS capsule TAKE 1 CAPSULE BY MOUTH ONCE WEEKLY 12 capsule 2    irbesartan (AVAPRO) 300 MG tablet TAKE 1 TABLET BY MOUTH EVERY NIGHT 30 tablet 5    metoprolol succinate (TOPROL XL) 100 MG extended release tablet Take 2 tablets by mouth daily 60 tablet 5    VcCbn-RbLeww-PT-B Cmp-C-Biot (INTEGRA PLUS) CAPS TAKE 1 CAPSULE DAILY INTEGRA PLUS IS NAME BRAND      oxyCODONE (OXY-IR) 15 MG immediate release tablet Take 1 tablet by mouth every 6 hours as needed. nortriptyline (PAMELOR) 10 MG capsule TAKE 1 CAPSULE BY MOUTH 3 TIMES DAILY      bisacodyl (DULCOLAX) 5 MG EC tablet Take 1 tablet by mouth      cetirizine (ZYRTEC) 10 MG tablet Take 1 tablet by mouth      esomeprazole (NEXIUM) 20 MG delayed release capsule Take 1 capsule by mouth daily      guaiFENesin (MUCINEX) 600 MG extended release tablet Take 1 tablet by mouth daily as needed      zolpidem (AMBIEN) 10 MG tablet       cycloSPORINE (RESTASIS) 0.05 % ophthalmic emulsion Apply 1 drop to eye daily as needed      docusate (COLACE, DULCOLAX) 100 MG CAPS Take 100 mg by mouth daily as needed      escitalopram (LEXAPRO) 20 MG tablet TAKE 1 TABLET BY MOUTH DAILY      fexofenadine (ALLEGRA) 180 MG tablet Take 1 tablet by mouth      LORazepam (ATIVAN) 0.5 MG tablet TK 1 T PO QID PRA      naloxone 4 MG/0.1ML LIQD nasal spray by Nasal route once       No current facility-administered medications for this visit.         Allergies   Allergen Reactions    Morpholine Salicylate      Other reaction(s): Headache-Intolerance, Hives/Swelling-Allergy    Celecoxib Swelling     Red rash, itching    Canagliflozin Other (See Comments)     \"HOT, SWEATY, BLACKED OUT\"    Clonidine Other (See Comments), Rash and Swelling

## 2023-05-17 RX ORDER — IRBESARTAN 300 MG/1
TABLET ORAL
Qty: 90 TABLET | Refills: 1 | Status: SHIPPED | OUTPATIENT
Start: 2023-05-17

## 2023-05-18 RX ORDER — METOPROLOL SUCCINATE 100 MG/1
200 TABLET, EXTENDED RELEASE ORAL DAILY
Qty: 180 TABLET | Refills: 3 | Status: SHIPPED | OUTPATIENT
Start: 2023-05-18

## 2023-06-14 ENCOUNTER — APPOINTMENT (RX ONLY)
Dept: URBAN - METROPOLITAN AREA CLINIC 329 | Facility: CLINIC | Age: 78
Setting detail: DERMATOLOGY
End: 2023-06-14

## 2023-06-14 DIAGNOSIS — L82.1 OTHER SEBORRHEIC KERATOSIS: ICD-10-CM

## 2023-06-14 DIAGNOSIS — L57.0 ACTINIC KERATOSIS: ICD-10-CM

## 2023-06-14 DIAGNOSIS — D18.0 HEMANGIOMA: ICD-10-CM

## 2023-06-14 DIAGNOSIS — D22 MELANOCYTIC NEVI: ICD-10-CM

## 2023-06-14 DIAGNOSIS — L81.4 OTHER MELANIN HYPERPIGMENTATION: ICD-10-CM

## 2023-06-14 PROBLEM — D22.62 MELANOCYTIC NEVI OF LEFT UPPER LIMB, INCLUDING SHOULDER: Status: ACTIVE | Noted: 2023-06-14

## 2023-06-14 PROBLEM — D22.5 MELANOCYTIC NEVI OF TRUNK: Status: ACTIVE | Noted: 2023-06-14

## 2023-06-14 PROBLEM — D22.61 MELANOCYTIC NEVI OF RIGHT UPPER LIMB, INCLUDING SHOULDER: Status: ACTIVE | Noted: 2023-06-14

## 2023-06-14 PROBLEM — D22.71 MELANOCYTIC NEVI OF RIGHT LOWER LIMB, INCLUDING HIP: Status: ACTIVE | Noted: 2023-06-14

## 2023-06-14 PROBLEM — D18.01 HEMANGIOMA OF SKIN AND SUBCUTANEOUS TISSUE: Status: ACTIVE | Noted: 2023-06-14

## 2023-06-14 PROCEDURE — ? COUNSELING

## 2023-06-14 PROCEDURE — ? SUNSCREEN RECOMMENDATIONS

## 2023-06-14 PROCEDURE — 99213 OFFICE O/P EST LOW 20 MIN: CPT | Mod: 25

## 2023-06-14 PROCEDURE — ? LIQUID NITROGEN

## 2023-06-14 PROCEDURE — ? ADDITIONAL NOTES

## 2023-06-14 PROCEDURE — 17000 DESTRUCT PREMALG LESION: CPT

## 2023-06-14 PROCEDURE — ? FULL BODY SKIN EXAM

## 2023-06-14 ASSESSMENT — LOCATION DETAILED DESCRIPTION DERM
LOCATION DETAILED: RIGHT ANTERIOR PROXIMAL THIGH
LOCATION DETAILED: LEFT ANTERIOR PROXIMAL UPPER ARM
LOCATION DETAILED: EPIGASTRIC SKIN
LOCATION DETAILED: LEFT MEDIAL UPPER BACK
LOCATION DETAILED: INFERIOR THORACIC SPINE
LOCATION DETAILED: RIGHT PROXIMAL DORSAL FOREARM
LOCATION DETAILED: RIGHT SUPERIOR UPPER BACK
LOCATION DETAILED: STERNAL NOTCH
LOCATION DETAILED: RIGHT ANTERIOR PROXIMAL UPPER ARM
LOCATION DETAILED: RIGHT RIB CAGE
LOCATION DETAILED: RIGHT MEDIAL SUPERIOR CHEST
LOCATION DETAILED: RIGHT DISTAL POSTERIOR THIGH
LOCATION DETAILED: LEFT PROXIMAL PRETIBIAL REGION
LOCATION DETAILED: RIGHT LATERAL ABDOMEN

## 2023-06-14 ASSESSMENT — LOCATION SIMPLE DESCRIPTION DERM
LOCATION SIMPLE: RIGHT UPPER ARM
LOCATION SIMPLE: CHEST
LOCATION SIMPLE: UPPER BACK
LOCATION SIMPLE: LEFT UPPER ARM
LOCATION SIMPLE: RIGHT UPPER BACK
LOCATION SIMPLE: RIGHT FOREARM
LOCATION SIMPLE: RIGHT POSTERIOR THIGH
LOCATION SIMPLE: RIGHT THIGH
LOCATION SIMPLE: LEFT UPPER BACK
LOCATION SIMPLE: ABDOMEN
LOCATION SIMPLE: LEFT PRETIBIAL REGION

## 2023-06-14 ASSESSMENT — LOCATION ZONE DERM
LOCATION ZONE: ARM
LOCATION ZONE: LEG
LOCATION ZONE: TRUNK

## 2023-06-14 NOTE — PROCEDURE: LIQUID NITROGEN
Consent: The patient's consent was obtained including but not limited to risks of crusting, scabbing, blistering, scarring, darker or lighter pigmentary change, recurrence, incomplete removal and infection.
Detail Level: Detailed
Duration Of Freeze Thaw-Cycle (Seconds): 0
Show Aperture Variable?: Yes
Post-Care Instructions: I reviewed with the patient in detail post-care instructions. Patient is to wear sunprotection, and avoid picking at any of the treated lesions. Pt may apply Vaseline to crusted or scabbing areas.
Render Post-Care Instructions In Note?: no

## 2023-07-15 DIAGNOSIS — G44.209 TENSION-TYPE HEADACHE, NOT INTRACTABLE, UNSPECIFIED CHRONICITY PATTERN: ICD-10-CM

## 2023-07-17 RX ORDER — BUTALBITAL, ACETAMINOPHEN AND CAFFEINE 300; 40; 50 MG/1; MG/1; MG/1
1 CAPSULE ORAL EVERY 4 HOURS PRN
Qty: 20 CAPSULE | Refills: 1 | Status: SHIPPED | OUTPATIENT
Start: 2023-07-17

## 2023-08-19 DIAGNOSIS — G44.209 TENSION-TYPE HEADACHE, NOT INTRACTABLE, UNSPECIFIED CHRONICITY PATTERN: ICD-10-CM

## 2023-08-19 RX ORDER — BUTALBITAL, ACETAMINOPHEN AND CAFFEINE 300; 40; 50 MG/1; MG/1; MG/1
1 CAPSULE ORAL EVERY 4 HOURS PRN
Qty: 20 CAPSULE | Refills: 1 | Status: CANCELLED | OUTPATIENT
Start: 2023-08-19

## 2023-08-21 DIAGNOSIS — G44.209 TENSION-TYPE HEADACHE, NOT INTRACTABLE, UNSPECIFIED CHRONICITY PATTERN: ICD-10-CM

## 2023-08-21 RX ORDER — BUTALBITAL, ACETAMINOPHEN AND CAFFEINE 300; 40; 50 MG/1; MG/1; MG/1
1 CAPSULE ORAL EVERY 4 HOURS PRN
Qty: 20 CAPSULE | Refills: 1 | Status: SHIPPED | OUTPATIENT
Start: 2023-08-21

## 2023-08-24 RX ORDER — ERGOCALCIFEROL 1.25 MG/1
CAPSULE ORAL
Qty: 12 CAPSULE | Refills: 1 | Status: SHIPPED | OUTPATIENT
Start: 2023-08-24

## 2023-08-29 ENCOUNTER — OFFICE VISIT (OUTPATIENT)
Dept: ENDOCRINOLOGY | Age: 78
End: 2023-08-29
Payer: MEDICARE

## 2023-08-29 VITALS
DIASTOLIC BLOOD PRESSURE: 78 MMHG | SYSTOLIC BLOOD PRESSURE: 140 MMHG | OXYGEN SATURATION: 97 % | WEIGHT: 187 LBS | BODY MASS INDEX: 32.1 KG/M2 | HEART RATE: 74 BPM

## 2023-08-29 DIAGNOSIS — Z79.4 CONTROLLED TYPE 2 DIABETES MELLITUS WITH DIABETIC POLYNEUROPATHY, WITH LONG-TERM CURRENT USE OF INSULIN (HCC): ICD-10-CM

## 2023-08-29 DIAGNOSIS — E78.2 MIXED HYPERLIPIDEMIA: ICD-10-CM

## 2023-08-29 DIAGNOSIS — E11.42 CONTROLLED TYPE 2 DIABETES MELLITUS WITH DIABETIC POLYNEUROPATHY, WITH LONG-TERM CURRENT USE OF INSULIN (HCC): Primary | ICD-10-CM

## 2023-08-29 DIAGNOSIS — I10 ESSENTIAL HYPERTENSION: ICD-10-CM

## 2023-08-29 DIAGNOSIS — E89.0 POSTSURGICAL HYPOTHYROIDISM: ICD-10-CM

## 2023-08-29 DIAGNOSIS — Z79.4 CONTROLLED TYPE 2 DIABETES MELLITUS WITH DIABETIC POLYNEUROPATHY, WITH LONG-TERM CURRENT USE OF INSULIN (HCC): Primary | ICD-10-CM

## 2023-08-29 DIAGNOSIS — E11.42 CONTROLLED TYPE 2 DIABETES MELLITUS WITH DIABETIC POLYNEUROPATHY, WITH LONG-TERM CURRENT USE OF INSULIN (HCC): ICD-10-CM

## 2023-08-29 LAB
ALBUMIN SERPL-MCNC: 4.1 G/DL (ref 3.2–4.6)
ALBUMIN/GLOB SERPL: 1.4 (ref 0.4–1.6)
ALP SERPL-CCNC: 81 U/L (ref 50–136)
ALT SERPL-CCNC: 38 U/L (ref 12–65)
ANION GAP SERPL CALC-SCNC: 8 MMOL/L (ref 2–11)
AST SERPL-CCNC: 20 U/L (ref 15–37)
BILIRUB SERPL-MCNC: 0.7 MG/DL (ref 0.2–1.1)
BUN SERPL-MCNC: 21 MG/DL (ref 8–23)
CALCIUM SERPL-MCNC: 9.2 MG/DL (ref 8.3–10.4)
CHLORIDE SERPL-SCNC: 101 MMOL/L (ref 101–110)
CHOLEST SERPL-MCNC: 138 MG/DL
CO2 SERPL-SCNC: 29 MMOL/L (ref 21–32)
CREAT SERPL-MCNC: 1.3 MG/DL (ref 0.6–1)
CREAT UR-MCNC: 258 MG/DL
GLOBULIN SER CALC-MCNC: 2.9 G/DL (ref 2.8–4.5)
GLUCOSE SERPL-MCNC: 135 MG/DL (ref 65–100)
HBA1C MFR BLD: 5.1 %
HDLC SERPL-MCNC: 41 MG/DL (ref 40–60)
HDLC SERPL: 3.4
LDLC SERPL CALC-MCNC: 61.6 MG/DL
MICROALBUMIN UR-MCNC: 15.2 MG/DL
MICROALBUMIN/CREAT UR-RTO: 59 MG/G (ref 0–30)
POTASSIUM SERPL-SCNC: 4.2 MMOL/L (ref 3.5–5.1)
PROT SERPL-MCNC: 7 G/DL (ref 6.3–8.2)
SODIUM SERPL-SCNC: 138 MMOL/L (ref 133–143)
TRIGL SERPL-MCNC: 177 MG/DL (ref 35–150)
TSH W FREE THYROID IF ABNORMAL: 0.16 UIU/ML (ref 0.36–3.74)
VLDLC SERPL CALC-MCNC: 35.4 MG/DL (ref 6–23)

## 2023-08-29 PROCEDURE — 1036F TOBACCO NON-USER: CPT | Performed by: INTERNAL MEDICINE

## 2023-08-29 PROCEDURE — 99214 OFFICE O/P EST MOD 30 MIN: CPT | Performed by: INTERNAL MEDICINE

## 2023-08-29 PROCEDURE — G8417 CALC BMI ABV UP PARAM F/U: HCPCS | Performed by: INTERNAL MEDICINE

## 2023-08-29 PROCEDURE — 1123F ACP DISCUSS/DSCN MKR DOCD: CPT | Performed by: INTERNAL MEDICINE

## 2023-08-29 PROCEDURE — 3077F SYST BP >= 140 MM HG: CPT | Performed by: INTERNAL MEDICINE

## 2023-08-29 PROCEDURE — 3078F DIAST BP <80 MM HG: CPT | Performed by: INTERNAL MEDICINE

## 2023-08-29 PROCEDURE — G8427 DOCREV CUR MEDS BY ELIG CLIN: HCPCS | Performed by: INTERNAL MEDICINE

## 2023-08-29 PROCEDURE — 1090F PRES/ABSN URINE INCON ASSESS: CPT | Performed by: INTERNAL MEDICINE

## 2023-08-29 PROCEDURE — G8399 PT W/DXA RESULTS DOCUMENT: HCPCS | Performed by: INTERNAL MEDICINE

## 2023-08-29 PROCEDURE — 83036 HEMOGLOBIN GLYCOSYLATED A1C: CPT | Performed by: INTERNAL MEDICINE

## 2023-08-29 RX ORDER — LEVOTHYROXINE SODIUM 112 UG/1
112 TABLET ORAL DAILY
Qty: 30 TABLET | Refills: 11
Start: 2023-08-29 | End: 2023-08-30 | Stop reason: SDUPTHER

## 2023-08-29 RX ORDER — PEN NEEDLE, DIABETIC 32GX 5/32"
NEEDLE, DISPOSABLE MISCELLANEOUS
Qty: 50 EACH | Refills: 11
Start: 2023-08-29

## 2023-08-29 RX ORDER — INSULIN GLARGINE 300 U/ML
50 INJECTION, SOLUTION SUBCUTANEOUS NIGHTLY
Qty: 9 ML | Refills: 11
Start: 2023-08-29

## 2023-08-29 RX ORDER — METFORMIN HYDROCHLORIDE 500 MG/1
500 TABLET, EXTENDED RELEASE ORAL DAILY
Qty: 30 TABLET | Refills: 11
Start: 2023-08-29

## 2023-08-29 RX ORDER — DULAGLUTIDE 1.5 MG/.5ML
INJECTION, SOLUTION SUBCUTANEOUS
Qty: 2 ML | Refills: 11
Start: 2023-08-29

## 2023-08-29 RX ORDER — EZETIMIBE AND SIMVASTATIN 10; 40 MG/1; MG/1
1 TABLET ORAL NIGHTLY
Qty: 30 TABLET | Refills: 11
Start: 2023-08-29

## 2023-08-29 ASSESSMENT — ENCOUNTER SYMPTOMS
DIARRHEA: 0
CONSTIPATION: 0

## 2023-08-29 NOTE — PROGRESS NOTES
Aaron Delgado MD, [facility]  [address] [facility], [address] [place] [address], [address]  Phone [phone]          oVnda Patten is a 69 y.o. female seen 8/29/2023 for follow-up of type 2 diabetes mellitus         ASSESSMENT AND PLAN:    1. Controlled type 2 diabetes mellitus with diabetic polyneuropathy, with long-term current use of insulin (HCC)  Her overall glycemic control is excellent and her hemoglobin A1c is at goal less than 7. Eye exam negative 10/2022. Urine microalbumin negative 8/2021 (she is on an ARB) and will be updated. - TOUJEO SOLOSTAR 300 UNIT/ML SOPN; Inject 50 Units into the skin at bedtime  Dispense: 9 mL; Refill: 11  - TRULICITY 1.5 MS/3.5QE SOPN; 1.5 mg, SubCUTAneous, EVERY 7 DAYS  Dispense: 2 mL; Refill: 11  - metFORMIN (GLUCOPHAGE-XR) 500 MG extended release tablet; Take 1 tablet by mouth daily  Dispense: 30 tablet; Refill: 11  - BD PEN NEEDLE CHINA 2ND GEN 32G X 4 MM MISC; Once a day  Dispense: 50 each; Refill: 11    2. Postsurgical hypothyroidism  Biochemically euthyroid 3/2023.    - levothyroxine (SYNTHROID) 112 MCG tablet; Take 1 tablet by mouth Daily  Dispense: 30 tablet; Refill: 11    3. Essential hypertension  Followed by her primary care physician and cardiologist.    4. Mixed hyperlipidemia  LDL at goal 3/2023.    - ezetimibe-simvastatin (VYTORIN) 10-40 MG per tablet; Take 1 tablet by mouth nightly  Dispense: 30 tablet; Refill: 11      Follow-up and Dispositions    Return in about 6 months (around 2/29/2024). HISTORY OF PRESENT ILLNESS:    DIABETES MELLITUS     Diagnosis: Type 2 diabetes mellitus diagnosed around 2011. Her chart states that she has a history of acute pancreatitis but she denies this adamantly. Diet: She usually tries to limit carbohydrates and fried foods. She has been avoiding sweets for the most part. No sweet tea or regular soft drinks.       Exercise: No regular

## 2023-08-30 ENCOUNTER — TELEPHONE (OUTPATIENT)
Dept: ENDOCRINOLOGY | Age: 78
End: 2023-08-30

## 2023-08-30 DIAGNOSIS — E89.0 POSTSURGICAL HYPOTHYROIDISM: ICD-10-CM

## 2023-08-30 LAB
EST. AVERAGE GLUCOSE BLD GHB EST-MCNC: 100 MG/DL
HBA1C MFR BLD: 5.1 % (ref 4.8–5.6)
T3 SERPL-MCNC: 1.25 NG/ML (ref 0.6–1.81)
T4 FREE SERPL-MCNC: 1.2 NG/DL (ref 0.78–1.46)

## 2023-08-30 RX ORDER — LEVOTHYROXINE SODIUM 112 UG/1
TABLET ORAL
Qty: 30 TABLET | Refills: 11 | Status: SHIPPED | OUTPATIENT
Start: 2023-08-30

## 2023-08-30 NOTE — TELEPHONE ENCOUNTER
The lab ran a lot of labs on her that they were not supposed to, but her thyroid level was abnormal.  Have her decrease her dose slightly as ordered.

## 2023-09-08 RX ORDER — GABAPENTIN 100 MG/1
CAPSULE ORAL
Qty: 90 CAPSULE | Refills: 2 | OUTPATIENT
Start: 2023-09-08

## 2023-09-08 RX ORDER — GABAPENTIN 100 MG/1
CAPSULE ORAL
Qty: 90 CAPSULE | Refills: 2 | Status: SHIPPED | OUTPATIENT
Start: 2023-09-08 | End: 2023-11-21

## 2023-09-18 ENCOUNTER — OFFICE VISIT (OUTPATIENT)
Dept: FAMILY MEDICINE CLINIC | Facility: CLINIC | Age: 78
End: 2023-09-18
Payer: MEDICARE

## 2023-09-18 VITALS
BODY MASS INDEX: 32.78 KG/M2 | HEIGHT: 64 IN | SYSTOLIC BLOOD PRESSURE: 140 MMHG | WEIGHT: 192 LBS | DIASTOLIC BLOOD PRESSURE: 92 MMHG

## 2023-09-18 DIAGNOSIS — I10 PRIMARY HYPERTENSION: ICD-10-CM

## 2023-09-18 DIAGNOSIS — Z79.4 CONTROLLED TYPE 2 DIABETES MELLITUS WITH DIABETIC POLYNEUROPATHY, WITH LONG-TERM CURRENT USE OF INSULIN (HCC): ICD-10-CM

## 2023-09-18 DIAGNOSIS — R09.81 HEAD CONGESTION: ICD-10-CM

## 2023-09-18 DIAGNOSIS — J30.9 ALLERGIC RHINITIS, UNSPECIFIED SEASONALITY, UNSPECIFIED TRIGGER: ICD-10-CM

## 2023-09-18 DIAGNOSIS — J01.90 ACUTE SINUSITIS, RECURRENCE NOT SPECIFIED, UNSPECIFIED LOCATION: ICD-10-CM

## 2023-09-18 DIAGNOSIS — R05.1 ACUTE COUGH: Primary | ICD-10-CM

## 2023-09-18 DIAGNOSIS — E11.42 CONTROLLED TYPE 2 DIABETES MELLITUS WITH DIABETIC POLYNEUROPATHY, WITH LONG-TERM CURRENT USE OF INSULIN (HCC): ICD-10-CM

## 2023-09-18 PROCEDURE — G8427 DOCREV CUR MEDS BY ELIG CLIN: HCPCS | Performed by: FAMILY MEDICINE

## 2023-09-18 PROCEDURE — 3080F DIAST BP >= 90 MM HG: CPT | Performed by: FAMILY MEDICINE

## 2023-09-18 PROCEDURE — G8399 PT W/DXA RESULTS DOCUMENT: HCPCS | Performed by: FAMILY MEDICINE

## 2023-09-18 PROCEDURE — 1036F TOBACCO NON-USER: CPT | Performed by: FAMILY MEDICINE

## 2023-09-18 PROCEDURE — 3044F HG A1C LEVEL LT 7.0%: CPT | Performed by: FAMILY MEDICINE

## 2023-09-18 PROCEDURE — 96372 THER/PROPH/DIAG INJ SC/IM: CPT | Performed by: FAMILY MEDICINE

## 2023-09-18 PROCEDURE — 1123F ACP DISCUSS/DSCN MKR DOCD: CPT | Performed by: FAMILY MEDICINE

## 2023-09-18 PROCEDURE — 99213 OFFICE O/P EST LOW 20 MIN: CPT | Performed by: FAMILY MEDICINE

## 2023-09-18 PROCEDURE — 1090F PRES/ABSN URINE INCON ASSESS: CPT | Performed by: FAMILY MEDICINE

## 2023-09-18 PROCEDURE — G8417 CALC BMI ABV UP PARAM F/U: HCPCS | Performed by: FAMILY MEDICINE

## 2023-09-18 PROCEDURE — 3077F SYST BP >= 140 MM HG: CPT | Performed by: FAMILY MEDICINE

## 2023-09-18 RX ORDER — DEXTROMETHORPHAN HYDROBROMIDE AND PROMETHAZINE HYDROCHLORIDE 15; 6.25 MG/5ML; MG/5ML
5 SYRUP ORAL 4 TIMES DAILY PRN
Qty: 150 ML | Refills: 0 | Status: SHIPPED | OUTPATIENT
Start: 2023-09-18 | End: 2023-09-22 | Stop reason: SDUPTHER

## 2023-09-18 RX ORDER — AZITHROMYCIN 250 MG/1
TABLET, FILM COATED ORAL
Qty: 6 TABLET | Refills: 0 | Status: SHIPPED | OUTPATIENT
Start: 2023-09-18

## 2023-09-18 RX ORDER — TRIAMCINOLONE ACETONIDE 40 MG/ML
40 INJECTION, SUSPENSION INTRA-ARTICULAR; INTRAMUSCULAR ONCE
Status: COMPLETED | OUTPATIENT
Start: 2023-09-18 | End: 2023-09-18

## 2023-09-18 RX ADMIN — TRIAMCINOLONE ACETONIDE 40 MG: 40 INJECTION, SUSPENSION INTRA-ARTICULAR; INTRAMUSCULAR at 11:07

## 2023-09-18 ASSESSMENT — PATIENT HEALTH QUESTIONNAIRE - PHQ9
SUM OF ALL RESPONSES TO PHQ QUESTIONS 1-9: 0
SUM OF ALL RESPONSES TO PHQ QUESTIONS 1-9: 0
SUM OF ALL RESPONSES TO PHQ9 QUESTIONS 1 & 2: 0
SUM OF ALL RESPONSES TO PHQ QUESTIONS 1-9: 0
SUM OF ALL RESPONSES TO PHQ QUESTIONS 1-9: 0
2. FEELING DOWN, DEPRESSED OR HOPELESS: 0
1. LITTLE INTEREST OR PLEASURE IN DOING THINGS: 0

## 2023-09-18 NOTE — PROGRESS NOTES
vomiting     post-op nausea    Neuropathy     bilateral legs    Obesity (BMI 30.0-34.9) 10/2/14    BMI- 30.7    Osteoarthritis     Panic attacks     Sacroiliac dysfunction     Seasonal allergic reaction     Sleep apnea     NO CPAP    Syncope and collapse 12/23/2015     Past Surgical History:   Procedure Laterality Date    BACK SURGERY  08/30/2013    spinal stimulator    BREAST BIOPSY Bilateral R/1988  L/2012    BREAST LUMPECTOMY Right     CARDIAC CATHETERIZATION  2005    CYST REMOVAL Left 02/2019    L thumb    CYST REMOVAL Right 04/2019    cyst on right foot removal    KNEE ARTHROSCOPY  10/03/2014    left knee torn menincus    KNEE ARTHROSCOPY Left 2004    left knee    ORTHOPEDIC SURGERY  04/03/2019    right big toe mass resection exostectomy    ORTHOPEDIC SURGERY Right 2009    hand nodule removed    ORTHOPEDIC SURGERY Left 2010    elbow scope    OTHER SURGICAL HISTORY  10/28/13    left hand-pointer and pinky finger cyst removed    OTHER SURGICAL HISTORY      nodule removed left foot and hand    OTHER SURGICAL HISTORY  12/22/09    TENS implant [facility]    PRE-MALIGNANT / BENIGN SKIN LESION EXCISION  09/29/2014    lower lip    SASKIA AND BSO (CERVIX REMOVED)  1995    THYROIDECTOMY, PARTIAL  2008    partial thyroidectomy and parathyroidectomy    TOTAL KNEE ARTHROPLASTY Right 05/2019    TOTAL KNEE ARTHROPLASTY Left 03/14/2017     Family History   Problem Relation Age of Onset    Hypertension Mother     Stroke Mother         TIAs- several    Breast Cancer Maternal Aunt         3 Maternal Aunts    Breast Cancer Paternal Aunt     Diabetes Father     Heart Disease Father         CHF/ CAD    Lung Disease Father         \"holes in lungs- exposed to toxins in textiles\"    Cancer Father         leukemia    Diabetes Mother     Breast Cancer Mother     Heart Disease Paternal Grandfather     Diabetes Paternal Grandfather     Heart Failure Paternal Grandmother         CHF    Diabetes Paternal Grandmother     Heart Disease Maternal

## 2023-09-21 RX ORDER — DEXTROMETHORPHAN HYDROBROMIDE AND PROMETHAZINE HYDROCHLORIDE 15; 6.25 MG/5ML; MG/5ML
SYRUP ORAL
Qty: 150 ML | Refills: 0 | OUTPATIENT
Start: 2023-09-21

## 2023-09-22 ENCOUNTER — TELEPHONE (OUTPATIENT)
Dept: FAMILY MEDICINE CLINIC | Facility: CLINIC | Age: 78
End: 2023-09-22

## 2023-09-22 RX ORDER — DEXTROMETHORPHAN HYDROBROMIDE AND PROMETHAZINE HYDROCHLORIDE 15; 6.25 MG/5ML; MG/5ML
5 SYRUP ORAL 4 TIMES DAILY PRN
Qty: 150 ML | Refills: 0 | Status: SHIPPED | OUTPATIENT
Start: 2023-09-22

## 2023-09-22 NOTE — TELEPHONE ENCOUNTER
Pt was in the office on Monday for a cough and head congestion. She was prescribed Promethazine DM. Mr Alvin Alex states that pt has taken all of the cough medication. He would like to know if she can get a refill. Pt is better but not able to sleep through the night due to cough.

## 2023-10-13 RX ORDER — IRBESARTAN 300 MG/1
TABLET ORAL
Qty: 90 TABLET | Refills: 1 | Status: SHIPPED | OUTPATIENT
Start: 2023-10-13

## 2023-10-31 ENCOUNTER — OFFICE VISIT (OUTPATIENT)
Dept: FAMILY MEDICINE CLINIC | Facility: CLINIC | Age: 78
End: 2023-10-31
Payer: MEDICARE

## 2023-10-31 VITALS
DIASTOLIC BLOOD PRESSURE: 88 MMHG | SYSTOLIC BLOOD PRESSURE: 140 MMHG | HEIGHT: 64 IN | WEIGHT: 193 LBS | BODY MASS INDEX: 32.95 KG/M2

## 2023-10-31 DIAGNOSIS — F33.9 RECURRENT DEPRESSION (HCC): ICD-10-CM

## 2023-10-31 DIAGNOSIS — G44.209 TENSION-TYPE HEADACHE, NOT INTRACTABLE, UNSPECIFIED CHRONICITY PATTERN: ICD-10-CM

## 2023-10-31 DIAGNOSIS — F41.9 ANXIETY: ICD-10-CM

## 2023-10-31 DIAGNOSIS — E11.42 CONTROLLED TYPE 2 DIABETES MELLITUS WITH DIABETIC POLYNEUROPATHY, WITH LONG-TERM CURRENT USE OF INSULIN (HCC): ICD-10-CM

## 2023-10-31 DIAGNOSIS — I10 PRIMARY HYPERTENSION: Primary | ICD-10-CM

## 2023-10-31 DIAGNOSIS — E78.00 PURE HYPERCHOLESTEROLEMIA: ICD-10-CM

## 2023-10-31 DIAGNOSIS — Z79.4 CONTROLLED TYPE 2 DIABETES MELLITUS WITH DIABETIC POLYNEUROPATHY, WITH LONG-TERM CURRENT USE OF INSULIN (HCC): ICD-10-CM

## 2023-10-31 DIAGNOSIS — E03.4 HYPOTHYROIDISM DUE TO ACQUIRED ATROPHY OF THYROID: ICD-10-CM

## 2023-10-31 DIAGNOSIS — E78.1 PURE HYPERGLYCERIDEMIA: ICD-10-CM

## 2023-10-31 DIAGNOSIS — E87.5 HYPERKALEMIA: ICD-10-CM

## 2023-10-31 DIAGNOSIS — G89.4 CHRONIC PAIN SYNDROME: ICD-10-CM

## 2023-10-31 PROCEDURE — 99214 OFFICE O/P EST MOD 30 MIN: CPT | Performed by: FAMILY MEDICINE

## 2023-10-31 PROCEDURE — 3077F SYST BP >= 140 MM HG: CPT | Performed by: FAMILY MEDICINE

## 2023-10-31 PROCEDURE — 3044F HG A1C LEVEL LT 7.0%: CPT | Performed by: FAMILY MEDICINE

## 2023-10-31 PROCEDURE — G8484 FLU IMMUNIZE NO ADMIN: HCPCS | Performed by: FAMILY MEDICINE

## 2023-10-31 PROCEDURE — 1090F PRES/ABSN URINE INCON ASSESS: CPT | Performed by: FAMILY MEDICINE

## 2023-10-31 PROCEDURE — 1036F TOBACCO NON-USER: CPT | Performed by: FAMILY MEDICINE

## 2023-10-31 PROCEDURE — G8399 PT W/DXA RESULTS DOCUMENT: HCPCS | Performed by: FAMILY MEDICINE

## 2023-10-31 PROCEDURE — G8417 CALC BMI ABV UP PARAM F/U: HCPCS | Performed by: FAMILY MEDICINE

## 2023-10-31 PROCEDURE — 3079F DIAST BP 80-89 MM HG: CPT | Performed by: FAMILY MEDICINE

## 2023-10-31 PROCEDURE — 1123F ACP DISCUSS/DSCN MKR DOCD: CPT | Performed by: FAMILY MEDICINE

## 2023-10-31 PROCEDURE — G8427 DOCREV CUR MEDS BY ELIG CLIN: HCPCS | Performed by: FAMILY MEDICINE

## 2023-10-31 RX ORDER — BUTALBITAL, ACETAMINOPHEN AND CAFFEINE 300; 40; 50 MG/1; MG/1; MG/1
1 CAPSULE ORAL EVERY 4 HOURS PRN
Qty: 20 CAPSULE | Refills: 1 | Status: CANCELLED | OUTPATIENT
Start: 2023-10-31

## 2023-10-31 ASSESSMENT — PATIENT HEALTH QUESTIONNAIRE - PHQ9
2. FEELING DOWN, DEPRESSED OR HOPELESS: 1
SUM OF ALL RESPONSES TO PHQ QUESTIONS 1-9: 2
SUM OF ALL RESPONSES TO PHQ9 QUESTIONS 1 & 2: 2
1. LITTLE INTEREST OR PLEASURE IN DOING THINGS: 1

## 2023-10-31 NOTE — PROGRESS NOTES
SUBJECTIVE:   Arjun Vale is a 69 y.o. female who has a past medical history significant for diabetes with neuropathy, hypertension, high cholesterol, high triglycerides, hypothyroidism, chronic pain syndrome, recurrent depression, tension headaches and generalized anxiety. Review of systems reveals no complaints of chest pain, shortness of breath, orthopnea or PND. GI and  review of systems is unremarkable. Current medications are listed in the EMR and reviewed today. HPI  See above    Past Medical History, Past Surgical History, Family history, Social History, and Medications were all reviewed with the patient today and updated as necessary.        Current Outpatient Medications   Medication Sig Dispense Refill    irbesartan (AVAPRO) 300 MG tablet TAKE 1 TABLET BY MOUTH EVERY DAY AT NIGHT 90 tablet 1    gabapentin (NEURONTIN) 100 MG capsule TAKE 1 CAPSULE BY MOUTH THREE TIMES A DAY 90 capsule 2    levothyroxine (SYNTHROID) 112 MCG tablet 1 tablet once a day except for 1/2 tablet on Sundays 30 tablet 11    TOUJEO SOLOSTAR 300 UNIT/ML concentrated injection pen Inject 50 Units into the skin at bedtime 9 mL 11    TRULICITY 1.5 WN/2.8FS SC injection 1.5 mg, SubCUTAneous, EVERY 7 DAYS 2 mL 11    metFORMIN (GLUCOPHAGE-XR) 500 MG extended release tablet Take 1 tablet by mouth daily 30 tablet 11    BD PEN NEEDLE CHINA 2ND GEN 32G X 4 MM MISC Once a day 50 each 11    ezetimibe-simvastatin (VYTORIN) 10-40 MG per tablet Take 1 tablet by mouth nightly 30 tablet 11    vitamin D (ERGOCALCIFEROL) 1.25 MG (37464 UT) CAPS capsule TAKE 1 CAPSULE BY MOUTH ONCE WEEKLY 12 capsule 1    butalbital-APAP-caffeine -40 MG CAPS per capsule Take 1 capsule by mouth every 4 hours as needed for Headaches 20 capsule 1    metoprolol succinate (TOPROL XL) 100 MG extended release tablet TAKE 2 TABLETS BY MOUTH DAILY 180 tablet 3    magnesium oxide (MAG-OX) 400 MG tablet Take 1 tablet by mouth 2 times daily 180 tablet 3    furosemide

## 2023-12-15 RX ORDER — GABAPENTIN 100 MG/1
CAPSULE ORAL
Qty: 90 CAPSULE | Refills: 5 | Status: SHIPPED | OUTPATIENT
Start: 2023-12-15 | End: 2024-02-26

## 2024-01-24 RX ORDER — ERGOCALCIFEROL 1.25 MG/1
CAPSULE ORAL
Qty: 12 CAPSULE | Refills: 3 | Status: SHIPPED | OUTPATIENT
Start: 2024-01-24

## 2024-02-07 DIAGNOSIS — E78.2 MIXED HYPERLIPIDEMIA: ICD-10-CM

## 2024-02-08 DIAGNOSIS — E78.2 MIXED HYPERLIPIDEMIA: ICD-10-CM

## 2024-02-08 RX ORDER — EZETIMIBE AND SIMVASTATIN 10; 40 MG/1; MG/1
1 TABLET ORAL NIGHTLY
Qty: 90 TABLET | Refills: 3 | OUTPATIENT
Start: 2024-02-08

## 2024-02-08 RX ORDER — EZETIMIBE AND SIMVASTATIN 10; 40 MG/1; MG/1
TABLET ORAL
Qty: 30 TABLET | Refills: 0 | Status: SHIPPED | OUTPATIENT
Start: 2024-02-08

## 2024-02-14 DIAGNOSIS — G44.209 TENSION-TYPE HEADACHE, NOT INTRACTABLE, UNSPECIFIED CHRONICITY PATTERN: ICD-10-CM

## 2024-02-14 RX ORDER — BUTALBITAL, ACETAMINOPHEN AND CAFFEINE 300; 40; 50 MG/1; MG/1; MG/1
1 CAPSULE ORAL EVERY 4 HOURS PRN
Qty: 20 CAPSULE | Refills: 1 | Status: SHIPPED | OUTPATIENT
Start: 2024-02-14

## 2024-03-05 ENCOUNTER — OFFICE VISIT (OUTPATIENT)
Dept: ENDOCRINOLOGY | Age: 79
End: 2024-03-05
Payer: MEDICARE

## 2024-03-05 VITALS
BODY MASS INDEX: 33.46 KG/M2 | RESPIRATION RATE: 16 BRPM | HEART RATE: 74 BPM | WEIGHT: 196 LBS | OXYGEN SATURATION: 96 % | HEIGHT: 64 IN | SYSTOLIC BLOOD PRESSURE: 126 MMHG | DIASTOLIC BLOOD PRESSURE: 74 MMHG

## 2024-03-05 DIAGNOSIS — I10 ESSENTIAL HYPERTENSION: ICD-10-CM

## 2024-03-05 DIAGNOSIS — E11.42 CONTROLLED TYPE 2 DIABETES MELLITUS WITH DIABETIC POLYNEUROPATHY, WITH LONG-TERM CURRENT USE OF INSULIN (HCC): Primary | ICD-10-CM

## 2024-03-05 DIAGNOSIS — Z79.4 CONTROLLED TYPE 2 DIABETES MELLITUS WITH DIABETIC POLYNEUROPATHY, WITH LONG-TERM CURRENT USE OF INSULIN (HCC): Primary | ICD-10-CM

## 2024-03-05 DIAGNOSIS — E78.2 MIXED HYPERLIPIDEMIA: ICD-10-CM

## 2024-03-05 DIAGNOSIS — E89.0 POSTSURGICAL HYPOTHYROIDISM: ICD-10-CM

## 2024-03-05 LAB — HBA1C MFR BLD: 5.1 %

## 2024-03-05 PROCEDURE — 99214 OFFICE O/P EST MOD 30 MIN: CPT | Performed by: INTERNAL MEDICINE

## 2024-03-05 PROCEDURE — 3074F SYST BP LT 130 MM HG: CPT | Performed by: INTERNAL MEDICINE

## 2024-03-05 PROCEDURE — G8417 CALC BMI ABV UP PARAM F/U: HCPCS | Performed by: INTERNAL MEDICINE

## 2024-03-05 PROCEDURE — G8484 FLU IMMUNIZE NO ADMIN: HCPCS | Performed by: INTERNAL MEDICINE

## 2024-03-05 PROCEDURE — 1123F ACP DISCUSS/DSCN MKR DOCD: CPT | Performed by: INTERNAL MEDICINE

## 2024-03-05 PROCEDURE — 1090F PRES/ABSN URINE INCON ASSESS: CPT | Performed by: INTERNAL MEDICINE

## 2024-03-05 PROCEDURE — 1036F TOBACCO NON-USER: CPT | Performed by: INTERNAL MEDICINE

## 2024-03-05 PROCEDURE — 83036 HEMOGLOBIN GLYCOSYLATED A1C: CPT | Performed by: INTERNAL MEDICINE

## 2024-03-05 PROCEDURE — G8399 PT W/DXA RESULTS DOCUMENT: HCPCS | Performed by: INTERNAL MEDICINE

## 2024-03-05 PROCEDURE — G8427 DOCREV CUR MEDS BY ELIG CLIN: HCPCS | Performed by: INTERNAL MEDICINE

## 2024-03-05 PROCEDURE — 3078F DIAST BP <80 MM HG: CPT | Performed by: INTERNAL MEDICINE

## 2024-03-05 RX ORDER — PEN NEEDLE, DIABETIC 32GX 5/32"
NEEDLE, DISPOSABLE MISCELLANEOUS
Qty: 50 EACH | Refills: 11 | Status: SHIPPED | OUTPATIENT
Start: 2024-03-05

## 2024-03-05 RX ORDER — DULAGLUTIDE 1.5 MG/.5ML
INJECTION, SOLUTION SUBCUTANEOUS
Qty: 2 ML | Refills: 11 | Status: SHIPPED | OUTPATIENT
Start: 2024-03-05

## 2024-03-05 RX ORDER — LEVOTHYROXINE SODIUM 112 UG/1
TABLET ORAL
Qty: 30 TABLET | Refills: 11 | Status: SHIPPED | OUTPATIENT
Start: 2024-03-05

## 2024-03-05 RX ORDER — INSULIN GLARGINE 300 U/ML
50 INJECTION, SOLUTION SUBCUTANEOUS NIGHTLY
Qty: 9 ML | Refills: 11 | Status: SHIPPED | OUTPATIENT
Start: 2024-03-05

## 2024-03-05 RX ORDER — METFORMIN HYDROCHLORIDE 500 MG/1
500 TABLET, EXTENDED RELEASE ORAL DAILY
Qty: 30 TABLET | Refills: 11 | Status: SHIPPED | OUTPATIENT
Start: 2024-03-05

## 2024-03-05 RX ORDER — EZETIMIBE AND SIMVASTATIN 10; 40 MG/1; MG/1
TABLET ORAL
Qty: 30 TABLET | Refills: 0 | Status: SHIPPED | OUTPATIENT
Start: 2024-03-05

## 2024-03-05 ASSESSMENT — ENCOUNTER SYMPTOMS
DIARRHEA: 0
CONSTIPATION: 0

## 2024-03-05 NOTE — PROGRESS NOTES
Aaron Fuller MD, FACE  [facility]  [address] [place] [address], [facility] [address]  [place] [address]  Phone [phone]  Facsimile [phone]          Kyra Corley is a 78 y.o. female seen 3/5/2024 for follow-up of type 2 diabetes mellitus         ASSESSMENT AND PLAN:    1. Controlled type 2 diabetes mellitus with diabetic polyneuropathy, with long-term current use of insulin (HCC)  Her overall glycemic control is excellent and her hemoglobin A1c is at goal less than 7.  Eye exam negative 10/2023.  Urine microalbumin positive 8/2023 (she is on an ARB).  Update labs prior to next visit.       - TOUJEO SOLOSTAR 300 UNIT/ML SOPN; Inject 50 Units into the skin at bedtime  Dispense: 9 mL; Refill: 11  - TRULICITY 1.5 MG/0.5ML SOPN; 1.5 mg, SubCUTAneous, EVERY 7 DAYS  Dispense: 2 mL; Refill: 11  - metFORMIN (GLUCOPHAGE-XR) 500 MG extended release tablet; Take 1 tablet by mouth daily  Dispense: 30 tablet; Refill: 11  - BD PEN NEEDLE CHINA 2ND GEN 32G X 4 MM MISC; Once a day  Dispense: 50 each; Refill: 11    2. Postsurgical hypothyroidism  Biochemically euthyroid 10/2023.    - levothyroxine (SYNTHROID) 112 MCG tablet; Take 1 tablet by mouth Daily  Dispense: 30 tablet; Refill: 11    3. Essential hypertension  Followed by her primary care physician and cardiologist.    4. Mixed hyperlipidemia  LDL close to goal less than 55 10/2023.    - ezetimibe-simvastatin (VYTORIN) 10-40 MG per tablet; Take 1 tablet by mouth nightly  Dispense: 30 tablet; Refill: 11      Follow-up and Dispositions    Return in about 6 months (around 9/5/2024).         HISTORY OF PRESENT ILLNESS:    DIABETES MELLITUS     Diagnosis: Type 2 diabetes mellitus diagnosed around 2011.  Her chart states that she has a history of acute pancreatitis but she denies this adamantly.     Diet: She usually tries to limit carbohydrates and fried foods.  She has been avoiding sweets for the most part.  No sweet tea or regular soft drinks.

## 2024-03-12 ENCOUNTER — OFFICE VISIT (OUTPATIENT)
Dept: FAMILY MEDICINE CLINIC | Facility: CLINIC | Age: 79
End: 2024-03-12
Payer: MEDICARE

## 2024-03-12 VITALS
OXYGEN SATURATION: 96 % | DIASTOLIC BLOOD PRESSURE: 80 MMHG | WEIGHT: 200 LBS | SYSTOLIC BLOOD PRESSURE: 140 MMHG | BODY MASS INDEX: 34.15 KG/M2 | HEIGHT: 64 IN | HEART RATE: 81 BPM

## 2024-03-12 DIAGNOSIS — Z78.0 MENOPAUSE: ICD-10-CM

## 2024-03-12 DIAGNOSIS — F41.9 ANXIETY: ICD-10-CM

## 2024-03-12 DIAGNOSIS — F33.9 RECURRENT DEPRESSION (HCC): ICD-10-CM

## 2024-03-12 DIAGNOSIS — E78.00 PURE HYPERCHOLESTEROLEMIA: ICD-10-CM

## 2024-03-12 DIAGNOSIS — E78.1 PURE HYPERGLYCERIDEMIA: ICD-10-CM

## 2024-03-12 DIAGNOSIS — N18.31 STAGE 3A CHRONIC KIDNEY DISEASE (HCC): ICD-10-CM

## 2024-03-12 DIAGNOSIS — I50.22 CHRONIC SYSTOLIC (CONGESTIVE) HEART FAILURE (HCC): ICD-10-CM

## 2024-03-12 DIAGNOSIS — R53.82 CHRONIC FATIGUE: ICD-10-CM

## 2024-03-12 DIAGNOSIS — E11.42 CONTROLLED TYPE 2 DIABETES MELLITUS WITH DIABETIC POLYNEUROPATHY, WITH LONG-TERM CURRENT USE OF INSULIN (HCC): ICD-10-CM

## 2024-03-12 DIAGNOSIS — E03.4 HYPOTHYROIDISM DUE TO ACQUIRED ATROPHY OF THYROID: ICD-10-CM

## 2024-03-12 DIAGNOSIS — I10 PRIMARY HYPERTENSION: ICD-10-CM

## 2024-03-12 DIAGNOSIS — Z00.00 MEDICARE ANNUAL WELLNESS VISIT, SUBSEQUENT: Primary | ICD-10-CM

## 2024-03-12 DIAGNOSIS — Z79.4 CONTROLLED TYPE 2 DIABETES MELLITUS WITH DIABETIC POLYNEUROPATHY, WITH LONG-TERM CURRENT USE OF INSULIN (HCC): ICD-10-CM

## 2024-03-12 DIAGNOSIS — R41.3 MEMORY LOSS: ICD-10-CM

## 2024-03-12 LAB
25(OH)D3 SERPL-MCNC: 72.3 NG/ML (ref 30–100)
ALBUMIN SERPL-MCNC: 4.2 G/DL (ref 3.2–4.6)
ALBUMIN/GLOB SERPL: 1.5 (ref 0.4–1.6)
ALP SERPL-CCNC: 68 U/L (ref 50–136)
ALT SERPL-CCNC: 31 U/L (ref 12–65)
ANION GAP SERPL CALC-SCNC: 7 MMOL/L (ref 2–11)
AST SERPL-CCNC: 22 U/L (ref 15–37)
BASOPHILS # BLD: 0 K/UL (ref 0–0.2)
BASOPHILS NFR BLD: 0 % (ref 0–2)
BILIRUB SERPL-MCNC: 0.7 MG/DL (ref 0.2–1.1)
BUN SERPL-MCNC: 11 MG/DL (ref 8–23)
CALCIUM SERPL-MCNC: 9.1 MG/DL (ref 8.3–10.4)
CHLORIDE SERPL-SCNC: 97 MMOL/L (ref 103–113)
CHOLEST SERPL-MCNC: 106 MG/DL
CO2 SERPL-SCNC: 29 MMOL/L (ref 21–32)
CREAT SERPL-MCNC: 1.1 MG/DL (ref 0.6–1)
DIFFERENTIAL METHOD BLD: ABNORMAL
EOSINOPHIL # BLD: 0.2 K/UL (ref 0–0.8)
EOSINOPHIL NFR BLD: 2 % (ref 0.5–7.8)
ERYTHROCYTE [DISTWIDTH] IN BLOOD BY AUTOMATED COUNT: 13 % (ref 11.9–14.6)
GLOBULIN SER CALC-MCNC: 2.8 G/DL (ref 2.8–4.5)
GLUCOSE SERPL-MCNC: 108 MG/DL (ref 65–100)
HCT VFR BLD AUTO: 38.8 % (ref 35.8–46.3)
HDLC SERPL-MCNC: 35 MG/DL (ref 40–60)
HDLC SERPL: 3
HGB BLD-MCNC: 13.5 G/DL (ref 11.7–15.4)
IMM GRANULOCYTES # BLD AUTO: 0 K/UL (ref 0–0.5)
IMM GRANULOCYTES NFR BLD AUTO: 1 % (ref 0–5)
LDLC SERPL CALC-MCNC: 41.8 MG/DL
LYMPHOCYTES # BLD: 2.5 K/UL (ref 0.5–4.6)
LYMPHOCYTES NFR BLD: 31 % (ref 13–44)
MCH RBC QN AUTO: 30.8 PG (ref 26.1–32.9)
MCHC RBC AUTO-ENTMCNC: 34.8 G/DL (ref 31.4–35)
MCV RBC AUTO: 88.4 FL (ref 82–102)
MONOCYTES # BLD: 0.5 K/UL (ref 0.1–1.3)
MONOCYTES NFR BLD: 7 % (ref 4–12)
NEUTS SEG # BLD: 4.8 K/UL (ref 1.7–8.2)
NEUTS SEG NFR BLD: 59 % (ref 43–78)
NRBC # BLD: 0 K/UL (ref 0–0.2)
PLATELET # BLD AUTO: 298 K/UL (ref 150–450)
PMV BLD AUTO: 9 FL (ref 9.4–12.3)
POTASSIUM SERPL-SCNC: 3.9 MMOL/L (ref 3.5–5.1)
PROT SERPL-MCNC: 7 G/DL (ref 6.3–8.2)
RBC # BLD AUTO: 4.39 M/UL (ref 4.05–5.2)
SODIUM SERPL-SCNC: 133 MMOL/L (ref 136–146)
TRIGL SERPL-MCNC: 146 MG/DL (ref 35–150)
TSH, 3RD GENERATION: 0.72 UIU/ML (ref 0.36–3.74)
VIT B12 SERPL-MCNC: 1236 PG/ML (ref 193–986)
VLDLC SERPL CALC-MCNC: 29.2 MG/DL (ref 6–23)
WBC # BLD AUTO: 8 K/UL (ref 4.3–11.1)

## 2024-03-12 PROCEDURE — 1123F ACP DISCUSS/DSCN MKR DOCD: CPT | Performed by: FAMILY MEDICINE

## 2024-03-12 PROCEDURE — 3079F DIAST BP 80-89 MM HG: CPT | Performed by: FAMILY MEDICINE

## 2024-03-12 PROCEDURE — 1036F TOBACCO NON-USER: CPT | Performed by: FAMILY MEDICINE

## 2024-03-12 PROCEDURE — 36415 COLL VENOUS BLD VENIPUNCTURE: CPT | Performed by: FAMILY MEDICINE

## 2024-03-12 PROCEDURE — G8484 FLU IMMUNIZE NO ADMIN: HCPCS | Performed by: FAMILY MEDICINE

## 2024-03-12 PROCEDURE — G8428 CUR MEDS NOT DOCUMENT: HCPCS | Performed by: FAMILY MEDICINE

## 2024-03-12 PROCEDURE — 1090F PRES/ABSN URINE INCON ASSESS: CPT | Performed by: FAMILY MEDICINE

## 2024-03-12 PROCEDURE — G8399 PT W/DXA RESULTS DOCUMENT: HCPCS | Performed by: FAMILY MEDICINE

## 2024-03-12 PROCEDURE — 3077F SYST BP >= 140 MM HG: CPT | Performed by: FAMILY MEDICINE

## 2024-03-12 PROCEDURE — G0439 PPPS, SUBSEQ VISIT: HCPCS | Performed by: FAMILY MEDICINE

## 2024-03-12 PROCEDURE — G8417 CALC BMI ABV UP PARAM F/U: HCPCS | Performed by: FAMILY MEDICINE

## 2024-03-12 PROCEDURE — 99213 OFFICE O/P EST LOW 20 MIN: CPT | Performed by: FAMILY MEDICINE

## 2024-03-12 RX ORDER — LANOLIN ALCOHOL/MO/W.PET/CERES
400 CREAM (GRAM) TOPICAL 2 TIMES DAILY
COMMUNITY
Start: 2024-01-21

## 2024-03-12 RX ORDER — OXYCODONE HYDROCHLORIDE 10 MG/1
TABLET ORAL
COMMUNITY
Start: 2024-01-30

## 2024-03-12 SDOH — HEALTH STABILITY: PHYSICAL HEALTH: ON AVERAGE, HOW MANY MINUTES DO YOU ENGAGE IN EXERCISE AT THIS LEVEL?: 0 MIN

## 2024-03-12 SDOH — HEALTH STABILITY: PHYSICAL HEALTH: ON AVERAGE, HOW MANY DAYS PER WEEK DO YOU ENGAGE IN MODERATE TO STRENUOUS EXERCISE (LIKE A BRISK WALK)?: 0 DAYS

## 2024-03-12 ASSESSMENT — PATIENT HEALTH QUESTIONNAIRE - PHQ9
SUM OF ALL RESPONSES TO PHQ QUESTIONS 1-9: 0
2. FEELING DOWN, DEPRESSED OR HOPELESS: 0
SUM OF ALL RESPONSES TO PHQ QUESTIONS 1-9: 0
SUM OF ALL RESPONSES TO PHQ QUESTIONS 1-9: 0
SUM OF ALL RESPONSES TO PHQ9 QUESTIONS 1 & 2: 0
1. LITTLE INTEREST OR PLEASURE IN DOING THINGS: 0
SUM OF ALL RESPONSES TO PHQ QUESTIONS 1-9: 0

## 2024-03-12 ASSESSMENT — LIFESTYLE VARIABLES
HOW MANY STANDARD DRINKS CONTAINING ALCOHOL DO YOU HAVE ON A TYPICAL DAY: PATIENT DOES NOT DRINK
HOW OFTEN DO YOU HAVE A DRINK CONTAINING ALCOHOL: 1
HOW OFTEN DO YOU HAVE SIX OR MORE DRINKS ON ONE OCCASION: 1
HOW OFTEN DO YOU HAVE A DRINK CONTAINING ALCOHOL: NEVER
HOW MANY STANDARD DRINKS CONTAINING ALCOHOL DO YOU HAVE ON A TYPICAL DAY: 0

## 2024-03-12 NOTE — PATIENT INSTRUCTIONS
Learning About Being Active as an Older Adult  Why is being active important as you get older?     Being active is one of the best things you can do for your health. And it's never too late to start. Being active--or getting active, if you aren't already--has definite benefits. It can:  Give you more energy,  Keep your mind sharp.  Improve balance to reduce your risk of falls.  Help you manage chronic illness with fewer medicines.  No matter how old you are, how fit you are, or what health problems you have, there is a form of activity that will work for you. And the more physical activity you can do, the better your overall health will be.  What kinds of activity can help you stay healthy?  Being more active will make your daily activities easier. Physical activity includes planned exercise and things you do in daily life. There are four types of activity:  Aerobic.  Doing aerobic activity makes your heart and lungs strong.  Includes walking, dancing, and gardening.  Aim for at least 2½ hours spread throughout the week.  It improves your energy and can help you sleep better.  Muscle-strengthening.  This type of activity can help maintain muscle and strengthen bones.  Includes climbing stairs, using resistance bands, and lifting or carrying heavy loads.  Aim for at least twice a week.  It can help protect the knees and other joints.  Stretching.  Stretching gives you better range of motion in joints and muscles.  Includes upper arm stretches, calf stretches, and gentle yoga.  Aim for at least twice a week, preferably after your muscles are warmed up from other activities.  It can help you function better in daily life.  Balancing.  This helps you stay coordinated and have good posture.  Includes heel-to-toe walking, mendoza chi, and certain types of yoga.  Aim for at least 3 days a week.  It can reduce your risk of falling.  Even if you have a hard time meeting the recommendations, it's better to be more active

## 2024-03-12 NOTE — PROGRESS NOTES
of echocardiogram 08/21/2015    mitral valve regurgitation    Hypertension     controlled with medication    Hypertriglyceridemia     managed with medication    Hypothyroidism     managed with medication    Lumbar herniated disc Aug 2013    Lumbar radiculopathy     Macrocytic anemia     Microcytic anemia     Mild pulmonary hypertension (HCC)     per ECHO (2012)    Nausea & vomiting     post-op nausea    Neuropathy     bilateral legs    Obesity (BMI 30.0-34.9) 10/2/14    BMI- 30.7    Osteoarthritis     Panic attacks     Sacroiliac dysfunction     Seasonal allergic reaction     Sleep apnea     NO CPAP    Syncope and collapse 12/23/2015     Past Surgical History:   Procedure Laterality Date    BACK SURGERY  08/30/2013    spinal stimulator    BREAST BIOPSY Bilateral R/1988  L/2012    BREAST LUMPECTOMY Right     CARDIAC CATHETERIZATION  2005    CYST REMOVAL Left 02/2019    L thumb    CYST REMOVAL Right 04/2019    cyst on right foot removal    KNEE ARTHROSCOPY  10/03/2014    left knee torn menincus    KNEE ARTHROSCOPY Left 2004    left knee    ORTHOPEDIC SURGERY  04/03/2019    right big toe mass resection exostectomy    ORTHOPEDIC SURGERY Right 2009    hand nodule removed    ORTHOPEDIC SURGERY Left 2010    elbow scope    OTHER SURGICAL HISTORY  10/28/13    left hand-pointer and pinky finger cyst removed    OTHER SURGICAL HISTORY      nodule removed left foot and hand    OTHER SURGICAL HISTORY  12/22/09    TENS implant [facility]    PRE-MALIGNANT / BENIGN SKIN LESION EXCISION  09/29/2014    lower lip    SASKIA AND BSO (CERVIX REMOVED)  1995    THYROIDECTOMY, PARTIAL  2008    partial thyroidectomy and parathyroidectomy    TOTAL KNEE ARTHROPLASTY Right 05/2019    TOTAL KNEE ARTHROPLASTY Left 03/14/2017     Family History   Problem Relation Age of Onset    Hypertension Mother     Stroke Mother         TIAs- several    Breast Cancer Maternal Aunt         3 Maternal Aunts    Breast Cancer Paternal Aunt     Diabetes Father     Heart

## 2024-03-12 NOTE — PROGRESS NOTES
release capsule Take 1 capsule by mouth daily Yes Provider, Eduardo, MD   guaiFENesin (MUCINEX) 600 MG extended release tablet Take 1 tablet by mouth daily as needed Yes Provider, MD Eduardo   zolpidem (AMBIEN) 10 MG tablet  Yes Provider, Eduardo, MD   cycloSPORINE (RESTASIS) 0.05 % ophthalmic emulsion Apply 1 drop to eye daily as needed Yes Automatic Reconciliation, Ar   docusate (COLACE, DULCOLAX) 100 MG CAPS Take 100 mg by mouth daily as needed Yes Automatic Reconciliation, Ar   escitalopram (LEXAPRO) 20 MG tablet TAKE 1 TABLET BY MOUTH DAILY Yes Automatic Reconciliation, Ar   fexofenadine (ALLEGRA) 180 MG tablet Take 1 tablet by mouth Yes Automatic Reconciliation, Ar   LORazepam (ATIVAN) 0.5 MG tablet TK 1 T PO QID PRA Yes Automatic Reconciliation, Ar   naloxone 4 MG/0.1ML LIQD nasal spray by Nasal route once Yes Automatic Reconciliation, Ar   gabapentin (NEURONTIN) 100 MG capsule TAKE 1 CAPSULE BY MOUTH THREE TIMES A DAY  Patient taking differently: in the morning and at bedtime. TAKE 1 CAPSULE BY MOUTH THREE TIMES A DAY  Ty Albright MD       [facility] (Including outside providers/suppliers regularly involved in providing care):   Patient Care Team:  Ty Albright MD as PCP - General  Ty Albright MD as PCP - Empaneled Provider  Omar Cota DO as Physician     Reviewed and updated this visit:  Tobacco  Allergies  Med Hx  Surg Hx  Soc Hx  Fam Hx

## 2024-03-13 DIAGNOSIS — Z79.4 CONTROLLED TYPE 2 DIABETES MELLITUS WITH DIABETIC POLYNEUROPATHY, WITH LONG-TERM CURRENT USE OF INSULIN (HCC): ICD-10-CM

## 2024-03-13 DIAGNOSIS — E11.42 CONTROLLED TYPE 2 DIABETES MELLITUS WITH DIABETIC POLYNEUROPATHY, WITH LONG-TERM CURRENT USE OF INSULIN (HCC): ICD-10-CM

## 2024-03-13 LAB — RPR SER QL: NONREACTIVE

## 2024-03-14 RX ORDER — DULAGLUTIDE 1.5 MG/.5ML
INJECTION, SOLUTION SUBCUTANEOUS
Qty: 2 ML | Refills: 11 | Status: SHIPPED | OUTPATIENT
Start: 2024-03-14

## 2024-03-27 RX ORDER — FUROSEMIDE 20 MG/1
20 TABLET ORAL
Qty: 108 TABLET | Refills: 3 | Status: SHIPPED | OUTPATIENT
Start: 2024-03-27

## 2024-04-08 RX ORDER — IRBESARTAN 300 MG/1
TABLET ORAL
Qty: 90 TABLET | Refills: 1 | Status: SHIPPED | OUTPATIENT
Start: 2024-04-08

## 2024-04-15 RX ORDER — METOPROLOL SUCCINATE 100 MG/1
200 TABLET, EXTENDED RELEASE ORAL DAILY
Qty: 180 TABLET | Refills: 3 | Status: SHIPPED | OUTPATIENT
Start: 2024-04-15

## 2024-04-15 RX ORDER — BUSPIRONE HYDROCHLORIDE 15 MG/1
15 TABLET ORAL 2 TIMES DAILY
Qty: 180 TABLET | Refills: 3 | Status: SHIPPED | OUTPATIENT
Start: 2024-04-15

## 2024-04-19 DIAGNOSIS — Z78.0 MENOPAUSE: ICD-10-CM

## 2024-04-25 DIAGNOSIS — E78.2 MIXED HYPERLIPIDEMIA: ICD-10-CM

## 2024-04-25 RX ORDER — EZETIMIBE AND SIMVASTATIN 10; 40 MG/1; MG/1
TABLET ORAL
Qty: 90 TABLET | Refills: 1 | OUTPATIENT
Start: 2024-04-25

## 2024-05-10 DIAGNOSIS — E78.2 MIXED HYPERLIPIDEMIA: ICD-10-CM

## 2024-05-10 RX ORDER — LANOLIN ALCOHOL/MO/W.PET/CERES
400 CREAM (GRAM) TOPICAL 2 TIMES DAILY
Qty: 180 TABLET | Refills: 6 | Status: SHIPPED | OUTPATIENT
Start: 2024-05-10

## 2024-05-10 RX ORDER — EZETIMIBE AND SIMVASTATIN 10; 40 MG/1; MG/1
TABLET ORAL
Qty: 30 TABLET | Refills: 4 | Status: SHIPPED | OUTPATIENT
Start: 2024-05-10

## 2024-05-10 NOTE — TELEPHONE ENCOUNTER
MEDICATION REFILL REQUEST      Name of Medication:  Magnesium oxide  Dose:  400 mg  Frequency:  BID  Quantity:  180  Days' supply:  90      Pharmacy Name/Location:  [phone]  Please call in asa

## 2024-06-18 SDOH — ECONOMIC STABILITY: FOOD INSECURITY: WITHIN THE PAST 12 MONTHS, YOU WORRIED THAT YOUR FOOD WOULD RUN OUT BEFORE YOU GOT MONEY TO BUY MORE.: NEVER TRUE

## 2024-06-18 SDOH — ECONOMIC STABILITY: FOOD INSECURITY: WITHIN THE PAST 12 MONTHS, THE FOOD YOU BOUGHT JUST DIDN'T LAST AND YOU DIDN'T HAVE MONEY TO GET MORE.: NEVER TRUE

## 2024-06-18 SDOH — ECONOMIC STABILITY: INCOME INSECURITY: HOW HARD IS IT FOR YOU TO PAY FOR THE VERY BASICS LIKE FOOD, HOUSING, MEDICAL CARE, AND HEATING?: NOT HARD AT ALL

## 2024-06-19 ENCOUNTER — OFFICE VISIT (OUTPATIENT)
Dept: FAMILY MEDICINE CLINIC | Facility: CLINIC | Age: 79
End: 2024-06-19
Payer: MEDICARE

## 2024-06-19 ENCOUNTER — APPOINTMENT (RX ONLY)
Dept: URBAN - METROPOLITAN AREA CLINIC 329 | Facility: CLINIC | Age: 79
Setting detail: DERMATOLOGY
End: 2024-06-19

## 2024-06-19 VITALS
DIASTOLIC BLOOD PRESSURE: 82 MMHG | BODY MASS INDEX: 34.86 KG/M2 | HEART RATE: 76 BPM | SYSTOLIC BLOOD PRESSURE: 138 MMHG | OXYGEN SATURATION: 97 % | HEIGHT: 64 IN | WEIGHT: 204.2 LBS

## 2024-06-19 DIAGNOSIS — I10 PRIMARY HYPERTENSION: ICD-10-CM

## 2024-06-19 DIAGNOSIS — L82.0 INFLAMED SEBORRHEIC KERATOSIS: ICD-10-CM

## 2024-06-19 DIAGNOSIS — L82.1 OTHER SEBORRHEIC KERATOSIS: ICD-10-CM

## 2024-06-19 DIAGNOSIS — H93.8X3 EAR FULLNESS, BILATERAL: ICD-10-CM

## 2024-06-19 DIAGNOSIS — Z79.4 CONTROLLED TYPE 2 DIABETES MELLITUS WITH DIABETIC POLYNEUROPATHY, WITH LONG-TERM CURRENT USE OF INSULIN (HCC): ICD-10-CM

## 2024-06-19 DIAGNOSIS — J30.9 ALLERGIC RHINITIS, UNSPECIFIED SEASONALITY, UNSPECIFIED TRIGGER: ICD-10-CM

## 2024-06-19 DIAGNOSIS — D18.0 HEMANGIOMA: ICD-10-CM

## 2024-06-19 DIAGNOSIS — Z12.83 ENCOUNTER FOR SCREENING FOR MALIGNANT NEOPLASM OF SKIN: ICD-10-CM

## 2024-06-19 DIAGNOSIS — N18.31 STAGE 3A CHRONIC KIDNEY DISEASE (HCC): ICD-10-CM

## 2024-06-19 DIAGNOSIS — E78.1 PURE HYPERGLYCERIDEMIA: ICD-10-CM

## 2024-06-19 DIAGNOSIS — L57.8 OTHER SKIN CHANGES DUE TO CHRONIC EXPOSURE TO NONIONIZING RADIATION: ICD-10-CM | Status: STABLE

## 2024-06-19 DIAGNOSIS — L81.4 OTHER MELANIN HYPERPIGMENTATION: ICD-10-CM

## 2024-06-19 DIAGNOSIS — Z85.828 PERSONAL HISTORY OF OTHER MALIGNANT NEOPLASM OF SKIN: ICD-10-CM

## 2024-06-19 DIAGNOSIS — E78.00 PURE HYPERCHOLESTEROLEMIA: ICD-10-CM

## 2024-06-19 DIAGNOSIS — E11.42 CONTROLLED TYPE 2 DIABETES MELLITUS WITH DIABETIC POLYNEUROPATHY, WITH LONG-TERM CURRENT USE OF INSULIN (HCC): ICD-10-CM

## 2024-06-19 DIAGNOSIS — E03.4 HYPOTHYROIDISM DUE TO ACQUIRED ATROPHY OF THYROID: ICD-10-CM

## 2024-06-19 DIAGNOSIS — R41.3 MEMORY LOSS: Primary | ICD-10-CM

## 2024-06-19 DIAGNOSIS — D22 MELANOCYTIC NEVI: ICD-10-CM

## 2024-06-19 DIAGNOSIS — E66.01 SEVERE OBESITY (BMI 35.0-39.9) WITH COMORBIDITY (HCC): ICD-10-CM

## 2024-06-19 PROBLEM — D18.01 HEMANGIOMA OF SKIN AND SUBCUTANEOUS TISSUE: Status: ACTIVE | Noted: 2024-06-19

## 2024-06-19 PROBLEM — D22.5 MELANOCYTIC NEVI OF TRUNK: Status: ACTIVE | Noted: 2024-06-19

## 2024-06-19 PROBLEM — D22.61 MELANOCYTIC NEVI OF RIGHT UPPER LIMB, INCLUDING SHOULDER: Status: ACTIVE | Noted: 2024-06-19

## 2024-06-19 PROBLEM — D22.71 MELANOCYTIC NEVI OF RIGHT LOWER LIMB, INCLUDING HIP: Status: ACTIVE | Noted: 2024-06-19

## 2024-06-19 PROCEDURE — 1090F PRES/ABSN URINE INCON ASSESS: CPT | Performed by: FAMILY MEDICINE

## 2024-06-19 PROCEDURE — 1123F ACP DISCUSS/DSCN MKR DOCD: CPT | Performed by: FAMILY MEDICINE

## 2024-06-19 PROCEDURE — 17110 DESTRUCTION B9 LES UP TO 14: CPT

## 2024-06-19 PROCEDURE — ? ADDITIONAL NOTES

## 2024-06-19 PROCEDURE — G8399 PT W/DXA RESULTS DOCUMENT: HCPCS | Performed by: FAMILY MEDICINE

## 2024-06-19 PROCEDURE — ? TREATMENT REGIMEN

## 2024-06-19 PROCEDURE — ? DERMATOSCOPIC EVALUATION

## 2024-06-19 PROCEDURE — ? COUNSELING

## 2024-06-19 PROCEDURE — 1036F TOBACCO NON-USER: CPT | Performed by: FAMILY MEDICINE

## 2024-06-19 PROCEDURE — 99213 OFFICE O/P EST LOW 20 MIN: CPT | Mod: 25

## 2024-06-19 PROCEDURE — ? BENIGN DESTRUCTION

## 2024-06-19 PROCEDURE — G8417 CALC BMI ABV UP PARAM F/U: HCPCS | Performed by: FAMILY MEDICINE

## 2024-06-19 PROCEDURE — G8427 DOCREV CUR MEDS BY ELIG CLIN: HCPCS | Performed by: FAMILY MEDICINE

## 2024-06-19 PROCEDURE — 3075F SYST BP GE 130 - 139MM HG: CPT | Performed by: FAMILY MEDICINE

## 2024-06-19 PROCEDURE — 99214 OFFICE O/P EST MOD 30 MIN: CPT | Performed by: FAMILY MEDICINE

## 2024-06-19 PROCEDURE — 3079F DIAST BP 80-89 MM HG: CPT | Performed by: FAMILY MEDICINE

## 2024-06-19 ASSESSMENT — PATIENT HEALTH QUESTIONNAIRE - PHQ9
7. TROUBLE CONCENTRATING ON THINGS, SUCH AS READING THE NEWSPAPER OR WATCHING TELEVISION: NOT AT ALL
SUM OF ALL RESPONSES TO PHQ9 QUESTIONS 1 & 2: 0
10. IF YOU CHECKED OFF ANY PROBLEMS, HOW DIFFICULT HAVE THESE PROBLEMS MADE IT FOR YOU TO DO YOUR WORK, TAKE CARE OF THINGS AT HOME, OR GET ALONG WITH OTHER PEOPLE: NOT DIFFICULT AT ALL
5. POOR APPETITE OR OVEREATING: NOT AT ALL
SUM OF ALL RESPONSES TO PHQ QUESTIONS 1-9: 2
2. FEELING DOWN, DEPRESSED OR HOPELESS: NOT AT ALL
1. LITTLE INTEREST OR PLEASURE IN DOING THINGS: NOT AT ALL
9. THOUGHTS THAT YOU WOULD BE BETTER OFF DEAD, OR OF HURTING YOURSELF: NOT AT ALL
SUM OF ALL RESPONSES TO PHQ QUESTIONS 1-9: 2
4. FEELING TIRED OR HAVING LITTLE ENERGY: MORE THAN HALF THE DAYS
6. FEELING BAD ABOUT YOURSELF - OR THAT YOU ARE A FAILURE OR HAVE LET YOURSELF OR YOUR FAMILY DOWN: NOT AT ALL
SUM OF ALL RESPONSES TO PHQ QUESTIONS 1-9: 2
SUM OF ALL RESPONSES TO PHQ QUESTIONS 1-9: 2
8. MOVING OR SPEAKING SO SLOWLY THAT OTHER PEOPLE COULD HAVE NOTICED. OR THE OPPOSITE, BEING SO FIGETY OR RESTLESS THAT YOU HAVE BEEN MOVING AROUND A LOT MORE THAN USUAL: NOT AT ALL

## 2024-06-19 ASSESSMENT — LOCATION SIMPLE DESCRIPTION DERM
LOCATION SIMPLE: RIGHT UPPER ARM
LOCATION SIMPLE: CHEST
LOCATION SIMPLE: RIGHT ANTERIOR NECK
LOCATION SIMPLE: RIGHT ANTERIOR NECK
LOCATION SIMPLE: ABDOMEN
LOCATION SIMPLE: RIGHT PRETIBIAL REGION
LOCATION SIMPLE: RIGHT ANKLE
LOCATION SIMPLE: LEFT PRETIBIAL REGION
LOCATION SIMPLE: LEFT UPPER BACK
LOCATION SIMPLE: RIGHT UPPER BACK
LOCATION SIMPLE: RIGHT FOREARM
LOCATION SIMPLE: RIGHT POSTERIOR UPPER ARM
LOCATION SIMPLE: RIGHT POSTERIOR THIGH
LOCATION SIMPLE: LEFT ANTERIOR NECK
LOCATION SIMPLE: RIGHT THIGH

## 2024-06-19 ASSESSMENT — LOCATION ZONE DERM
LOCATION ZONE: TRUNK
LOCATION ZONE: LEG
LOCATION ZONE: NECK
LOCATION ZONE: ARM
LOCATION ZONE: NECK

## 2024-06-19 ASSESSMENT — LOCATION DETAILED DESCRIPTION DERM
LOCATION DETAILED: RIGHT DISTAL POSTERIOR THIGH
LOCATION DETAILED: RIGHT CLAVICULAR NECK
LOCATION DETAILED: RIGHT INFERIOR MEDIAL UPPER BACK
LOCATION DETAILED: RIGHT DISTAL PRETIBIAL REGION
LOCATION DETAILED: LEFT DISTAL PRETIBIAL REGION
LOCATION DETAILED: RIGHT ANTERIOR DISTAL THIGH
LOCATION DETAILED: MIDDLE STERNUM
LOCATION DETAILED: RIGHT PROXIMAL POSTERIOR UPPER ARM
LOCATION DETAILED: LEFT MEDIAL UPPER BACK
LOCATION DETAILED: RIGHT ANTERIOR PROXIMAL UPPER ARM
LOCATION DETAILED: RIGHT SUPERIOR MEDIAL UPPER BACK
LOCATION DETAILED: LEFT INFERIOR ANTERIOR NECK
LOCATION DETAILED: EPIGASTRIC SKIN
LOCATION DETAILED: RIGHT INFERIOR LATERAL NECK
LOCATION DETAILED: RIGHT PROXIMAL DORSAL FOREARM
LOCATION DETAILED: RIGHT POSTERIOR ANKLE

## 2024-06-19 NOTE — PROGRESS NOTES
SUBJECTIVE:   Kyra Corley is a 78 y.o. female who has a past medical history significant for diabetes with neuropathy, hypertension, high cholesterol, high triglycerides, hypothyroidism, chronic pain syndrome, recurrent depression, tension headaches and generalized anxiety.  At previous visit the patient had reported some subjective memory loss.  Patient's Mini-Mental status exam was unremarkable.  She respectfully declined her imaging but did agree to lab work.  Lab work including TSH, CBC, metabolic panel, B12 level and RPR were negative.  No new complaints or developments in this regard.    In addition today the patient complains of frontal pressure and headache for 2 to 3 weeks.  Her allergies have been very active during this timeframe.  No fever, chest pain, shortness of breath.  She has had some mild congestion in both her head and chest.    Patient reports no active chest pain, shortness of breath, orthopnea or PND.  Current medications are listed in the EMR and reviewed today.    HPI  See above    Past Medical History, Past Surgical History, Family history, Social History, and Medications were all reviewed with the patient today and updated as necessary.       Current Outpatient Medications   Medication Sig Dispense Refill    ezetimibe-simvastatin (VYTORIN) 10-40 MG per tablet Take 1 tablet by mouth nightly 30 tablet 4    magnesium oxide (MAG-OX) 400 (240 Mg) MG tablet Take 1 tablet by mouth 2 times daily 180 tablet 6    metoprolol succinate (TOPROL XL) 100 MG extended release tablet TAKE 2 TABLETS BY MOUTH DAILY 180 tablet 3    busPIRone (BUSPAR) 15 MG tablet Take 15 mg by mouth in the morning and at bedtime 180 tablet 3    irbesartan (AVAPRO) 300 MG tablet TAKE 1 TABLET BY MOUTH EVERY DAY AT NIGHT 90 tablet 1    furosemide (LASIX) 20 MG tablet TAKE 1 TABLET BY MOUTH THREE TIMES A WEEK 2-3 TIMES A WEEK 108 tablet 3    TRULICITY 1.5 MG/0.5ML SC injection 1.5 mg, SubCUTAneous, EVERY 7 DAYS 2 mL 11

## 2024-06-19 NOTE — PROCEDURE: ADDITIONAL NOTES
Render Risk Assessment In Note?: no
Detail Level: Simple
Additional Notes: Look for bleeding or scabbing in the scar which may mean that your skin cancer is recurring.
14-Jan-2018

## 2024-06-19 NOTE — PROCEDURE: BENIGN DESTRUCTION
Anesthesia Volume In Cc: 0.5
Render Post-Care Instructions In Note?: no
Post-Care Instructions: I reviewed with the patient in detail post-care instructions. Patient is to wear sunprotection, and avoid picking at any of the treated lesions. Pt may apply Vaseline to crusted or scabbing areas.
Medical Necessity Clause: This procedure was medically necessary because the lesions that were treated were:
Treatment Number (Will Not Render If 0): 0
Detail Level: Detailed
Consent: The patient's consent was obtained including but not limited to risks of crusting, scabbing, blistering, scarring, darker or lighter pigmentary change, recurrence, incomplete removal and infection.
Medical Necessity Information: It is in your best interest to select a reason for this procedure from the list below. All of these items fulfill various CMS LCD requirements except the new and changing color options.

## 2024-06-24 RX ORDER — GABAPENTIN 100 MG/1
CAPSULE ORAL
Qty: 90 CAPSULE | Refills: 5 | Status: SHIPPED | OUTPATIENT
Start: 2024-06-24 | End: 2024-12-28

## 2024-08-06 DIAGNOSIS — E78.2 MIXED HYPERLIPIDEMIA: ICD-10-CM

## 2024-08-06 RX ORDER — EZETIMIBE AND SIMVASTATIN 10; 40 MG/1; MG/1
TABLET ORAL
Qty: 90 TABLET | Refills: 1 | OUTPATIENT
Start: 2024-08-06

## 2024-08-14 DIAGNOSIS — E78.2 MIXED HYPERLIPIDEMIA: ICD-10-CM

## 2024-08-15 RX ORDER — EZETIMIBE AND SIMVASTATIN 10; 40 MG/1; MG/1
TABLET ORAL
Qty: 30 TABLET | Refills: 4 | Status: SHIPPED | OUTPATIENT
Start: 2024-08-15

## 2024-08-15 NOTE — TELEPHONE ENCOUNTER
Patient requesting refill pended below.  She has follow up sched 9.3.24, last labs done in March 2024

## 2024-08-17 DIAGNOSIS — E78.2 MIXED HYPERLIPIDEMIA: ICD-10-CM

## 2024-08-19 RX ORDER — EZETIMIBE AND SIMVASTATIN 10; 40 MG/1; MG/1
TABLET ORAL
Qty: 90 TABLET | Refills: 1 | OUTPATIENT
Start: 2024-08-19

## 2024-09-03 ENCOUNTER — OFFICE VISIT (OUTPATIENT)
Dept: ENDOCRINOLOGY | Age: 79
End: 2024-09-03
Payer: MEDICARE

## 2024-09-03 VITALS
SYSTOLIC BLOOD PRESSURE: 142 MMHG | HEIGHT: 64 IN | OXYGEN SATURATION: 96 % | DIASTOLIC BLOOD PRESSURE: 96 MMHG | WEIGHT: 198 LBS | BODY MASS INDEX: 33.8 KG/M2 | RESPIRATION RATE: 16 BRPM | HEART RATE: 88 BPM

## 2024-09-03 DIAGNOSIS — E78.2 MIXED HYPERLIPIDEMIA: ICD-10-CM

## 2024-09-03 DIAGNOSIS — E11.42 CONTROLLED TYPE 2 DIABETES MELLITUS WITH DIABETIC POLYNEUROPATHY, WITH LONG-TERM CURRENT USE OF INSULIN (HCC): Primary | ICD-10-CM

## 2024-09-03 DIAGNOSIS — E11.42 CONTROLLED TYPE 2 DIABETES MELLITUS WITH DIABETIC POLYNEUROPATHY, WITH LONG-TERM CURRENT USE OF INSULIN (HCC): ICD-10-CM

## 2024-09-03 DIAGNOSIS — Z79.4 CONTROLLED TYPE 2 DIABETES MELLITUS WITH DIABETIC POLYNEUROPATHY, WITH LONG-TERM CURRENT USE OF INSULIN (HCC): Primary | ICD-10-CM

## 2024-09-03 DIAGNOSIS — I10 ESSENTIAL HYPERTENSION: ICD-10-CM

## 2024-09-03 DIAGNOSIS — Z79.4 CONTROLLED TYPE 2 DIABETES MELLITUS WITH DIABETIC POLYNEUROPATHY, WITH LONG-TERM CURRENT USE OF INSULIN (HCC): ICD-10-CM

## 2024-09-03 DIAGNOSIS — E89.0 POSTSURGICAL HYPOTHYROIDISM: ICD-10-CM

## 2024-09-03 LAB
CREAT UR-MCNC: 67.9 MG/DL (ref 28–217)
MICROALBUMIN UR-MCNC: 5.11 MG/DL (ref 0–20)
MICROALBUMIN/CREAT UR-RTO: 75 MG/G (ref 0–30)

## 2024-09-03 PROCEDURE — 1123F ACP DISCUSS/DSCN MKR DOCD: CPT | Performed by: INTERNAL MEDICINE

## 2024-09-03 PROCEDURE — G8399 PT W/DXA RESULTS DOCUMENT: HCPCS | Performed by: INTERNAL MEDICINE

## 2024-09-03 PROCEDURE — 3080F DIAST BP >= 90 MM HG: CPT | Performed by: INTERNAL MEDICINE

## 2024-09-03 PROCEDURE — 3077F SYST BP >= 140 MM HG: CPT | Performed by: INTERNAL MEDICINE

## 2024-09-03 PROCEDURE — G8427 DOCREV CUR MEDS BY ELIG CLIN: HCPCS | Performed by: INTERNAL MEDICINE

## 2024-09-03 PROCEDURE — 1036F TOBACCO NON-USER: CPT | Performed by: INTERNAL MEDICINE

## 2024-09-03 PROCEDURE — 1090F PRES/ABSN URINE INCON ASSESS: CPT | Performed by: INTERNAL MEDICINE

## 2024-09-03 PROCEDURE — G2211 COMPLEX E/M VISIT ADD ON: HCPCS | Performed by: INTERNAL MEDICINE

## 2024-09-03 PROCEDURE — G8417 CALC BMI ABV UP PARAM F/U: HCPCS | Performed by: INTERNAL MEDICINE

## 2024-09-03 PROCEDURE — 99214 OFFICE O/P EST MOD 30 MIN: CPT | Performed by: INTERNAL MEDICINE

## 2024-09-03 RX ORDER — PEN NEEDLE, DIABETIC 32GX 5/32"
NEEDLE, DISPOSABLE MISCELLANEOUS
Qty: 50 EACH | Refills: 11 | Status: SHIPPED | OUTPATIENT
Start: 2024-09-03

## 2024-09-03 RX ORDER — EZETIMIBE AND SIMVASTATIN 10; 40 MG/1; MG/1
TABLET ORAL
Qty: 30 TABLET | Refills: 11 | Status: SHIPPED | OUTPATIENT
Start: 2024-09-03

## 2024-09-03 RX ORDER — METFORMIN HCL 500 MG
500 TABLET, EXTENDED RELEASE 24 HR ORAL DAILY
Qty: 30 TABLET | Refills: 11 | Status: SHIPPED | OUTPATIENT
Start: 2024-09-03

## 2024-09-03 RX ORDER — DULAGLUTIDE 1.5 MG/.5ML
INJECTION, SOLUTION SUBCUTANEOUS
Qty: 2 ML | Refills: 11 | Status: SHIPPED | OUTPATIENT
Start: 2024-09-03

## 2024-09-03 RX ORDER — LEVOTHYROXINE SODIUM 112 UG/1
TABLET ORAL
Qty: 30 TABLET | Refills: 11 | Status: SHIPPED | OUTPATIENT
Start: 2024-09-03

## 2024-09-03 RX ORDER — INSULIN GLARGINE 300 U/ML
50 INJECTION, SOLUTION SUBCUTANEOUS NIGHTLY
Qty: 9 ML | Refills: 11 | Status: SHIPPED | OUTPATIENT
Start: 2024-09-03

## 2024-09-03 ASSESSMENT — ENCOUNTER SYMPTOMS
CONSTIPATION: 0
DIARRHEA: 0

## 2024-09-03 NOTE — PROGRESS NOTES
Aaron Fuller MD, FACE  [facility]  [address] [place] [address], [address]  [place] [address]  Phone [phone]  Facsimile [phone]          Kyra Corley is a 78 y.o. female seen 9/3/2024 for follow-up of type 2 diabetes mellitus         ASSESSMENT AND PLAN:    1. Controlled type 2 diabetes mellitus with diabetic polyneuropathy, with long-term current use of insulin (HCC)  Her overall glycemic control is excellent and her hemoglobin A1c is at goal less than 7.  Eye exam negative 10/2023.  Urine microalbumin positive 8/2023 (she is on an ARB) and will be updated today.      - TOUJEO SOLOSTAR 300 UNIT/ML concentrated injection pen; Inject 50 Units into the skin at bedtime  Dispense: 9 mL; Refill: 11  - TRULICITY 1.5 MG/0.5ML SC injection; 1.5 mg, SubCUTAneous, EVERY 7 DAYS  Dispense: 2 mL; Refill: 11  - metFORMIN (GLUCOPHAGE-XR) 500 MG extended release tablet; Take 1 tablet by mouth daily  Dispense: 30 tablet; Refill: 11  - BD PEN NEEDLE CHINA 2ND GEN 32G X 4 MM MISC; Once a day  Dispense: 50 each; Refill: 11    2. Postsurgical hypothyroidism  Biochemically euthyroid 3/2024.    - levothyroxine (SYNTHROID) 112 MCG tablet; 1 tablet once a day except for 1/2 tablet on Sundays  Dispense: 30 tablet; Refill: 11    3. Essential hypertension  Followed by her primary care physician and cardiologist.    4. Mixed hyperlipidemia  LDL at goal less than 55 3/2024.    - ezetimibe-simvastatin (VYTORIN) 10-40 MG per tablet; Take 1 tablet by mouth nightly  Dispense: 30 tablet; Refill: 11      Follow-up and Dispositions    Return in about 6 months (around 3/3/2025).         HISTORY OF PRESENT ILLNESS:    DIABETES MELLITUS     Diagnosis: Type 2 diabetes mellitus diagnosed around 2011.  Her chart states that she has a history of acute pancreatitis but she denies this adamantly.     Diet: She usually tries to limit carbohydrates and fried foods.  She has been avoiding sweets for the most part.

## 2024-10-02 RX ORDER — IRBESARTAN 300 MG/1
TABLET ORAL
Qty: 90 TABLET | Refills: 1 | Status: SHIPPED | OUTPATIENT
Start: 2024-10-02

## 2024-10-29 DIAGNOSIS — G44.209 TENSION-TYPE HEADACHE, NOT INTRACTABLE, UNSPECIFIED CHRONICITY PATTERN: ICD-10-CM

## 2024-10-29 RX ORDER — BUTALBITAL, ACETAMINOPHEN AND CAFFEINE 300; 40; 50 MG/1; MG/1; MG/1
1 CAPSULE ORAL EVERY 4 HOURS PRN
Qty: 20 CAPSULE | Refills: 1 | Status: SHIPPED | OUTPATIENT
Start: 2024-10-29

## 2024-12-06 RX ORDER — ERGOCALCIFEROL 1.25 MG/1
CAPSULE, LIQUID FILLED ORAL
Qty: 12 CAPSULE | Refills: 3 | Status: SHIPPED | OUTPATIENT
Start: 2024-12-06

## 2024-12-31 RX ORDER — GABAPENTIN 100 MG/1
CAPSULE ORAL
Qty: 90 CAPSULE | Refills: 5 | Status: SHIPPED | OUTPATIENT
Start: 2024-12-31 | End: 2025-07-06

## 2025-01-08 ENCOUNTER — LAB (OUTPATIENT)
Dept: FAMILY MEDICINE CLINIC | Facility: CLINIC | Age: 80
End: 2025-01-08

## 2025-01-08 DIAGNOSIS — E03.4 HYPOTHYROIDISM DUE TO ACQUIRED ATROPHY OF THYROID: ICD-10-CM

## 2025-01-08 DIAGNOSIS — E78.00 PURE HYPERCHOLESTEROLEMIA: ICD-10-CM

## 2025-01-08 DIAGNOSIS — E78.1 PURE HYPERGLYCERIDEMIA: ICD-10-CM

## 2025-01-08 DIAGNOSIS — I10 PRIMARY HYPERTENSION: ICD-10-CM

## 2025-01-08 LAB
ALBUMIN SERPL-MCNC: 4.3 G/DL (ref 3.2–4.6)
ALBUMIN/GLOB SERPL: 1.5 (ref 1–1.9)
ALP SERPL-CCNC: 88 U/L (ref 35–104)
ALT SERPL-CCNC: 28 U/L (ref 8–45)
ANION GAP SERPL CALC-SCNC: 12 MMOL/L (ref 7–16)
AST SERPL-CCNC: 37 U/L (ref 15–37)
BILIRUB SERPL-MCNC: 0.8 MG/DL (ref 0–1.2)
BUN SERPL-MCNC: 14 MG/DL (ref 8–23)
CALCIUM SERPL-MCNC: 9.9 MG/DL (ref 8.8–10.2)
CHLORIDE SERPL-SCNC: 100 MMOL/L (ref 98–107)
CHOLEST SERPL-MCNC: 106 MG/DL (ref 0–200)
CO2 SERPL-SCNC: 27 MMOL/L (ref 20–29)
CREAT SERPL-MCNC: 1.06 MG/DL (ref 0.6–1.1)
GLOBULIN SER CALC-MCNC: 2.9 G/DL (ref 2.3–3.5)
GLUCOSE SERPL-MCNC: 118 MG/DL (ref 70–99)
HDLC SERPL-MCNC: 29 MG/DL (ref 40–60)
HDLC SERPL: 3.7 (ref 0–5)
LDLC SERPL CALC-MCNC: 45 MG/DL (ref 0–100)
POTASSIUM SERPL-SCNC: 5.4 MMOL/L (ref 3.5–5.1)
PROT SERPL-MCNC: 7.1 G/DL (ref 6.3–8.2)
SODIUM SERPL-SCNC: 139 MMOL/L (ref 136–145)
TRIGL SERPL-MCNC: 162 MG/DL (ref 0–150)
TSH, 3RD GENERATION: 1.55 UIU/ML (ref 0.27–4.2)
VLDLC SERPL CALC-MCNC: 32 MG/DL (ref 6–23)

## 2025-02-23 DIAGNOSIS — G44.209 TENSION-TYPE HEADACHE, NOT INTRACTABLE, UNSPECIFIED CHRONICITY PATTERN: ICD-10-CM

## 2025-02-24 RX ORDER — BUTALBITAL, ACETAMINOPHEN AND CAFFEINE 300; 40; 50 MG/1; MG/1; MG/1
1 CAPSULE ORAL EVERY 4 HOURS PRN
Qty: 20 CAPSULE | Refills: 1 | Status: SHIPPED | OUTPATIENT
Start: 2025-02-24

## 2025-02-24 NOTE — TELEPHONE ENCOUNTER
Refill request     [id number] - [place] - [address] [facility] - P [phone] - F [phone]

## 2025-02-26 ENCOUNTER — OFFICE VISIT (OUTPATIENT)
Dept: FAMILY MEDICINE CLINIC | Facility: CLINIC | Age: 80
End: 2025-02-26
Payer: MEDICARE

## 2025-02-26 VITALS
SYSTOLIC BLOOD PRESSURE: 152 MMHG | OXYGEN SATURATION: 94 % | DIASTOLIC BLOOD PRESSURE: 82 MMHG | BODY MASS INDEX: 32.1 KG/M2 | WEIGHT: 187 LBS | HEART RATE: 75 BPM

## 2025-02-26 DIAGNOSIS — F33.9 RECURRENT DEPRESSION: ICD-10-CM

## 2025-02-26 DIAGNOSIS — E78.00 PURE HYPERCHOLESTEROLEMIA: ICD-10-CM

## 2025-02-26 DIAGNOSIS — F41.9 ANXIETY: ICD-10-CM

## 2025-02-26 DIAGNOSIS — E78.1 PURE HYPERGLYCERIDEMIA: ICD-10-CM

## 2025-02-26 DIAGNOSIS — G89.4 CHRONIC PAIN SYNDROME: ICD-10-CM

## 2025-02-26 DIAGNOSIS — I10 PRIMARY HYPERTENSION: Primary | ICD-10-CM

## 2025-02-26 DIAGNOSIS — E11.42 CONTROLLED TYPE 2 DIABETES MELLITUS WITH DIABETIC POLYNEUROPATHY, WITH LONG-TERM CURRENT USE OF INSULIN (HCC): ICD-10-CM

## 2025-02-26 DIAGNOSIS — Z79.4 CONTROLLED TYPE 2 DIABETES MELLITUS WITH DIABETIC POLYNEUROPATHY, WITH LONG-TERM CURRENT USE OF INSULIN (HCC): ICD-10-CM

## 2025-02-26 DIAGNOSIS — R41.3 MEMORY LOSS: ICD-10-CM

## 2025-02-26 DIAGNOSIS — E03.4 HYPOTHYROIDISM DUE TO ACQUIRED ATROPHY OF THYROID: ICD-10-CM

## 2025-02-26 PROCEDURE — G8417 CALC BMI ABV UP PARAM F/U: HCPCS | Performed by: FAMILY MEDICINE

## 2025-02-26 PROCEDURE — 3077F SYST BP >= 140 MM HG: CPT | Performed by: FAMILY MEDICINE

## 2025-02-26 PROCEDURE — 1159F MED LIST DOCD IN RCRD: CPT | Performed by: FAMILY MEDICINE

## 2025-02-26 PROCEDURE — 1123F ACP DISCUSS/DSCN MKR DOCD: CPT | Performed by: FAMILY MEDICINE

## 2025-02-26 PROCEDURE — 3079F DIAST BP 80-89 MM HG: CPT | Performed by: FAMILY MEDICINE

## 2025-02-26 PROCEDURE — 1090F PRES/ABSN URINE INCON ASSESS: CPT | Performed by: FAMILY MEDICINE

## 2025-02-26 PROCEDURE — G2211 COMPLEX E/M VISIT ADD ON: HCPCS | Performed by: FAMILY MEDICINE

## 2025-02-26 PROCEDURE — 99214 OFFICE O/P EST MOD 30 MIN: CPT | Performed by: FAMILY MEDICINE

## 2025-02-26 PROCEDURE — G8427 DOCREV CUR MEDS BY ELIG CLIN: HCPCS | Performed by: FAMILY MEDICINE

## 2025-02-26 PROCEDURE — 1036F TOBACCO NON-USER: CPT | Performed by: FAMILY MEDICINE

## 2025-02-26 PROCEDURE — G8399 PT W/DXA RESULTS DOCUMENT: HCPCS | Performed by: FAMILY MEDICINE

## 2025-02-26 RX ORDER — BUSPIRONE HYDROCHLORIDE 15 MG/1
15 TABLET ORAL 2 TIMES DAILY
Qty: 180 TABLET | Refills: 3 | Status: SHIPPED | OUTPATIENT
Start: 2025-02-26

## 2025-02-26 RX ORDER — METOPROLOL SUCCINATE 100 MG/1
200 TABLET, EXTENDED RELEASE ORAL DAILY
Qty: 180 TABLET | Refills: 3 | Status: SHIPPED | OUTPATIENT
Start: 2025-02-26

## 2025-02-26 RX ORDER — IRBESARTAN 300 MG/1
300 TABLET ORAL NIGHTLY
Qty: 90 TABLET | Refills: 1 | Status: SHIPPED | OUTPATIENT
Start: 2025-02-26

## 2025-02-26 RX ORDER — GABAPENTIN 100 MG/1
CAPSULE ORAL
Qty: 90 CAPSULE | Refills: 5 | Status: SHIPPED | OUTPATIENT
Start: 2025-02-26 | End: 2025-09-01

## 2025-02-26 SDOH — ECONOMIC STABILITY: FOOD INSECURITY: WITHIN THE PAST 12 MONTHS, YOU WORRIED THAT YOUR FOOD WOULD RUN OUT BEFORE YOU GOT MONEY TO BUY MORE.: NEVER TRUE

## 2025-02-26 SDOH — ECONOMIC STABILITY: FOOD INSECURITY: WITHIN THE PAST 12 MONTHS, THE FOOD YOU BOUGHT JUST DIDN'T LAST AND YOU DIDN'T HAVE MONEY TO GET MORE.: NEVER TRUE

## 2025-02-26 ASSESSMENT — PATIENT HEALTH QUESTIONNAIRE - PHQ9
1. LITTLE INTEREST OR PLEASURE IN DOING THINGS: NOT AT ALL
8. MOVING OR SPEAKING SO SLOWLY THAT OTHER PEOPLE COULD HAVE NOTICED. OR THE OPPOSITE, BEING SO FIGETY OR RESTLESS THAT YOU HAVE BEEN MOVING AROUND A LOT MORE THAN USUAL: SEVERAL DAYS
3. TROUBLE FALLING OR STAYING ASLEEP: NOT AT ALL
SUM OF ALL RESPONSES TO PHQ QUESTIONS 1-9: 2
10. IF YOU CHECKED OFF ANY PROBLEMS, HOW DIFFICULT HAVE THESE PROBLEMS MADE IT FOR YOU TO DO YOUR WORK, TAKE CARE OF THINGS AT HOME, OR GET ALONG WITH OTHER PEOPLE: NOT DIFFICULT AT ALL
SUM OF ALL RESPONSES TO PHQ QUESTIONS 1-9: 2
9. THOUGHTS THAT YOU WOULD BE BETTER OFF DEAD, OR OF HURTING YOURSELF: NOT AT ALL
SUM OF ALL RESPONSES TO PHQ QUESTIONS 1-9: 2
4. FEELING TIRED OR HAVING LITTLE ENERGY: NOT AT ALL
SUM OF ALL RESPONSES TO PHQ QUESTIONS 1-9: 2
7. TROUBLE CONCENTRATING ON THINGS, SUCH AS READING THE NEWSPAPER OR WATCHING TELEVISION: SEVERAL DAYS
5. POOR APPETITE OR OVEREATING: NOT AT ALL
6. FEELING BAD ABOUT YOURSELF - OR THAT YOU ARE A FAILURE OR HAVE LET YOURSELF OR YOUR FAMILY DOWN: NOT AT ALL
2. FEELING DOWN, DEPRESSED OR HOPELESS: NOT AT ALL
SUM OF ALL RESPONSES TO PHQ9 QUESTIONS 1 & 2: 0

## 2025-02-26 NOTE — PROGRESS NOTES
\"Have you been to the ER, urgent care clinic since your last visit?  Hospitalized since your last visit?\"    YES - When: approximately 1 months ago.  Where and Why: [facility] [place] a month ago for Flu and bronchitis.    “Have you seen or consulted any other health care providers outside our system since your last visit?”    NO              Surgeon (Optional): Dr. SHEREE Lock

## 2025-02-26 NOTE — PROGRESS NOTES
SUBJECTIVE:   Kyra Corley is a 79 y.o. female who has a past medical history significant for diabetes with neuropathy, hypertension, high cholesterol, high triglycerides, hypothyroidism, chronic pain syndrome, recurrent depression, tension headaches and generalized anxiety.  Patient presents today accompanied by her daughter, Belen.  There is expressed concerns about declining cognitive functioning.  This has been expressed before in the past with a normal Mini-Mental status exam in 2023.  However she reports that she is becoming more forgetful and acknowledges that she is repeating questions.  She is not getting lost or misplacing objects in odd places.  There is been no falls.  There has been no urinary symptoms.  There is no visual loss or disturbance, headache, chest pain, shortness of breath, orthopnea or PND.  She has had a conversation with the assistance of her daughter with chronic pain management about weaning off of some of her narcotic pain medicine.  They are in the process of doing this.    HPI  See above    Past Medical History, Past Surgical History, Family history, Social History, and Medications were all reviewed with the patient today and updated as necessary.       Current Outpatient Medications   Medication Sig Dispense Refill    butalbital-APAP-caffeine -40 MG CAPS per capsule TAKE 1 CAPSULE BY MOUTH EVERY 4 HOURS AS NEEDED FOR HEADACHES DX:G44.209 20 capsule 1    gabapentin (NEURONTIN) 100 MG capsule TAKE 1 CAPSULE BY MOUTH THREE TIMES A DAY 90 capsule 5    vitamin D (ERGOCALCIFEROL) 1.25 MG (47312 UT) CAPS capsule TAKE 1 CAPSULE BY MOUTH ONCE WEEKLY 12 capsule 3    irbesartan (AVAPRO) 300 MG tablet TAKE 1 TABLET BY MOUTH EVERY DAY AT NIGHT 90 tablet 1    TOUJEO SOLOSTAR 300 UNIT/ML concentrated injection pen Inject 50 Units into the skin at bedtime 9 mL 11    TRULICITY 1.5 MG/0.5ML SC injection 1.5 mg, SubCUTAneous, EVERY 7 DAYS 2 mL 11    metFORMIN (GLUCOPHAGE-XR) 500 MG extended

## 2025-03-04 ENCOUNTER — OFFICE VISIT (OUTPATIENT)
Dept: ENDOCRINOLOGY | Age: 80
End: 2025-03-04
Payer: MEDICARE

## 2025-03-04 VITALS
OXYGEN SATURATION: 99 % | HEART RATE: 68 BPM | WEIGHT: 190 LBS | SYSTOLIC BLOOD PRESSURE: 186 MMHG | HEIGHT: 64 IN | RESPIRATION RATE: 18 BRPM | BODY MASS INDEX: 32.44 KG/M2 | DIASTOLIC BLOOD PRESSURE: 88 MMHG

## 2025-03-04 DIAGNOSIS — E78.2 MIXED HYPERLIPIDEMIA: ICD-10-CM

## 2025-03-04 DIAGNOSIS — E89.0 POSTSURGICAL HYPOTHYROIDISM: ICD-10-CM

## 2025-03-04 DIAGNOSIS — Z79.4 CONTROLLED TYPE 2 DIABETES MELLITUS WITH DIABETIC POLYNEUROPATHY, WITH LONG-TERM CURRENT USE OF INSULIN (HCC): Primary | ICD-10-CM

## 2025-03-04 DIAGNOSIS — E11.42 CONTROLLED TYPE 2 DIABETES MELLITUS WITH DIABETIC POLYNEUROPATHY, WITH LONG-TERM CURRENT USE OF INSULIN (HCC): Primary | ICD-10-CM

## 2025-03-04 DIAGNOSIS — I10 ESSENTIAL HYPERTENSION: ICD-10-CM

## 2025-03-04 PROCEDURE — G2211 COMPLEX E/M VISIT ADD ON: HCPCS | Performed by: INTERNAL MEDICINE

## 2025-03-04 PROCEDURE — 99214 OFFICE O/P EST MOD 30 MIN: CPT | Performed by: INTERNAL MEDICINE

## 2025-03-04 PROCEDURE — 1090F PRES/ABSN URINE INCON ASSESS: CPT | Performed by: INTERNAL MEDICINE

## 2025-03-04 PROCEDURE — 3079F DIAST BP 80-89 MM HG: CPT | Performed by: INTERNAL MEDICINE

## 2025-03-04 PROCEDURE — G8399 PT W/DXA RESULTS DOCUMENT: HCPCS | Performed by: INTERNAL MEDICINE

## 2025-03-04 PROCEDURE — 1123F ACP DISCUSS/DSCN MKR DOCD: CPT | Performed by: INTERNAL MEDICINE

## 2025-03-04 PROCEDURE — 1036F TOBACCO NON-USER: CPT | Performed by: INTERNAL MEDICINE

## 2025-03-04 PROCEDURE — 3077F SYST BP >= 140 MM HG: CPT | Performed by: INTERNAL MEDICINE

## 2025-03-04 PROCEDURE — 1126F AMNT PAIN NOTED NONE PRSNT: CPT | Performed by: INTERNAL MEDICINE

## 2025-03-04 PROCEDURE — 1159F MED LIST DOCD IN RCRD: CPT | Performed by: INTERNAL MEDICINE

## 2025-03-04 PROCEDURE — G8417 CALC BMI ABV UP PARAM F/U: HCPCS | Performed by: INTERNAL MEDICINE

## 2025-03-04 PROCEDURE — G8427 DOCREV CUR MEDS BY ELIG CLIN: HCPCS | Performed by: INTERNAL MEDICINE

## 2025-03-04 RX ORDER — DULAGLUTIDE 1.5 MG/.5ML
1.5 INJECTION, SOLUTION SUBCUTANEOUS WEEKLY
Qty: 2 ML | Refills: 11 | Status: SHIPPED | OUTPATIENT
Start: 2025-03-04

## 2025-03-04 RX ORDER — EZETIMIBE AND SIMVASTATIN 10; 40 MG/1; MG/1
TABLET ORAL
Qty: 30 TABLET | Refills: 11 | Status: SHIPPED | OUTPATIENT
Start: 2025-03-04

## 2025-03-04 RX ORDER — METFORMIN HYDROCHLORIDE 500 MG/1
500 TABLET, EXTENDED RELEASE ORAL DAILY
Qty: 30 TABLET | Refills: 11 | Status: SHIPPED | OUTPATIENT
Start: 2025-03-04

## 2025-03-04 RX ORDER — PEN NEEDLE, DIABETIC 32GX 5/32"
NEEDLE, DISPOSABLE MISCELLANEOUS
Qty: 50 EACH | Refills: 11 | Status: SHIPPED | OUTPATIENT
Start: 2025-03-04

## 2025-03-04 RX ORDER — LEVOTHYROXINE SODIUM 112 UG/1
TABLET ORAL
Qty: 30 TABLET | Refills: 11 | Status: SHIPPED | OUTPATIENT
Start: 2025-03-04

## 2025-03-04 RX ORDER — INSULIN GLARGINE 300 U/ML
50 INJECTION, SOLUTION SUBCUTANEOUS NIGHTLY
Qty: 9 ML | Refills: 11 | Status: SHIPPED | OUTPATIENT
Start: 2025-03-04

## 2025-03-04 ASSESSMENT — ENCOUNTER SYMPTOMS
CONSTIPATION: 1
DIARRHEA: 0

## 2025-03-04 NOTE — PROGRESS NOTES
Aaron Fuller MD, FACE  [facility]  [address] [place] [address], [facility] [address]  [place] [address]  Phone [phone]  Facsimile [phone]          Kyra Corley is a 79 y.o. female seen 3/4/2025 for follow-up of type 2 diabetes mellitus         ASSESSMENT AND PLAN:    1. Controlled type 2 diabetes mellitus with diabetic polyneuropathy, with long-term current use of insulin (HCC)  Her overall glycemic control is excellent and her hemoglobin A1c is at goal less than 7.  Eye exam negative 1/2025.  Urine microalbumin positive 9/2024 (she is on an ARB).      - TOUJEO SOLOSTAR 300 UNIT/ML concentrated injection pen; Inject 50 Units into the skin at bedtime  Dispense: 9 mL; Refill: 11  - TRULICITY 1.5 MG/0.5ML SC injection; 1.5 mg, SubCUTAneous, EVERY 7 DAYS  Dispense: 2 mL; Refill: 11  - metFORMIN (GLUCOPHAGE-XR) 500 MG extended release tablet; Take 1 tablet by mouth daily  Dispense: 30 tablet; Refill: 11  - BD PEN NEEDLE CH[place] 2ND GEN 32G X 4 MM MISC; Once a day  Dispense: 50 each; Refill: 11    2. Postsurgical hypothyroidism  Biochemically euthyroid 1/2025.    - levothyroxine (SYNTHROID) 112 MCG tablet; 1 tablet once a day except for 1/2 tablet on Sundays  Dispense: 30 tablet; Refill: 11    3. Essential hypertension  Followed by her primary care physician and cardiologist.    4. Mixed hyperlipidemia  LDL at goal less than 55 1/2025.    - ezetimibe-simvastatin (VYTORIN) 10-40 MG per tablet; Take 1 tablet by mouth nightly  Dispense: 30 tablet; Refill: 11      Follow-up and Dispositions    Return in about 6 months (around 9/4/2025).         HISTORY OF PRESENT ILLNESS:    DIABETES MELLITUS     Diagnosis: Type 2 diabetes mellitus diagnosed around 2011.  Her chart states that she has a history of acute pancreatitis but she denies this adamantly.     Diet: She has been over eating carbohydrates.  She has been avoiding sweets for the most part.  No sweet tea.  She has been drinking regular

## 2025-03-13 ENCOUNTER — HOSPITAL ENCOUNTER (EMERGENCY)
Age: 80
Discharge: HOME | End: 2025-03-13
Attending: EMERGENCY MEDICINE
Payer: MEDICARE

## 2025-03-13 ENCOUNTER — APPOINTMENT (OUTPATIENT)
Dept: GENERAL RADIOLOGY | Age: 80
End: 2025-03-13
Payer: MEDICARE

## 2025-03-13 VITALS
HEART RATE: 89 BPM | DIASTOLIC BLOOD PRESSURE: 94 MMHG | OXYGEN SATURATION: 94 % | SYSTOLIC BLOOD PRESSURE: 195 MMHG | HEIGHT: 63 IN | BODY MASS INDEX: 34.02 KG/M2 | RESPIRATION RATE: 16 BRPM | TEMPERATURE: 98.5 F | WEIGHT: 192 LBS

## 2025-03-13 DIAGNOSIS — I16.0 HYPERTENSIVE URGENCY: Primary | ICD-10-CM

## 2025-03-13 DIAGNOSIS — R51.9 MILD HEADACHE: ICD-10-CM

## 2025-03-13 LAB
ALBUMIN SERPL-MCNC: 4.6 G/DL (ref 3.2–4.6)
ALBUMIN/GLOB SERPL: 1.5 (ref 1–1.9)
ALP SERPL-CCNC: 112 U/L (ref 35–104)
ALT SERPL-CCNC: 19 U/L (ref 12–65)
ANION GAP SERPL CALC-SCNC: 13 MMOL/L (ref 7–16)
AST SERPL-CCNC: 23 U/L (ref 15–37)
BASOPHILS # BLD: 0.02 K/UL (ref 0–0.2)
BASOPHILS NFR BLD: 0.4 % (ref 0–2)
BILIRUB SERPL-MCNC: 0.8 MG/DL (ref 0–1.2)
BUN SERPL-MCNC: 10 MG/DL (ref 8–23)
CALCIUM SERPL-MCNC: 9.4 MG/DL (ref 8.8–10.2)
CHLORIDE SERPL-SCNC: 97 MMOL/L (ref 98–107)
CO2 SERPL-SCNC: 27 MMOL/L (ref 20–29)
CREAT SERPL-MCNC: 0.84 MG/DL (ref 0.8–1.3)
DIFFERENTIAL METHOD BLD: ABNORMAL
EKG ATRIAL RATE: 79 BPM
EKG DIAGNOSIS: NORMAL
EKG P AXIS: 71 DEGREES
EKG P-R INTERVAL: 149 MS
EKG Q-T INTERVAL: 430 MS
EKG QRS DURATION: 74 MS
EKG QTC CALCULATION (BAZETT): 497 MS
EKG R AXIS: 54 DEGREES
EKG T AXIS: 73 DEGREES
EKG VENTRICULAR RATE: 80 BPM
EOSINOPHIL # BLD: 0.1 K/UL (ref 0–0.8)
EOSINOPHIL NFR BLD: 1.8 % (ref 0.5–7.8)
ERYTHROCYTE [DISTWIDTH] IN BLOOD BY AUTOMATED COUNT: 12.6 % (ref 11.9–14.6)
GLOBULIN SER CALC-MCNC: 3.1 G/DL (ref 2.3–3.5)
GLUCOSE SERPL-MCNC: 126 MG/DL (ref 65–100)
HCT VFR BLD AUTO: 41.4 % (ref 35.8–46.3)
HGB BLD-MCNC: 14.6 G/DL (ref 11.7–15.4)
IMM GRANULOCYTES # BLD AUTO: 0.02 K/UL (ref 0–0.5)
IMM GRANULOCYTES NFR BLD AUTO: 0.4 % (ref 0–5)
LYMPHOCYTES # BLD: 1.64 K/UL (ref 0.5–4.6)
LYMPHOCYTES NFR BLD: 29.5 % (ref 13–44)
MCH RBC QN AUTO: 30.2 PG (ref 26.1–32.9)
MCHC RBC AUTO-ENTMCNC: 35.3 G/DL (ref 31.4–35)
MCV RBC AUTO: 85.5 FL (ref 82–102)
MONOCYTES # BLD: 0.4 K/UL (ref 0.1–1.3)
MONOCYTES NFR BLD: 7.2 % (ref 4–12)
NEUTS SEG # BLD: 3.38 K/UL (ref 1.7–8.2)
NEUTS SEG NFR BLD: 60.7 % (ref 43–78)
NRBC # BLD: 0 K/UL (ref 0–0.2)
PLATELET # BLD AUTO: 279 K/UL (ref 150–450)
PMV BLD AUTO: 8.2 FL (ref 9.4–12.3)
POTASSIUM SERPL-SCNC: 4 MMOL/L (ref 3.5–5.1)
PROT SERPL-MCNC: 7.7 G/DL (ref 6.3–8.2)
RBC # BLD AUTO: 4.84 M/UL (ref 4.05–5.2)
SODIUM SERPL-SCNC: 137 MMOL/L (ref 133–143)
TROPONIN T SERPL HS-MCNC: 8 NG/L (ref 0–14)
TROPONIN T SERPL HS-MCNC: 8.2 NG/L (ref 0–14)
WBC # BLD AUTO: 5.6 K/UL (ref 4.3–11.1)

## 2025-03-13 PROCEDURE — 6360000002 HC RX W HCPCS: Performed by: EMERGENCY MEDICINE

## 2025-03-13 PROCEDURE — 6370000000 HC RX 637 (ALT 250 FOR IP): Performed by: EMERGENCY MEDICINE

## 2025-03-13 PROCEDURE — 80053 COMPREHEN METABOLIC PANEL: CPT

## 2025-03-13 PROCEDURE — 93010 ELECTROCARDIOGRAM REPORT: CPT | Performed by: INTERNAL MEDICINE

## 2025-03-13 PROCEDURE — 85025 COMPLETE CBC W/AUTO DIFF WBC: CPT

## 2025-03-13 PROCEDURE — 96374 THER/PROPH/DIAG INJ IV PUSH: CPT

## 2025-03-13 PROCEDURE — 84484 ASSAY OF TROPONIN QUANT: CPT

## 2025-03-13 PROCEDURE — 93005 ELECTROCARDIOGRAM TRACING: CPT | Performed by: EMERGENCY MEDICINE

## 2025-03-13 PROCEDURE — 71045 X-RAY EXAM CHEST 1 VIEW: CPT

## 2025-03-13 PROCEDURE — 99285 EMERGENCY DEPT VISIT HI MDM: CPT

## 2025-03-13 RX ORDER — LABETALOL HYDROCHLORIDE 5 MG/ML
20 INJECTION, SOLUTION INTRAVENOUS
Status: COMPLETED | OUTPATIENT
Start: 2025-03-13 | End: 2025-03-13

## 2025-03-13 RX ORDER — AMLODIPINE BESYLATE 5 MG/1
5 TABLET ORAL
Status: COMPLETED | OUTPATIENT
Start: 2025-03-13 | End: 2025-03-13

## 2025-03-13 RX ORDER — AMLODIPINE BESYLATE 5 MG/1
5 TABLET ORAL DAILY
Qty: 30 TABLET | Refills: 0 | Status: SHIPPED | OUTPATIENT
Start: 2025-03-13

## 2025-03-13 RX ORDER — ACETAMINOPHEN 325 MG/1
650 TABLET ORAL
Status: COMPLETED | OUTPATIENT
Start: 2025-03-13 | End: 2025-03-13

## 2025-03-13 RX ADMIN — LABETALOL HYDROCHLORIDE 20 MG: 5 INJECTION INTRAVENOUS at 13:33

## 2025-03-13 RX ADMIN — AMLODIPINE BESYLATE 5 MG: 5 TABLET ORAL at 11:59

## 2025-03-13 RX ADMIN — ACETAMINOPHEN 650 MG: 325 TABLET ORAL at 11:59

## 2025-03-13 ASSESSMENT — PAIN SCALES - GENERAL
PAINLEVEL_OUTOF10: 7
PAINLEVEL_OUTOF10: 6

## 2025-03-13 ASSESSMENT — PAIN DESCRIPTION - LOCATION: LOCATION: HEAD

## 2025-03-13 ASSESSMENT — PAIN - FUNCTIONAL ASSESSMENT: PAIN_FUNCTIONAL_ASSESSMENT: 0-10

## 2025-03-13 ASSESSMENT — PAIN DESCRIPTION - DESCRIPTORS: DESCRIPTORS: SHARP;POUNDING

## 2025-03-13 NOTE — ED NOTES
Patient mobility status  with no difficulty.     I have reviewed discharge instructions with the patient.  The patient verbalized understanding.    Patient left ED via Discharge Method: ambulatory to Home with Spouse.    Opportunity for questions and clarification provided.     Patient given 1 scripts.

## 2025-03-13 NOTE — ED TRIAGE NOTES
Pt ambulatory to room with spouse. Pt reports BP this morning of 192/110 and headache. Pt denies chest pain or shortness of breath, denies any recent changes to her medications.

## 2025-03-13 NOTE — DISCHARGE INSTRUCTIONS
Tylenol 500 to 650 mg every 4-6 hours as needed for pain.  Amlodipine once daily in addition to continuing your current blood pressure medicines.  Return if chest pain, trouble breathing, numbness tingling or weakness on one side of the other.  Return if any other new or concerning symptoms.  Call your doctor tomorrow for appointment next week for blood pressure recheck

## 2025-03-13 NOTE — ED PROVIDER NOTES
CAPS Take 100 mg by mouth daily as needed      escitalopram (LEXAPRO) 20 MG tablet TAKE 1 TABLET BY MOUTH DAILY      fexofenadine (ALLEGRA) 180 MG tablet Take 1 tablet by mouth      LORazepam (ATIVAN) 0.5 MG tablet TK 1 T PO QID PRA      naloxone 4 MG/0.1ML LIQD nasal spray by Nasal route once              Results from this emergency department visit:      Results for orders placed or performed during the hospital encounter of 03/13/25   XR CHEST PORTABLE    Narrative    Chest X-ray    INDICATION: Chest Pain    COMPARISON:  None    TECHNIQUE: AP/PA view of the chest was obtained.    FINDINGS: Normal heart size. The lungs are clear. No pleural effusion or  pneumothorax. No acute osseous findings. Neurostimulator leads in the mid  thoracic canal.        Impression    No acute cardiopulmonary abnormality.      Electronically signed by Dillon Gardiner   CBC with Auto Differential   Result Value Ref Range    WBC 5.6 4.3 - 11.1 K/uL    RBC 4.84 4.05 - 5.20 M/uL    Hemoglobin 14.6 11.7 - 15.4 g/dL    Hematocrit 41.4 35.8 - 46.3 %    MCV 85.5 82.0 - 102.0 FL    MCH 30.2 26.1 - 32.9 PG    MCHC 35.3 (H) 31.4 - 35.0 g/dL    RDW 12.6 11.9 - 14.6 %    Platelets 279 150 - 450 K/uL    MPV 8.2 (L) 9.4 - 12.3 FL    nRBC 0.00 0.0 - 0.2 K/uL    Differential Type AUTOMATED      Neutrophils % 60.7 43.0 - 78.0 %    Lymphocytes % 29.5 13.0 - 44.0 %    Monocytes % 7.2 4.0 - 12.0 %    Eosinophils % 1.8 0.5 - 7.8 %    Basophils % 0.4 0.0 - 2.0 %    Immature Granulocytes % 0.4 0.0 - 5.0 %    Neutrophils Absolute 3.38 1.70 - 8.20 K/UL    Lymphocytes Absolute 1.64 0.50 - 4.60 K/UL    Monocytes Absolute 0.40 0.10 - 1.30 K/UL    Eosinophils Absolute 0.10 0.00 - 0.80 K/UL    Basophils Absolute 0.02 0.00 - 0.20 K/UL    Immature Granulocytes Absolute 0.02 0.0 - 0.5 K/UL   Comprehensive Metabolic Panel w/ Reflex to MG   Result Value Ref Range    Sodium 137 133 - 143 mmol/L    Potassium 4.0 3.5 - 5.1 mmol/L    Chloride 97 (L) 98 - 107 mmol/L    CO2 27 20 -

## 2025-03-14 ENCOUNTER — CARE COORDINATION (OUTPATIENT)
Dept: CARE COORDINATION | Facility: CLINIC | Age: 80
End: 2025-03-14

## 2025-03-14 NOTE — CARE COORDINATION
Ambulatory Care Coordination Note     3/14/2025 4:26 PM     Patient Current Location:  [place]     This patient was received as a referral from Ambulatory Care Manager .    ACM contacted the patient by telephone. Verified name and  with patient as identifiers. Provided introduction to self, and explanation of the ACM role.   Patient declined care management services at this time.          ACM: Martina Quispe RN     Challenges to be reviewed by the provider   Additional needs identified to be addressed with provider No  none               Method of communication with provider: phone.    Utilization: Initial Call - N/A    Care Summary Note: Pt has respectfully declined services at this time, my contact information has been shared with pt in the event there circumstances change. They are free to reach out at any time. ACM signing off.      PCP/Specialist follow up:   Future Appointments         Provider Specialty Dept Phone    3/18/2025 9:40 AM Ty Albright MD Family Medicine [phone]    2025 1:40 PM Ty Albright MD Family Medicine [phone]    2025 2:00 PM Aaron Fuller MD Endocrinology [phone]            Fo

## 2025-03-18 ENCOUNTER — OFFICE VISIT (OUTPATIENT)
Dept: FAMILY MEDICINE CLINIC | Facility: CLINIC | Age: 80
End: 2025-03-18
Payer: MEDICARE

## 2025-03-18 VITALS
DIASTOLIC BLOOD PRESSURE: 76 MMHG | OXYGEN SATURATION: 96 % | BODY MASS INDEX: 33.17 KG/M2 | HEART RATE: 73 BPM | HEIGHT: 63 IN | WEIGHT: 187.2 LBS | SYSTOLIC BLOOD PRESSURE: 138 MMHG

## 2025-03-18 DIAGNOSIS — E78.00 PURE HYPERCHOLESTEROLEMIA: ICD-10-CM

## 2025-03-18 DIAGNOSIS — F33.9 RECURRENT DEPRESSION: ICD-10-CM

## 2025-03-18 DIAGNOSIS — R41.3 MEMORY LOSS: ICD-10-CM

## 2025-03-18 DIAGNOSIS — Z79.4 CONTROLLED TYPE 2 DIABETES MELLITUS WITH DIABETIC POLYNEUROPATHY, WITH LONG-TERM CURRENT USE OF INSULIN (HCC): ICD-10-CM

## 2025-03-18 DIAGNOSIS — E78.1 PURE HYPERGLYCERIDEMIA: ICD-10-CM

## 2025-03-18 DIAGNOSIS — E11.42 CONTROLLED TYPE 2 DIABETES MELLITUS WITH DIABETIC POLYNEUROPATHY, WITH LONG-TERM CURRENT USE OF INSULIN (HCC): ICD-10-CM

## 2025-03-18 DIAGNOSIS — E89.0 POSTSURGICAL HYPOTHYROIDISM: ICD-10-CM

## 2025-03-18 DIAGNOSIS — I10 PRIMARY HYPERTENSION: Primary | ICD-10-CM

## 2025-03-18 PROCEDURE — G8417 CALC BMI ABV UP PARAM F/U: HCPCS | Performed by: FAMILY MEDICINE

## 2025-03-18 PROCEDURE — 1090F PRES/ABSN URINE INCON ASSESS: CPT | Performed by: FAMILY MEDICINE

## 2025-03-18 PROCEDURE — 1159F MED LIST DOCD IN RCRD: CPT | Performed by: FAMILY MEDICINE

## 2025-03-18 PROCEDURE — 1036F TOBACCO NON-USER: CPT | Performed by: FAMILY MEDICINE

## 2025-03-18 PROCEDURE — 3075F SYST BP GE 130 - 139MM HG: CPT | Performed by: FAMILY MEDICINE

## 2025-03-18 PROCEDURE — 1123F ACP DISCUSS/DSCN MKR DOCD: CPT | Performed by: FAMILY MEDICINE

## 2025-03-18 PROCEDURE — G2211 COMPLEX E/M VISIT ADD ON: HCPCS | Performed by: FAMILY MEDICINE

## 2025-03-18 PROCEDURE — G8399 PT W/DXA RESULTS DOCUMENT: HCPCS | Performed by: FAMILY MEDICINE

## 2025-03-18 PROCEDURE — 3078F DIAST BP <80 MM HG: CPT | Performed by: FAMILY MEDICINE

## 2025-03-18 PROCEDURE — G8427 DOCREV CUR MEDS BY ELIG CLIN: HCPCS | Performed by: FAMILY MEDICINE

## 2025-03-18 PROCEDURE — 99214 OFFICE O/P EST MOD 30 MIN: CPT | Performed by: FAMILY MEDICINE

## 2025-03-18 RX ORDER — AMLODIPINE BESYLATE 5 MG/1
5 TABLET ORAL DAILY
Qty: 30 TABLET | Refills: 5 | Status: SHIPPED | OUTPATIENT
Start: 2025-03-18

## 2025-03-18 ASSESSMENT — PATIENT HEALTH QUESTIONNAIRE - PHQ9
SUM OF ALL RESPONSES TO PHQ QUESTIONS 1-9: 2
SUM OF ALL RESPONSES TO PHQ QUESTIONS 1-9: 2
1. LITTLE INTEREST OR PLEASURE IN DOING THINGS: SEVERAL DAYS
SUM OF ALL RESPONSES TO PHQ QUESTIONS 1-9: 2
2. FEELING DOWN, DEPRESSED OR HOPELESS: SEVERAL DAYS
SUM OF ALL RESPONSES TO PHQ QUESTIONS 1-9: 2

## 2025-03-18 NOTE — PROGRESS NOTES
SUBJECTIVE:   Kyra Corley is a 79 y.o. female who has a past medical history significant for diabetes with neuropathy, hypertension, high cholesterol, high triglycerides, hypothyroidism, chronic pain syndrome, recurrent depression, tension headaches and generalized anxiety.  At previous visit the patient had reported some subjective memory loss.  Please refer to prior notes for details.  Between her and her daughter who accompanied her she likely just to observe.  She acknowledges that her stress and depression have been increasing.  Blood pressure was noted to be elevated.  Patient had requested no changes in medication and that she focus on lifestyle management.  She states that she tried to do a good her stress and worry became too significant.  She developed headache and blood pressure that was \"very high\".  She went to the emergency room.  Blood pressure on admission was 192/110.  She was given IV labetalol and oral Norvasc.  Lab work including cardiac enzymes and EKG were unremarkable.  Once blood pressure had shown a downward trend she was discharged home.  She states that since discharge she has felt great and is feeling much better.  She reports no residual headache, chest pain, shortness of breath, slurred speech, orthopnea or PND.    HPI  See above    Past Medical History, Past Surgical History, Family history, Social History, and Medications were all reviewed with the patient today and updated as necessary.       Current Outpatient Medications   Medication Sig Dispense Refill    amLODIPine (NORVASC) 5 MG tablet Take 1 tablet by mouth daily 30 tablet 0    TOUJEO SOLOSTAR 300 UNIT/ML concentrated injection pen Inject 50 Units into the skin at bedtime 9 mL 11    TRULICITY 1.5 MG/0.5ML SC injection Inject 0.5 mLs into the skin once a week 2 mL 11    metFORMIN (GLUCOPHAGE-XR) 500 MG extended release tablet Take 1 tablet by mouth daily 30 tablet 11    BD PEN NEEDLE CHINA 2ND GEN 32G X 4 MM MISC Once a day 50

## 2025-03-18 NOTE — PROGRESS NOTES
\"Have you been to the ER, urgent care clinic since your last visit?  Hospitalized since your last visit?\"    YES - When: approximately 1  weeks ago.  Where and Why: [facility].    “Have you seen or consulted any other health care providers outside our system since your last visit?”    NO

## 2025-04-29 ENCOUNTER — OFFICE VISIT (OUTPATIENT)
Dept: FAMILY MEDICINE CLINIC | Facility: CLINIC | Age: 80
End: 2025-04-29
Payer: MEDICARE

## 2025-04-29 VITALS
SYSTOLIC BLOOD PRESSURE: 120 MMHG | HEART RATE: 74 BPM | HEIGHT: 63 IN | BODY MASS INDEX: 33.56 KG/M2 | DIASTOLIC BLOOD PRESSURE: 78 MMHG | WEIGHT: 189.4 LBS | OXYGEN SATURATION: 95 %

## 2025-04-29 DIAGNOSIS — F41.9 ANXIETY: ICD-10-CM

## 2025-04-29 DIAGNOSIS — E78.1 PURE HYPERGLYCERIDEMIA: ICD-10-CM

## 2025-04-29 DIAGNOSIS — I10 PRIMARY HYPERTENSION: ICD-10-CM

## 2025-04-29 DIAGNOSIS — E11.42 CONTROLLED TYPE 2 DIABETES MELLITUS WITH DIABETIC POLYNEUROPATHY, WITH LONG-TERM CURRENT USE OF INSULIN (HCC): ICD-10-CM

## 2025-04-29 DIAGNOSIS — E89.0 POSTSURGICAL HYPOTHYROIDISM: ICD-10-CM

## 2025-04-29 DIAGNOSIS — Z00.00 MEDICARE ANNUAL WELLNESS VISIT, SUBSEQUENT: Primary | ICD-10-CM

## 2025-04-29 DIAGNOSIS — F33.9 RECURRENT DEPRESSION: ICD-10-CM

## 2025-04-29 DIAGNOSIS — E78.00 PURE HYPERCHOLESTEROLEMIA: ICD-10-CM

## 2025-04-29 DIAGNOSIS — R41.3 MEMORY LOSS: ICD-10-CM

## 2025-04-29 DIAGNOSIS — Z79.4 CONTROLLED TYPE 2 DIABETES MELLITUS WITH DIABETIC POLYNEUROPATHY, WITH LONG-TERM CURRENT USE OF INSULIN (HCC): ICD-10-CM

## 2025-04-29 LAB
ALBUMIN SERPL-MCNC: 4.1 G/DL (ref 3.2–4.6)
ALBUMIN/GLOB SERPL: 1.5 (ref 1–1.9)
ALP SERPL-CCNC: 87 U/L (ref 35–104)
ALT SERPL-CCNC: 30 U/L (ref 8–45)
ANION GAP SERPL CALC-SCNC: 13 MMOL/L (ref 7–16)
AST SERPL-CCNC: 31 U/L (ref 15–37)
BASOPHILS # BLD: 0.02 K/UL (ref 0–0.2)
BASOPHILS NFR BLD: 0.3 % (ref 0–2)
BILIRUB SERPL-MCNC: 0.9 MG/DL (ref 0–1.2)
BUN SERPL-MCNC: 12 MG/DL (ref 8–23)
CALCIUM SERPL-MCNC: 9.2 MG/DL (ref 8.8–10.2)
CHLORIDE SERPL-SCNC: 98 MMOL/L (ref 98–107)
CHOLEST SERPL-MCNC: 120 MG/DL (ref 0–200)
CO2 SERPL-SCNC: 26 MMOL/L (ref 20–29)
CREAT SERPL-MCNC: 0.98 MG/DL (ref 0.6–1.1)
DIFFERENTIAL METHOD BLD: ABNORMAL
EOSINOPHIL # BLD: 0.19 K/UL (ref 0–0.8)
EOSINOPHIL NFR BLD: 2.6 % (ref 0.5–7.8)
ERYTHROCYTE [DISTWIDTH] IN BLOOD BY AUTOMATED COUNT: 13.2 % (ref 11.9–14.6)
GLOBULIN SER CALC-MCNC: 2.8 G/DL (ref 2.3–3.5)
GLUCOSE SERPL-MCNC: 137 MG/DL (ref 70–99)
HCT VFR BLD AUTO: 38.8 % (ref 35.8–46.3)
HDLC SERPL-MCNC: 28 MG/DL (ref 40–60)
HDLC SERPL: 4.2 (ref 0–5)
HGB BLD-MCNC: 14 G/DL (ref 11.7–15.4)
IMM GRANULOCYTES # BLD AUTO: 0.03 K/UL (ref 0–0.5)
IMM GRANULOCYTES NFR BLD AUTO: 0.4 % (ref 0–5)
LDLC SERPL CALC-MCNC: 49 MG/DL (ref 0–100)
LYMPHOCYTES # BLD: 1.99 K/UL (ref 0.5–4.6)
LYMPHOCYTES NFR BLD: 27.3 % (ref 13–44)
MCH RBC QN AUTO: 30.6 PG (ref 26.1–32.9)
MCHC RBC AUTO-ENTMCNC: 36.1 G/DL (ref 31.4–35)
MCV RBC AUTO: 84.7 FL (ref 82–102)
MONOCYTES # BLD: 0.44 K/UL (ref 0.1–1.3)
MONOCYTES NFR BLD: 6 % (ref 4–12)
NEUTS SEG # BLD: 4.61 K/UL (ref 1.7–8.2)
NEUTS SEG NFR BLD: 63.4 % (ref 43–78)
NRBC # BLD: 0 K/UL (ref 0–0.2)
PLATELET # BLD AUTO: 288 K/UL (ref 150–450)
PMV BLD AUTO: 9.1 FL (ref 9.4–12.3)
POTASSIUM SERPL-SCNC: 4.3 MMOL/L (ref 3.5–5.1)
PROT SERPL-MCNC: 6.9 G/DL (ref 6.3–8.2)
RBC # BLD AUTO: 4.58 M/UL (ref 4.05–5.2)
SODIUM SERPL-SCNC: 137 MMOL/L (ref 136–145)
TRIGL SERPL-MCNC: 216 MG/DL (ref 0–150)
VLDLC SERPL CALC-MCNC: 43 MG/DL (ref 6–23)
WBC # BLD AUTO: 7.3 K/UL (ref 4.3–11.1)

## 2025-04-29 PROCEDURE — G0439 PPPS, SUBSEQ VISIT: HCPCS | Performed by: FAMILY MEDICINE

## 2025-04-29 PROCEDURE — G8427 DOCREV CUR MEDS BY ELIG CLIN: HCPCS | Performed by: FAMILY MEDICINE

## 2025-04-29 PROCEDURE — 3078F DIAST BP <80 MM HG: CPT | Performed by: FAMILY MEDICINE

## 2025-04-29 PROCEDURE — 3074F SYST BP LT 130 MM HG: CPT | Performed by: FAMILY MEDICINE

## 2025-04-29 PROCEDURE — 99213 OFFICE O/P EST LOW 20 MIN: CPT | Performed by: FAMILY MEDICINE

## 2025-04-29 PROCEDURE — 1036F TOBACCO NON-USER: CPT | Performed by: FAMILY MEDICINE

## 2025-04-29 PROCEDURE — 1123F ACP DISCUSS/DSCN MKR DOCD: CPT | Performed by: FAMILY MEDICINE

## 2025-04-29 PROCEDURE — G8399 PT W/DXA RESULTS DOCUMENT: HCPCS | Performed by: FAMILY MEDICINE

## 2025-04-29 PROCEDURE — 1159F MED LIST DOCD IN RCRD: CPT | Performed by: FAMILY MEDICINE

## 2025-04-29 PROCEDURE — 1090F PRES/ABSN URINE INCON ASSESS: CPT | Performed by: FAMILY MEDICINE

## 2025-04-29 PROCEDURE — G8417 CALC BMI ABV UP PARAM F/U: HCPCS | Performed by: FAMILY MEDICINE

## 2025-04-29 PROCEDURE — G2211 COMPLEX E/M VISIT ADD ON: HCPCS | Performed by: FAMILY MEDICINE

## 2025-04-29 ASSESSMENT — PATIENT HEALTH QUESTIONNAIRE - PHQ9
2. FEELING DOWN, DEPRESSED OR HOPELESS: SEVERAL DAYS
SUM OF ALL RESPONSES TO PHQ QUESTIONS 1-9: 5
4. FEELING TIRED OR HAVING LITTLE ENERGY: SEVERAL DAYS
1. LITTLE INTEREST OR PLEASURE IN DOING THINGS: NOT AT ALL
8. MOVING OR SPEAKING SO SLOWLY THAT OTHER PEOPLE COULD HAVE NOTICED. OR THE OPPOSITE, BEING SO FIGETY OR RESTLESS THAT YOU HAVE BEEN MOVING AROUND A LOT MORE THAN USUAL: NOT AT ALL
SUM OF ALL RESPONSES TO PHQ QUESTIONS 1-9: 0
7. TROUBLE CONCENTRATING ON THINGS, SUCH AS READING THE NEWSPAPER OR WATCHING TELEVISION: NOT AT ALL
5. POOR APPETITE OR OVEREATING: NOT AT ALL
10. IF YOU CHECKED OFF ANY PROBLEMS, HOW DIFFICULT HAVE THESE PROBLEMS MADE IT FOR YOU TO DO YOUR WORK, TAKE CARE OF THINGS AT HOME, OR GET ALONG WITH OTHER PEOPLE: NOT DIFFICULT AT ALL
3. TROUBLE FALLING OR STAYING ASLEEP: NEARLY EVERY DAY
SUM OF ALL RESPONSES TO PHQ QUESTIONS 1-9: 0
1. LITTLE INTEREST OR PLEASURE IN DOING THINGS: NOT AT ALL
SUM OF ALL RESPONSES TO PHQ QUESTIONS 1-9: 5
6. FEELING BAD ABOUT YOURSELF - OR THAT YOU ARE A FAILURE OR HAVE LET YOURSELF OR YOUR FAMILY DOWN: NOT AT ALL
SUM OF ALL RESPONSES TO PHQ QUESTIONS 1-9: 0
SUM OF ALL RESPONSES TO PHQ QUESTIONS 1-9: 5
SUM OF ALL RESPONSES TO PHQ QUESTIONS 1-9: 5
SUM OF ALL RESPONSES TO PHQ QUESTIONS 1-9: 0
9. THOUGHTS THAT YOU WOULD BE BETTER OFF DEAD, OR OF HURTING YOURSELF: NOT AT ALL

## 2025-04-29 ASSESSMENT — LIFESTYLE VARIABLES
HOW OFTEN DO YOU HAVE A DRINK CONTAINING ALCOHOL: NEVER
HOW MANY STANDARD DRINKS CONTAINING ALCOHOL DO YOU HAVE ON A TYPICAL DAY: PATIENT DOES NOT DRINK

## 2025-04-29 NOTE — PROGRESS NOTES
Have you been to the ER, urgent care clinic since your last visit?  Hospitalized since your last visit?   NO    Have you seen or consulted any other health care providers outside our system since your last visit?   NO            
Medicare Annual Wellness Visit    Kyra Corley is here for Medicare AWV (subs)    Assessment & Plan   Medicare annual wellness visit, subsequent     No follow-ups on file.     Subjective   who has a past medical history significant for diabetes with neuropathy, hypertension, high cholesterol, high triglycerides, hypothyroidism, chronic pain syndrome, recurrent depression, tension headaches and generalized anxiety.      Patient's complete Health Risk Assessment and screening values have been reviewed and are found in Flowsheets. The following problems were reviewed today and where indicated follow up appointments were made and/or referrals ordered.    Positive Risk Factor Screenings with Interventions:      Depression:  PHQ-2 Score: 1  PHQ-9 Total Score: 5  Total Score Interpretation: 5-9 = mild depression  Interventions:  Patient declines any further evaluation or treatment       Controlled Medication Review:    Today's Pain Level: No data recorded   Opioid Risk: (Low risk score <55) Opioid risk score: 38    Patient is low risk for opioid use disorder or overdose.    Last PDMP [place] as Reviewed:  Review User Review Instant Review Result                  General HRA Questions:  Select all that apply: (!) New or Increased Fatigue  Interventions Fatigue:  Will check metabolic panel and CBC.  Thyroid and diabetic monitoring used to endocrinology      Inactivity:  On average, how many days per week do you engage in moderate to strenuous exercise (like a brisk walk)?: 0 days (!) Abnormal  On average, how many minutes do you engage in exercise at this level?: 0 min  Interventions:  Encouraged activity     Abnormal BMI (obese):  Body mass index is 34.09 kg/m². (!) Abnormal  Interventions:  Patient declines any further evaluation or treatment         Hearing Screen:  Do you or your family notice any trouble with your hearing that hasn't been managed with hearing aids?: (!) Yes (wears hearing aids)    Interventions:  Patient 
Take 1 tablet by mouth nightly 90 tablet 1    metoprolol succinate (TOPROL XL) 100 MG extended release tablet Take 2 tablets by mouth daily 180 tablet 3    butalbital-APAP-caffeine -40 MG CAPS per capsule TAKE 1 CAPSULE BY MOUTH EVERY 4 HOURS AS NEEDED FOR HEADACHES DX:G44.209 20 capsule 1    vitamin D (ERGOCALCIFEROL) 1.25 MG (81873 UT) CAPS capsule TAKE 1 CAPSULE BY MOUTH ONCE WEEKLY 12 capsule 3    magnesium oxide (MAG-OX) 400 (240 Mg) MG tablet Take 1 tablet by mouth 2 times daily 180 tablet 6    furosemide (LASIX) 20 MG tablet TAKE 1 TABLET BY MOUTH THREE TIMES A WEEK 2-3 TIMES A WEEK 108 tablet 3    oxyCODONE HCl (OXY-IR) 10 MG immediate release tablet PLEASE SEE ATTACHED FOR DETAILED DIRECTIONS      YbStw-CcLypn-CE-B Cmp-C-Biot (INTEGRA PLUS) CAPS       nortriptyline (PAMELOR) 10 MG capsule Take 1 capsule by mouth 2 times daily      bisacodyl (DULCOLAX) 5 MG EC tablet Take 1 tablet by mouth      cetirizine (ZYRTEC) 10 MG tablet Take 1 tablet by mouth      esomeprazole (NEXIUM) 20 MG delayed release capsule Take 1 capsule by mouth daily      guaiFENesin (MUCINEX) 600 MG extended release tablet Take 1 tablet by mouth daily as needed      zolpidem (AMBIEN) 10 MG tablet       cycloSPORINE (RESTASIS) 0.05 % ophthalmic emulsion Apply 1 drop to eye daily as needed      docusate (COLACE, DULCOLAX) 100 MG CAPS Take 100 mg by mouth daily as needed      escitalopram (LEXAPRO) 20 MG tablet TAKE 1 TABLET BY MOUTH DAILY      fexofenadine (ALLEGRA) 180 MG tablet Take 1 tablet by mouth      LORazepam (ATIVAN) 0.5 MG tablet TK 1 T PO QID PRA      naloxone 4 MG/0.1ML LIQD nasal spray by Nasal route once       No current facility-administered medications for this visit.     Allergies   Allergen Reactions    Morpholine Salicylate      Other reaction(s): Headache-Intolerance, Hives/Swelling-Allergy    Celecoxib Swelling     Red rash, itching    Canagliflozin Other (See Comments)     \"HOT, SWEATY, BLACKED OUT\"    Clonidine

## 2025-05-27 ENCOUNTER — OFFICE VISIT (OUTPATIENT)
Dept: FAMILY MEDICINE CLINIC | Facility: CLINIC | Age: 80
End: 2025-05-27
Payer: MEDICARE

## 2025-05-27 VITALS
HEART RATE: 78 BPM | SYSTOLIC BLOOD PRESSURE: 136 MMHG | HEIGHT: 63 IN | DIASTOLIC BLOOD PRESSURE: 82 MMHG | OXYGEN SATURATION: 96 % | BODY MASS INDEX: 34.23 KG/M2 | WEIGHT: 193.2 LBS

## 2025-05-27 DIAGNOSIS — E78.00 PURE HYPERCHOLESTEROLEMIA: ICD-10-CM

## 2025-05-27 DIAGNOSIS — I50.22 CHRONIC SYSTOLIC (CONGESTIVE) HEART FAILURE (HCC): ICD-10-CM

## 2025-05-27 DIAGNOSIS — E78.1 PURE HYPERGLYCERIDEMIA: ICD-10-CM

## 2025-05-27 DIAGNOSIS — G89.4 CHRONIC PAIN SYNDROME: ICD-10-CM

## 2025-05-27 DIAGNOSIS — F33.9 RECURRENT DEPRESSION: ICD-10-CM

## 2025-05-27 DIAGNOSIS — E11.42 CONTROLLED TYPE 2 DIABETES MELLITUS WITH DIABETIC POLYNEUROPATHY, WITH LONG-TERM CURRENT USE OF INSULIN (HCC): ICD-10-CM

## 2025-05-27 DIAGNOSIS — Z79.4 CONTROLLED TYPE 2 DIABETES MELLITUS WITH DIABETIC POLYNEUROPATHY, WITH LONG-TERM CURRENT USE OF INSULIN (HCC): ICD-10-CM

## 2025-05-27 DIAGNOSIS — E03.4 HYPOTHYROIDISM DUE TO ACQUIRED ATROPHY OF THYROID: ICD-10-CM

## 2025-05-27 DIAGNOSIS — I10 PRIMARY HYPERTENSION: Primary | ICD-10-CM

## 2025-05-27 PROCEDURE — 3079F DIAST BP 80-89 MM HG: CPT | Performed by: FAMILY MEDICINE

## 2025-05-27 PROCEDURE — 99214 OFFICE O/P EST MOD 30 MIN: CPT | Performed by: FAMILY MEDICINE

## 2025-05-27 PROCEDURE — G8417 CALC BMI ABV UP PARAM F/U: HCPCS | Performed by: FAMILY MEDICINE

## 2025-05-27 PROCEDURE — G8399 PT W/DXA RESULTS DOCUMENT: HCPCS | Performed by: FAMILY MEDICINE

## 2025-05-27 PROCEDURE — G8427 DOCREV CUR MEDS BY ELIG CLIN: HCPCS | Performed by: FAMILY MEDICINE

## 2025-05-27 PROCEDURE — 3075F SYST BP GE 130 - 139MM HG: CPT | Performed by: FAMILY MEDICINE

## 2025-05-27 PROCEDURE — 1090F PRES/ABSN URINE INCON ASSESS: CPT | Performed by: FAMILY MEDICINE

## 2025-05-27 PROCEDURE — G2211 COMPLEX E/M VISIT ADD ON: HCPCS | Performed by: FAMILY MEDICINE

## 2025-05-27 PROCEDURE — 1036F TOBACCO NON-USER: CPT | Performed by: FAMILY MEDICINE

## 2025-05-27 PROCEDURE — 1159F MED LIST DOCD IN RCRD: CPT | Performed by: FAMILY MEDICINE

## 2025-05-27 PROCEDURE — 1123F ACP DISCUSS/DSCN MKR DOCD: CPT | Performed by: FAMILY MEDICINE

## 2025-05-27 ASSESSMENT — PATIENT HEALTH QUESTIONNAIRE - PHQ9
SUM OF ALL RESPONSES TO PHQ QUESTIONS 1-9: 2
1. LITTLE INTEREST OR PLEASURE IN DOING THINGS: SEVERAL DAYS
2. FEELING DOWN, DEPRESSED OR HOPELESS: SEVERAL DAYS

## 2025-05-27 NOTE — PROGRESS NOTES
Have you been to the ER, urgent care clinic since your last visit?  Hospitalized since your last visit?   NO    Have you seen or consulted any other health care providers outside our system since your last visit?   NO            
pulmonary hypertension (HCC)     per ECHO (2012)    Nausea & vomiting     post-op nausea    Neuropathy     bilateral legs    Obesity (BMI 30.0-34.9) 10/2/14    BMI- 30.7    Osteoarthritis     Panic attacks     Sacroiliac dysfunction     Seasonal allergic reaction     Sleep apnea     NO CPAP    Syncope and collapse 12/23/2015     Past Surgical History:   Procedure Laterality Date    BACK SURGERY  08/30/2013    spinal stimulator    BREAST BIOPSY Bilateral R/1988  L/2012    BREAST LUMPECTOMY Right     CARDIAC CATHETERIZATION  2005    CYST REMOVAL Left 02/2019    L thumb    CYST REMOVAL Right 04/2019    cyst on right foot removal    KNEE ARTHROSCOPY  10/03/2014    left knee torn menincus    KNEE ARTHROSCOPY Left 2004    left knee    ORTHOPEDIC SURGERY  04/03/2019    right big toe mass resection exostectomy    ORTHOPEDIC SURGERY Right 2009    hand nodule removed    ORTHOPEDIC SURGERY Left 2010    elbow scope    OTHER SURGICAL HISTORY  10/28/13    left hand-pointer and pinky finger cyst removed    OTHER SURGICAL HISTORY      nodule removed left foot and hand    OTHER SURGICAL HISTORY  12/22/09    TENS implant [facility]    PRE-MALIGNANT / BENIGN SKIN LESION EXCISION  09/29/2014    lower lip    SASKIA AND BSO (CERVIX REMOVED)  1995    THYROIDECTOMY, PARTIAL  2008    partial thyroidectomy and parathyroidectomy    TOTAL KNEE ARTHROPLASTY Right 05/2019    TOTAL KNEE ARTHROPLASTY Left 03/14/2017     Family History   Problem Relation Age of Onset    Hypertension Mother     Stroke Mother         TIAs- several    Diabetes Mother     Breast Cancer Mother     Cancer Mother         Breast    Heart Attack Mother     Heart Disease Mother     Osteoporosis Mother     Breast Cancer Maternal Aunt         3 Maternal Aunts    Breast Cancer Paternal Aunt     Diabetes Father     Heart Disease Father         CHF/ CAD    Lung Disease Father         \"holes in lungs- exposed to toxins in textiles\"    Cancer Father         Leukemia    Asthma Father

## 2025-06-20 RX ORDER — IRBESARTAN 300 MG/1
300 TABLET ORAL NIGHTLY
Qty: 90 TABLET | Refills: 1 | Status: SHIPPED | OUTPATIENT
Start: 2025-06-20

## 2025-07-06 DIAGNOSIS — G44.209 TENSION-TYPE HEADACHE, NOT INTRACTABLE, UNSPECIFIED CHRONICITY PATTERN: ICD-10-CM

## 2025-07-07 RX ORDER — BUTALBITAL, ACETAMINOPHEN AND CAFFEINE 300; 40; 50 MG/1; MG/1; MG/1
1 CAPSULE ORAL EVERY 4 HOURS PRN
Qty: 20 CAPSULE | Refills: 1 | Status: SHIPPED | OUTPATIENT
Start: 2025-07-07 | End: 2025-07-08 | Stop reason: SDUPTHER

## 2025-07-08 DIAGNOSIS — G44.209 TENSION-TYPE HEADACHE, NOT INTRACTABLE, UNSPECIFIED CHRONICITY PATTERN: ICD-10-CM

## 2025-07-08 RX ORDER — BUTALBITAL, ACETAMINOPHEN AND CAFFEINE 300; 40; 50 MG/1; MG/1; MG/1
1 CAPSULE ORAL EVERY 4 HOURS PRN
Qty: 20 CAPSULE | Refills: 1 | Status: SHIPPED | OUTPATIENT
Start: 2025-07-08

## 2025-07-17 ENCOUNTER — TELEPHONE (OUTPATIENT)
Dept: FAMILY MEDICINE CLINIC | Facility: CLINIC | Age: 80
End: 2025-07-17

## 2025-07-17 NOTE — TELEPHONE ENCOUNTER
Pharmacist called and stated that the butalbital is not coming through escript correctly. Wants to know if we can auth a verbal?  Per Dr Albright, ok to fill     Take 1 capsule by mouth every 4 hours as needed for Headaches DX:G44.209 Dispense: 20 capsule, Refills: 1 ordered

## 2025-07-18 ENCOUNTER — TELEPHONE (OUTPATIENT)
Age: 80
End: 2025-07-18

## 2025-07-18 NOTE — TELEPHONE ENCOUNTER
MEDICATION REFILL REQUEST      Name of Medication:  Magnesium oxide   Dose:  240 mg  Frequency:  twice a day   Quantity:    Days' supply:        Pharmacy Name/Location:  [facility] [phone]

## 2025-07-21 ENCOUNTER — TELEPHONE (OUTPATIENT)
Age: 80
End: 2025-07-21

## 2025-07-21 NOTE — TELEPHONE ENCOUNTER
Patient's daughter calling asking if her Mother needs to be seen by Dr Cota she said she usually see's Dr Cota every year but did not get any letter in the mail and unsure if Dr Cota should see her. She also wants to know if pt should keep taking her medicine.

## 2025-07-22 RX ORDER — LANOLIN ALCOHOL/MO/W.PET/CERES
400 CREAM (GRAM) TOPICAL 2 TIMES DAILY
Qty: 60 TABLET | Refills: 0 | Status: SHIPPED | OUTPATIENT
Start: 2025-07-22

## 2025-07-22 NOTE — TELEPHONE ENCOUNTER
MEDICATION REFILL REQUEST      Name of Medication:  magnesium oxide (MAG-OX) 400 (240 Mg) MG tablet   Dose:    Frequency:    Quantity:    Days' supply:  60    Pharmacy Name/Location:      [facility] / pharmacy  # [id number]  [address] [place] [address]    Please call and advise if need be.

## 2025-07-22 NOTE — TELEPHONE ENCOUNTER
Last office visit 4/19/2023.  Patient scheduled for 8/5/2025.  Rx verified in last office visit .    Requested Prescriptions     Pending Prescriptions Disp Refills    magnesium oxide (MAG-OX) 400 (240 Mg) MG tablet 60 tablet 0     Sig: Take 1 tablet by mouth 2 times daily

## 2025-08-05 ENCOUNTER — OFFICE VISIT (OUTPATIENT)
Age: 80
End: 2025-08-05
Payer: MEDICARE

## 2025-08-05 VITALS
HEIGHT: 63 IN | BODY MASS INDEX: 34.73 KG/M2 | SYSTOLIC BLOOD PRESSURE: 138 MMHG | HEART RATE: 80 BPM | WEIGHT: 196 LBS | DIASTOLIC BLOOD PRESSURE: 78 MMHG

## 2025-08-05 DIAGNOSIS — E78.2 MIXED HYPERLIPIDEMIA: ICD-10-CM

## 2025-08-05 DIAGNOSIS — I10 ESSENTIAL HYPERTENSION: ICD-10-CM

## 2025-08-05 DIAGNOSIS — I34.0 NONRHEUMATIC MITRAL VALVE REGURGITATION: ICD-10-CM

## 2025-08-05 DIAGNOSIS — R06.02 SHORTNESS OF BREATH: ICD-10-CM

## 2025-08-05 DIAGNOSIS — I50.22 CHRONIC SYSTOLIC (CONGESTIVE) HEART FAILURE (HCC): Primary | ICD-10-CM

## 2025-08-05 DIAGNOSIS — N18.31 STAGE 3A CHRONIC KIDNEY DISEASE (HCC): ICD-10-CM

## 2025-08-05 PROCEDURE — 1126F AMNT PAIN NOTED NONE PRSNT: CPT | Performed by: INTERNAL MEDICINE

## 2025-08-05 PROCEDURE — G8399 PT W/DXA RESULTS DOCUMENT: HCPCS | Performed by: INTERNAL MEDICINE

## 2025-08-05 PROCEDURE — 1090F PRES/ABSN URINE INCON ASSESS: CPT | Performed by: INTERNAL MEDICINE

## 2025-08-05 PROCEDURE — 3078F DIAST BP <80 MM HG: CPT | Performed by: INTERNAL MEDICINE

## 2025-08-05 PROCEDURE — 1036F TOBACCO NON-USER: CPT | Performed by: INTERNAL MEDICINE

## 2025-08-05 PROCEDURE — G8417 CALC BMI ABV UP PARAM F/U: HCPCS | Performed by: INTERNAL MEDICINE

## 2025-08-05 PROCEDURE — 3075F SYST BP GE 130 - 139MM HG: CPT | Performed by: INTERNAL MEDICINE

## 2025-08-05 PROCEDURE — 1123F ACP DISCUSS/DSCN MKR DOCD: CPT | Performed by: INTERNAL MEDICINE

## 2025-08-05 PROCEDURE — 99214 OFFICE O/P EST MOD 30 MIN: CPT | Performed by: INTERNAL MEDICINE

## 2025-08-05 PROCEDURE — G8428 CUR MEDS NOT DOCUMENT: HCPCS | Performed by: INTERNAL MEDICINE

## 2025-08-20 RX ORDER — LANOLIN ALCOHOL/MO/W.PET/CERES
400 CREAM (GRAM) TOPICAL 2 TIMES DAILY
Qty: 60 TABLET | Refills: 0 | Status: CANCELLED | OUTPATIENT
Start: 2025-08-20

## 2025-08-21 RX ORDER — LANOLIN ALCOHOL/MO/W.PET/CERES
400 CREAM (GRAM) TOPICAL 2 TIMES DAILY
Qty: 180 TABLET | Refills: 3 | Status: SHIPPED | OUTPATIENT
Start: 2025-08-21

## 2025-09-02 ENCOUNTER — OFFICE VISIT (OUTPATIENT)
Dept: ENDOCRINOLOGY | Age: 80
End: 2025-09-02
Payer: MEDICARE

## 2025-09-02 VITALS
DIASTOLIC BLOOD PRESSURE: 90 MMHG | HEART RATE: 69 BPM | WEIGHT: 194.4 LBS | SYSTOLIC BLOOD PRESSURE: 124 MMHG | OXYGEN SATURATION: 95 % | BODY MASS INDEX: 33.19 KG/M2 | HEIGHT: 64 IN

## 2025-09-02 DIAGNOSIS — E89.0 POSTSURGICAL HYPOTHYROIDISM: ICD-10-CM

## 2025-09-02 DIAGNOSIS — E11.42 CONTROLLED TYPE 2 DIABETES MELLITUS WITH DIABETIC POLYNEUROPATHY, WITH LONG-TERM CURRENT USE OF INSULIN (HCC): Primary | ICD-10-CM

## 2025-09-02 DIAGNOSIS — Z79.4 CONTROLLED TYPE 2 DIABETES MELLITUS WITH DIABETIC POLYNEUROPATHY, WITH LONG-TERM CURRENT USE OF INSULIN (HCC): Primary | ICD-10-CM

## 2025-09-02 DIAGNOSIS — E78.2 MIXED HYPERLIPIDEMIA: ICD-10-CM

## 2025-09-02 DIAGNOSIS — I10 ESSENTIAL HYPERTENSION: ICD-10-CM

## 2025-09-02 LAB — HBA1C MFR BLD: 6 %

## 2025-09-02 PROCEDURE — 83036 HEMOGLOBIN GLYCOSYLATED A1C: CPT | Performed by: INTERNAL MEDICINE

## 2025-09-02 PROCEDURE — G8427 DOCREV CUR MEDS BY ELIG CLIN: HCPCS | Performed by: INTERNAL MEDICINE

## 2025-09-02 PROCEDURE — G8399 PT W/DXA RESULTS DOCUMENT: HCPCS | Performed by: INTERNAL MEDICINE

## 2025-09-02 PROCEDURE — 1090F PRES/ABSN URINE INCON ASSESS: CPT | Performed by: INTERNAL MEDICINE

## 2025-09-02 PROCEDURE — 1123F ACP DISCUSS/DSCN MKR DOCD: CPT | Performed by: INTERNAL MEDICINE

## 2025-09-02 PROCEDURE — 3080F DIAST BP >= 90 MM HG: CPT | Performed by: INTERNAL MEDICINE

## 2025-09-02 PROCEDURE — 1159F MED LIST DOCD IN RCRD: CPT | Performed by: INTERNAL MEDICINE

## 2025-09-02 PROCEDURE — 99214 OFFICE O/P EST MOD 30 MIN: CPT | Performed by: INTERNAL MEDICINE

## 2025-09-02 PROCEDURE — G8417 CALC BMI ABV UP PARAM F/U: HCPCS | Performed by: INTERNAL MEDICINE

## 2025-09-02 PROCEDURE — G2211 COMPLEX E/M VISIT ADD ON: HCPCS | Performed by: INTERNAL MEDICINE

## 2025-09-02 PROCEDURE — 3044F HG A1C LEVEL LT 7.0%: CPT | Performed by: INTERNAL MEDICINE

## 2025-09-02 PROCEDURE — 1036F TOBACCO NON-USER: CPT | Performed by: INTERNAL MEDICINE

## 2025-09-02 PROCEDURE — 3074F SYST BP LT 130 MM HG: CPT | Performed by: INTERNAL MEDICINE

## 2025-09-02 RX ORDER — DULAGLUTIDE 1.5 MG/.5ML
1.5 INJECTION, SOLUTION SUBCUTANEOUS WEEKLY
Qty: 2 ML | Refills: 11 | Status: SHIPPED | OUTPATIENT
Start: 2025-09-02

## 2025-09-02 RX ORDER — METFORMIN HYDROCHLORIDE 500 MG/1
500 TABLET, EXTENDED RELEASE ORAL DAILY
Qty: 30 TABLET | Refills: 11 | Status: SHIPPED | OUTPATIENT
Start: 2025-09-02

## 2025-09-02 RX ORDER — INSULIN GLARGINE 300 U/ML
50 INJECTION, SOLUTION SUBCUTANEOUS NIGHTLY
Qty: 9 ML | Refills: 11 | Status: SHIPPED | OUTPATIENT
Start: 2025-09-02

## 2025-09-02 RX ORDER — LEVOTHYROXINE SODIUM 112 UG/1
TABLET ORAL
Qty: 30 TABLET | Refills: 11 | Status: SHIPPED | OUTPATIENT
Start: 2025-09-02

## 2025-09-02 RX ORDER — EZETIMIBE AND SIMVASTATIN 10; 40 MG/1; MG/1
TABLET ORAL
Qty: 30 TABLET | Refills: 11 | Status: SHIPPED | OUTPATIENT
Start: 2025-09-02

## 2025-09-02 RX ORDER — PEN NEEDLE, DIABETIC 32GX 5/32"
NEEDLE, DISPOSABLE MISCELLANEOUS
Qty: 50 EACH | Refills: 11 | Status: SHIPPED | OUTPATIENT
Start: 2025-09-02

## 2025-09-02 ASSESSMENT — ENCOUNTER SYMPTOMS
CONSTIPATION: 1
DIARRHEA: 0

## (undated) DEVICE — MEDI-VAC YANKAUER SUCTION HANDLE W/BULBOUS TIP: Brand: CARDINAL HEALTH

## (undated) DEVICE — BIPOLAR SEALER 23-112-1 AQM 6.0: Brand: AQUAMANTYS ®

## (undated) DEVICE — BLADE SAW W12.5XL73.5MM THK0.8MM CUT THK1MM RECIP FOR L BNE

## (undated) DEVICE — TRAY PREP DRY W/ PREM GLV 2 APPL 6 SPNG 2 UNDPD 1 OVERWRAP

## (undated) DEVICE — SKIN STAPLER: Brand: SIGNET

## (undated) DEVICE — SOLUTION IRRIG 3000ML 0.9% SOD CHL FLX CONT 0797208] ICU MEDICAL INC]

## (undated) DEVICE — NEEDLE HYPO 18GA L1.5IN PNK S STL HUB POLYPR SHLD REG BVL

## (undated) DEVICE — PACK PROCEDURE SURG TOT KNEE

## (undated) DEVICE — STRETCH BANDAGE ROLL: Brand: DERMACEA

## (undated) DEVICE — SYR 50ML LR LCK 1ML GRAD NSAF --

## (undated) DEVICE — GAUZE,SPONGE,8"X4",12PLY,XRAY,STRL,LF: Brand: MEDLINE

## (undated) DEVICE — GOWN,REINF,POLY,ECL,PP SLV,XL: Brand: MEDLINE

## (undated) DEVICE — BLADE SAW W12.5XL70MM THK0.8MM CUT THK1.12MM S STL RECIP

## (undated) DEVICE — BUTTON SWITCH PENCIL BLADE ELECTRODE, HOLSTER: Brand: EDGE

## (undated) DEVICE — T4 HOOD

## (undated) DEVICE — Device

## (undated) DEVICE — 3000CC GUARDIAN II: Brand: GUARDIAN

## (undated) DEVICE — (D)PREP SKN CHLRAPRP APPL 26ML -- CONVERT TO ITEM 371833

## (undated) DEVICE — DRAPE,TOP,102X53,STERILE: Brand: MEDLINE

## (undated) DEVICE — REM POLYHESIVE ADULT PATIENT RETURN ELECTRODE: Brand: VALLEYLAB

## (undated) DEVICE — SOLUTION IV 1000ML 0.9% SOD CHL

## (undated) DEVICE — CURETTE BNE CEM 10IN DISP --

## (undated) DEVICE — PENROSE DRAIN 12" X 1/4: Brand: CARDINAL HEALTH

## (undated) DEVICE — 3M™ IOBAN™ 2 ANTIMICROBIAL INCISE DRAPE 6650EZ: Brand: IOBAN™ 2

## (undated) DEVICE — HANDPIECE SET WITH COAXIAL HIGH FLOW TIP AND SUCTION TUBE: Brand: INTERPULSE

## (undated) DEVICE — 2000CC GUARDIAN II: Brand: GUARDIAN

## (undated) DEVICE — MEDI-VAC NON-CONDUCTIVE SUCTION TUBING: Brand: CARDINAL HEALTH

## (undated) DEVICE — DRAPE SHT 3 QTR PROXIMA 53X77 --

## (undated) DEVICE — ABDOMINAL PAD: Brand: DERMACEA

## (undated) DEVICE — DRAPE,U/SHT,SPLIT,FILM,60X84,STERILE: Brand: MEDLINE

## (undated) DEVICE — Device: Brand: POWER-FLO®

## (undated) DEVICE — SUTURE VCRL SZ 1 L27IN ABSRB UD L36MM CP-1 1/2 CIR REV CUT J268H

## (undated) DEVICE — BLADE SAW PAT RMR PILT H 46MM --

## (undated) DEVICE — SOLUTION IV 500ML 0.9% SOD CHL FLX CONT

## (undated) DEVICE — CARDINAL HEALTH FLEXIBLE LIGHT HANDLE COVER: Brand: CARDINAL HEALTH

## (undated) DEVICE — SUTURE VCRL SZ 2-0 L27IN ABSRB UD L36MM CP-1 1/2 CIR REV J266H

## (undated) DEVICE — SUTURE ETHLN SZ 4-0 L18IN NONABSORBABLE BLK L19MM PS-2 3/8 1667H

## (undated) DEVICE — MCLASS OSCILLATING SAW BLADE 19 X 1.27 (0.050") X 90 MM: Brand: MCLASS

## (undated) DEVICE — AMD ANTIMICROBIAL GAUZE SPONGES,12 PLY USP TYPE VII, 0.2% POLYHEXAMETHYLENE BIGUANIDE HCI (PHMB): Brand: CURITY

## (undated) DEVICE — BANDAGE COMPR SELF ADH 5 YDX4 IN TAN STRL PREMIERPRO LF

## (undated) DEVICE — Z DISCONTINUED USE 2744636  DRESSING AQUACEL 14 IN ALG W3.5XL14IN POLYUR FLM CVR W/ HYDRCOLL

## (undated) DEVICE — CONTAINER,SPECIMEN,O.R.STRL,4.5OZ: Brand: MEDLINE

## (undated) DEVICE — SYR LR LCK 1ML GRAD NSAF 30ML --

## (undated) DEVICE — SOLUTION SCRB 4OZ 4% CHG CLN BASE FOR PT SKIN ANTISEPSIS

## (undated) DEVICE — TRAY CATH 16F DRN BG LTX -- CONVERT TO ITEM 363158

## (undated) DEVICE — SUTURE STRATAFIX SPRL SZ 1 L14IN ABSRB VLT L48CM CTX 1/2 SXPD2B405

## (undated) DEVICE — DUAL CUT SAGITTAL BLADE

## (undated) DEVICE — AMD ANTIMICROBIAL BANDAGE ROLL,6 PLY: Brand: KERLIX

## (undated) DEVICE — FAN SPRAY KIT: Brand: PULSAVAC®

## (undated) DEVICE — 3M™ COBAN™ NL STERILE NON-LATEX SELF-ADHERENT WRAP, 2084S, 4 IN X 5 YD (10 CM X 4,5 M), 18 ROLLS/CASE: Brand: 3M™ COBAN™

## (undated) DEVICE — PACK TKR SZ 5 FEM PREP TRL POST STBL INTUITION SOLO ATTUNE

## (undated) DEVICE — X-RAY SPONGES,12 PLY: Brand: DERMACEA

## (undated) DEVICE — SUTURE STRATAFIX SPRL SZ 1 L5IN ABSRB VLT CT-1 L36MM 1/2 SXPD2B401

## (undated) DEVICE — SUTURE MCRYL SZ 4-0 L18IN ABSRB UD L19MM PS-2 3/8 CIR PRIM Y496G

## (undated) DEVICE — SLIM BODY SKIN STAPLER: Brand: APPOSE ULC